# Patient Record
Sex: FEMALE | Race: WHITE | Employment: PART TIME | URBAN - NONMETROPOLITAN AREA
[De-identification: names, ages, dates, MRNs, and addresses within clinical notes are randomized per-mention and may not be internally consistent; named-entity substitution may affect disease eponyms.]

---

## 2017-02-21 ENCOUNTER — OFFICE VISIT (OUTPATIENT)
Dept: FAMILY MEDICINE CLINIC | Age: 67
End: 2017-02-21

## 2017-02-21 VITALS
SYSTOLIC BLOOD PRESSURE: 128 MMHG | DIASTOLIC BLOOD PRESSURE: 76 MMHG | BODY MASS INDEX: 31.59 KG/M2 | OXYGEN SATURATION: 98 % | HEART RATE: 97 BPM | HEIGHT: 65 IN | WEIGHT: 189.6 LBS

## 2017-02-21 DIAGNOSIS — R07.89 CHEST TIGHTNESS: ICD-10-CM

## 2017-02-21 DIAGNOSIS — M50.20 DISPLACEMENT OF CERVICAL INTERVERTEBRAL DISC WITHOUT MYELOPATHY: Primary | ICD-10-CM

## 2017-02-21 DIAGNOSIS — M54.2 NECK PAIN: ICD-10-CM

## 2017-02-21 DIAGNOSIS — F32.3 DEPRESSIVE PSYCHOSIS (HCC): ICD-10-CM

## 2017-02-21 PROCEDURE — 99213 OFFICE O/P EST LOW 20 MIN: CPT | Performed by: FAMILY MEDICINE

## 2017-02-21 RX ORDER — TRAMADOL HYDROCHLORIDE 50 MG/1
TABLET ORAL
Qty: 120 TABLET | Refills: 0 | Status: SHIPPED | OUTPATIENT
Start: 2017-02-21 | End: 2017-05-05 | Stop reason: SDUPTHER

## 2017-02-21 RX ORDER — GABAPENTIN 100 MG/1
CAPSULE ORAL
Qty: 120 CAPSULE | Refills: 11 | Status: SHIPPED | OUTPATIENT
Start: 2017-02-21 | End: 2017-08-21 | Stop reason: DRUGHIGH

## 2017-02-25 LAB
6-ACETYLMORPHINE: NOT DETECTED
7-AMINOCLONAZEPAM: NOT DETECTED
ALPHA-OH-ALPRAZOLAM: NOT DETECTED
ALPRAZOLAM: NOT DETECTED
AMPHETAMINE: NOT DETECTED
BARBITURATES: NOT DETECTED
BENZOYLECGONINE: NOT DETECTED
BUPRENORPHINE: NOT DETECTED
CARISOPRODOL: NOT DETECTED
CLONAZEPAM: NOT DETECTED
CODEINE: NOT DETECTED
CREATININE URINE: 264.1 MG/DL (ref 20–400)
DIAZEPAM: NOT DETECTED
DRUGS EXPECTED: NORMAL
EER PAIN MGT DRUG PANEL, HIGH RES/EMIT U: NORMAL
ETHYL GLUCURONIDE: NOT DETECTED
FENTANYL: NOT DETECTED
HYDROCODONE: NOT DETECTED
HYDROMORPHONE: NOT DETECTED
LORAZEPAM: NOT DETECTED
MARIJUANA METABOLITE: NOT DETECTED
MDA: NOT DETECTED
MDEA: NOT DETECTED
MDMA URINE: NOT DETECTED
MEPERIDINE: NOT DETECTED
METHADONE: NOT DETECTED
METHAMPHETAMINE: NOT DETECTED
METHYLPHENIDATE: NOT DETECTED
MIDAZOLAM: NOT DETECTED
MORPHINE: NOT DETECTED
NORBUPRENORPHINE, FREE: NOT DETECTED
NORDIAZEPAM: NOT DETECTED
NORFENTANYL: NOT DETECTED
NORHYDROCODONE, URINE: NOT DETECTED
NOROXYCODONE: NOT DETECTED
NOROXYMORPHONE, URINE: NOT DETECTED
OXAZEPAM: NOT DETECTED
OXYCODONE: NOT DETECTED
OXYMORPHONE: NOT DETECTED
PAIN MANAGEMENT DRUG PANEL: NORMAL
PAIN MANAGEMENT DRUG PANEL: NORMAL
PCP: NOT DETECTED
PHENTERMINE: NOT DETECTED
PROPOXYPHENE: NOT DETECTED
TAPENTADOL, URINE: NOT DETECTED
TAPENTADOL-O-SULFATE, URINE: NOT DETECTED
TEMAZEPAM: NOT DETECTED
TRAMADOL: PRESENT
ZOLPIDEM: NOT DETECTED

## 2017-03-01 ENCOUNTER — TELEPHONE (OUTPATIENT)
Dept: FAMILY MEDICINE CLINIC | Age: 67
End: 2017-03-01

## 2017-03-07 ENCOUNTER — NURSE ONLY (OUTPATIENT)
Dept: FAMILY MEDICINE CLINIC | Age: 67
End: 2017-03-07

## 2017-03-07 DIAGNOSIS — R07.89 CHEST TIGHTNESS: ICD-10-CM

## 2017-03-07 DIAGNOSIS — R07.9 CHEST PAIN, UNSPECIFIED TYPE: Primary | ICD-10-CM

## 2017-03-07 PROCEDURE — 93000 ELECTROCARDIOGRAM COMPLETE: CPT | Performed by: FAMILY MEDICINE

## 2017-03-20 ENCOUNTER — HOSPITAL ENCOUNTER (OUTPATIENT)
Dept: OTHER | Age: 67
Discharge: HOME | End: 2017-03-20
Admitting: FAMILY MEDICINE

## 2017-03-20 ENCOUNTER — HOSPITAL ENCOUNTER (OUTPATIENT)
Dept: NUCLEAR MEDICINE | Age: 67
Discharge: HOME | End: 2017-03-20
Attending: FAMILY MEDICINE | Admitting: FAMILY MEDICINE

## 2017-03-20 DIAGNOSIS — R07.9 CHEST PAIN: ICD-10-CM

## 2017-03-20 DIAGNOSIS — R94.31 ABNORMAL EKG: Primary | ICD-10-CM

## 2017-03-20 LAB
LV EF: 27 %
LVEF MODALITY: NORMAL

## 2017-03-20 RX ORDER — ASPIRIN 81 MG/1
81 TABLET ORAL DAILY
Qty: 30 TABLET | Refills: 3 | COMMUNITY
Start: 2017-03-20

## 2017-03-20 RX ORDER — ATORVASTATIN CALCIUM 40 MG/1
80 TABLET, FILM COATED ORAL DAILY
Qty: 60 TABLET | Refills: 11 | Status: SHIPPED | OUTPATIENT
Start: 2017-03-20 | End: 2018-03-05 | Stop reason: SDUPTHER

## 2017-03-20 RX ORDER — METOPROLOL SUCCINATE 25 MG/1
25 TABLET, EXTENDED RELEASE ORAL DAILY
Qty: 30 TABLET | Refills: 11 | Status: SHIPPED | OUTPATIENT
Start: 2017-03-20 | End: 2017-03-30 | Stop reason: SDUPTHER

## 2017-03-21 ENCOUNTER — OFFICE VISIT (OUTPATIENT)
Dept: CARDIOLOGY CLINIC | Age: 67
End: 2017-03-21

## 2017-03-21 VITALS
DIASTOLIC BLOOD PRESSURE: 76 MMHG | BODY MASS INDEX: 31.49 KG/M2 | HEIGHT: 65 IN | HEART RATE: 82 BPM | WEIGHT: 189 LBS | SYSTOLIC BLOOD PRESSURE: 126 MMHG

## 2017-03-21 DIAGNOSIS — R07.2 PRECORDIAL PAIN: ICD-10-CM

## 2017-03-21 DIAGNOSIS — R94.39 ABNORMAL STRESS TEST: Primary | ICD-10-CM

## 2017-03-21 DIAGNOSIS — R94.31 ABNORMAL ECG: ICD-10-CM

## 2017-03-21 PROCEDURE — 3014F SCREEN MAMMO DOC REV: CPT | Performed by: INTERNAL MEDICINE

## 2017-03-21 PROCEDURE — 1123F ACP DISCUSS/DSCN MKR DOCD: CPT | Performed by: INTERNAL MEDICINE

## 2017-03-21 PROCEDURE — 99205 OFFICE O/P NEW HI 60 MIN: CPT | Performed by: INTERNAL MEDICINE

## 2017-03-21 PROCEDURE — G8417 CALC BMI ABV UP PARAM F/U: HCPCS | Performed by: INTERNAL MEDICINE

## 2017-03-21 PROCEDURE — 1090F PRES/ABSN URINE INCON ASSESS: CPT | Performed by: INTERNAL MEDICINE

## 2017-03-21 PROCEDURE — G8484 FLU IMMUNIZE NO ADMIN: HCPCS | Performed by: INTERNAL MEDICINE

## 2017-03-21 PROCEDURE — 3017F COLORECTAL CA SCREEN DOC REV: CPT | Performed by: INTERNAL MEDICINE

## 2017-03-21 PROCEDURE — G8400 PT W/DXA NO RESULTS DOC: HCPCS | Performed by: INTERNAL MEDICINE

## 2017-03-21 PROCEDURE — G8427 DOCREV CUR MEDS BY ELIG CLIN: HCPCS | Performed by: INTERNAL MEDICINE

## 2017-03-21 PROCEDURE — 1036F TOBACCO NON-USER: CPT | Performed by: INTERNAL MEDICINE

## 2017-03-21 PROCEDURE — 4040F PNEUMOC VAC/ADMIN/RCVD: CPT | Performed by: INTERNAL MEDICINE

## 2017-03-21 RX ORDER — NITROGLYCERIN 0.4 MG/1
0.4 TABLET SUBLINGUAL EVERY 5 MIN PRN
Qty: 25 TABLET | Refills: 3 | Status: SHIPPED | OUTPATIENT
Start: 2017-03-21 | End: 2019-04-30 | Stop reason: CLARIF

## 2017-03-22 PROBLEM — I10 HTN (HYPERTENSION): Status: ACTIVE | Noted: 2017-03-22

## 2017-03-22 PROBLEM — R07.9 CHEST PAIN: Status: ACTIVE | Noted: 2017-03-21

## 2017-03-23 ENCOUNTER — HOSPITAL ENCOUNTER (OUTPATIENT)
Dept: OTHER | Age: 67
Discharge: HOME | End: 2017-03-23
Attending: INTERNAL MEDICINE | Admitting: INTERNAL MEDICINE

## 2017-03-24 PROBLEM — I50.22 SYSTOLIC CHF, CHRONIC (HCC): Status: ACTIVE | Noted: 2017-03-24

## 2017-03-24 PROBLEM — I42.9 CARDIOMYOPATHY, IDIOPATHIC (HCC): Status: ACTIVE | Noted: 2017-03-24

## 2017-03-27 ENCOUNTER — TELEPHONE (OUTPATIENT)
Dept: CARDIAC REHAB | Age: 67
End: 2017-03-27

## 2017-03-28 ENCOUNTER — TELEPHONE (OUTPATIENT)
Dept: CARDIOLOGY CLINIC | Age: 67
End: 2017-03-28

## 2017-03-30 ENCOUNTER — OFFICE VISIT (OUTPATIENT)
Dept: CARDIOLOGY CLINIC | Age: 67
End: 2017-03-30

## 2017-03-30 ENCOUNTER — HOSPITAL ENCOUNTER (OUTPATIENT)
Dept: GENERAL RADIOLOGY | Age: 67
Discharge: HOME | End: 2017-03-30
Attending: NURSE PRACTITIONER | Admitting: NURSE PRACTITIONER

## 2017-03-30 VITALS
WEIGHT: 184 LBS | BODY MASS INDEX: 30.66 KG/M2 | DIASTOLIC BLOOD PRESSURE: 60 MMHG | OXYGEN SATURATION: 94 % | HEART RATE: 84 BPM | HEIGHT: 65 IN | SYSTOLIC BLOOD PRESSURE: 110 MMHG

## 2017-03-30 DIAGNOSIS — I50.20 SYSTOLIC HEART FAILURE, UNSPECIFIED HEART FAILURE CHRONICITY: Primary | ICD-10-CM

## 2017-03-30 DIAGNOSIS — I50.21 ACUTE SYSTOLIC CONGESTIVE HEART FAILURE (HCC): ICD-10-CM

## 2017-03-30 DIAGNOSIS — I50.20 SYSTOLIC HEART FAILURE, UNSPECIFIED HEART FAILURE CHRONICITY: ICD-10-CM

## 2017-03-30 DIAGNOSIS — I10 ESSENTIAL HYPERTENSION: ICD-10-CM

## 2017-03-30 DIAGNOSIS — I42.9 CARDIOMYOPATHY, IDIOPATHIC (HCC): ICD-10-CM

## 2017-03-30 DIAGNOSIS — R06.02 SHORTNESS OF BREATH: ICD-10-CM

## 2017-03-30 LAB
ANION GAP SERPL CALCULATED.3IONS-SCNC: 13 MMOL/L (ref 3–16)
BUN BLDV-MCNC: 20 MG/DL (ref 7–20)
CALCIUM SERPL-MCNC: 9.6 MG/DL (ref 8.3–10.6)
CHLORIDE BLD-SCNC: 100 MMOL/L (ref 99–110)
CO2: 26 MMOL/L (ref 21–32)
CREAT SERPL-MCNC: 0.8 MG/DL (ref 0.6–1.2)
GFR AFRICAN AMERICAN: >60
GFR NON-AFRICAN AMERICAN: >60
GLUCOSE BLD-MCNC: 95 MG/DL (ref 70–99)
POTASSIUM SERPL-SCNC: 4.3 MMOL/L (ref 3.5–5.1)
PRO-BNP: 867 PG/ML (ref 0–124)
SODIUM BLD-SCNC: 139 MMOL/L (ref 136–145)

## 2017-03-30 PROCEDURE — 1090F PRES/ABSN URINE INCON ASSESS: CPT | Performed by: NURSE PRACTITIONER

## 2017-03-30 PROCEDURE — 99215 OFFICE O/P EST HI 40 MIN: CPT | Performed by: NURSE PRACTITIONER

## 2017-03-30 PROCEDURE — 4040F PNEUMOC VAC/ADMIN/RCVD: CPT | Performed by: NURSE PRACTITIONER

## 2017-03-30 PROCEDURE — G8417 CALC BMI ABV UP PARAM F/U: HCPCS | Performed by: NURSE PRACTITIONER

## 2017-03-30 PROCEDURE — 1123F ACP DISCUSS/DSCN MKR DOCD: CPT | Performed by: NURSE PRACTITIONER

## 2017-03-30 PROCEDURE — G8427 DOCREV CUR MEDS BY ELIG CLIN: HCPCS | Performed by: NURSE PRACTITIONER

## 2017-03-30 PROCEDURE — 3014F SCREEN MAMMO DOC REV: CPT | Performed by: NURSE PRACTITIONER

## 2017-03-30 PROCEDURE — 1111F DSCHRG MED/CURRENT MED MERGE: CPT | Performed by: NURSE PRACTITIONER

## 2017-03-30 PROCEDURE — 1036F TOBACCO NON-USER: CPT | Performed by: NURSE PRACTITIONER

## 2017-03-30 PROCEDURE — 3017F COLORECTAL CA SCREEN DOC REV: CPT | Performed by: NURSE PRACTITIONER

## 2017-03-30 PROCEDURE — G8598 ASA/ANTIPLAT THER USED: HCPCS | Performed by: NURSE PRACTITIONER

## 2017-03-30 PROCEDURE — G8484 FLU IMMUNIZE NO ADMIN: HCPCS | Performed by: NURSE PRACTITIONER

## 2017-03-30 PROCEDURE — G8400 PT W/DXA NO RESULTS DOC: HCPCS | Performed by: NURSE PRACTITIONER

## 2017-03-30 RX ORDER — METOPROLOL SUCCINATE 25 MG/1
50 TABLET, EXTENDED RELEASE ORAL DAILY
Qty: 30 TABLET | Refills: 11 | Status: SHIPPED | OUTPATIENT
Start: 2017-03-30 | End: 2017-04-11 | Stop reason: SDUPTHER

## 2017-03-31 ENCOUNTER — TELEPHONE (OUTPATIENT)
Dept: CARDIOLOGY CLINIC | Age: 67
End: 2017-03-31

## 2017-04-03 ENCOUNTER — TELEPHONE (OUTPATIENT)
Dept: FAMILY MEDICINE CLINIC | Age: 67
End: 2017-04-03

## 2017-04-03 ENCOUNTER — TELEPHONE (OUTPATIENT)
Dept: CARDIOLOGY CLINIC | Age: 67
End: 2017-04-03

## 2017-04-10 ENCOUNTER — TELEPHONE (OUTPATIENT)
Dept: CARDIOLOGY | Age: 67
End: 2017-04-10

## 2017-04-10 ENCOUNTER — OFFICE VISIT (OUTPATIENT)
Dept: FAMILY MEDICINE CLINIC | Age: 67
End: 2017-04-10

## 2017-04-10 VITALS
BODY MASS INDEX: 30.49 KG/M2 | HEIGHT: 65 IN | WEIGHT: 183 LBS | DIASTOLIC BLOOD PRESSURE: 68 MMHG | HEART RATE: 73 BPM | OXYGEN SATURATION: 96 % | SYSTOLIC BLOOD PRESSURE: 122 MMHG

## 2017-04-10 DIAGNOSIS — I50.22 SYSTOLIC CHF, CHRONIC (HCC): Primary | ICD-10-CM

## 2017-04-10 DIAGNOSIS — I10 ESSENTIAL HYPERTENSION: ICD-10-CM

## 2017-04-10 DIAGNOSIS — M54.2 NECK PAIN: ICD-10-CM

## 2017-04-10 DIAGNOSIS — E78.2 MIXED HYPERLIPIDEMIA: ICD-10-CM

## 2017-04-10 DIAGNOSIS — I42.9 CARDIOMYOPATHY, IDIOPATHIC (HCC): ICD-10-CM

## 2017-04-10 PROCEDURE — G8400 PT W/DXA NO RESULTS DOC: HCPCS | Performed by: FAMILY MEDICINE

## 2017-04-10 PROCEDURE — 1123F ACP DISCUSS/DSCN MKR DOCD: CPT | Performed by: FAMILY MEDICINE

## 2017-04-10 PROCEDURE — 3017F COLORECTAL CA SCREEN DOC REV: CPT | Performed by: FAMILY MEDICINE

## 2017-04-10 PROCEDURE — 1111F DSCHRG MED/CURRENT MED MERGE: CPT | Performed by: FAMILY MEDICINE

## 2017-04-10 PROCEDURE — 3014F SCREEN MAMMO DOC REV: CPT | Performed by: FAMILY MEDICINE

## 2017-04-10 PROCEDURE — 1036F TOBACCO NON-USER: CPT | Performed by: FAMILY MEDICINE

## 2017-04-10 PROCEDURE — 4040F PNEUMOC VAC/ADMIN/RCVD: CPT | Performed by: FAMILY MEDICINE

## 2017-04-10 PROCEDURE — G8427 DOCREV CUR MEDS BY ELIG CLIN: HCPCS | Performed by: FAMILY MEDICINE

## 2017-04-10 PROCEDURE — 99214 OFFICE O/P EST MOD 30 MIN: CPT | Performed by: FAMILY MEDICINE

## 2017-04-10 PROCEDURE — G8417 CALC BMI ABV UP PARAM F/U: HCPCS | Performed by: FAMILY MEDICINE

## 2017-04-10 PROCEDURE — 1090F PRES/ABSN URINE INCON ASSESS: CPT | Performed by: FAMILY MEDICINE

## 2017-04-10 PROCEDURE — G8598 ASA/ANTIPLAT THER USED: HCPCS | Performed by: FAMILY MEDICINE

## 2017-04-10 ASSESSMENT — PATIENT HEALTH QUESTIONNAIRE - PHQ9
SUM OF ALL RESPONSES TO PHQ9 QUESTIONS 1 & 2: 0
2. FEELING DOWN, DEPRESSED OR HOPELESS: 0
SUM OF ALL RESPONSES TO PHQ QUESTIONS 1-9: 0
1. LITTLE INTEREST OR PLEASURE IN DOING THINGS: 0

## 2017-04-11 ENCOUNTER — HOSPITAL ENCOUNTER (OUTPATIENT)
Dept: GENERAL RADIOLOGY | Age: 67
Discharge: HOME | End: 2017-04-11
Attending: NURSE PRACTITIONER | Admitting: NURSE PRACTITIONER

## 2017-04-11 ENCOUNTER — OFFICE VISIT (OUTPATIENT)
Dept: CARDIOLOGY CLINIC | Age: 67
End: 2017-04-11

## 2017-04-11 VITALS
HEART RATE: 66 BPM | SYSTOLIC BLOOD PRESSURE: 115 MMHG | DIASTOLIC BLOOD PRESSURE: 66 MMHG | WEIGHT: 179.8 LBS | BODY MASS INDEX: 30.39 KG/M2

## 2017-04-11 DIAGNOSIS — I50.21 ACUTE SYSTOLIC CONGESTIVE HEART FAILURE (HCC): Primary | ICD-10-CM

## 2017-04-11 DIAGNOSIS — I42.9 CARDIOMYOPATHY, IDIOPATHIC (HCC): ICD-10-CM

## 2017-04-11 LAB
ANION GAP SERPL CALCULATED.3IONS-SCNC: 12 MMOL/L (ref 3–16)
BUN BLDV-MCNC: 16 MG/DL (ref 7–20)
CALCIUM SERPL-MCNC: 9.5 MG/DL (ref 8.3–10.6)
CHLORIDE BLD-SCNC: 96 MMOL/L (ref 99–110)
CO2: 27 MMOL/L (ref 21–32)
CREAT SERPL-MCNC: 0.9 MG/DL (ref 0.6–1.2)
GFR AFRICAN AMERICAN: >60
GFR NON-AFRICAN AMERICAN: >60
GLUCOSE BLD-MCNC: 97 MG/DL (ref 70–99)
POTASSIUM SERPL-SCNC: 5 MMOL/L (ref 3.5–5.1)
SODIUM BLD-SCNC: 135 MMOL/L (ref 136–145)

## 2017-04-11 PROCEDURE — 3014F SCREEN MAMMO DOC REV: CPT | Performed by: NURSE PRACTITIONER

## 2017-04-11 PROCEDURE — 1123F ACP DISCUSS/DSCN MKR DOCD: CPT | Performed by: NURSE PRACTITIONER

## 2017-04-11 PROCEDURE — 4040F PNEUMOC VAC/ADMIN/RCVD: CPT | Performed by: NURSE PRACTITIONER

## 2017-04-11 PROCEDURE — 3017F COLORECTAL CA SCREEN DOC REV: CPT | Performed by: NURSE PRACTITIONER

## 2017-04-11 PROCEDURE — G8427 DOCREV CUR MEDS BY ELIG CLIN: HCPCS | Performed by: NURSE PRACTITIONER

## 2017-04-11 PROCEDURE — G8400 PT W/DXA NO RESULTS DOC: HCPCS | Performed by: NURSE PRACTITIONER

## 2017-04-11 PROCEDURE — 1090F PRES/ABSN URINE INCON ASSESS: CPT | Performed by: NURSE PRACTITIONER

## 2017-04-11 PROCEDURE — G8417 CALC BMI ABV UP PARAM F/U: HCPCS | Performed by: NURSE PRACTITIONER

## 2017-04-11 PROCEDURE — G8598 ASA/ANTIPLAT THER USED: HCPCS | Performed by: NURSE PRACTITIONER

## 2017-04-11 PROCEDURE — 1111F DSCHRG MED/CURRENT MED MERGE: CPT | Performed by: NURSE PRACTITIONER

## 2017-04-11 PROCEDURE — 99214 OFFICE O/P EST MOD 30 MIN: CPT | Performed by: NURSE PRACTITIONER

## 2017-04-11 PROCEDURE — 1036F TOBACCO NON-USER: CPT | Performed by: NURSE PRACTITIONER

## 2017-04-11 RX ORDER — METOPROLOL SUCCINATE 50 MG/1
50 TABLET, EXTENDED RELEASE ORAL DAILY
Qty: 30 TABLET | Refills: 3 | Status: SHIPPED | OUTPATIENT
Start: 2017-04-11 | End: 2017-08-08 | Stop reason: SDUPTHER

## 2017-04-11 RX ORDER — LISINOPRIL 5 MG/1
10 TABLET ORAL DAILY
Qty: 30 TABLET | Refills: 3
Start: 2017-04-11 | End: 2017-04-11 | Stop reason: SDUPTHER

## 2017-04-11 RX ORDER — LISINOPRIL 10 MG/1
10 TABLET ORAL DAILY
Qty: 30 TABLET | Refills: 3 | Status: SHIPPED | OUTPATIENT
Start: 2017-04-11 | End: 2017-04-25 | Stop reason: SDUPTHER

## 2017-04-11 RX ORDER — LORAZEPAM 1 MG/1
TABLET ORAL
Qty: 2 TABLET | Refills: 0 | Status: SHIPPED | OUTPATIENT
Start: 2017-04-11 | End: 2017-11-10

## 2017-04-12 ENCOUNTER — TELEPHONE (OUTPATIENT)
Dept: CARDIOLOGY CLINIC | Age: 67
End: 2017-04-12

## 2017-04-12 DIAGNOSIS — Z79.899 MEDICATION MANAGEMENT: Primary | ICD-10-CM

## 2017-04-18 ENCOUNTER — HOSPITAL ENCOUNTER (OUTPATIENT)
Dept: OTHER | Age: 67
Discharge: HOME | End: 2017-04-18
Attending: NURSE PRACTITIONER | Admitting: NURSE PRACTITIONER

## 2017-04-18 ENCOUNTER — TELEPHONE (OUTPATIENT)
Dept: CARDIOLOGY CLINIC | Age: 67
End: 2017-04-18

## 2017-04-18 LAB
ANION GAP SERPL CALCULATED.3IONS-SCNC: 10 MMOL/L (ref 3–16)
BUN BLDV-MCNC: 16 MG/DL (ref 7–20)
CALCIUM SERPL-MCNC: 9.4 MG/DL (ref 8.3–10.6)
CHLORIDE BLD-SCNC: 103 MMOL/L (ref 99–110)
CO2: 29 MMOL/L (ref 21–32)
CREAT SERPL-MCNC: 0.8 MG/DL (ref 0.6–1.2)
GFR AFRICAN AMERICAN: >60
GFR NON-AFRICAN AMERICAN: >60
GLUCOSE BLD-MCNC: 125 MG/DL (ref 70–99)
POTASSIUM SERPL-SCNC: 4 MMOL/L (ref 3.5–5.1)
SODIUM BLD-SCNC: 142 MMOL/L (ref 136–145)

## 2017-04-25 ENCOUNTER — OFFICE VISIT (OUTPATIENT)
Dept: CARDIOLOGY CLINIC | Age: 67
End: 2017-04-25

## 2017-04-25 VITALS
SYSTOLIC BLOOD PRESSURE: 106 MMHG | BODY MASS INDEX: 30.86 KG/M2 | OXYGEN SATURATION: 96 % | HEART RATE: 65 BPM | DIASTOLIC BLOOD PRESSURE: 59 MMHG | WEIGHT: 182.6 LBS

## 2017-04-25 DIAGNOSIS — I10 ESSENTIAL HYPERTENSION: ICD-10-CM

## 2017-04-25 DIAGNOSIS — I42.9 CARDIOMYOPATHY, IDIOPATHIC (HCC): Primary | ICD-10-CM

## 2017-04-25 DIAGNOSIS — I50.22 SYSTOLIC CHF, CHRONIC (HCC): ICD-10-CM

## 2017-04-25 PROCEDURE — 1123F ACP DISCUSS/DSCN MKR DOCD: CPT | Performed by: NURSE PRACTITIONER

## 2017-04-25 PROCEDURE — G8427 DOCREV CUR MEDS BY ELIG CLIN: HCPCS | Performed by: NURSE PRACTITIONER

## 2017-04-25 PROCEDURE — 99214 OFFICE O/P EST MOD 30 MIN: CPT | Performed by: NURSE PRACTITIONER

## 2017-04-25 PROCEDURE — 3014F SCREEN MAMMO DOC REV: CPT | Performed by: NURSE PRACTITIONER

## 2017-04-25 PROCEDURE — G8598 ASA/ANTIPLAT THER USED: HCPCS | Performed by: NURSE PRACTITIONER

## 2017-04-25 PROCEDURE — 3017F COLORECTAL CA SCREEN DOC REV: CPT | Performed by: NURSE PRACTITIONER

## 2017-04-25 PROCEDURE — 1036F TOBACCO NON-USER: CPT | Performed by: NURSE PRACTITIONER

## 2017-04-25 PROCEDURE — G8400 PT W/DXA NO RESULTS DOC: HCPCS | Performed by: NURSE PRACTITIONER

## 2017-04-25 PROCEDURE — 1090F PRES/ABSN URINE INCON ASSESS: CPT | Performed by: NURSE PRACTITIONER

## 2017-04-25 PROCEDURE — 4040F PNEUMOC VAC/ADMIN/RCVD: CPT | Performed by: NURSE PRACTITIONER

## 2017-04-25 PROCEDURE — G8417 CALC BMI ABV UP PARAM F/U: HCPCS | Performed by: NURSE PRACTITIONER

## 2017-04-25 RX ORDER — LISINOPRIL 10 MG/1
10 TABLET ORAL 2 TIMES DAILY
Qty: 60 TABLET | Refills: 3 | Status: SHIPPED | OUTPATIENT
Start: 2017-04-25 | End: 2017-05-09 | Stop reason: SDUPTHER

## 2017-05-04 ENCOUNTER — HOSPITAL ENCOUNTER (OUTPATIENT)
Dept: OTHER | Age: 67
Discharge: HOME | End: 2017-05-04
Attending: NURSE PRACTITIONER | Admitting: NURSE PRACTITIONER

## 2017-05-04 LAB
ANION GAP SERPL CALCULATED.3IONS-SCNC: 11 MMOL/L (ref 3–16)
BUN BLDV-MCNC: 19 MG/DL (ref 7–20)
CALCIUM SERPL-MCNC: 9.8 MG/DL (ref 8.3–10.6)
CHLORIDE BLD-SCNC: 104 MMOL/L (ref 99–110)
CO2: 27 MMOL/L (ref 21–32)
CREAT SERPL-MCNC: 0.9 MG/DL (ref 0.6–1.2)
GFR AFRICAN AMERICAN: >60
GFR NON-AFRICAN AMERICAN: >60
GLUCOSE BLD-MCNC: 103 MG/DL (ref 70–99)
POTASSIUM SERPL-SCNC: 4.2 MMOL/L (ref 3.5–5.1)
PRO-BNP: 455 PG/ML (ref 0–124)
SODIUM BLD-SCNC: 142 MMOL/L (ref 136–145)

## 2017-05-05 DIAGNOSIS — M50.20 DISPLACEMENT OF CERVICAL INTERVERTEBRAL DISC WITHOUT MYELOPATHY: ICD-10-CM

## 2017-05-05 DIAGNOSIS — M54.2 NECK PAIN: ICD-10-CM

## 2017-05-05 RX ORDER — TRAMADOL HYDROCHLORIDE 50 MG/1
TABLET ORAL
Qty: 120 TABLET | Refills: 0 | Status: SHIPPED | OUTPATIENT
Start: 2017-05-05 | End: 2017-08-21 | Stop reason: SDUPTHER

## 2017-05-09 ENCOUNTER — OFFICE VISIT (OUTPATIENT)
Dept: CARDIOLOGY CLINIC | Age: 67
End: 2017-05-09

## 2017-05-09 VITALS
SYSTOLIC BLOOD PRESSURE: 108 MMHG | DIASTOLIC BLOOD PRESSURE: 61 MMHG | HEART RATE: 65 BPM | BODY MASS INDEX: 30.25 KG/M2 | WEIGHT: 179 LBS

## 2017-05-09 DIAGNOSIS — I42.9 CARDIOMYOPATHY, IDIOPATHIC (HCC): Primary | ICD-10-CM

## 2017-05-09 DIAGNOSIS — I50.22 SYSTOLIC CHF, CHRONIC (HCC): ICD-10-CM

## 2017-05-09 DIAGNOSIS — I10 ESSENTIAL HYPERTENSION: ICD-10-CM

## 2017-05-09 PROCEDURE — 1036F TOBACCO NON-USER: CPT | Performed by: NURSE PRACTITIONER

## 2017-05-09 PROCEDURE — G8427 DOCREV CUR MEDS BY ELIG CLIN: HCPCS | Performed by: NURSE PRACTITIONER

## 2017-05-09 PROCEDURE — 3017F COLORECTAL CA SCREEN DOC REV: CPT | Performed by: NURSE PRACTITIONER

## 2017-05-09 PROCEDURE — G8598 ASA/ANTIPLAT THER USED: HCPCS | Performed by: NURSE PRACTITIONER

## 2017-05-09 PROCEDURE — 1090F PRES/ABSN URINE INCON ASSESS: CPT | Performed by: NURSE PRACTITIONER

## 2017-05-09 PROCEDURE — G8400 PT W/DXA NO RESULTS DOC: HCPCS | Performed by: NURSE PRACTITIONER

## 2017-05-09 PROCEDURE — G8417 CALC BMI ABV UP PARAM F/U: HCPCS | Performed by: NURSE PRACTITIONER

## 2017-05-09 PROCEDURE — 3014F SCREEN MAMMO DOC REV: CPT | Performed by: NURSE PRACTITIONER

## 2017-05-09 PROCEDURE — 4040F PNEUMOC VAC/ADMIN/RCVD: CPT | Performed by: NURSE PRACTITIONER

## 2017-05-09 PROCEDURE — 1123F ACP DISCUSS/DSCN MKR DOCD: CPT | Performed by: NURSE PRACTITIONER

## 2017-05-09 PROCEDURE — 99214 OFFICE O/P EST MOD 30 MIN: CPT | Performed by: NURSE PRACTITIONER

## 2017-05-09 RX ORDER — LISINOPRIL 10 MG/1
30 TABLET ORAL DAILY
Qty: 60 TABLET | Refills: 3
Start: 2017-05-09 | End: 2017-05-23 | Stop reason: SDUPTHER

## 2017-05-15 ENCOUNTER — TELEPHONE (OUTPATIENT)
Dept: CARDIOLOGY | Age: 67
End: 2017-05-15

## 2017-05-19 ENCOUNTER — TELEPHONE (OUTPATIENT)
Dept: CARDIOLOGY CLINIC | Age: 67
End: 2017-05-19

## 2017-05-21 ENCOUNTER — HOSPITAL ENCOUNTER (OUTPATIENT)
Dept: OTHER | Age: 67
Discharge: HOME | End: 2017-05-21
Attending: NURSE PRACTITIONER | Admitting: NURSE PRACTITIONER

## 2017-05-21 LAB
ANION GAP SERPL CALCULATED.3IONS-SCNC: 11 MMOL/L (ref 3–16)
BUN BLDV-MCNC: 16 MG/DL (ref 7–20)
CALCIUM SERPL-MCNC: 9.4 MG/DL (ref 8.3–10.6)
CHLORIDE BLD-SCNC: 103 MMOL/L (ref 99–110)
CO2: 27 MMOL/L (ref 21–32)
CREAT SERPL-MCNC: 1 MG/DL (ref 0.6–1.2)
GFR AFRICAN AMERICAN: >60
GFR NON-AFRICAN AMERICAN: 55
GLUCOSE BLD-MCNC: 90 MG/DL (ref 70–99)
POTASSIUM SERPL-SCNC: 4.9 MMOL/L (ref 3.5–5.1)
SODIUM BLD-SCNC: 141 MMOL/L (ref 136–145)

## 2017-05-22 ENCOUNTER — OFFICE VISIT (OUTPATIENT)
Dept: FAMILY MEDICINE CLINIC | Age: 67
End: 2017-05-22

## 2017-05-22 ENCOUNTER — TELEPHONE (OUTPATIENT)
Dept: CARDIOLOGY CLINIC | Age: 67
End: 2017-05-22

## 2017-05-22 VITALS
SYSTOLIC BLOOD PRESSURE: 120 MMHG | TEMPERATURE: 99.3 F | HEART RATE: 77 BPM | OXYGEN SATURATION: 97 % | BODY MASS INDEX: 29.66 KG/M2 | DIASTOLIC BLOOD PRESSURE: 72 MMHG | WEIGHT: 178 LBS | HEIGHT: 65 IN

## 2017-05-22 DIAGNOSIS — I42.9 CARDIOMYOPATHY, IDIOPATHIC (HCC): ICD-10-CM

## 2017-05-22 DIAGNOSIS — M50.20 DISPLACEMENT OF CERVICAL INTERVERTEBRAL DISC WITHOUT MYELOPATHY: Primary | ICD-10-CM

## 2017-05-22 PROCEDURE — 3014F SCREEN MAMMO DOC REV: CPT | Performed by: FAMILY MEDICINE

## 2017-05-22 PROCEDURE — 99213 OFFICE O/P EST LOW 20 MIN: CPT | Performed by: FAMILY MEDICINE

## 2017-05-22 PROCEDURE — 1123F ACP DISCUSS/DSCN MKR DOCD: CPT | Performed by: FAMILY MEDICINE

## 2017-05-22 PROCEDURE — G8400 PT W/DXA NO RESULTS DOC: HCPCS | Performed by: FAMILY MEDICINE

## 2017-05-22 PROCEDURE — G8417 CALC BMI ABV UP PARAM F/U: HCPCS | Performed by: FAMILY MEDICINE

## 2017-05-22 PROCEDURE — 1090F PRES/ABSN URINE INCON ASSESS: CPT | Performed by: FAMILY MEDICINE

## 2017-05-22 PROCEDURE — G8427 DOCREV CUR MEDS BY ELIG CLIN: HCPCS | Performed by: FAMILY MEDICINE

## 2017-05-22 PROCEDURE — 1036F TOBACCO NON-USER: CPT | Performed by: FAMILY MEDICINE

## 2017-05-22 PROCEDURE — G8598 ASA/ANTIPLAT THER USED: HCPCS | Performed by: FAMILY MEDICINE

## 2017-05-22 PROCEDURE — 3017F COLORECTAL CA SCREEN DOC REV: CPT | Performed by: FAMILY MEDICINE

## 2017-05-22 PROCEDURE — 4040F PNEUMOC VAC/ADMIN/RCVD: CPT | Performed by: FAMILY MEDICINE

## 2017-05-23 ENCOUNTER — OFFICE VISIT (OUTPATIENT)
Dept: CARDIOLOGY CLINIC | Age: 67
End: 2017-05-23

## 2017-05-23 VITALS
WEIGHT: 178 LBS | BODY MASS INDEX: 30.39 KG/M2 | SYSTOLIC BLOOD PRESSURE: 137 MMHG | HEIGHT: 64 IN | DIASTOLIC BLOOD PRESSURE: 70 MMHG | HEART RATE: 67 BPM

## 2017-05-23 DIAGNOSIS — I77.1 SUBCLAVIAN ARTERY STENOSIS, RIGHT (HCC): ICD-10-CM

## 2017-05-23 DIAGNOSIS — I36.1 NON-RHEUMATIC TRICUSPID VALVE INSUFFICIENCY: ICD-10-CM

## 2017-05-23 DIAGNOSIS — I50.22 SYSTOLIC CHF, CHRONIC (HCC): Primary | ICD-10-CM

## 2017-05-23 DIAGNOSIS — I34.0 MODERATE MITRAL REGURGITATION: ICD-10-CM

## 2017-05-23 PROCEDURE — 1123F ACP DISCUSS/DSCN MKR DOCD: CPT | Performed by: NURSE PRACTITIONER

## 2017-05-23 PROCEDURE — G8598 ASA/ANTIPLAT THER USED: HCPCS | Performed by: NURSE PRACTITIONER

## 2017-05-23 PROCEDURE — G8417 CALC BMI ABV UP PARAM F/U: HCPCS | Performed by: NURSE PRACTITIONER

## 2017-05-23 PROCEDURE — 3014F SCREEN MAMMO DOC REV: CPT | Performed by: NURSE PRACTITIONER

## 2017-05-23 PROCEDURE — 4040F PNEUMOC VAC/ADMIN/RCVD: CPT | Performed by: NURSE PRACTITIONER

## 2017-05-23 PROCEDURE — G8427 DOCREV CUR MEDS BY ELIG CLIN: HCPCS | Performed by: NURSE PRACTITIONER

## 2017-05-23 PROCEDURE — 3017F COLORECTAL CA SCREEN DOC REV: CPT | Performed by: NURSE PRACTITIONER

## 2017-05-23 PROCEDURE — 99215 OFFICE O/P EST HI 40 MIN: CPT | Performed by: NURSE PRACTITIONER

## 2017-05-23 PROCEDURE — 1036F TOBACCO NON-USER: CPT | Performed by: NURSE PRACTITIONER

## 2017-05-23 PROCEDURE — G8400 PT W/DXA NO RESULTS DOC: HCPCS | Performed by: NURSE PRACTITIONER

## 2017-05-23 PROCEDURE — 1090F PRES/ABSN URINE INCON ASSESS: CPT | Performed by: NURSE PRACTITIONER

## 2017-05-23 RX ORDER — LISINOPRIL 40 MG/1
40 TABLET ORAL NIGHTLY
Qty: 30 TABLET | Refills: 3 | Status: SHIPPED | OUTPATIENT
Start: 2017-05-23 | End: 2017-09-08 | Stop reason: SDUPTHER

## 2017-06-12 ENCOUNTER — CLINICAL DOCUMENTATION (OUTPATIENT)
Dept: SPIRITUAL SERVICES | Age: 67
End: 2017-06-12

## 2017-06-16 ENCOUNTER — OFFICE VISIT (OUTPATIENT)
Dept: VASCULAR SURGERY | Age: 67
End: 2017-06-16

## 2017-06-16 VITALS
HEIGHT: 65 IN | SYSTOLIC BLOOD PRESSURE: 138 MMHG | BODY MASS INDEX: 29.82 KG/M2 | DIASTOLIC BLOOD PRESSURE: 82 MMHG | HEART RATE: 61 BPM | WEIGHT: 179 LBS | OXYGEN SATURATION: 94 %

## 2017-06-16 DIAGNOSIS — M25.511 CHRONIC RIGHT SHOULDER PAIN: Primary | ICD-10-CM

## 2017-06-16 DIAGNOSIS — G89.29 CHRONIC RIGHT SHOULDER PAIN: Primary | ICD-10-CM

## 2017-06-16 DIAGNOSIS — I77.1 SUBCLAVIAN ARTERY STENOSIS, RIGHT (HCC): ICD-10-CM

## 2017-06-16 PROCEDURE — 3017F COLORECTAL CA SCREEN DOC REV: CPT | Performed by: SURGERY

## 2017-06-16 PROCEDURE — 4040F PNEUMOC VAC/ADMIN/RCVD: CPT | Performed by: SURGERY

## 2017-06-16 PROCEDURE — 3014F SCREEN MAMMO DOC REV: CPT | Performed by: SURGERY

## 2017-06-16 PROCEDURE — G8598 ASA/ANTIPLAT THER USED: HCPCS | Performed by: SURGERY

## 2017-06-16 PROCEDURE — 1036F TOBACCO NON-USER: CPT | Performed by: SURGERY

## 2017-06-16 PROCEDURE — 99214 OFFICE O/P EST MOD 30 MIN: CPT | Performed by: SURGERY

## 2017-06-16 PROCEDURE — G8417 CALC BMI ABV UP PARAM F/U: HCPCS | Performed by: SURGERY

## 2017-06-16 PROCEDURE — G8427 DOCREV CUR MEDS BY ELIG CLIN: HCPCS | Performed by: SURGERY

## 2017-06-16 PROCEDURE — 1090F PRES/ABSN URINE INCON ASSESS: CPT | Performed by: SURGERY

## 2017-06-21 ENCOUNTER — TELEPHONE (OUTPATIENT)
Dept: CARDIOLOGY CLINIC | Age: 67
End: 2017-06-21

## 2017-06-22 ENCOUNTER — OFFICE VISIT (OUTPATIENT)
Dept: CARDIOLOGY CLINIC | Age: 67
End: 2017-06-22

## 2017-06-22 ENCOUNTER — CLINICAL DOCUMENTATION (OUTPATIENT)
Dept: SPIRITUAL SERVICES | Age: 67
End: 2017-06-22

## 2017-06-22 VITALS
HEART RATE: 70 BPM | SYSTOLIC BLOOD PRESSURE: 118 MMHG | HEIGHT: 65 IN | BODY MASS INDEX: 29.82 KG/M2 | DIASTOLIC BLOOD PRESSURE: 68 MMHG | WEIGHT: 179 LBS

## 2017-06-22 DIAGNOSIS — F41.9 ANXIETY: ICD-10-CM

## 2017-06-22 DIAGNOSIS — I50.22 SYSTOLIC CHF, CHRONIC (HCC): Primary | ICD-10-CM

## 2017-06-22 DIAGNOSIS — I10 ESSENTIAL HYPERTENSION: ICD-10-CM

## 2017-06-22 DIAGNOSIS — I77.1 SUBCLAVIAN ARTERY STENOSIS, RIGHT (HCC): ICD-10-CM

## 2017-06-22 DIAGNOSIS — I42.9 CARDIOMYOPATHY, IDIOPATHIC (HCC): ICD-10-CM

## 2017-06-22 PROCEDURE — 99214 OFFICE O/P EST MOD 30 MIN: CPT | Performed by: NURSE PRACTITIONER

## 2017-06-22 PROCEDURE — 4040F PNEUMOC VAC/ADMIN/RCVD: CPT | Performed by: NURSE PRACTITIONER

## 2017-06-22 PROCEDURE — 1090F PRES/ABSN URINE INCON ASSESS: CPT | Performed by: NURSE PRACTITIONER

## 2017-06-22 PROCEDURE — G8598 ASA/ANTIPLAT THER USED: HCPCS | Performed by: NURSE PRACTITIONER

## 2017-06-22 PROCEDURE — G8417 CALC BMI ABV UP PARAM F/U: HCPCS | Performed by: NURSE PRACTITIONER

## 2017-06-22 PROCEDURE — G8427 DOCREV CUR MEDS BY ELIG CLIN: HCPCS | Performed by: NURSE PRACTITIONER

## 2017-06-22 PROCEDURE — 3017F COLORECTAL CA SCREEN DOC REV: CPT | Performed by: NURSE PRACTITIONER

## 2017-06-22 PROCEDURE — 1036F TOBACCO NON-USER: CPT | Performed by: NURSE PRACTITIONER

## 2017-06-22 PROCEDURE — 3014F SCREEN MAMMO DOC REV: CPT | Performed by: NURSE PRACTITIONER

## 2017-06-22 PROCEDURE — 1123F ACP DISCUSS/DSCN MKR DOCD: CPT | Performed by: NURSE PRACTITIONER

## 2017-06-22 PROCEDURE — G8400 PT W/DXA NO RESULTS DOC: HCPCS | Performed by: NURSE PRACTITIONER

## 2017-07-11 ENCOUNTER — OFFICE VISIT (OUTPATIENT)
Dept: CARDIOLOGY CLINIC | Age: 67
End: 2017-07-11

## 2017-07-11 VITALS — BODY MASS INDEX: 30.59 KG/M2 | WEIGHT: 181 LBS

## 2017-07-11 DIAGNOSIS — I50.22 SYSTOLIC CHF, CHRONIC (HCC): ICD-10-CM

## 2017-07-11 DIAGNOSIS — I42.9 CARDIOMYOPATHY, IDIOPATHIC (HCC): Primary | ICD-10-CM

## 2017-07-11 PROCEDURE — G8417 CALC BMI ABV UP PARAM F/U: HCPCS | Performed by: NURSE PRACTITIONER

## 2017-07-11 PROCEDURE — G8598 ASA/ANTIPLAT THER USED: HCPCS | Performed by: NURSE PRACTITIONER

## 2017-07-11 PROCEDURE — 1090F PRES/ABSN URINE INCON ASSESS: CPT | Performed by: NURSE PRACTITIONER

## 2017-07-11 PROCEDURE — 1036F TOBACCO NON-USER: CPT | Performed by: NURSE PRACTITIONER

## 2017-07-11 PROCEDURE — G8428 CUR MEDS NOT DOCUMENT: HCPCS | Performed by: NURSE PRACTITIONER

## 2017-07-11 PROCEDURE — 3014F SCREEN MAMMO DOC REV: CPT | Performed by: NURSE PRACTITIONER

## 2017-07-11 PROCEDURE — 4040F PNEUMOC VAC/ADMIN/RCVD: CPT | Performed by: NURSE PRACTITIONER

## 2017-07-11 PROCEDURE — 1123F ACP DISCUSS/DSCN MKR DOCD: CPT | Performed by: NURSE PRACTITIONER

## 2017-07-11 PROCEDURE — 99213 OFFICE O/P EST LOW 20 MIN: CPT | Performed by: NURSE PRACTITIONER

## 2017-07-11 PROCEDURE — 3017F COLORECTAL CA SCREEN DOC REV: CPT | Performed by: NURSE PRACTITIONER

## 2017-07-11 PROCEDURE — G8400 PT W/DXA NO RESULTS DOC: HCPCS | Performed by: NURSE PRACTITIONER

## 2017-07-13 ENCOUNTER — CLINICAL DOCUMENTATION (OUTPATIENT)
Dept: SPIRITUAL SERVICES | Age: 67
End: 2017-07-13

## 2017-07-17 ENCOUNTER — TELEPHONE (OUTPATIENT)
Dept: FAMILY MEDICINE CLINIC | Age: 67
End: 2017-07-17

## 2017-07-17 DIAGNOSIS — B02.29 POST HERPETIC NEURALGIA: ICD-10-CM

## 2017-07-17 RX ORDER — ACYCLOVIR 800 MG/1
TABLET ORAL
Qty: 35 TABLET | Refills: 0 | Status: SHIPPED | OUTPATIENT
Start: 2017-07-17 | End: 2017-08-21 | Stop reason: ALTCHOICE

## 2017-07-20 ENCOUNTER — TELEPHONE (OUTPATIENT)
Dept: CARDIOLOGY CLINIC | Age: 67
End: 2017-07-20

## 2017-07-20 RX ORDER — FENOFIBRATE 54 MG/1
54 TABLET ORAL DAILY
Qty: 90 TABLET | Refills: 3 | OUTPATIENT
Start: 2017-07-20 | End: 2018-07-10 | Stop reason: SDUPTHER

## 2017-08-07 ENCOUNTER — CLINICAL DOCUMENTATION (OUTPATIENT)
Dept: SPIRITUAL SERVICES | Age: 67
End: 2017-08-07

## 2017-08-21 ENCOUNTER — OFFICE VISIT (OUTPATIENT)
Dept: FAMILY MEDICINE CLINIC | Age: 67
End: 2017-08-21

## 2017-08-21 ENCOUNTER — CLINICAL DOCUMENTATION (OUTPATIENT)
Dept: SPIRITUAL SERVICES | Age: 67
End: 2017-08-21

## 2017-08-21 VITALS
HEART RATE: 73 BPM | BODY MASS INDEX: 30.16 KG/M2 | DIASTOLIC BLOOD PRESSURE: 76 MMHG | WEIGHT: 181 LBS | HEIGHT: 65 IN | SYSTOLIC BLOOD PRESSURE: 126 MMHG | OXYGEN SATURATION: 97 %

## 2017-08-21 DIAGNOSIS — M51.37 DEGENERATION OF LUMBAR OR LUMBOSACRAL INTERVERTEBRAL DISC: Primary | ICD-10-CM

## 2017-08-21 DIAGNOSIS — S80.12XS CONTUSION OF LEFT LOWER LEG, SEQUELA: ICD-10-CM

## 2017-08-21 DIAGNOSIS — S20.222S: ICD-10-CM

## 2017-08-21 PROCEDURE — 1036F TOBACCO NON-USER: CPT | Performed by: FAMILY MEDICINE

## 2017-08-21 PROCEDURE — G8417 CALC BMI ABV UP PARAM F/U: HCPCS | Performed by: FAMILY MEDICINE

## 2017-08-21 PROCEDURE — G8400 PT W/DXA NO RESULTS DOC: HCPCS | Performed by: FAMILY MEDICINE

## 2017-08-21 PROCEDURE — G8427 DOCREV CUR MEDS BY ELIG CLIN: HCPCS | Performed by: FAMILY MEDICINE

## 2017-08-21 PROCEDURE — 99214 OFFICE O/P EST MOD 30 MIN: CPT | Performed by: FAMILY MEDICINE

## 2017-08-21 PROCEDURE — 3017F COLORECTAL CA SCREEN DOC REV: CPT | Performed by: FAMILY MEDICINE

## 2017-08-21 PROCEDURE — 4040F PNEUMOC VAC/ADMIN/RCVD: CPT | Performed by: FAMILY MEDICINE

## 2017-08-21 PROCEDURE — 1090F PRES/ABSN URINE INCON ASSESS: CPT | Performed by: FAMILY MEDICINE

## 2017-08-21 PROCEDURE — 3014F SCREEN MAMMO DOC REV: CPT | Performed by: FAMILY MEDICINE

## 2017-08-21 PROCEDURE — G8598 ASA/ANTIPLAT THER USED: HCPCS | Performed by: FAMILY MEDICINE

## 2017-08-21 PROCEDURE — 1123F ACP DISCUSS/DSCN MKR DOCD: CPT | Performed by: FAMILY MEDICINE

## 2017-08-21 RX ORDER — TRAMADOL HYDROCHLORIDE 50 MG/1
TABLET ORAL
Qty: 120 TABLET | Refills: 0 | Status: SHIPPED | OUTPATIENT
Start: 2017-08-21 | End: 2017-10-09 | Stop reason: SDUPTHER

## 2017-08-21 RX ORDER — GABAPENTIN 100 MG/1
CAPSULE ORAL
Qty: 60 CAPSULE | Refills: 11 | Status: SHIPPED
Start: 2017-08-21 | End: 2018-01-16 | Stop reason: DRUGHIGH

## 2017-08-29 ENCOUNTER — OFFICE VISIT (OUTPATIENT)
Dept: ORTHOPEDIC SURGERY | Age: 67
End: 2017-08-29

## 2017-08-29 VITALS — HEIGHT: 65 IN | WEIGHT: 175 LBS | BODY MASS INDEX: 29.16 KG/M2

## 2017-08-29 DIAGNOSIS — M25.511 RIGHT SHOULDER PAIN, UNSPECIFIED CHRONICITY: Primary | ICD-10-CM

## 2017-08-29 DIAGNOSIS — M75.101 TEAR OF RIGHT ROTATOR CUFF, UNSPECIFIED TEAR EXTENT: ICD-10-CM

## 2017-08-29 PROCEDURE — 1123F ACP DISCUSS/DSCN MKR DOCD: CPT | Performed by: PHYSICIAN ASSISTANT

## 2017-08-29 PROCEDURE — 1090F PRES/ABSN URINE INCON ASSESS: CPT | Performed by: PHYSICIAN ASSISTANT

## 2017-08-29 PROCEDURE — 3017F COLORECTAL CA SCREEN DOC REV: CPT | Performed by: PHYSICIAN ASSISTANT

## 2017-08-29 PROCEDURE — 3014F SCREEN MAMMO DOC REV: CPT | Performed by: PHYSICIAN ASSISTANT

## 2017-08-29 PROCEDURE — 99203 OFFICE O/P NEW LOW 30 MIN: CPT | Performed by: PHYSICIAN ASSISTANT

## 2017-08-29 PROCEDURE — G8598 ASA/ANTIPLAT THER USED: HCPCS | Performed by: PHYSICIAN ASSISTANT

## 2017-08-29 PROCEDURE — 1036F TOBACCO NON-USER: CPT | Performed by: PHYSICIAN ASSISTANT

## 2017-08-29 PROCEDURE — 4040F PNEUMOC VAC/ADMIN/RCVD: CPT | Performed by: PHYSICIAN ASSISTANT

## 2017-08-29 PROCEDURE — G8400 PT W/DXA NO RESULTS DOC: HCPCS | Performed by: PHYSICIAN ASSISTANT

## 2017-08-29 PROCEDURE — G8417 CALC BMI ABV UP PARAM F/U: HCPCS | Performed by: PHYSICIAN ASSISTANT

## 2017-08-29 PROCEDURE — 73030 X-RAY EXAM OF SHOULDER: CPT | Performed by: PHYSICIAN ASSISTANT

## 2017-08-29 PROCEDURE — G8427 DOCREV CUR MEDS BY ELIG CLIN: HCPCS | Performed by: PHYSICIAN ASSISTANT

## 2017-09-08 ENCOUNTER — HOSPITAL ENCOUNTER (OUTPATIENT)
Dept: GENERAL RADIOLOGY | Age: 67
Discharge: HOME | End: 2017-09-08

## 2017-09-08 DIAGNOSIS — M75.101 TEAR OF RIGHT ROTATOR CUFF, UNSPECIFIED TEAR EXTENT: ICD-10-CM

## 2017-09-08 DIAGNOSIS — M25.511 RIGHT SHOULDER PAIN, UNSPECIFIED CHRONICITY: ICD-10-CM

## 2017-09-08 DIAGNOSIS — M25.511 ACUTE PAIN OF RIGHT SHOULDER: ICD-10-CM

## 2017-09-22 ENCOUNTER — TELEPHONE (OUTPATIENT)
Dept: FAMILY MEDICINE CLINIC | Age: 67
End: 2017-09-22

## 2017-09-22 RX ORDER — BUSPIRONE HYDROCHLORIDE 10 MG/1
10 TABLET ORAL ONCE
Qty: 1 TABLET | Refills: 0 | Status: SHIPPED | OUTPATIENT
Start: 2017-09-22 | End: 2017-09-22

## 2017-10-09 DIAGNOSIS — M51.37 DEGENERATION OF LUMBAR OR LUMBOSACRAL INTERVERTEBRAL DISC: ICD-10-CM

## 2017-10-09 RX ORDER — TRAMADOL HYDROCHLORIDE 50 MG/1
TABLET ORAL
Qty: 120 TABLET | Refills: 0 | Status: SHIPPED | OUTPATIENT
Start: 2017-10-09 | End: 2017-12-02 | Stop reason: SDUPTHER

## 2017-10-10 ENCOUNTER — OFFICE VISIT (OUTPATIENT)
Dept: ORTHOPEDIC SURGERY | Age: 67
End: 2017-10-10

## 2017-10-10 VITALS — WEIGHT: 175.04 LBS | HEIGHT: 65 IN | BODY MASS INDEX: 29.16 KG/M2

## 2017-10-10 DIAGNOSIS — M75.121 COMPLETE TEAR OF RIGHT ROTATOR CUFF: ICD-10-CM

## 2017-10-10 PROCEDURE — G8427 DOCREV CUR MEDS BY ELIG CLIN: HCPCS | Performed by: ORTHOPAEDIC SURGERY

## 2017-10-10 PROCEDURE — 1123F ACP DISCUSS/DSCN MKR DOCD: CPT | Performed by: ORTHOPAEDIC SURGERY

## 2017-10-10 PROCEDURE — 3014F SCREEN MAMMO DOC REV: CPT | Performed by: ORTHOPAEDIC SURGERY

## 2017-10-10 PROCEDURE — G8598 ASA/ANTIPLAT THER USED: HCPCS | Performed by: ORTHOPAEDIC SURGERY

## 2017-10-10 PROCEDURE — G8400 PT W/DXA NO RESULTS DOC: HCPCS | Performed by: ORTHOPAEDIC SURGERY

## 2017-10-10 PROCEDURE — 99213 OFFICE O/P EST LOW 20 MIN: CPT | Performed by: ORTHOPAEDIC SURGERY

## 2017-10-10 PROCEDURE — L3670 SO ACRO/CLAV CAN WEB PRE OTS: HCPCS | Performed by: ORTHOPAEDIC SURGERY

## 2017-10-10 PROCEDURE — 3017F COLORECTAL CA SCREEN DOC REV: CPT | Performed by: ORTHOPAEDIC SURGERY

## 2017-10-10 PROCEDURE — 4040F PNEUMOC VAC/ADMIN/RCVD: CPT | Performed by: ORTHOPAEDIC SURGERY

## 2017-10-10 PROCEDURE — 1090F PRES/ABSN URINE INCON ASSESS: CPT | Performed by: ORTHOPAEDIC SURGERY

## 2017-10-10 PROCEDURE — 1036F TOBACCO NON-USER: CPT | Performed by: ORTHOPAEDIC SURGERY

## 2017-10-10 PROCEDURE — G8484 FLU IMMUNIZE NO ADMIN: HCPCS | Performed by: ORTHOPAEDIC SURGERY

## 2017-10-10 PROCEDURE — G8417 CALC BMI ABV UP PARAM F/U: HCPCS | Performed by: ORTHOPAEDIC SURGERY

## 2017-10-10 NOTE — PROGRESS NOTES
HISTORY:  The patient returns today for evaluation of right shoulder pain. The symptoms are essentially unchanged from previous visit. She continues to have right shoulder pain. REVIEW OF SYSTEMS: Pertinent items are noted in the HPI. Review of systems was reviewed from Patient History Form dated on August 29, 2017 and available in the patient's chart under the Media tab. PHYSICAL EXAMINATION: Her physical exam is unchanged. Inspection of the right shoulder reveals warm, dry, intact skin. There is no adenopathy. The distal neurovascular exam is grossly intact. She has moderate point tenderness over the lateral aspect of the acromion. Range of motion reveals 90° of active forward elevation, 110° passive forward elevation. She has 30° of abduction rotation with her arm at her side. She has 90° of shoulder abduction. She has 70° of external rotation and 20° of internal rotation with the arm maximally abducted. She has 4 out of 5 strength in forward elevation and external rotation. She has 5 minus out of 5 strength in internal rotation. She has discomfort and weakness we will test.  She has discomfort and weakness with a belly press test.  She has mild weakness with resisted supination. Examination of the contralateral shoulder reveals no atrophy or deformity. The skin is warm and dry. Range of motion is within normal limits. There is no focal tenderness with palpation. Provocative SLAP, biceps tension, apprehension AC joint or rotator cuff tests are negative. Strength is graded 5/5 in all muscle groups. The distal neurovascular exam is grossly intact. Cervical spine: The skin is warm and dry. There is no swelling, warmth, or erythema. Range of motion is within normal limits. There is no paraspinal or spinous process tenderness. Spurling's sign is negative and did not produce shoulder pain. The distal neurovascular exam is grossly intact.     MRI: I personally reviewed the MRI images of the right shoulder. MRI of the right shoulder reveals:  Small full thickness tear of the distal supraspinatus tendon with   approximately 12 mm of retraction.       Infraspinatus tendinosis and mild partial-thickness tearing of the distal   tendon.       High-grade partial-thickness tear of the intra-articular portion of the   biceps tendon is suspected.       Mild subacromial impingement with suspected mild subacromial bursitis. ASSESSMENT:   1. Right shoulder full-thickness supraspinatus tendon tear with approximately 1 cm of retraction  2. High-grade, incomplete biceps tendon tear  3. Outlet impingement with subacromial bursitis    PLAN:  I had a detailed discussion with the patient. We reviewed the diagnosis and the treatment options. She is not pleased with her shoulder but is concerned about missing work. She is leaning towards proceeding with right shoulder arthroscopy, subacromial decompression and rotator cuff repair. I discussed the risks of surgery which include but are not limited to infection, bleeding, nerve injury resulting in motor or sensory loss, DVT, RSD, persistent pain, loss of motion, functional instability, and need for further surgery. At no time were any guarantees implied or stated. The patient acknowledged these risks and electively reviewed and signed the consent form. Procedures    Sling Shot II Breg Brace     Patient was prescribed a Breg Sling Shot II Shoulder Brace. The right shoulder will require stabilization / immobilization from this orthosis. The orthosis will assist in protecting the affected area, provide functional support and facilitate healing. The device was ordered and fit on 10/10/2017. The patient was educated and fit by a healthcare professional with expert knowledge and specialization in brace application while under the direct supervision of the treating physician.   Verbal and written instructions for the use of and application of this item were provided. They were instructed to contact the office immediately should the brace result in increased pain, decreased sensation, increased swelling or worsening of the condition.

## 2017-10-10 NOTE — PATIENT INSTRUCTIONS
I have reviewed the provider's instructions with the patient, answering all questions to her satisfaction.

## 2017-10-17 NOTE — LETTER
[facility]  Surgery Scheduling & Billing Form    SHANNON Charles E Juan Darling      Surgeon: Keegan Ogden. Sandi Anderson M.D. Assistant: Esequiel Madrid PA-C    Surgical Procedure: Right shoulder video arthroscopy,  subacromial decompression, rotator cuff repair   CPT CODE 95533, [id number]      Scheduled Date: 11/15/2017 Scheduled Time: 8:30am    Length of Surgery: 1 hour    Diagnosis: Complete tear of right rotator cuff ICD-10-CM: M75.121            Classification:  Outpatient        Same Day Admission  In-Patient  Day Admit    Cardiac Clearance Needed:  YES          NO     Anesthesia Type:   General MAC    IV Regional Local Other:                 SCD:  Knee High Thigh High    Pre-admission Testing: Labs EKG       X-Ray H&P    Allergies: Allergies   Allergen Reactions    Shellfish-Derived Products Nausea And Vomiting    Celebrex [Celecoxib] Swelling    Codeine     Ibuprofen Swelling    Lipitor [Atorvastatin] Other (See Comments)     Muscle aches, flu like SX    Methadone     Vioxx Swelling     Latex: YES          NO    C-ARM needed  -   Large          Mini    Special Equipment:   Rep Notified:       If no pre-admission testing is needed, specify reason (i.e. Testing at primary; no testing needed; etc)    Special Requests:   Remarks/Comments:    Scheduled By: Christiano Manning 10/17/2017            1              Patient Name:  Tracey Nugent                                          Patient :  1950          Insurance:  Payor: MEDICARE / Plan: MEDICARE PART A AND B / Product Type: *No Product type* /                     Comments:                 Candelario Mabry MD                 10/17/17                                     2                             [facility]    IN ACCORDANCE WITH OUR FORMULARY SYSTEM, A GENERIC EQUIVALENT DRUG MAY BE DISPENSED AND ADMINISTERED UNLESS D. A. W. IS WRITTEN WITH THE MEDICATION ORDER  PRE-SURGICAL PHYSICIAN ORDERS  ORTHOPEDIC SURGERY

## 2017-10-24 ENCOUNTER — CLINICAL DOCUMENTATION (OUTPATIENT)
Dept: SPIRITUAL SERVICES | Age: 67
End: 2017-10-24

## 2017-10-24 ENCOUNTER — OFFICE VISIT (OUTPATIENT)
Dept: CARDIOLOGY CLINIC | Age: 67
End: 2017-10-24

## 2017-10-24 VITALS
OXYGEN SATURATION: 97 % | HEART RATE: 67 BPM | DIASTOLIC BLOOD PRESSURE: 60 MMHG | SYSTOLIC BLOOD PRESSURE: 100 MMHG | WEIGHT: 179 LBS | HEIGHT: 65 IN | BODY MASS INDEX: 29.82 KG/M2

## 2017-10-24 DIAGNOSIS — I42.9 CARDIOMYOPATHY, IDIOPATHIC (HCC): ICD-10-CM

## 2017-10-24 DIAGNOSIS — E78.2 MIXED HYPERLIPIDEMIA: ICD-10-CM

## 2017-10-24 DIAGNOSIS — I10 ESSENTIAL HYPERTENSION: ICD-10-CM

## 2017-10-24 DIAGNOSIS — I50.22 SYSTOLIC CHF, CHRONIC (HCC): Primary | ICD-10-CM

## 2017-10-24 PROCEDURE — 3017F COLORECTAL CA SCREEN DOC REV: CPT | Performed by: NURSE PRACTITIONER

## 2017-10-24 PROCEDURE — G8400 PT W/DXA NO RESULTS DOC: HCPCS | Performed by: NURSE PRACTITIONER

## 2017-10-24 PROCEDURE — G8427 DOCREV CUR MEDS BY ELIG CLIN: HCPCS | Performed by: NURSE PRACTITIONER

## 2017-10-24 PROCEDURE — G8598 ASA/ANTIPLAT THER USED: HCPCS | Performed by: NURSE PRACTITIONER

## 2017-10-24 PROCEDURE — 1036F TOBACCO NON-USER: CPT | Performed by: NURSE PRACTITIONER

## 2017-10-24 PROCEDURE — 3014F SCREEN MAMMO DOC REV: CPT | Performed by: NURSE PRACTITIONER

## 2017-10-24 PROCEDURE — 4040F PNEUMOC VAC/ADMIN/RCVD: CPT | Performed by: NURSE PRACTITIONER

## 2017-10-24 PROCEDURE — 1123F ACP DISCUSS/DSCN MKR DOCD: CPT | Performed by: NURSE PRACTITIONER

## 2017-10-24 PROCEDURE — 99214 OFFICE O/P EST MOD 30 MIN: CPT | Performed by: NURSE PRACTITIONER

## 2017-10-24 PROCEDURE — G8484 FLU IMMUNIZE NO ADMIN: HCPCS | Performed by: NURSE PRACTITIONER

## 2017-10-24 PROCEDURE — G8417 CALC BMI ABV UP PARAM F/U: HCPCS | Performed by: NURSE PRACTITIONER

## 2017-10-24 PROCEDURE — 1090F PRES/ABSN URINE INCON ASSESS: CPT | Performed by: NURSE PRACTITIONER

## 2017-10-24 NOTE — PROGRESS NOTES
Late Entry:  10/23/2017  12:37pm/1237    Patient telephoned Outpatient [facility] to schedule an appointment to update her Advanced Directives documents. Patient updated [facility]patient [facility] team member on current health concerns. An appointment is scheduled for 10/24/2017 at 11:30am with Outpatient [facility].

## 2017-10-24 NOTE — PROGRESS NOTES
Medication Sig Start Date End Date Taking? Authorizing Provider   traMADol (ULTRAM) 50 MG tablet TAKE TWO TABLETS BY MOUTH FOUR TIMES A DAY AS NEEDED 10/9/17  Yes Ankur Jacobs MD   lisinopril (PRINIVIL;ZESTRIL) 40 MG tablet TAKE 1 TABLET BY MOUTH NIGHTLY 9/8/17  Yes Jennifer Paige CNP   gabapentin (NEURONTIN) 100 MG capsule 1 bid 8/21/17  Yes Ankur Jacobs MD   metoprolol succinate (TOPROL XL) 50 MG extended release tablet TAKE 1 TABLET BY MOUTH DAILY 8/8/17  Yes Jennifer Paige CNP   fenofibrate (TRICOR) 54 MG tablet Take 1 tablet by mouth daily 7/20/17  Yes Aramis Gaitan MD   spironolactone (ALDACTONE) 25 MG tablet Take 0.5 tablets by mouth daily 3/24/17  Yes Aramis Gaitan MD   nitroGLYCERIN (NITROSTAT) 0.4 MG SL tablet Place 1 tablet under the tongue every 5 minutes as needed for Chest pain 3/21/17  Yes Florencio Osullivan MD   atorvastatin (LIPITOR) 40 MG tablet Take 2 tablets by mouth daily 3/20/17  Yes Ankur Jacobs MD   aspirin EC 81 MG EC tablet Take 1 tablet by mouth daily 3/20/17  Yes Ankur Jacobs MD   albuterol sulfate HFA (PROVENTIL HFA) 108 (90 BASE) MCG/ACT inhaler Inhale 2 puffs into the lungs 4 times daily  Patient taking differently: Inhale 2 puffs into the lungs every 6 hours as needed  10/4/16  Yes Ankur Jacobs MD   lidocaine (LIDODERM) 5 % Place 1 patch onto the skin daily 12 hours on, 12 hours off prn 10/4/16  Yes Ankur Jacobs MD   cyclobenzaprine (FLEXERIL) 10 MG tablet TAKE ONE TABLET BY MOUTH THREE TIMES A DAY AS NEEDED 10/4/16  Yes Ankur Jacobs MD   Cholecalciferol (VITAMIN D3) 2000 UNITS CAPS Take 4 tablets by mouth daily    Yes Historical Provider, MD   Calcium 9095-8831 MG-UNIT CHEW Take  by mouth.    Yes Historical Provider, MD   LORazepam (ATIVAN) 1 MG tablet Take 1 hour prior to MRI and repeat if needed at MRI appointment 4/11/17   Jennifer Paige CNP        Allergies:  Shellfish-derived 03/24/2017    WBC 10.3 03/23/2017    WBC 6.3 03/22/2017    RBC 4.56 03/24/2017    RBC 4.68 03/23/2017    RBC 4.85 03/22/2017    HGB 13.0 03/24/2017    HGB 13.0 03/23/2017    HGB 13.6 03/22/2017    HCT 38.1 03/24/2017    HCT 39.3 03/23/2017    HCT 40.8 03/22/2017    MCV 83.6 03/24/2017    MCV 83.9 03/23/2017    MCV 84.1 03/22/2017    RDW 13.3 03/24/2017    RDW 14.1 03/23/2017    RDW 13.6 03/22/2017     03/24/2017     03/23/2017     03/22/2017     BMP:   Lab Results   Component Value Date     08/20/2017     06/20/2017     05/21/2017    K 4.7 08/20/2017    K 4.8 06/20/2017    K 4.9 05/21/2017     08/20/2017     06/20/2017     05/21/2017    CO2 28 08/20/2017    CO2 29 06/20/2017    CO2 27 05/21/2017    PHOS 3.7 03/23/2017    BUN 12 08/20/2017    BUN 13 06/20/2017    BUN 16 05/21/2017    CREATININE 1.0 08/20/2017    CREATININE 0.9 06/20/2017    CREATININE 1.0 05/21/2017     BNP:   Lab Results   Component Value Date    PROBNP 455 05/04/2017    PROBNP 867 03/30/2017    PROBNP 1,419 03/22/2017       Recent Testing:    Cardiac MRI 6/2017  IMPRESSION:  These findings are most consistent with a recovering non-ischemic cardiomyopathy. Wall motion abnormality is likely secondary to underlying LBBB. An infiltrative cardiomyopathy cannot be excluded. However, findings are not consistent with cardiac amyloidosis. There is no evidence of a hereditary cardiomyopathy. Moderate MR and mild to moderate TR are present. Echo 0/8266  LV systolic function is severely reduced with an ejection fraction of 20-25%. There is akinesis of the anteroseptal and apical-septal segments. No evidence for apical thrombus with use of Definity contrast.   Grade II diastolic dysfunction with borderline elevated filling pressure. There is mild concentric left ventricular hypertrophy. Left ventricular size is moderately dilated . Severely dilated left atrium.    Mild mitral annular

## 2017-10-24 NOTE — Clinical Note
Does she need to see you prior to getting cardiac clearance? She is going to be having rotator cuff surgery 11/15/17.
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
## 2017-10-26 NOTE — COMMUNICATION BODY
products; Celebrex [celecoxib]; Codeine; Ibuprofen; Lipitor [atorvastatin]; Methadone; and Vioxx     Review of Systems:   · Constitutional: there has been no unanticipated weight loss. · Eyes: No vision changes  · ENT: No Headaches, no nasal congestion. No mouth sores or sore throat. · Cardiovascular: Reviewed in HPI  · Respiratory: No cough or wheezing, no sputum production. · Gastrointestinal: No abdominal pain, no constipation or diarrhea  · Genitourinary: No dysuria, trouble voiding, or hematuria. · Musculoskeletal:  No weakness or joint complaints. · Integumentary: No rash or pruritis. · Neurological: No numbness or tingling. No weakness. No tremor. · Psychiatric: No anxiety, no depression. · Endocrine:  No excessive thirst or urination. · Hematologic/Lymphatic: No abnormal bruising or bleeding, blood clots or swollen lymph nodes. Physical Examination:    Vitals:    10/24/17 1249   BP: 100/60   Pulse: 67   SpO2: 97%   Weight: 179 lb (81.2 kg)   Height: 5' 4.5\" (1.638 m)        Constitutional and General Appearance: no apparent distress  HEENT: non-icteric sclera, oropharynx without exudate, oral mucosa moist  Neck: JVP less than 8 cm H20  Respiratory:  · No use of accessory muscles  · Clear breath sounds throughout, no wheezing, no crackles, no rhonchi  Cardiovascular:  · The apical impulses not displaced  · Heart tones are crisp and normal, no murmur/rub/gallop  · Regular rate and rhythm, S1,S2 normal  · Radial pulses 2+ and equal bilaterally  · No edema  · Pedal Pulses: 2+ and equal   Abdomen:  · No masses or tenderness  · Liver: No Abnormalities Noted  Musculoskeletal/Skin:  · Exhibits normal gait balance and coordination  · There is no clubbing, cyanosis of the extremities  · Skin is warm and dry  · Moves all extremities well  Neurological/Psychiatric:  · Alert and oriented in all spheres  · On the verge of crying at times.      Lab Data:  CBC:   Lab Results   Component Value Date    WBC 7.5 03/24/2017    WBC 10.3 03/23/2017    WBC 6.3 03/22/2017    RBC 4.56 03/24/2017    RBC 4.68 03/23/2017    RBC 4.85 03/22/2017    HGB 13.0 03/24/2017    HGB 13.0 03/23/2017    HGB 13.6 03/22/2017    HCT 38.1 03/24/2017    HCT 39.3 03/23/2017    HCT 40.8 03/22/2017    MCV 83.6 03/24/2017    MCV 83.9 03/23/2017    MCV 84.1 03/22/2017    RDW 13.3 03/24/2017    RDW 14.1 03/23/2017    RDW 13.6 03/22/2017     03/24/2017     03/23/2017     03/22/2017     BMP:   Lab Results   Component Value Date     08/20/2017     06/20/2017     05/21/2017    K 4.7 08/20/2017    K 4.8 06/20/2017    K 4.9 05/21/2017     08/20/2017     06/20/2017     05/21/2017    CO2 28 08/20/2017    CO2 29 06/20/2017    CO2 27 05/21/2017    PHOS 3.7 03/23/2017    BUN 12 08/20/2017    BUN 13 06/20/2017    BUN 16 05/21/2017    CREATININE 1.0 08/20/2017    CREATININE 0.9 06/20/2017    CREATININE 1.0 05/21/2017     BNP:   Lab Results   Component Value Date    PROBNP 455 05/04/2017    PROBNP 867 03/30/2017    PROBNP 1,419 03/22/2017       Recent Testing:    Cardiac MRI 6/2017  IMPRESSION:  These findings are most consistent with a recovering non-ischemic cardiomyopathy. Wall motion abnormality is likely secondary to underlying LBBB. An infiltrative cardiomyopathy cannot be excluded. However, findings are not consistent with cardiac amyloidosis. There is no evidence of a hereditary cardiomyopathy. Moderate MR and mild to moderate TR are present. Echo 7/7311  LV systolic function is severely reduced with an ejection fraction of 20-25%. There is akinesis of the anteroseptal and apical-septal segments. No evidence for apical thrombus with use of Definity contrast.   Grade II diastolic dysfunction with borderline elevated filling pressure. There is mild concentric left ventricular hypertrophy. Left ventricular size is moderately dilated . Severely dilated left atrium.    Mild mitral annular calcification. Mild thickening of leaflets of mitral valve. Moderate mitral valve regurgitation. Mild-moderate tricuspid regurgitation. Systolic pulmonary artery pressure (SPAP) is normal and estimated at 33mmHg   (RA pressure 3mmHg). LEFT HEART CATH 3/22/17  LM: no angiographic CAD  LAD: Luminals. Large D1  LCX: luminals  RCA: dominant, mild diffuse disease with multiple luminals of <15%, mid/distal 20%     LVEDP: 14  LVEF: 25% with severe inferior hypokinesis and mod-sev global hypokinesis     Rt subclavian artery with 80% stenosis, unable to pass and Radial access abandoned and groin acces obtained    Pertinent Problems:  · Chronic systolic heart failure with recovered EF. LVEF 25%---> 55%. NYHA class II.   · Non-ischemic cardiomyopathy  · Hyperlipidemia  · Right subclavian artery stenosis seen by Dr. Amna Michelle  · Right shoulder pain, chronic    Visit Diagnosis:    1. Systolic CHF, chronic ([address]    2. Mixed hyperlipidemia    3. Cardiomyopathy, idiopathic ([address]    4. Essential hypertension      She appears compensated from heart failure perspective and her EF is recovered. Plan:   1. Continues standing orders BMP every 3 months; repeat fasting lipid panel with next blood draw  2. Continue daily weights  3. Continue 2 liter fluid allowance  4. No changes to medications; continues GDMT  5. Stay as active as possible  6. Cardiac clearance to come from MD, discussed this with patient  7. Follow up 3 months Dr. María Brandt  1. Tobacco Cessation Counseling: NA  2. Retake of BP if >140/90:   NA  3. Documentation to PCP/referring for new patient:  Sent to PCP at close of office visit  4. CAD patient on anti-platelet: Yes  5. CAD patient on STATIN therapy:  Yes  6. Patient with CHF and aFib on anticoagulation:  NA     I appreciate the opportunity for caring for this patient.      Guilherme Douglas CNP, 10/24/2017, 1:12 PM

## 2017-11-01 ENCOUNTER — HOSPITAL ENCOUNTER (OUTPATIENT)
Dept: PHYSICAL THERAPY | Age: 67
Discharge: HOME | End: 2017-11-30
Attending: ORTHOPAEDIC SURGERY | Admitting: ORTHOPAEDIC SURGERY

## 2017-11-02 ENCOUNTER — CLINICAL DOCUMENTATION (OUTPATIENT)
Dept: SPIRITUAL SERVICES | Age: 67
End: 2017-11-02

## 2017-11-10 ENCOUNTER — TELEPHONE (OUTPATIENT)
Dept: CARDIOLOGY CLINIC | Age: 67
End: 2017-11-10

## 2017-11-10 ENCOUNTER — CLINICAL DOCUMENTATION (OUTPATIENT)
Dept: SPIRITUAL SERVICES | Age: 67
End: 2017-11-10

## 2017-11-10 ENCOUNTER — OFFICE VISIT (OUTPATIENT)
Dept: FAMILY MEDICINE CLINIC | Age: 67
End: 2017-11-10

## 2017-11-10 VITALS
OXYGEN SATURATION: 96 % | HEIGHT: 65 IN | DIASTOLIC BLOOD PRESSURE: 76 MMHG | SYSTOLIC BLOOD PRESSURE: 122 MMHG | BODY MASS INDEX: 30.16 KG/M2 | HEART RATE: 81 BPM | WEIGHT: 181 LBS

## 2017-11-10 DIAGNOSIS — M51.37 DEGENERATION OF LUMBAR OR LUMBOSACRAL INTERVERTEBRAL DISC: ICD-10-CM

## 2017-11-10 DIAGNOSIS — F32.1 MAJOR DEPRESSIVE DISORDER, SINGLE EPISODE, MODERATE (HCC): ICD-10-CM

## 2017-11-10 DIAGNOSIS — Z01.818 PREOP EXAMINATION: Primary | ICD-10-CM

## 2017-11-10 DIAGNOSIS — M75.121 COMPLETE TEAR OF RIGHT ROTATOR CUFF: ICD-10-CM

## 2017-11-10 DIAGNOSIS — Z01.818 PRE-OP TESTING: ICD-10-CM

## 2017-11-10 DIAGNOSIS — E78.2 MIXED HYPERLIPIDEMIA: ICD-10-CM

## 2017-11-10 DIAGNOSIS — J45.20 MILD INTERMITTENT ASTHMA WITHOUT COMPLICATION: ICD-10-CM

## 2017-11-10 DIAGNOSIS — I10 ESSENTIAL HYPERTENSION: ICD-10-CM

## 2017-11-10 DIAGNOSIS — I77.1 SUBCLAVIAN ARTERY STENOSIS, RIGHT (HCC): ICD-10-CM

## 2017-11-10 DIAGNOSIS — I42.9 CARDIOMYOPATHY, IDIOPATHIC (HCC): ICD-10-CM

## 2017-11-10 LAB
ANION GAP SERPL CALCULATED.3IONS-SCNC: 12 MMOL/L (ref 3–16)
BASOPHILS ABSOLUTE: 0.1 K/UL (ref 0–0.2)
BASOPHILS RELATIVE PERCENT: 1.2 %
BUN BLDV-MCNC: 16 MG/DL (ref 7–20)
CALCIUM SERPL-MCNC: 9.6 MG/DL (ref 8.3–10.6)
CHLORIDE BLD-SCNC: 102 MMOL/L (ref 99–110)
CO2: 27 MMOL/L (ref 21–32)
CREAT SERPL-MCNC: 1 MG/DL (ref 0.6–1.2)
EOSINOPHILS ABSOLUTE: 0.2 K/UL (ref 0–0.6)
EOSINOPHILS RELATIVE PERCENT: 4.1 %
GFR AFRICAN AMERICAN: >60
GFR NON-AFRICAN AMERICAN: 55
GLUCOSE BLD-MCNC: 82 MG/DL (ref 70–99)
HCT VFR BLD CALC: 36.7 % (ref 36–48)
HEMOGLOBIN: 12.4 G/DL (ref 12–16)
LYMPHOCYTES ABSOLUTE: 2.5 K/UL (ref 1–5.1)
LYMPHOCYTES RELATIVE PERCENT: 41.3 %
MCH RBC QN AUTO: 29.4 PG (ref 26–34)
MCHC RBC AUTO-ENTMCNC: 33.7 G/DL (ref 31–36)
MCV RBC AUTO: 87.2 FL (ref 80–100)
MONOCYTES ABSOLUTE: 0.3 K/UL (ref 0–1.3)
MONOCYTES RELATIVE PERCENT: 5.5 %
NEUTROPHILS ABSOLUTE: 2.8 K/UL (ref 1.7–7.7)
NEUTROPHILS RELATIVE PERCENT: 47.9 %
PDW BLD-RTO: 13 % (ref 12.4–15.4)
PLATELET # BLD: 262 K/UL (ref 135–450)
PMV BLD AUTO: 9.8 FL (ref 5–10.5)
POTASSIUM SERPL-SCNC: 4.5 MMOL/L (ref 3.5–5.1)
RBC # BLD: 4.21 M/UL (ref 4–5.2)
SODIUM BLD-SCNC: 141 MMOL/L (ref 136–145)
WBC # BLD: 5.9 K/UL (ref 4–11)

## 2017-11-10 PROCEDURE — 1090F PRES/ABSN URINE INCON ASSESS: CPT | Performed by: FAMILY MEDICINE

## 2017-11-10 PROCEDURE — 1036F TOBACCO NON-USER: CPT | Performed by: FAMILY MEDICINE

## 2017-11-10 PROCEDURE — 36415 COLL VENOUS BLD VENIPUNCTURE: CPT | Performed by: FAMILY MEDICINE

## 2017-11-10 PROCEDURE — G8427 DOCREV CUR MEDS BY ELIG CLIN: HCPCS | Performed by: FAMILY MEDICINE

## 2017-11-10 PROCEDURE — 3017F COLORECTAL CA SCREEN DOC REV: CPT | Performed by: FAMILY MEDICINE

## 2017-11-10 PROCEDURE — G8598 ASA/ANTIPLAT THER USED: HCPCS | Performed by: FAMILY MEDICINE

## 2017-11-10 PROCEDURE — G8400 PT W/DXA NO RESULTS DOC: HCPCS | Performed by: FAMILY MEDICINE

## 2017-11-10 PROCEDURE — 99215 OFFICE O/P EST HI 40 MIN: CPT | Performed by: FAMILY MEDICINE

## 2017-11-10 PROCEDURE — G8484 FLU IMMUNIZE NO ADMIN: HCPCS | Performed by: FAMILY MEDICINE

## 2017-11-10 PROCEDURE — 1123F ACP DISCUSS/DSCN MKR DOCD: CPT | Performed by: FAMILY MEDICINE

## 2017-11-10 PROCEDURE — G8417 CALC BMI ABV UP PARAM F/U: HCPCS | Performed by: FAMILY MEDICINE

## 2017-11-10 PROCEDURE — 3014F SCREEN MAMMO DOC REV: CPT | Performed by: FAMILY MEDICINE

## 2017-11-10 PROCEDURE — 4040F PNEUMOC VAC/ADMIN/RCVD: CPT | Performed by: FAMILY MEDICINE

## 2017-11-10 NOTE — PROGRESS NOTES
11/10/2017  10:28am/1028    Outpatient SCS team member telephoned patient. Team member and patient discussed possible appointment times to meet in order to complete Living Will document. Patient would like to meet with Outpatient SCS at [place] [address] on 11/13/2017 between 8am-12pm.

## 2017-11-10 NOTE — PROGRESS NOTES
Bertrand Mendez M.D., [address]. [place], [address]  Phone: [phone]  Fax: [phone]  Preoperative Consultation      Tracey Nugent  YOB: 1950    Date of Service:  11/10/2017    Vitals:    11/10/17 1521   BP: 122/76   Site: Left Arm   Position: Sitting   Cuff Size: Medium Adult   Pulse: 81   SpO2: 96%   Weight: 181 lb (82.1 kg)   Height: 5' 4.5\" (1.638 m)      Wt Readings from Last 2 Encounters:   11/10/17 181 lb (82.1 kg)   10/24/17 179 lb (81.2 kg)     BP Readings from Last 3 Encounters:   11/10/17 122/76   10/24/17 100/60   08/21/17 126/76        Chief Complaint   Patient presents with   [facility] Exam     Right Rotator Cuff Surgery by Dr. Junior Flank 11/15/17 at [facility].      Allergies   Allergen Reactions    Shellfish-Derived Products Nausea And Vomiting    Celebrex [Celecoxib] Swelling    Codeine     Ibuprofen Swelling    Lipitor [Atorvastatin] Other (See Comments)     Muscle aches, flu like SX    Methadone     Vioxx Swelling     Outpatient Prescriptions Marked as Taking for the 11/10/17 encounter (Office Visit) with Trey Briseno MD   Medication Sig Dispense Refill    traMADol (ULTRAM) 50 MG tablet TAKE TWO TABLETS BY MOUTH FOUR TIMES A DAY AS NEEDED (Patient taking differently: 2 am  1 afternoon 1 hs) 120 tablet 0    lisinopril (PRINIVIL;ZESTRIL) 40 MG tablet TAKE 1 TABLET BY MOUTH NIGHTLY 90 tablet 1    gabapentin (NEURONTIN) 100 MG capsule 1 bid (Patient taking differently: 2 times daily 2 am 1 hs) 60 capsule 11    metoprolol succinate (TOPROL XL) 50 MG extended release tablet TAKE 1 TABLET BY MOUTH DAILY 90 tablet 1    fenofibrate (TRICOR) 54 MG tablet Take 1 tablet by mouth daily 90 tablet 3    spironolactone (ALDACTONE) 25 MG tablet Take 0.5 tablets by mouth daily 30 tablet 3    nitroGLYCERIN (NITROSTAT) 0.4 MG SL tablet Place 1 tablet under the tongue every 5 minutes as needed for Chest ([facility] [address] [facility]    Abnormal ECG    Acute systolic congestive heart failure (HCC)    Cardiomyopathy, idiopathic (HCC)    Systolic CHF, chronic (HCC)    Essential hypertension    Mixed hyperlipidemia    Subclavian artery stenosis, right (HCC)    Complete tear of right rotator cuff       Past Medical History:   Diagnosis Date    Arthritis     Asthma     Back pain     Fibromyalgia     Fibromyalgia     Heart failure with reduced ejection fraction (HCC)     Herpes zoster     Hypercholesteremia     Hyperlipidemia     Hypertriglyceridemia     Neck pain     Osteopenia     Stress-induced cardiomyopathy 03/2017     Past Surgical History:   Procedure Laterality Date    BACK SURGERY      BLADDER REPAIR      CARDIAC CATHETERIZATION      COLONOSCOPY  4/23/12    diverticulosis    HYSTERECTOMY      NECK SURGERY  1/98, 10/05, '07 or '08    C6-[id number] '05     Family History   Problem Relation Age of Onset    High Blood Pressure Mother     Heart Disease Father      Social History     Social History    Marital status: Legally      Spouse name: N/A    Number of children: N/A    Years of education: N/A     Occupational History    Not on file. Social History Main Topics    Smoking status: Former Smoker     Packs/day: 0.50     Quit date: 1/1/2015    Smokeless tobacco: Never Used    Alcohol use No    Drug use: No    Sexual activity: Yes     Partners: Male     Other Topics Concern    Not on file     Social History Narrative    No narrative on file       Review of Systems  A comprehensive review of systems was negative except for what was noted in the HPI. Additional review of systems may be scanned into the media section of this medical record. Any responses requiring further intervention were pursued. Physical Exam   Constitutional: She is oriented to person, place, and time. She appears well-developed and well-nourished. No distress. HENT:   Head: Normocephalic and atraumatic.    Mouth/Throat:

## 2017-11-10 NOTE — TELEPHONE ENCOUNTER
Galo Mejía states that the pt is coming for surgery on Wednesday morning and she is needing cardiac clearance entered in epic. She states that she can see NPRB's note from ov on 10/24 that clearance needs to come from MD. Pt last saw Miller Skinner on 3/21/17.

## 2017-11-13 ENCOUNTER — TELEPHONE (OUTPATIENT)
Dept: CARDIOLOGY CLINIC | Age: 67
End: 2017-11-13

## 2017-11-13 DIAGNOSIS — I50.21 ACUTE SYSTOLIC CONGESTIVE HEART FAILURE (HCC): Primary | ICD-10-CM

## 2017-11-13 NOTE — TELEPHONE ENCOUNTER
Pt is NOT clear for surgery  First- the actual surgery being performed is not listed  Second- none of my recommendations from April were completed  Third- pt needs a limited echo  4- gill did not see her for cardia clearance,     Please schedule echo and make appt

## 2017-11-14 ENCOUNTER — CLINICAL DOCUMENTATION (OUTPATIENT)
Dept: SPIRITUAL SERVICES | Age: 67
End: 2017-11-14

## 2017-11-16 ENCOUNTER — HOSPITAL ENCOUNTER (OUTPATIENT)
Dept: CARDIOLOGY | Facility: CLINIC | Age: 67
Discharge: HOME | End: 2017-11-16
Attending: INTERNAL MEDICINE | Admitting: INTERNAL MEDICINE

## 2017-11-17 ENCOUNTER — TELEPHONE (OUTPATIENT)
Dept: CARDIOLOGY CLINIC | Age: 67
End: 2017-11-17

## 2017-11-17 RX ORDER — SPIRONOLACTONE 25 MG/1
12.5 TABLET ORAL DAILY
Qty: 30 TABLET | Refills: 3 | Status: SHIPPED | OUTPATIENT
Start: 2017-11-17 | End: 2018-06-02 | Stop reason: SDUPTHER

## 2017-11-22 ENCOUNTER — HOSPITAL ENCOUNTER (OUTPATIENT)
Dept: PHYSICAL THERAPY | Age: 67
Discharge: HOME | End: 2017-10-31
Admitting: ORTHOPAEDIC SURGERY

## 2017-11-28 ENCOUNTER — CLINICAL DOCUMENTATION (OUTPATIENT)
Dept: SPIRITUAL SERVICES | Age: 67
End: 2017-11-28

## 2017-11-28 NOTE — PROGRESS NOTES
11/28/2017 4:55pm 1655    Outpatient SCS sent a note card to patient via post office affirming availability of spiritual support. Contact information provided for Outpatient SCS is patient desires or needs spiritual support in the future. Plan: Outpatient SCS to move patient to inactive status in files. Outpatient SCS will again follow patient if patient notifies office and desires continued spiritual support or re-admits to acute setting.

## 2017-11-29 ENCOUNTER — OFFICE VISIT (OUTPATIENT)
Dept: CARDIOLOGY CLINIC | Age: 67
End: 2017-11-29

## 2017-11-29 ENCOUNTER — TELEPHONE (OUTPATIENT)
Dept: CARDIOLOGY CLINIC | Age: 67
End: 2017-11-29

## 2017-11-29 VITALS
DIASTOLIC BLOOD PRESSURE: 70 MMHG | SYSTOLIC BLOOD PRESSURE: 110 MMHG | BODY MASS INDEX: 30.16 KG/M2 | WEIGHT: 181 LBS | HEART RATE: 67 BPM | OXYGEN SATURATION: 96 % | HEIGHT: 65 IN

## 2017-11-29 DIAGNOSIS — I42.9 CARDIOMYOPATHY, IDIOPATHIC (HCC): ICD-10-CM

## 2017-11-29 DIAGNOSIS — I25.10 CORONARY ARTERY DISEASE INVOLVING NATIVE CORONARY ARTERY OF NATIVE HEART WITHOUT ANGINA PECTORIS: ICD-10-CM

## 2017-11-29 DIAGNOSIS — Z01.818 PRE-OP EXAM: Primary | ICD-10-CM

## 2017-11-29 DIAGNOSIS — I77.1 SUBCLAVIAN ARTERY STENOSIS, RIGHT (HCC): ICD-10-CM

## 2017-11-29 PROCEDURE — 3017F COLORECTAL CA SCREEN DOC REV: CPT | Performed by: INTERNAL MEDICINE

## 2017-11-29 PROCEDURE — G8417 CALC BMI ABV UP PARAM F/U: HCPCS | Performed by: INTERNAL MEDICINE

## 2017-11-29 PROCEDURE — G8598 ASA/ANTIPLAT THER USED: HCPCS | Performed by: INTERNAL MEDICINE

## 2017-11-29 PROCEDURE — G8484 FLU IMMUNIZE NO ADMIN: HCPCS | Performed by: INTERNAL MEDICINE

## 2017-11-29 PROCEDURE — G8400 PT W/DXA NO RESULTS DOC: HCPCS | Performed by: INTERNAL MEDICINE

## 2017-11-29 PROCEDURE — 1090F PRES/ABSN URINE INCON ASSESS: CPT | Performed by: INTERNAL MEDICINE

## 2017-11-29 PROCEDURE — G8427 DOCREV CUR MEDS BY ELIG CLIN: HCPCS | Performed by: INTERNAL MEDICINE

## 2017-11-29 PROCEDURE — 1123F ACP DISCUSS/DSCN MKR DOCD: CPT | Performed by: INTERNAL MEDICINE

## 2017-11-29 PROCEDURE — 93000 ELECTROCARDIOGRAM COMPLETE: CPT | Performed by: INTERNAL MEDICINE

## 2017-11-29 PROCEDURE — 4040F PNEUMOC VAC/ADMIN/RCVD: CPT | Performed by: INTERNAL MEDICINE

## 2017-11-29 PROCEDURE — 3014F SCREEN MAMMO DOC REV: CPT | Performed by: INTERNAL MEDICINE

## 2017-11-29 PROCEDURE — 99213 OFFICE O/P EST LOW 20 MIN: CPT | Performed by: INTERNAL MEDICINE

## 2017-11-29 PROCEDURE — 1036F TOBACCO NON-USER: CPT | Performed by: INTERNAL MEDICINE

## 2017-11-29 NOTE — PROGRESS NOTES
[facility]   Cardiovascular Evaluation    PATIENT: Rosalee Cheadle  DATE: 2017  MRN:   CSN: [id number]  : 1950      Primary Care Doctor: Lakisha Salcido MD  Reason for evaluation:   Cardiac Clearance (right shoulder surgery rotator cuff) and Other (a \"pulliing\" in her chest)      Subjective:   History of present illness on initial date of evaluation:   Rosalee Cheadle is a 79 y.o. patient who presents for cardiac evaluation of an abnormal stress test. She underwent a stress test on 3/20/17 that showed moderate sized anteroseptal partial reversibility defect consistent with  ischemia and infarction in the territory of the mid and distal LAD. She was started in Metoprolol 25 mg daily and Lipitor 40 mg. Today she states she has been having chest tightness off and on for the last 3 months. This has been worsening in severity in the last few weeks. She states this pain is occasionally exertional and positional.  She also has exertional shortness of breath. She os unable to climb a flight of stairs. She states that at times she is not able to take a deep breath. She states that on Saturday she experienced neck and facial numbness. She chewed a baby Aspirin and rested. She started new medications yesterday and feels she is tolerating them well. She quit smoking roughly <1 year ago. She complains of extremity swelling in the morning. This resolves on its own. She also reports a family history of CAD in hr father. He underwent CABG x5 age 48. She denies syncope, dizziness, palpitations, nausea or vomiting. Today she presents for cardiology follow up and cardiac clearance for right rotator cuff surgery. She has right subclavian artery stenosis and has seen Dr. Denice Nissen for this. She underwent LHC on 3/23/17 with no intervention needed. Echo 17 showed an EF of 35-40%. She denies chest pain, shortness of breath, edema or syncope.        Patient Active Problem List Diagnosis    Back pain    Neck pain    Fibromyalgia    Arthritis    Herpes zoster    Osteopenia    Obesity    Dysthymia    Scoliosis of thoracic spine    Compression fx, thoracic spine (HCC)    Rotator cuff syndrome of right shoulder    Vitamin D deficiency    Major depressive disorder, single episode, moderate (HCC)    Displacement of cervical intervertebral disc without myelopathy    Tobacco abuse    Diastasis recti    Degeneration of lumbar or lumbosacral intervertebral disc    Contusion of lower leg    Contusion of back    Ocular migraine    Post herpetic neuralgia    Mild intermittent asthma without complication    Depressive psychosis (HCC)    Abnormal stress test    Chest pain    Unstable angina (HCC)    Abnormal ECG    Acute systolic congestive heart failure (HCC)    Cardiomyopathy, idiopathic (HCC)    Systolic CHF, chronic (HCC)    Essential hypertension    Mixed hyperlipidemia    Subclavian artery stenosis, right (HCC)    Complete tear of right rotator cuff         Past Medical History:   has a past medical history of Arthritis; Asthma; Back pain; Fibromyalgia; Fibromyalgia; Heart failure with reduced ejection fraction ([address] Herpes zoster; Hypercholesteremia; Hyperlipidemia; Hypertriglyceridemia; Neck pain; Osteopenia; and Stress-induced cardiomyopathy. Surgical History:   has a past surgical history that includes Neck surgery (1/98, 10/05, '07 or '08); back surgery; Hysterectomy; bladder repair; Colonoscopy (4/23/12); and Cardiac catheterization. Social History:   reports that she quit smoking about 2 years ago. She smoked 0.50 packs per day. She has never used smokeless tobacco. She reports that she does not drink alcohol or use drugs. Family History:  Father CABG x5 age 48. Home Medications:  Reviewed and are listed in nursing record.  and/or listed below  Current Outpatient Prescriptions   Medication Sig Dispense Refill    spironolactone (ALDACTONE) 25 MG tablet Take 0.5 tablets by mouth daily 30 tablet 3    traMADol (ULTRAM) 50 MG tablet TAKE TWO TABLETS BY MOUTH FOUR TIMES A DAY AS NEEDED (Patient taking differently: 2 am  1 afternoon 1 hs) 120 tablet 0    lisinopril (PRINIVIL;ZESTRIL) 40 MG tablet TAKE 1 TABLET BY MOUTH NIGHTLY 90 tablet 1    gabapentin (NEURONTIN) 100 MG capsule 1 bid (Patient taking differently: 2 times daily 2 am 1 hs) 60 capsule 11    metoprolol succinate (TOPROL XL) 50 MG extended release tablet TAKE 1 TABLET BY MOUTH DAILY 90 tablet 1    fenofibrate (TRICOR) 54 MG tablet Take 1 tablet by mouth daily 90 tablet 3    atorvastatin (LIPITOR) 40 MG tablet Take 2 tablets by mouth daily 60 tablet 11    aspirin EC 81 MG EC tablet Take 1 tablet by mouth daily 30 tablet 3    albuterol sulfate HFA (PROVENTIL HFA) 108 (90 BASE) MCG/ACT inhaler Inhale 2 puffs into the lungs 4 times daily (Patient taking differently: Inhale 2 puffs into the lungs every 6 hours as needed ) 1 Inhaler 11    Cholecalciferol (VITAMIN D3) 2000 UNITS CAPS Take 2 tablets by mouth daily       Calcium 5985-9111 MG-UNIT CHEW Take  by mouth.  nitroGLYCERIN (NITROSTAT) 0.4 MG SL tablet Place 1 tablet under the tongue every 5 minutes as needed for Chest pain 25 tablet 3    lidocaine (LIDODERM) 5 % Place 1 patch onto the skin daily 12 hours on, 12 hours off prn 30 patch 11     No current facility-administered medications for this visit. Allergies:  Shellfish-derived products; Celebrex [celecoxib]; Codeine; Ibuprofen; Lipitor [atorvastatin]; Methadone; and Vioxx     Review of Systems:   All 12 point review of symptoms completed. Pertinent positives identified in the HPI, all other review of symptoms negative as below.     Objective:   PHYSICAL EXAM:    Vitals:    11/29/17 0930   BP: 110/70   Pulse:    SpO2:     Weight: 181 lb (82.1 kg)     Wt Readings from Last 3 Encounters:   11/29/17 181 lb (82.1 kg)   11/10/17 181 lb (82.1 kg)   10/24/17 179 lb (81.2 kg) Value Date    LDLCALC 217 (H) 03/23/2017    LDLCALC 266 (H) 10/28/2016            Lab Results   Component Value Date    LABVLDL 58 03/23/2017    LABVLDL 59 10/28/2016         CARDIAC DATA     ECHO 11/16/17  Limited echo for LV function with limited doppler/no color. LV systolic function is moderately reduced with an LVEF of 35-40%. There is moderate global hypokinesis. Left ventricular size is mildly increased. There is abnormal (paradoxical) septal motion c/w LBBB. AV appears sclerotic but opens adequately. Mild calcification of aortic  annulus noted. Grade I diastolic dysfunction with normal filling pressure. Systolic pulmonary artery pressure (SPAP) is normal and estimated at 26mmHg  (RA pressure 3mmHg). Compared to last echo on 3/23/2017 (EF 20-25%), left ventricle systolic  function has improved. [facility] 3/23/17  ANGIOGRAPHIC FINDINGS:  1. The left main coronary artery bifurcates off the left coronary cusp. The  left main has no angiographic disease. 2.  The LAD is a moderate sized vessel that runs in the interventricular  groove. It has luminal irregularities throughout its course with no focal  stenosis. There is a large branching diagonal 1 vessel that is small, about 2  mm in size, with no significant disease. The LAD does wrap the apex. 3.  The left circumflex has 2 small obtuse marginals with a high takeoff;  these have luminal irregularities. There is an OM3 and a large PLOM. The  left circumflex has luminal irregularities only with no significant disease. 4.  The right coronary artery is dominant for posterior circulation, comes off  the right coronary cusp, travels in the AV groove and bifurcates to PLV and  PDA. This is a large vessel, greater than about 4 mm in size. There is mild  diffuse disease all throughout the proximal, mid and distal portions with  lesions no greater than 15% all throughout. There is a large PLV and a PDA  system.   5.   LVEDP is 14.    6.  No significant aortic stenosis was detected. No significant mitral  regurgitation, but opacification may not be optimal to detect this. 7.  LV EF was severely reduced at about 25%. There was fairly severe  hypokinesis of the inferior wall, and there is moderate to severe hypokinesis  of the anterior wall in the apex and the apical inferior walls as well. 8.  The right subclavian artery did show an 80% stenosis. We were unable to  do a pullback gradient across this to assess its hemodynamic significance, so  we will check bilateral arm pressures as well as future ultrasound. PLAN:  1. No significant coronary disease was seen today. 2.  Evidence of severe nonischemic cardiomyopathy with ejection fraction  approximately 25%. Given the patient has abnormal stress test for ischemia,  abnormal wall motions, as well as a left bundle, I am very concerned for  possible infiltrative disease, so we will need to evaluate for sarcoid,  possible amyloid or other infiltrative processes. She will also need an  extensive secondary workup for her abnormal cardiomyopathy at this point. 3.  We will at this point intensify CHF therapy. Continue aspirin, Lipitor  and Toprol. I will add an ACE inhibitor and Aldactone as tolerated. 4.  The patient will require a cardiac MRI as an outpatient on a more urgent  basis; this need to be done with gadolinium to evaluate myocardium. If  lesions are found, she will need an endocardial biopsy at that time. ECG   3/7/2017 NSR with LBBB    STRESS TEST: 3/20/17  Moderate sized anteroseptal partial reversibility defect consistent with  ischemia and infarction in the territory of the mid and distal LAD. There is  severe global LV systolic dysfunction with ejection fraction of 27%. More  prominent HK of the anterior wall with asynchronous septal motion and severe  HK of the remaining segments. High risk abnormal study.      Resting ECG  Normal sinus rhythm; complete LBBB      Assessment and Plan Kimberly Sagastume is a 79 y.o. female who presents today for the following problems:      1. CAD: Nonobstructive   - false + stress test  2. Chronic nonischemic cardiomyopathy: 25%-->35-40%  3. Abnormal ECG: LBBB  4. PAD: Right subclavian stenosis   - 80% on cath, asymptomatic  5. Preop: rt rotator cuff        MD PLAN  1. Pt ok to proceed with surgery as low risk, no cardiac symptoms with good CHF control  2. Pt informed NO bp to be taken in right arm   - arterial US next visit  3. Cont current meds      Plan:  1. Do not have blood pressure taken in right arm  2. Ok for rotator cuff surgery  3. Follow up with Dr. Durand Duverney in April with US of upper extremities      QUALITY MEASURES  1. Tobacco Cessation Counseling: NA  2. Retake of BP if >140/90:   NA  3. Documentation to PCP/referring for new patient:  Sent to PCP at close of office visit  4. CAD patient on anti-platelet: Yes  5. CAD patient on STATIN therapy:  Yes  6. Patient with CHF and aFib on anticoagulation:  NA       It is a pleasure to assist in the care of Kimberly Sagastume. Please call with any questions. All questions and concerns were addressed to the patient/family. Alternatives to my treatment were discussed. The note was completed using EMR.          Beverly Oh MD, [address] [facility] Cardiologist  [facility]  [phone] [facility]  [phone] [address] [place] [address]  11/29/2017  9:45 AM

## 2017-11-29 NOTE — LETTER
[address] Cardiology - [address] [place] [address]  Phone: [phone]  Fax: [phone]    Jose Craig MD        November 29, 2017     Charlotte Giraldo, [address] [place] [address]    Patient: Veda Tomas  MR Number:   YOB: 1950  Date of Visit: 11/29/2017    Dear Dr. Charlotte Giraldo:    Thank you for allowing me to participate in the care of Pedro Garcia. Below are the relevant portions of my assessment and plan of care. Assessment and Plan   Veda Tomas is a 79 y.o. female who presents today for the following problems:       1. CAD: Nonobstructive              - false + stress test  2. Chronic nonischemic cardiomyopathy: 25%-->35-40%  3. Abnormal ECG: LBBB  4. PAD: Right subclavian stenosis              - 80% on cath, asymptomatic  5. Preop: rt rotator cuff           MD PLAN  1. Pt ok to proceed with surgery as low risk, no cardiac symptoms with good CHF control  2. Pt informed NO bp to be taken in right arm              - arterial US next visit  3. Cont current meds        Plan:  1. Do not have blood pressure taken in right arm  2. Ok for rotator cuff surgery  3. Follow up with Dr. Sahara Camilo in April with US of upper extremities      If you have questions, please do not hesitate to call me. I look forward to following Hakan Schmidt along with you.     Sincerely,        Jose Craig MD

## 2017-11-29 NOTE — PATIENT INSTRUCTIONS
Plan:  1. Do not have blood pressure taken in right arm  2. Ok for rotator cuff surgery  3.  Follow up with Dr. Mike Wynn in April with US of upper extremities

## 2017-12-02 DIAGNOSIS — M51.37 DEGENERATION OF LUMBAR OR LUMBOSACRAL INTERVERTEBRAL DISC: ICD-10-CM

## 2017-12-05 RX ORDER — TRAMADOL HYDROCHLORIDE 50 MG/1
TABLET ORAL
Qty: 120 TABLET | Refills: 2 | Status: SHIPPED | OUTPATIENT
Start: 2017-12-05 | End: 2018-05-16 | Stop reason: SDUPTHER

## 2018-01-08 ENCOUNTER — TELEPHONE (OUTPATIENT)
Dept: FAMILY MEDICINE CLINIC | Age: 68
End: 2018-01-08

## 2018-01-08 DIAGNOSIS — E78.00 HYPERCHOLESTEROLEMIA: ICD-10-CM

## 2018-01-08 DIAGNOSIS — Z12.39 SCREENING FOR BREAST CANCER: ICD-10-CM

## 2018-01-15 RX ORDER — METOPROLOL SUCCINATE 50 MG/1
TABLET, EXTENDED RELEASE ORAL
Qty: 90 TABLET | Refills: 3 | Status: SHIPPED | OUTPATIENT
Start: 2018-01-15 | End: 2018-10-03 | Stop reason: SDUPTHER

## 2018-01-16 ENCOUNTER — OFFICE VISIT (OUTPATIENT)
Dept: FAMILY MEDICINE CLINIC | Age: 68
End: 2018-01-16

## 2018-01-16 VITALS
WEIGHT: 184 LBS | BODY MASS INDEX: 30.66 KG/M2 | HEART RATE: 74 BPM | SYSTOLIC BLOOD PRESSURE: 114 MMHG | DIASTOLIC BLOOD PRESSURE: 68 MMHG | OXYGEN SATURATION: 96 % | HEIGHT: 65 IN

## 2018-01-16 DIAGNOSIS — M51.37 DEGENERATION OF LUMBAR OR LUMBOSACRAL INTERVERTEBRAL DISC: ICD-10-CM

## 2018-01-16 DIAGNOSIS — E78.2 MIXED HYPERLIPIDEMIA: Primary | ICD-10-CM

## 2018-01-16 DIAGNOSIS — F34.1 DYSTHYMIA: ICD-10-CM

## 2018-01-16 DIAGNOSIS — J45.20 MILD INTERMITTENT ASTHMA WITHOUT COMPLICATION: ICD-10-CM

## 2018-01-16 DIAGNOSIS — I51.81 STRESS-INDUCED CARDIOMYOPATHY: ICD-10-CM

## 2018-01-16 DIAGNOSIS — M79.7 FIBROMYALGIA: ICD-10-CM

## 2018-01-16 DIAGNOSIS — I42.9 CARDIOMYOPATHY, IDIOPATHIC (HCC): ICD-10-CM

## 2018-01-16 DIAGNOSIS — E55.9 VITAMIN D DEFICIENCY: ICD-10-CM

## 2018-01-16 DIAGNOSIS — I50.22 SYSTOLIC CHF, CHRONIC (HCC): ICD-10-CM

## 2018-01-16 DIAGNOSIS — I10 ESSENTIAL HYPERTENSION: ICD-10-CM

## 2018-01-16 DIAGNOSIS — E66.09 CLASS 1 OBESITY DUE TO EXCESS CALORIES WITHOUT SERIOUS COMORBIDITY WITH BODY MASS INDEX (BMI) OF 31.0 TO 31.9 IN ADULT: ICD-10-CM

## 2018-01-16 DIAGNOSIS — F33.41 MAJOR DEPRESSIVE DISORDER, RECURRENT, IN PARTIAL REMISSION (HCC): ICD-10-CM

## 2018-01-16 PROCEDURE — G8417 CALC BMI ABV UP PARAM F/U: HCPCS | Performed by: FAMILY MEDICINE

## 2018-01-16 PROCEDURE — 3017F COLORECTAL CA SCREEN DOC REV: CPT | Performed by: FAMILY MEDICINE

## 2018-01-16 PROCEDURE — 1090F PRES/ABSN URINE INCON ASSESS: CPT | Performed by: FAMILY MEDICINE

## 2018-01-16 PROCEDURE — 99214 OFFICE O/P EST MOD 30 MIN: CPT | Performed by: FAMILY MEDICINE

## 2018-01-16 PROCEDURE — 3014F SCREEN MAMMO DOC REV: CPT | Performed by: FAMILY MEDICINE

## 2018-01-16 PROCEDURE — 1123F ACP DISCUSS/DSCN MKR DOCD: CPT | Performed by: FAMILY MEDICINE

## 2018-01-16 PROCEDURE — G8400 PT W/DXA NO RESULTS DOC: HCPCS | Performed by: FAMILY MEDICINE

## 2018-01-16 PROCEDURE — G8599 NO ASA/ANTIPLAT THER USE RNG: HCPCS | Performed by: FAMILY MEDICINE

## 2018-01-16 PROCEDURE — G8484 FLU IMMUNIZE NO ADMIN: HCPCS | Performed by: FAMILY MEDICINE

## 2018-01-16 PROCEDURE — G8427 DOCREV CUR MEDS BY ELIG CLIN: HCPCS | Performed by: FAMILY MEDICINE

## 2018-01-16 PROCEDURE — 4040F PNEUMOC VAC/ADMIN/RCVD: CPT | Performed by: FAMILY MEDICINE

## 2018-01-16 PROCEDURE — 1036F TOBACCO NON-USER: CPT | Performed by: FAMILY MEDICINE

## 2018-01-16 RX ORDER — GABAPENTIN 100 MG/1
CAPSULE ORAL
Qty: 60 CAPSULE | Refills: 11 | Status: SHIPPED
Start: 2018-01-16 | End: 2018-04-17 | Stop reason: SDUPTHER

## 2018-01-16 NOTE — PROGRESS NOTES
Value   03/23/2017 217 (H)     Past Medical History:   Diagnosis Date    Arthritis     Asthma     Back pain     Fibromyalgia     Fibromyalgia     Heart failure with reduced ejection fraction (HCC)     Herpes zoster     Hypercholesteremia     Hyperlipidemia     Hypertriglyceridemia     Neck pain     Osteopenia     Stress-induced cardiomyopathy 03/2017     Family History   Problem Relation Age of Onset    High Blood Pressure Mother     Heart Disease Father      Social History     Social History    Marital status: Legally      Spouse name: N/A    Number of children: N/A    Years of education: N/A     Occupational History    Not on file. Social History Main Topics    Smoking status: Former Smoker     Packs/day: 0.50     Quit date: 1/1/2015    Smokeless tobacco: Never Used    Alcohol use No    Drug use: No    Sexual activity: Yes     Partners: Male     Other Topics Concern    Not on file     Social History Narrative    No narrative on file       Prior to Visit Medications    Medication Sig Taking?  Authorizing Provider   metoprolol succinate (TOPROL XL) 50 MG extended release tablet TAKE 1 TABLET BY MOUTH EVERY DAY Yes Whitney Arroyo MD   Docusate Calcium (STOOL SOFTENER PO) Take by mouth as needed Yes Historical Provider, MD   traMADol (ULTRAM) 50 MG tablet TAKE TWO TABLETS BY MOUTH FOUR TIMES A DAY AS NEEDED Yes Valerio Browne MD   VENTOLIN  (00 Base) MCG/ACT inhaler INHALE 2 PUFFS INTO THE LUNGS FOUR TIMES DAILY Yes Valerio Browne MD   spironolactone (ALDACTONE) 25 MG tablet Take 0.5 tablets by mouth daily Yes Whitney Arroyo MD   lisinopril (PRINIVIL;ZESTRIL) 40 MG tablet TAKE 1 TABLET BY MOUTH NIGHTLY Yes Sandhya Frank CNP   gabapentin (NEURONTIN) 100 MG capsule 1 bid  Patient taking differently: 2 times daily 2 am 1 hs Yes Valerio Browne MD   fenofibrate (TRICOR) 54 MG tablet Take 1 tablet by mouth daily Yes Jorge Saha MD nitroGLYCERIN (NITROSTAT) 0.4 MG SL tablet Place 1 tablet under the tongue every 5 minutes as needed for Chest pain Yes Levi Anne MD   atorvastatin (LIPITOR) 40 MG tablet Take 2 tablets by mouth daily Yes Lavonna Bloch, MD   aspirin EC 81 MG EC tablet Take 1 tablet by mouth daily Yes Lavonna Bloch, MD   lidocaine (LIDODERM) 5 % Place 1 patch onto the skin daily 12 hours on, 12 hours off prn Yes Lavonna Bloch, MD   Cholecalciferol (VITAMIN D3) 2000 UNITS CAPS Take 2 tablets by mouth daily  Yes Historical Provider, MD   Calcium 0209-9509 MG-UNIT CHEW Take  by mouth. Yes Historical Provider, MD     Allergies   Allergen Reactions    Shellfish-Derived Products Nausea And Vomiting    Celebrex [Celecoxib] Swelling    Codeine     Ibuprofen Swelling    Lipitor [Atorvastatin] Other (See Comments)     Muscle aches, flu like SX    Methadone     Vioxx Swelling       OBJECTIVE:  /68 (Site: Left Arm, Position: Sitting, Cuff Size: Large Adult)   Pulse 74   Ht 5' 4.5\" (1.638 m)   Wt 184 lb (83.5 kg)   LMP  (LMP Unknown)   SpO2 96%   BMI 31.10 kg/m²   BP Readings from Last 2 Encounters:   01/16/18 114/68   11/29/17 110/70     Wt Readings from Last 3 Encounters:   01/16/18 184 lb (83.5 kg)   11/29/17 181 lb (82.1 kg)   11/10/17 181 lb (82.1 kg)         Physical Exam   Constitutional: She appears well-developed and well-nourished. No distress. HENT:   Head: Normocephalic and atraumatic. Right Ear: External ear normal.   Left Ear: External ear normal.   Nose: Nose normal.   Eyes: Conjunctivae and EOM are normal. Pupils are equal, round, and reactive to light. Right eye exhibits no discharge. Left eye exhibits no discharge. No scleral icterus. Cardiovascular: Normal rate, regular rhythm and normal heart sounds. Exam reveals no gallop. Pulmonary/Chest: Effort normal and breath sounds normal. No respiratory distress. She has no rales. Skin: Skin is warm and dry.  No

## 2018-01-30 ENCOUNTER — OFFICE VISIT (OUTPATIENT)
Dept: FAMILY MEDICINE CLINIC | Age: 68
End: 2018-01-30

## 2018-01-30 VITALS
DIASTOLIC BLOOD PRESSURE: 66 MMHG | HEART RATE: 82 BPM | SYSTOLIC BLOOD PRESSURE: 124 MMHG | WEIGHT: 180.6 LBS | OXYGEN SATURATION: 98 % | BODY MASS INDEX: 30.52 KG/M2

## 2018-01-30 DIAGNOSIS — M50.20 DISPLACEMENT OF CERVICAL INTERVERTEBRAL DISC WITHOUT MYELOPATHY: Primary | ICD-10-CM

## 2018-01-30 DIAGNOSIS — F32.3 DEPRESSIVE PSYCHOSIS (HCC): ICD-10-CM

## 2018-01-30 PROCEDURE — G8599 NO ASA/ANTIPLAT THER USE RNG: HCPCS | Performed by: FAMILY MEDICINE

## 2018-01-30 PROCEDURE — 3017F COLORECTAL CA SCREEN DOC REV: CPT | Performed by: FAMILY MEDICINE

## 2018-01-30 PROCEDURE — G8417 CALC BMI ABV UP PARAM F/U: HCPCS | Performed by: FAMILY MEDICINE

## 2018-01-30 PROCEDURE — 99213 OFFICE O/P EST LOW 20 MIN: CPT | Performed by: FAMILY MEDICINE

## 2018-01-30 PROCEDURE — 1123F ACP DISCUSS/DSCN MKR DOCD: CPT | Performed by: FAMILY MEDICINE

## 2018-01-30 PROCEDURE — G8427 DOCREV CUR MEDS BY ELIG CLIN: HCPCS | Performed by: FAMILY MEDICINE

## 2018-01-30 PROCEDURE — 1036F TOBACCO NON-USER: CPT | Performed by: FAMILY MEDICINE

## 2018-01-30 PROCEDURE — 1090F PRES/ABSN URINE INCON ASSESS: CPT | Performed by: FAMILY MEDICINE

## 2018-01-30 PROCEDURE — 3014F SCREEN MAMMO DOC REV: CPT | Performed by: FAMILY MEDICINE

## 2018-01-30 PROCEDURE — G8400 PT W/DXA NO RESULTS DOC: HCPCS | Performed by: FAMILY MEDICINE

## 2018-01-30 PROCEDURE — G8484 FLU IMMUNIZE NO ADMIN: HCPCS | Performed by: FAMILY MEDICINE

## 2018-01-30 PROCEDURE — 4040F PNEUMOC VAC/ADMIN/RCVD: CPT | Performed by: FAMILY MEDICINE

## 2018-01-30 NOTE — LETTER
medical emergencies, dial 911. For questions regarding your [employer] account call [phone]. If you have a clinical question, please call your doctor's office.     Sincerely,  Citlali Gabriel MD

## 2018-01-30 NOTE — PROGRESS NOTES
I, Dr. Olinda Huertas, personally performed the services described in this documentation, as scribed by the above signed scribe in my presence, and it is both accurate and complete. I agree with the Chief Complaint, ROS, and Past Histories independently gathered by the clinical support staff and the remaining scribed note accurately describes my personal service to the patient.       1/30/2018    3:48 PM

## 2018-01-31 ENCOUNTER — TELEPHONE (OUTPATIENT)
Dept: CARDIOLOGY CLINIC | Age: 68
End: 2018-01-31

## 2018-01-31 NOTE — TELEPHONE ENCOUNTER
1. Avoid over the counter cold medications that contain pseudo-ephredrines, phenylephrines, neosynephrines = decongestants  2. Okay to take antihistamines without the decongestant (Claritin, Allegra, Zyrtec, Benadryl without the \"D\")  3. Okay to take guaifenesin (Mucinex or Robitussion) to help clear phlegm, okay to take dextromethorphan (Delsym) as cough suppressant.

## 2018-02-02 ENCOUNTER — TELEPHONE (OUTPATIENT)
Dept: FAMILY MEDICINE CLINIC | Age: 68
End: 2018-02-02

## 2018-02-02 RX ORDER — AZITHROMYCIN 250 MG/1
TABLET, FILM COATED ORAL
Qty: 1 PACKET | Refills: 0 | Status: SHIPPED | OUTPATIENT
Start: 2018-02-02 | End: 2018-02-12

## 2018-03-05 RX ORDER — ATORVASTATIN CALCIUM 40 MG/1
80 TABLET, FILM COATED ORAL DAILY
Qty: 60 TABLET | Refills: 11 | Status: SHIPPED | OUTPATIENT
Start: 2018-03-05 | End: 2019-03-12 | Stop reason: SDUPTHER

## 2018-03-05 RX ORDER — LISINOPRIL 40 MG/1
TABLET ORAL
Qty: 90 TABLET | Refills: 1 | Status: SHIPPED | OUTPATIENT
Start: 2018-03-05 | End: 2018-09-14 | Stop reason: SDUPTHER

## 2018-03-06 NOTE — TELEPHONE ENCOUNTER
Please inform her she make take 2 tablets by mouth at night or I can call her in the increase dose at 80 mg. Which would she prefer?

## 2018-03-06 NOTE — TELEPHONE ENCOUNTER
Pt said that she wants to take the 2 tablets at night. Pt said that she has a hard time swallowing bigger pills so she is fine with just sticking to taking the two tablets.

## 2018-04-16 ENCOUNTER — TELEPHONE (OUTPATIENT)
Dept: FAMILY MEDICINE CLINIC | Age: 68
End: 2018-04-16

## 2018-04-16 DIAGNOSIS — Z12.39 SCREENING FOR BREAST CANCER: Primary | ICD-10-CM

## 2018-04-17 ENCOUNTER — OFFICE VISIT (OUTPATIENT)
Dept: FAMILY MEDICINE CLINIC | Age: 68
End: 2018-04-17

## 2018-04-17 VITALS
SYSTOLIC BLOOD PRESSURE: 122 MMHG | OXYGEN SATURATION: 94 % | HEIGHT: 65 IN | BODY MASS INDEX: 31.49 KG/M2 | WEIGHT: 189 LBS | HEART RATE: 88 BPM | DIASTOLIC BLOOD PRESSURE: 68 MMHG

## 2018-04-17 DIAGNOSIS — I77.1 SUBCLAVIAN ARTERY STENOSIS, RIGHT (HCC): ICD-10-CM

## 2018-04-17 DIAGNOSIS — S20.222D CONTUSION OF LEFT SIDE OF BACK, SUBSEQUENT ENCOUNTER: ICD-10-CM

## 2018-04-17 DIAGNOSIS — E78.2 MIXED HYPERLIPIDEMIA: Primary | ICD-10-CM

## 2018-04-17 DIAGNOSIS — M51.37 DEGENERATION OF LUMBAR OR LUMBOSACRAL INTERVERTEBRAL DISC: Primary | ICD-10-CM

## 2018-04-17 DIAGNOSIS — S80.12XD CONTUSION OF LEFT LOWER LEG, SUBSEQUENT ENCOUNTER: ICD-10-CM

## 2018-04-17 PROCEDURE — 3017F COLORECTAL CA SCREEN DOC REV: CPT | Performed by: FAMILY MEDICINE

## 2018-04-17 PROCEDURE — 4040F PNEUMOC VAC/ADMIN/RCVD: CPT | Performed by: FAMILY MEDICINE

## 2018-04-17 PROCEDURE — 1123F ACP DISCUSS/DSCN MKR DOCD: CPT | Performed by: FAMILY MEDICINE

## 2018-04-17 PROCEDURE — 1090F PRES/ABSN URINE INCON ASSESS: CPT | Performed by: FAMILY MEDICINE

## 2018-04-17 PROCEDURE — 99213 OFFICE O/P EST LOW 20 MIN: CPT | Performed by: FAMILY MEDICINE

## 2018-04-17 PROCEDURE — 1036F TOBACCO NON-USER: CPT | Performed by: FAMILY MEDICINE

## 2018-04-17 PROCEDURE — G8427 DOCREV CUR MEDS BY ELIG CLIN: HCPCS | Performed by: FAMILY MEDICINE

## 2018-04-17 PROCEDURE — G8417 CALC BMI ABV UP PARAM F/U: HCPCS | Performed by: FAMILY MEDICINE

## 2018-04-17 PROCEDURE — G8400 PT W/DXA NO RESULTS DOC: HCPCS | Performed by: FAMILY MEDICINE

## 2018-04-17 PROCEDURE — 3014F SCREEN MAMMO DOC REV: CPT | Performed by: FAMILY MEDICINE

## 2018-04-17 PROCEDURE — G8599 NO ASA/ANTIPLAT THER USE RNG: HCPCS | Performed by: FAMILY MEDICINE

## 2018-04-17 RX ORDER — GABAPENTIN 100 MG/1
CAPSULE ORAL
Qty: 180 CAPSULE | Refills: 11 | Status: SHIPPED | OUTPATIENT
Start: 2018-04-17 | End: 2019-04-22 | Stop reason: SDUPTHER

## 2018-04-17 RX ORDER — TRAMADOL HYDROCHLORIDE 50 MG/1
TABLET ORAL
Qty: 120 TABLET | Refills: 2 | Status: CANCELLED | OUTPATIENT
Start: 2018-04-17 | End: 2018-05-17

## 2018-04-17 ASSESSMENT — PATIENT HEALTH QUESTIONNAIRE - PHQ9
1. LITTLE INTEREST OR PLEASURE IN DOING THINGS: 0
2. FEELING DOWN, DEPRESSED OR HOPELESS: 0
SUM OF ALL RESPONSES TO PHQ QUESTIONS 1-9: 0
SUM OF ALL RESPONSES TO PHQ9 QUESTIONS 1 & 2: 0

## 2018-05-02 ENCOUNTER — HOSPITAL ENCOUNTER (OUTPATIENT)
Dept: VASCULAR LAB | Age: 68
Discharge: HOME | End: 2018-05-02
Attending: INTERNAL MEDICINE | Admitting: INTERNAL MEDICINE

## 2018-05-02 DIAGNOSIS — I77.1 STRICTURE OF ARTERY (HCC): ICD-10-CM

## 2018-05-03 ENCOUNTER — TELEPHONE (OUTPATIENT)
Dept: CARDIOLOGY CLINIC | Age: 68
End: 2018-05-03

## 2018-05-08 ENCOUNTER — OFFICE VISIT (OUTPATIENT)
Dept: CARDIOLOGY CLINIC | Age: 68
End: 2018-05-08

## 2018-05-08 VITALS
DIASTOLIC BLOOD PRESSURE: 80 MMHG | WEIGHT: 187 LBS | OXYGEN SATURATION: 97 % | SYSTOLIC BLOOD PRESSURE: 136 MMHG | BODY MASS INDEX: 31.16 KG/M2 | HEART RATE: 63 BPM | HEIGHT: 65 IN

## 2018-05-08 DIAGNOSIS — I50.22 SYSTOLIC CHF, CHRONIC (HCC): Primary | ICD-10-CM

## 2018-05-08 DIAGNOSIS — E78.2 MIXED HYPERLIPIDEMIA: ICD-10-CM

## 2018-05-08 DIAGNOSIS — I10 ESSENTIAL HYPERTENSION: ICD-10-CM

## 2018-05-08 DIAGNOSIS — R06.02 SOB (SHORTNESS OF BREATH): ICD-10-CM

## 2018-05-08 PROCEDURE — G8417 CALC BMI ABV UP PARAM F/U: HCPCS | Performed by: INTERNAL MEDICINE

## 2018-05-08 PROCEDURE — 1090F PRES/ABSN URINE INCON ASSESS: CPT | Performed by: INTERNAL MEDICINE

## 2018-05-08 PROCEDURE — G8427 DOCREV CUR MEDS BY ELIG CLIN: HCPCS | Performed by: INTERNAL MEDICINE

## 2018-05-08 PROCEDURE — 3017F COLORECTAL CA SCREEN DOC REV: CPT | Performed by: INTERNAL MEDICINE

## 2018-05-08 PROCEDURE — 99215 OFFICE O/P EST HI 40 MIN: CPT | Performed by: INTERNAL MEDICINE

## 2018-05-11 ENCOUNTER — TELEPHONE (OUTPATIENT)
Dept: CARDIOLOGY CLINIC | Age: 68
End: 2018-05-11

## 2018-05-16 ENCOUNTER — TELEPHONE (OUTPATIENT)
Dept: FAMILY MEDICINE CLINIC | Age: 68
End: 2018-05-16

## 2018-05-16 DIAGNOSIS — M54.50 CHRONIC LOW BACK PAIN WITHOUT SCIATICA, UNSPECIFIED BACK PAIN LATERALITY: ICD-10-CM

## 2018-05-16 DIAGNOSIS — M51.37 DEGENERATION OF LUMBAR OR LUMBOSACRAL INTERVERTEBRAL DISC: ICD-10-CM

## 2018-05-16 DIAGNOSIS — G89.29 CHRONIC LOW BACK PAIN WITHOUT SCIATICA, UNSPECIFIED BACK PAIN LATERALITY: ICD-10-CM

## 2018-05-16 DIAGNOSIS — M54.2 NECK PAIN: Primary | ICD-10-CM

## 2018-05-16 RX ORDER — TRAMADOL HYDROCHLORIDE 50 MG/1
TABLET ORAL
Qty: 120 TABLET | Refills: 0 | Status: SHIPPED | OUTPATIENT
Start: 2018-05-16 | End: 2018-06-16

## 2018-06-04 RX ORDER — SPIRONOLACTONE 25 MG/1
TABLET ORAL
Qty: 45 TABLET | Refills: 1 | Status: SHIPPED | OUTPATIENT
Start: 2018-06-04 | End: 2018-10-03 | Stop reason: SDUPTHER

## 2018-06-07 ENCOUNTER — HOSPITAL ENCOUNTER (OUTPATIENT)
Dept: CARDIOLOGY | Facility: CLINIC | Age: 68
Discharge: HOME | End: 2018-06-07
Attending: INTERNAL MEDICINE | Admitting: INTERNAL MEDICINE

## 2018-06-07 DIAGNOSIS — R06.02 SHORTNESS OF BREATH: ICD-10-CM

## 2018-06-07 LAB
LV EF: 45 %
LVEF MODALITY: NORMAL

## 2018-06-08 ENCOUNTER — TELEPHONE (OUTPATIENT)
Dept: CARDIOLOGY CLINIC | Age: 68
End: 2018-06-08

## 2018-06-19 RX ORDER — ALBUTEROL SULFATE 90 UG/1
2 AEROSOL, METERED RESPIRATORY (INHALATION) 4 TIMES DAILY
Qty: 18 INHALER | Refills: 5 | Status: SHIPPED | OUTPATIENT
Start: 2018-06-19 | End: 2019-04-30 | Stop reason: CLARIF

## 2018-06-20 ENCOUNTER — TELEPHONE (OUTPATIENT)
Dept: CARDIOLOGY CLINIC | Age: 68
End: 2018-06-20

## 2018-06-22 ENCOUNTER — TELEPHONE (OUTPATIENT)
Dept: FAMILY MEDICINE CLINIC | Age: 68
End: 2018-06-22

## 2018-07-08 DIAGNOSIS — M51.37 DEGENERATION OF LUMBAR OR LUMBOSACRAL INTERVERTEBRAL DISC: ICD-10-CM

## 2018-07-09 ENCOUNTER — TELEPHONE (OUTPATIENT)
Dept: FAMILY MEDICINE CLINIC | Age: 68
End: 2018-07-09

## 2018-07-09 RX ORDER — TRAMADOL HYDROCHLORIDE 50 MG/1
TABLET ORAL
Qty: 120 TABLET | Refills: 1 | OUTPATIENT
Start: 2018-07-09

## 2018-07-09 NOTE — TELEPHONE ENCOUNTER
Patient is calling in, she states Comp management has sent her approved C-9 to our office twice for Dr Adria Beauchamp but his office still states they have not received it. She cannot get into see him until they get a copy. Can you please call patient and confirm we have it and have sent it to them.     [phone]

## 2018-07-09 NOTE — TELEPHONE ENCOUNTER
Printed the approval off and Nuha the  said Comp Management called her to verify and that they were going to call Dr Masoud Lino office and clear this up.

## 2018-07-10 ENCOUNTER — TELEPHONE (OUTPATIENT)
Dept: CARDIOLOGY CLINIC | Age: 68
End: 2018-07-10

## 2018-07-10 RX ORDER — FENOFIBRATE 54 MG/1
54 TABLET ORAL DAILY
Qty: 90 TABLET | Refills: 1 | Status: SHIPPED | OUTPATIENT
Start: 2018-07-10 | End: 2018-10-03 | Stop reason: SDUPTHER

## 2018-07-10 NOTE — TELEPHONE ENCOUNTER
Medication Refill    When was your last appointment with cardiology?  (if 1year or longer, please schedule an appointment)    Medication needing refilled:fenofibrate    Doseage of the medication:54mg    How are you taking this medication (QD, BID, TID, QID, PRN):    Patient want a 30 or 90 day supply called in:90    Which Pharmacy are we sending the medication to:[facility]     Pharmacy Phone number:    Pharmacy Fax number:

## 2018-07-11 DIAGNOSIS — M51.37 DEGENERATION OF LUMBAR OR LUMBOSACRAL INTERVERTEBRAL DISC: ICD-10-CM

## 2018-07-12 RX ORDER — TRAMADOL HYDROCHLORIDE 50 MG/1
TABLET ORAL
Qty: 120 TABLET | Refills: 1 | OUTPATIENT
Start: 2018-07-12

## 2018-07-18 ENCOUNTER — OFFICE VISIT (OUTPATIENT)
Dept: FAMILY MEDICINE CLINIC | Age: 68
End: 2018-07-18

## 2018-07-18 VITALS
BODY MASS INDEX: 31.32 KG/M2 | WEIGHT: 188 LBS | OXYGEN SATURATION: 96 % | HEART RATE: 82 BPM | DIASTOLIC BLOOD PRESSURE: 68 MMHG | HEIGHT: 65 IN | SYSTOLIC BLOOD PRESSURE: 120 MMHG

## 2018-07-18 DIAGNOSIS — E78.2 MIXED HYPERLIPIDEMIA: ICD-10-CM

## 2018-07-18 DIAGNOSIS — M51.37 DEGENERATION OF LUMBAR OR LUMBOSACRAL INTERVERTEBRAL DISC: ICD-10-CM

## 2018-07-18 DIAGNOSIS — M54.2 NECK PAIN: ICD-10-CM

## 2018-07-18 DIAGNOSIS — I10 ESSENTIAL HYPERTENSION: ICD-10-CM

## 2018-07-18 DIAGNOSIS — I50.22 SYSTOLIC CHF, CHRONIC (HCC): ICD-10-CM

## 2018-07-18 DIAGNOSIS — M50.20 DISPLACEMENT OF CERVICAL INTERVERTEBRAL DISC WITHOUT MYELOPATHY: Primary | ICD-10-CM

## 2018-07-18 LAB
A/G RATIO: 1.7 (ref 1.1–2.2)
ALBUMIN SERPL-MCNC: 4.3 G/DL (ref 3.4–5)
ALP BLD-CCNC: 62 U/L (ref 40–129)
ALT SERPL-CCNC: 18 U/L (ref 10–40)
ANION GAP SERPL CALCULATED.3IONS-SCNC: 10 MMOL/L (ref 3–16)
AST SERPL-CCNC: 20 U/L (ref 15–37)
BASOPHILS ABSOLUTE: 0.1 K/UL (ref 0–0.2)
BASOPHILS RELATIVE PERCENT: 0.9 %
BILIRUB SERPL-MCNC: 0.4 MG/DL (ref 0–1)
BUN BLDV-MCNC: 15 MG/DL (ref 7–20)
CALCIUM SERPL-MCNC: 9.7 MG/DL (ref 8.3–10.6)
CHLORIDE BLD-SCNC: 103 MMOL/L (ref 99–110)
CHOLESTEROL, TOTAL: 260 MG/DL (ref 0–199)
CO2: 26 MMOL/L (ref 21–32)
CREAT SERPL-MCNC: 1 MG/DL (ref 0.6–1.2)
EOSINOPHILS ABSOLUTE: 0.2 K/UL (ref 0–0.6)
EOSINOPHILS RELATIVE PERCENT: 3.7 %
GFR AFRICAN AMERICAN: >60
GFR NON-AFRICAN AMERICAN: 55
GLOBULIN: 2.5 G/DL
GLUCOSE BLD-MCNC: 119 MG/DL (ref 70–99)
HCT VFR BLD CALC: 36.4 % (ref 36–48)
HDLC SERPL-MCNC: 58 MG/DL (ref 40–60)
HEMOGLOBIN: 12.3 G/DL (ref 12–16)
LDL CHOLESTEROL CALCULATED: 172 MG/DL
LYMPHOCYTES ABSOLUTE: 1.3 K/UL (ref 1–5.1)
LYMPHOCYTES RELATIVE PERCENT: 23.2 %
MCH RBC QN AUTO: 30 PG (ref 26–34)
MCHC RBC AUTO-ENTMCNC: 33.9 G/DL (ref 31–36)
MCV RBC AUTO: 88.5 FL (ref 80–100)
MONOCYTES ABSOLUTE: 0.4 K/UL (ref 0–1.3)
MONOCYTES RELATIVE PERCENT: 6.3 %
NEUTROPHILS ABSOLUTE: 3.8 K/UL (ref 1.7–7.7)
NEUTROPHILS RELATIVE PERCENT: 65.9 %
PDW BLD-RTO: 13.5 % (ref 12.4–15.4)
PLATELET # BLD: 275 K/UL (ref 135–450)
PMV BLD AUTO: 9.4 FL (ref 5–10.5)
POTASSIUM SERPL-SCNC: 5.1 MMOL/L (ref 3.5–5.1)
PRO-BNP: 95 PG/ML (ref 0–124)
RBC # BLD: 4.12 M/UL (ref 4–5.2)
SODIUM BLD-SCNC: 139 MMOL/L (ref 136–145)
TOTAL PROTEIN: 6.8 G/DL (ref 6.4–8.2)
TRIGL SERPL-MCNC: 148 MG/DL (ref 0–150)
VLDLC SERPL CALC-MCNC: 30 MG/DL
WBC # BLD: 5.8 K/UL (ref 4–11)

## 2018-07-18 PROCEDURE — 3017F COLORECTAL CA SCREEN DOC REV: CPT | Performed by: FAMILY MEDICINE

## 2018-07-18 PROCEDURE — 1036F TOBACCO NON-USER: CPT | Performed by: FAMILY MEDICINE

## 2018-07-18 PROCEDURE — 1090F PRES/ABSN URINE INCON ASSESS: CPT | Performed by: FAMILY MEDICINE

## 2018-07-18 PROCEDURE — 1101F PT FALLS ASSESS-DOCD LE1/YR: CPT | Performed by: FAMILY MEDICINE

## 2018-07-18 PROCEDURE — 99214 OFFICE O/P EST MOD 30 MIN: CPT | Performed by: FAMILY MEDICINE

## 2018-07-18 PROCEDURE — G8417 CALC BMI ABV UP PARAM F/U: HCPCS | Performed by: FAMILY MEDICINE

## 2018-07-18 PROCEDURE — G8427 DOCREV CUR MEDS BY ELIG CLIN: HCPCS | Performed by: FAMILY MEDICINE

## 2018-07-18 PROCEDURE — 36415 COLL VENOUS BLD VENIPUNCTURE: CPT | Performed by: FAMILY MEDICINE

## 2018-07-18 PROCEDURE — G8400 PT W/DXA NO RESULTS DOC: HCPCS | Performed by: FAMILY MEDICINE

## 2018-07-18 PROCEDURE — G8599 NO ASA/ANTIPLAT THER USE RNG: HCPCS | Performed by: FAMILY MEDICINE

## 2018-07-18 PROCEDURE — 4040F PNEUMOC VAC/ADMIN/RCVD: CPT | Performed by: FAMILY MEDICINE

## 2018-07-18 PROCEDURE — 1123F ACP DISCUSS/DSCN MKR DOCD: CPT | Performed by: FAMILY MEDICINE

## 2018-07-18 NOTE — PROGRESS NOTES
Chief Complaint   Patient presents with    Personal Problem       HPI:  Wale Underwood is a 79 y.o. (: 1950) here today for private issue. Patient wishes to discuss ongoing pain management. Per previous discussion, she has been aware that I was discontinuing chronic pain management. We had written enough medication for her through . Is taken her some time to get established back with Dr. Aayush Overton. She has seen him in the past and he has done procedures. She has 2 tramadol left. She had spoken to Dr. Aayush Overton staff prior to the upcoming appointment this [address] she had perceived from the conversation that he would not be writing any pain medicine for her. She continues to work. She has found that to tramadol in the morning and one in the evening is the minimum that she can take which allows her to continue to function and work. If she cannot get tramadol from him, she is concerned as to what her other options are. She is okay with receiving procedures as indicated. Patient's medications, allergies, past medical, surgical, social and family histories were reviewed and updated as appropriate on 2018 at 10:18 AM.    ROS:  Review of Systems    All other systems reviewed and are negative except as noted above on 2018 at 10:18 AM. Additional review of systems may be scanned into the media section of this medical record. Any responses requiring further intervention were pursued.     LDL Calculated (mg/dL)   Date Value   2017 217 (H)     Past Medical History:   Diagnosis Date    Arthritis     Asthma     Back pain     Fibromyalgia     Fibromyalgia     Heart failure with reduced ejection fraction (HCC)     Herpes zoster     Hypercholesteremia     Hyperlipidemia     Hypertriglyceridemia     Neck pain     Osteopenia     Stress-induced cardiomyopathy 2017     Family History   Problem Relation Age of Onset    High Blood Pressure Mother     Heart Disease Father Social History     Social History    Marital status:      Spouse name: N/A    Number of children: N/A    Years of education: N/A     Occupational History    Not on file. Social History Main Topics    Smoking status: Former Smoker     Packs/day: 0.50     Quit date: 1/1/2015    Smokeless tobacco: Never Used    Alcohol use No    Drug use: No    Sexual activity: Yes     Partners: Male     Other Topics Concern    Not on file     Social History Narrative    No narrative on file       Prior to Visit Medications    Medication Sig Taking? Authorizing Provider   fenofibrate (TRICOR) 54 MG tablet Take 1 tablet by mouth daily Yes BRENDA Kyle CNP   albuterol sulfate HFA (VENTOLIN HFA) 108 (90 Base) MCG/ACT inhaler Inhale 2 puffs into the lungs 4 times daily Yes BRENDA Travis CNP   spironolactone (ALDACTONE) 25 MG tablet TAKE 1/2 TABLET BY MOUTH DAILY Yes Delma Celaya MD   atorvastatin (LIPITOR) 40 MG tablet TAKE 2 TABLETS BY MOUTH DAILY Yes BRENDA Travis CNP   lisinopril (PRINIVIL;ZESTRIL) 40 MG tablet TAKE 1 TABLET BY MOUTH EVERY DAY IN THE EVENING Yes Delma Celaya MD   metoprolol succinate (TOPROL XL) 50 MG extended release tablet TAKE 1 TABLET BY MOUTH EVERY DAY Yes Angeles Ram MD   Docusate Calcium (STOOL SOFTENER PO) Take by mouth as needed Yes Historical Provider, MD   nitroGLYCERIN (NITROSTAT) 0.4 MG SL tablet Place 1 tablet under the tongue every 5 minutes as needed for Chest pain Yes Angeles Ram MD   aspirin EC 81 MG EC tablet Take 1 tablet by mouth daily Yes Chencho Greenberg MD   lidocaine (LIDODERM) 5 % Place 1 patch onto the skin daily 12 hours on, 12 hours off prn Yes Chencho Greenberg MD   Cholecalciferol (VITAMIN D3) 2000 UNITS CAPS Take 2 tablets by mouth daily  Yes Historical Provider, MD   Calcium 4870-0919 MG-UNIT CHEW Take  by mouth.  Yes Historical Provider, MD   gabapentin (NEURONTIN) 100 MG capsule 2 capsule 3 x day. Chencho Greenberg MD     Allergies   Allergen Reactions    Shellfish-Derived Products Nausea And Vomiting    Celebrex [Celecoxib] Swelling    Codeine     Ibuprofen Swelling    Lipitor [Atorvastatin] Other (See Comments)     Muscle aches, flu like SX    Methadone     Vioxx Swelling       OBJECTIVE:  Estimated body mass index is 31.77 kg/m² as calculated from the following:    Height as of this encounter: 5' 4.5\" (1.638 m). Weight as of this encounter: 188 lb (85.3 kg). Vitals:    07/18/18 1012   BP: 120/68   Site: Left Arm   Position: Sitting   Cuff Size: Large Adult   Pulse: 82   SpO2: 96%   Weight: 188 lb (85.3 kg)   Height: 5' 4.5\" (1.638 m)     BP Readings from Last 2 Encounters:   07/18/18 120/68   05/08/18 136/80     Wt Readings from Last 3 Encounters:   07/18/18 188 lb (85.3 kg)   05/08/18 187 lb (84.8 kg)   04/17/18 189 lb (85.7 kg)       Physical Exam   Constitutional: She is oriented to person, place, and time. She appears well-developed and well-nourished. No distress. HENT:   Head: Normocephalic and atraumatic. Cardiovascular: Normal rate. Pulmonary/Chest: Effort normal. No respiratory distress. Neurological: She is alert and oriented to person, place, and time. Skin: She is not diaphoretic. Psychiatric: Her speech is normal and behavior is normal. Judgment and thought content normal. Her mood appears anxious. Patient quite tearful   Nursing note and vitals reviewed. ASSESSMENT PLAN      Diagnosis Orders   1. Displacement of cervical intervertebral disc without myelopathy     2. Mixed hyperlipidemia  LIPID PANEL   3. Neck pain  Drug Panel-PM-HI Res-UR Interp-A   4. Systolic CHF, chronic (HCC)  Comprehensive Metabolic Panel    CBC WITH AUTO DIFFERENTIAL    BRAIN NATRIURETIC PEPTIDE (BNP)   5. Essential hypertension  Comprehensive Metabolic Panel    CBC WITH AUTO DIFFERENTIAL    BRAIN NATRIURETIC PEPTIDE (BNP)   6.  Degeneration of

## 2018-07-20 ENCOUNTER — CLINICAL DOCUMENTATION (OUTPATIENT)
Dept: FAMILY MEDICINE CLINIC | Age: 68
End: 2018-07-20

## 2018-07-22 LAB
6-ACETYLMORPHINE: NOT DETECTED
7-AMINOCLONAZEPAM: NOT DETECTED
ALPHA-OH-ALPRAZOLAM: NOT DETECTED
ALPRAZOLAM: NOT DETECTED
AMPHETAMINE: NOT DETECTED
BARBITURATES: NOT DETECTED
BENZOYLECGONINE: NOT DETECTED
BUPRENORPHINE: NOT DETECTED
CARISOPRODOL: NOT DETECTED
CLONAZEPAM: NOT DETECTED
CODEINE: NOT DETECTED
CREATININE URINE: 219 MG/DL (ref 20–400)
DIAZEPAM: NOT DETECTED
DRUGS EXPECTED: NORMAL
EER PAIN MGT DRUG PANEL, HIGH RES/EMIT U: NORMAL
ETHYL GLUCURONIDE: NOT DETECTED
FENTANYL: NOT DETECTED
HYDROCODONE: NOT DETECTED
HYDROMORPHONE: NOT DETECTED
LORAZEPAM: NOT DETECTED
MARIJUANA METABOLITE: NOT DETECTED
MDA: NOT DETECTED
MDEA: NOT DETECTED
MDMA URINE: NOT DETECTED
MEPERIDINE: NOT DETECTED
METHADONE: NOT DETECTED
METHAMPHETAMINE: NOT DETECTED
METHYLPHENIDATE: NOT DETECTED
MIDAZOLAM: NOT DETECTED
MORPHINE: NOT DETECTED
NORBUPRENORPHINE, FREE: NOT DETECTED
NORDIAZEPAM: NOT DETECTED
NORFENTANYL: NOT DETECTED
NORHYDROCODONE, URINE: NOT DETECTED
NOROXYCODONE: NOT DETECTED
NOROXYMORPHONE, URINE: NOT DETECTED
OXAZEPAM: NOT DETECTED
OXYCODONE: NOT DETECTED
OXYMORPHONE: NOT DETECTED
PAIN MANAGEMENT DRUG PANEL: NORMAL
PAIN MANAGEMENT DRUG PANEL: NORMAL
PCP: NOT DETECTED
PHENTERMINE: NOT DETECTED
PROPOXYPHENE: NOT DETECTED
TAPENTADOL, URINE: NOT DETECTED
TAPENTADOL-O-SULFATE, URINE: NOT DETECTED
TEMAZEPAM: NOT DETECTED
TRAMADOL: PRESENT
ZOLPIDEM: NOT DETECTED

## 2018-07-23 ENCOUNTER — TELEPHONE (OUTPATIENT)
Dept: CARDIOLOGY CLINIC | Age: 68
End: 2018-07-23

## 2018-07-31 ENCOUNTER — OFFICE VISIT (OUTPATIENT)
Dept: FAMILY MEDICINE CLINIC | Age: 68
End: 2018-07-31

## 2018-07-31 VITALS
OXYGEN SATURATION: 97 % | DIASTOLIC BLOOD PRESSURE: 72 MMHG | WEIGHT: 193 LBS | BODY MASS INDEX: 32.62 KG/M2 | HEART RATE: 78 BPM | SYSTOLIC BLOOD PRESSURE: 108 MMHG

## 2018-07-31 DIAGNOSIS — M50.20 DISPLACEMENT OF CERVICAL INTERVERTEBRAL DISC WITHOUT MYELOPATHY: Primary | ICD-10-CM

## 2018-07-31 DIAGNOSIS — F32.3 DEPRESSIVE PSYCHOSIS (HCC): ICD-10-CM

## 2018-07-31 PROCEDURE — 99213 OFFICE O/P EST LOW 20 MIN: CPT | Performed by: FAMILY MEDICINE

## 2018-07-31 NOTE — PROGRESS NOTES
Judgment and thought content normal.   Nursing note and vitals reviewed. ASSESSMENT PLAN      Diagnosis Orders   1. Displacement of cervical intervertebral disc without myelopathy     2. Depressive psychosis ([address]     I believe her cervical injury is at baseline. I do not get the sense there is worsening radiculopathy. At this time we will continue to monitor no need for updated imaging, no need for return to neurosurgery at this point. She is now receiving up to 4 tramadol per day from Dr. Mariel Coe. She also is taking 2 gabapentin twice a day. Follow-up for the cervical spine in 6 months, follow-up for her lumbar injury on a separate Worker's Comp. claim in 3 months. Patient should call the office immediately with new or ongoing signs or symptoms or worsening, or proceed to the emergency room. No changes in past medical history, past surgical history, social history, or family history were noted during the patient encounter unless specifically listed above. All updates of past medical history, past surgical history, social history, or family history were reviewed personally by me during the office visit. All problems listed in the assessment are stable unless noted otherwise. Medication profile reviewed personally by me during the office visit. Medication side effects and possible impairments from medications were discussed as applicable. This document was prepared by a combination of typing and transcription through a voice recognition software. Scribe attestation: Alec Bañuelos RN, am scribing for and in the presence of Romayne Patient, MD. Electronically signed by Marah Saba RN on 7/31/2018 at 4:22 PM      Provider attestation:     I, Dr. Jarrett Zhao, personally performed the services described in this documentation, as scribed by the above signed scribe in my presence, and it is both accurate and complete.  I agree with the ROS and Past Histories independently

## 2018-09-14 RX ORDER — LISINOPRIL 40 MG/1
TABLET ORAL
Qty: 90 TABLET | Refills: 1 | Status: SHIPPED | OUTPATIENT
Start: 2018-09-14 | End: 2018-10-03 | Stop reason: SDUPTHER

## 2018-09-17 ENCOUNTER — TELEPHONE (OUTPATIENT)
Dept: FAMILY MEDICINE CLINIC | Age: 68
End: 2018-09-17

## 2018-09-17 NOTE — TELEPHONE ENCOUNTER
Pt said that she has the shingles back and was wanting to see if she could get something called in to [facility] or come in and be seen

## 2018-09-17 NOTE — TELEPHONE ENCOUNTER
[facility] detailed with appt date/time - requested pt to call back to confirm.  appt made in EPIC

## 2018-09-18 ENCOUNTER — TELEPHONE (OUTPATIENT)
Dept: FAMILY MEDICINE CLINIC | Age: 68
End: 2018-09-18

## 2018-09-18 NOTE — TELEPHONE ENCOUNTER
As long as absences are not habitual it will be a problem, for this patient it has not been a problem

## 2018-10-03 ENCOUNTER — TELEPHONE (OUTPATIENT)
Dept: CARDIOLOGY CLINIC | Age: 68
End: 2018-10-03

## 2018-10-04 RX ORDER — METOPROLOL SUCCINATE 50 MG/1
TABLET, EXTENDED RELEASE ORAL
Qty: 90 TABLET | Refills: 3 | Status: SHIPPED | OUTPATIENT
Start: 2018-10-04 | End: 2019-11-05 | Stop reason: SDUPTHER

## 2018-10-04 RX ORDER — SPIRONOLACTONE 25 MG/1
TABLET ORAL
Qty: 45 TABLET | Refills: 3 | Status: SHIPPED | OUTPATIENT
Start: 2018-10-04 | End: 2019-11-05 | Stop reason: SDUPTHER

## 2018-10-04 RX ORDER — LISINOPRIL 40 MG/1
TABLET ORAL
Qty: 90 TABLET | Refills: 2 | Status: SHIPPED | OUTPATIENT
Start: 2018-10-04 | End: 2019-09-17 | Stop reason: SDUPTHER

## 2018-10-04 RX ORDER — FENOFIBRATE 54 MG/1
54 TABLET ORAL DAILY
Qty: 90 TABLET | Refills: 1 | Status: SHIPPED | OUTPATIENT
Start: 2018-10-04 | End: 2019-07-15 | Stop reason: SDUPTHER

## 2018-10-16 ENCOUNTER — OFFICE VISIT (OUTPATIENT)
Dept: FAMILY MEDICINE CLINIC | Age: 68
End: 2018-10-16
Payer: MEDICARE

## 2018-10-16 VITALS
HEIGHT: 64 IN | SYSTOLIC BLOOD PRESSURE: 124 MMHG | WEIGHT: 193 LBS | BODY MASS INDEX: 32.95 KG/M2 | DIASTOLIC BLOOD PRESSURE: 78 MMHG | OXYGEN SATURATION: 96 % | HEART RATE: 74 BPM

## 2018-10-16 DIAGNOSIS — M51.36 DEGENERATION OF LUMBAR INTERVERTEBRAL DISC: Primary | ICD-10-CM

## 2018-10-16 DIAGNOSIS — S20.229D CONTUSION OF BACK, UNSPECIFIED LATERALITY, SUBSEQUENT ENCOUNTER: Chronic | ICD-10-CM

## 2018-10-16 DIAGNOSIS — S80.10XD CONTUSION OF LOWER LEG, UNSPECIFIED LATERALITY, SUBSEQUENT ENCOUNTER: Chronic | ICD-10-CM

## 2018-10-16 PROCEDURE — 1090F PRES/ABSN URINE INCON ASSESS: CPT | Performed by: FAMILY MEDICINE

## 2018-10-16 PROCEDURE — 1123F ACP DISCUSS/DSCN MKR DOCD: CPT | Performed by: FAMILY MEDICINE

## 2018-10-16 PROCEDURE — G8417 CALC BMI ABV UP PARAM F/U: HCPCS | Performed by: FAMILY MEDICINE

## 2018-10-16 PROCEDURE — 99214 OFFICE O/P EST MOD 30 MIN: CPT | Performed by: FAMILY MEDICINE

## 2018-10-16 PROCEDURE — 3017F COLORECTAL CA SCREEN DOC REV: CPT | Performed by: FAMILY MEDICINE

## 2018-10-16 PROCEDURE — G8484 FLU IMMUNIZE NO ADMIN: HCPCS | Performed by: FAMILY MEDICINE

## 2018-10-16 PROCEDURE — 1036F TOBACCO NON-USER: CPT | Performed by: FAMILY MEDICINE

## 2018-10-16 PROCEDURE — 4040F PNEUMOC VAC/ADMIN/RCVD: CPT | Performed by: FAMILY MEDICINE

## 2018-10-16 PROCEDURE — G8400 PT W/DXA NO RESULTS DOC: HCPCS | Performed by: FAMILY MEDICINE

## 2018-10-16 PROCEDURE — G8427 DOCREV CUR MEDS BY ELIG CLIN: HCPCS | Performed by: FAMILY MEDICINE

## 2018-10-16 PROCEDURE — 1101F PT FALLS ASSESS-DOCD LE1/YR: CPT | Performed by: FAMILY MEDICINE

## 2018-10-16 PROCEDURE — G8599 NO ASA/ANTIPLAT THER USE RNG: HCPCS | Performed by: FAMILY MEDICINE

## 2018-10-16 NOTE — PROGRESS NOTES
Chief Complaint   Patient presents with    Back Pain     Worker's comp   Pain is about the same. She thinks rain and cold weather has kicked it up a notch. HPI:  Nini Allen is a 79 y.o. (: 1950) here today for  Worker's Compensation Visit. Worker's Compensation Visit  Low back pain due to injury at work. Pain is getting a little worse. Describes pain as constant, deep aching pain. At worst pain it is 8/10. Pain sometimes radiates down both legs. She takes Tramadol and Gabapentin. Patient's medications, allergies, past medical, surgical, social and family histories were reviewed and updated asappropriate on 10/16/2018 at 4:35 PM.    ROS:  Review of Systems    All other systems reviewed and are negative except as noted above on 10/16/2018 at 4:35 PM. Additional review of systems may be scanned into the media section ofthis medical record. Any responses requiring further intervention were pursued. LDL Calculated (mg/dL)   Date Value   2018 172 (H)     Past Medical History:   Diagnosis Date    Arthritis     Asthma     Back pain     Fibromyalgia     Fibromyalgia     Heart failure with reduced ejection fraction (HCC)     Herpes zoster     Hypercholesteremia     Hyperlipidemia     Hypertriglyceridemia     Neck pain     Osteopenia     Stress-induced cardiomyopathy 2017     Family History   Problem Relation Age of Onset    High Blood Pressure Mother     Heart Disease Father      Social History     Social History    Marital status:      Spouse name: N/A    Number of children: N/A    Years of education: N/A     Occupational History    Not on file.      Social History Main Topics    Smoking status: Former Smoker     Packs/day: 0.50     Years: 3.00     Quit date: 2015    Smokeless tobacco: Never Used    Alcohol use No    Drug use: No    Sexual activity: Yes     Partners: Male     Other Topics Concern    Not on file     Social History Narrative    No kg).  Vitals:    10/16/18 1611   BP: 124/78   Site: Left Upper Arm   Position: Sitting   Cuff Size: Large Adult   Pulse: 74   SpO2: 96%   Weight: 193 lb (87.5 kg)   Height: 5' 4\" (1.626 m)     BP Readings from Last 2 Encounters:   10/16/18 124/78   07/31/18 108/72     Wt Readings from Last 3 Encounters:   10/16/18 193 lb (87.5 kg)   07/31/18 193 lb (87.5 kg)   07/18/18 188 lb (85.3 kg)       Physical Exam   Constitutional: She appears well-developed and well-nourished. No distress. HENT:   Head: Normocephalic and atraumatic. Right Ear: External ear normal.   Left Ear: External ear normal.   Nose: Nose normal.   Lips symmetric   Eyes: Pupils are equal, round, and reactive to light. Lids are normal. Right eye exhibits no discharge. Left eye exhibits no discharge. No scleral icterus. Neck: No JVD present. No thyromegaly present. Cardiovascular: Normal rate, regular rhythm and normal heart sounds. Pulmonary/Chest: Effort normal and breath sounds normal. No respiratory distress. Abdominal: Soft. There is no hepatosplenomegaly. There is no tenderness. Musculoskeletal: She exhibits tenderness (over sacral spine area). She exhibits no edema. Lumbar back: She exhibits bony tenderness. Right thigh 1 cm in diameter larger than left   Skin: Skin is warm and dry. No rash noted. She is not diaphoretic. No erythema. No pallor. Turgor normal   Psychiatric: She has a normal mood and affect. Her behavior is normal. Judgment and thought content normal.   Nursing note and vitals reviewed. ASSESSMENT PLAN      Diagnosis Orders   1. Degeneration of lumbar intervertebral disc     2. Contusion of lower leg, unspecified laterality, subsequent encounter     3. Contusion of back, unspecified laterality, subsequent encounter      Dr. Jacquelin Cheung is prescribing her controlled substances. She does have some thigh wasting on the left that would be consistent with her radiculopathy.   She is managing to push through

## 2018-10-18 PROBLEM — S80.12XA CONTUSION OF LEFT LEG: Status: ACTIVE | Noted: 2018-10-18

## 2018-10-18 PROBLEM — M51.37 DEGENERATIVE DISC DISEASE AT L5-S1 LEVEL: Chronic | Status: ACTIVE | Noted: 2018-10-18

## 2018-10-18 PROBLEM — S30.0XXA CONTUSION OF BUTTOCK: Chronic | Status: ACTIVE | Noted: 2018-10-18

## 2018-10-18 PROBLEM — M51.369 DEGENERATION OF L4-L5 INTERVERTEBRAL DISC: Status: ACTIVE | Noted: 2018-10-18

## 2018-10-18 PROBLEM — S30.0XXA CONTUSION OF BUTTOCK: Status: ACTIVE | Noted: 2018-10-18

## 2018-10-18 PROBLEM — M51.37 DEGENERATIVE DISC DISEASE AT L5-S1 LEVEL: Status: ACTIVE | Noted: 2018-10-18

## 2018-10-18 PROBLEM — M51.36 DEGENERATION OF L4-L5 INTERVERTEBRAL DISC: Status: ACTIVE | Noted: 2018-10-18

## 2018-10-18 PROBLEM — S80.12XA CONTUSION OF LEFT LEG: Chronic | Status: ACTIVE | Noted: 2018-10-18

## 2018-10-18 PROBLEM — M51.379 DEGENERATIVE DISC DISEASE AT L5-S1 LEVEL: Status: ACTIVE | Noted: 2018-10-18

## 2018-10-18 PROBLEM — M51.369 DEGENERATION OF L4-L5 INTERVERTEBRAL DISC: Chronic | Status: ACTIVE | Noted: 2018-10-18

## 2018-10-18 PROBLEM — M51.36 DEGENERATION OF L4-L5 INTERVERTEBRAL DISC: Chronic | Status: ACTIVE | Noted: 2018-10-18

## 2018-10-18 PROBLEM — M51.379 DEGENERATIVE DISC DISEASE AT L5-S1 LEVEL: Chronic | Status: ACTIVE | Noted: 2018-10-18

## 2018-11-13 ENCOUNTER — OFFICE VISIT (OUTPATIENT)
Dept: CARDIOLOGY CLINIC | Age: 68
End: 2018-11-13
Payer: MEDICARE

## 2018-11-13 VITALS
OXYGEN SATURATION: 94 % | HEART RATE: 76 BPM | BODY MASS INDEX: 32.68 KG/M2 | WEIGHT: 191.4 LBS | SYSTOLIC BLOOD PRESSURE: 110 MMHG | HEIGHT: 64 IN | DIASTOLIC BLOOD PRESSURE: 80 MMHG

## 2018-11-13 DIAGNOSIS — I42.9 CARDIOMYOPATHY, IDIOPATHIC (HCC): ICD-10-CM

## 2018-11-13 DIAGNOSIS — I10 ESSENTIAL HYPERTENSION: ICD-10-CM

## 2018-11-13 DIAGNOSIS — I50.22 SYSTOLIC CHF, CHRONIC (HCC): Primary | ICD-10-CM

## 2018-11-13 DIAGNOSIS — E78.2 MIXED HYPERLIPIDEMIA: ICD-10-CM

## 2018-11-13 PROCEDURE — G8427 DOCREV CUR MEDS BY ELIG CLIN: HCPCS | Performed by: INTERNAL MEDICINE

## 2018-11-13 PROCEDURE — 1036F TOBACCO NON-USER: CPT | Performed by: INTERNAL MEDICINE

## 2018-11-13 PROCEDURE — G8417 CALC BMI ABV UP PARAM F/U: HCPCS | Performed by: INTERNAL MEDICINE

## 2018-11-13 PROCEDURE — 1090F PRES/ABSN URINE INCON ASSESS: CPT | Performed by: INTERNAL MEDICINE

## 2018-11-13 PROCEDURE — 3017F COLORECTAL CA SCREEN DOC REV: CPT | Performed by: INTERNAL MEDICINE

## 2018-11-13 PROCEDURE — G8484 FLU IMMUNIZE NO ADMIN: HCPCS | Performed by: INTERNAL MEDICINE

## 2018-11-13 PROCEDURE — 99214 OFFICE O/P EST MOD 30 MIN: CPT | Performed by: INTERNAL MEDICINE

## 2018-11-13 PROCEDURE — G8400 PT W/DXA NO RESULTS DOC: HCPCS | Performed by: INTERNAL MEDICINE

## 2018-11-13 PROCEDURE — 4040F PNEUMOC VAC/ADMIN/RCVD: CPT | Performed by: INTERNAL MEDICINE

## 2018-11-13 PROCEDURE — 1101F PT FALLS ASSESS-DOCD LE1/YR: CPT | Performed by: INTERNAL MEDICINE

## 2018-11-13 PROCEDURE — G8599 NO ASA/ANTIPLAT THER USE RNG: HCPCS | Performed by: INTERNAL MEDICINE

## 2018-11-13 PROCEDURE — 1123F ACP DISCUSS/DSCN MKR DOCD: CPT | Performed by: INTERNAL MEDICINE

## 2018-11-13 NOTE — PROGRESS NOTES
to 30 days. . 120 tablet 0    lisinopril (PRINIVIL;ZESTRIL) 40 MG tablet TAKE 1 TABLET BY MOUTH EVERY DAY IN THE EVENING 90 tablet 2    spironolactone (ALDACTONE) 25 MG tablet TAKE 1/2 TABLET BY MOUTH DAILY 45 tablet 3    metoprolol succinate (TOPROL XL) 50 MG extended release tablet TAKE 1 TABLET BY MOUTH EVERY DAY 90 tablet 3    fenofibrate (TRICOR) 54 MG tablet Take 1 tablet by mouth daily 90 tablet 1    gabapentin (NEURONTIN) 100 MG capsule 2 capsule 3 x day. 180 capsule 11    atorvastatin (LIPITOR) 40 MG tablet TAKE 2 TABLETS BY MOUTH DAILY 60 tablet 11    Docusate Calcium (STOOL SOFTENER PO) Take by mouth as needed      nitroGLYCERIN (NITROSTAT) 0.4 MG SL tablet Place 1 tablet under the tongue every 5 minutes as needed for Chest pain 25 tablet 3    aspirin EC 81 MG EC tablet Take 1 tablet by mouth daily 30 tablet 3    Cholecalciferol (VITAMIN D3) 2000 UNITS CAPS Take 2 tablets by mouth daily       Calcium 4085-6928 MG-UNIT CHEW Take  by mouth.  albuterol sulfate HFA (VENTOLIN HFA) 108 (90 Base) MCG/ACT inhaler Inhale 2 puffs into the lungs 4 times daily 18 Inhaler 5    lidocaine (LIDODERM) 5 % Place 1 patch onto the skin daily 12 hours on, 12 hours off prn 30 patch 11     No current facility-administered medications for this visit.         Past Medical History:   Diagnosis Date    Arthritis     Asthma     Back pain     Fibromyalgia     Fibromyalgia     Heart failure with reduced ejection fraction (HCC)     Herpes zoster     Hypercholesteremia     Hyperlipidemia     Hypertriglyceridemia     Neck pain     Osteopenia     Stress-induced cardiomyopathy 03/2017     Past Surgical History:   Procedure Laterality Date    BACK SURGERY      BLADDER REPAIR      CARDIAC CATHETERIZATION      COLONOSCOPY  4/23/12    diverticulosis    HYSTERECTOMY      NECK SURGERY  1/98, 10/05, '07 or '08    C6-C7spur '05     Family History   Problem Relation Age of Onset    High Blood Pressure Mother     Systolic pulmonary artery pressure (SPAP) is normal and estimated at 26 mmHg   (right atrial pressure 3 mmHg). Last echo showed EF of 35-40%       [facility] 3/23/17  ANGIOGRAPHIC FINDINGS:  1.  The left main coronary artery bifurcates off the left coronary cusp.  The  left main has no angiographic disease. 2.  The LAD is a moderate sized vessel that runs in the interventricular  groove.  It has luminal irregularities throughout its course with no focal  stenosis. Martin Davis is a large branching diagonal 1 vessel that is small, about 2  mm in size, with no significant disease.  The LAD does wrap the apex. 3.  The left circumflex has 2 small obtuse marginals with a high takeoff;  these have luminal irregularities. Martin Davis is an OM3 and a large PLOM.  The  left circumflex has luminal irregularities only with no significant disease. 4.  The right coronary artery is dominant for posterior circulation, comes off  the right coronary cusp, travels in the AV groove and bifurcates to PLV and  PDA.  This is a large vessel, greater than about 4 mm in size.  There is mild  diffuse disease all throughout the proximal, mid and distal portions with  lesions no greater than 15% all throughout. Martin Davis is a large PLV and a PDA  system. 5.   LVEDP is 14.    6.  No significant aortic stenosis was detected.  No significant mitral  regurgitation, but opacification may not be optimal to detect this. 7.  LV EF was severely reduced at about 25%.  There was fairly severe  hypokinesis of the inferior wall, and there is moderate to severe hypokinesis  of the anterior wall in the apex and the apical inferior walls as well.   8.  The right subclavian artery did show an 80% stenosis.  We were unable to  do a pullback gradient across this to assess its hemodynamic significance, so  we will check bilateral arm pressures as well as future ultrasound.       Physical Exam:  Vitals:    11/13/18 1257   BP: 110/80   Pulse: 76   SpO2: 94%   Weight: 191 lb 6.4 oz (86.8 kg)   Height: 5' 4\" (1.626 m)     Body mass index is 32.85 kg/m². Wt Readings from Last 3 Encounters:   11/13/18 191 lb 6.4 oz (86.8 kg)   10/16/18 193 lb (87.5 kg)   07/31/18 193 lb (87.5 kg)     BP Readings from Last 3 Encounters:   11/13/18 110/80   10/16/18 124/78   07/31/18 108/72        Constitutional and General Appearance:   WD/WN in NAD  HEENT:  NC/AT  KATHRYN  No problems with hearing  Skin:  Warm, dry  Respiratory:  · Normal excursion and expansion without use of accessory muscles  · Resp Auscultation: Normal breath sounds without dullness  Cardiovascular:  · The apical impulses not displaced  · Heart tones are crisp and normal  · Cervical veins are not engorged  · The carotid upstroke is normal in amplitude and contour without delay or bruit  · JVP less than 8 cm H2O  RRR with nl S1 and S2 without m,r,g  · Peripheral pulses are symmetrical and full  · There is no clubbing, cyanosis of the extremities. · No edema  · Femoral Arteries: 2+ and equal  · Pedal Pulses: 2+ and equal   Neck:  · No thyromegaly  Abdomen:  · No masses or tenderness  · Liver/Spleen: No Abnormalities Noted  Neurological/Psychiatric:  · Alert and oriented in all spheres  · Moves all extremities well  · Exhibits normal gait balance and coordination  · No abnormalities of mood, affect, memory, mentation, or behavior are noted      Assessment:    1. Systolic CHF, chronic (HCC)    2. Cardiomyopathy, idiopathic ([address]    3. Essential hypertension    4. Mixed hyperlipidemia         Plan:  1. Avoid second hand smoke  2. Discussed possible need to start Zetia if LDL still elevated  3. Lipids, CBC, CMP and BNP  4. Follow up with me in 6 months      QUALITY MEASURES  1. Tobacco Cessation Counseling: NA  2. Retake of BP if >140/90:   NA  3. Documentation to PCP/referring for new patient:  Sent to PCP at close of office visit  4. CAD patient on anti-platelet: Yes  5. CAD patient on STATIN therapy:  Yes  6.  Patient with CHF and aFib on

## 2018-12-04 ENCOUNTER — TELEPHONE (OUTPATIENT)
Dept: FAMILY MEDICINE CLINIC | Age: 68
End: 2018-12-04

## 2019-01-15 ENCOUNTER — TELEPHONE (OUTPATIENT)
Dept: FAMILY MEDICINE CLINIC | Age: 69
End: 2019-01-15

## 2019-01-29 ENCOUNTER — OFFICE VISIT (OUTPATIENT)
Dept: FAMILY MEDICINE CLINIC | Age: 69
End: 2019-01-29
Payer: MEDICARE

## 2019-01-29 VITALS
BODY MASS INDEX: 32.85 KG/M2 | DIASTOLIC BLOOD PRESSURE: 78 MMHG | HEIGHT: 64 IN | SYSTOLIC BLOOD PRESSURE: 128 MMHG | WEIGHT: 192.4 LBS | HEART RATE: 74 BPM | OXYGEN SATURATION: 96 %

## 2019-01-29 DIAGNOSIS — F32.3 DEPRESSIVE PSYCHOSIS (HCC): Primary | ICD-10-CM

## 2019-01-29 DIAGNOSIS — M50.20 HERNIATED DISC, CERVICAL: ICD-10-CM

## 2019-01-29 DIAGNOSIS — M50.321 DEGENERATION OF INTERVERTEBRAL DISC AT C4-C5 LEVEL: ICD-10-CM

## 2019-01-29 DIAGNOSIS — M50.322 DEGENERATION OF C5-C6 INTERVERTEBRAL DISC: ICD-10-CM

## 2019-01-29 PROCEDURE — 99213 OFFICE O/P EST LOW 20 MIN: CPT | Performed by: FAMILY MEDICINE

## 2019-01-29 RX ORDER — GABAPENTIN 100 MG/1
CAPSULE ORAL
COMMUNITY
Start: 2019-01-28 | End: 2019-01-29 | Stop reason: SDUPTHER

## 2019-02-14 ENCOUNTER — TELEPHONE (OUTPATIENT)
Dept: FAMILY MEDICINE CLINIC | Age: 69
End: 2019-02-14

## 2019-03-12 RX ORDER — ATORVASTATIN CALCIUM 80 MG/1
80 TABLET, FILM COATED ORAL DAILY
Qty: 90 TABLET | Refills: 3 | Status: SHIPPED | OUTPATIENT
Start: 2019-03-12 | End: 2019-03-29 | Stop reason: SDUPTHER

## 2019-03-29 RX ORDER — ATORVASTATIN CALCIUM 40 MG/1
TABLET, FILM COATED ORAL
Qty: 60 TABLET | Refills: 11 | Status: SHIPPED | OUTPATIENT
Start: 2019-03-29 | End: 2019-10-01

## 2019-04-02 ENCOUNTER — TELEPHONE (OUTPATIENT)
Dept: FAMILY MEDICINE CLINIC | Age: 69
End: 2019-04-02

## 2019-04-03 NOTE — TELEPHONE ENCOUNTER
Attempted to contact patient on 4/3/2019. Result: left message on the patient's voicemail asking patient to return my call. Pre-Visit planning not completed.

## 2019-04-16 ENCOUNTER — OFFICE VISIT (OUTPATIENT)
Dept: FAMILY MEDICINE CLINIC | Age: 69
End: 2019-04-16
Payer: COMMERCIAL

## 2019-04-16 VITALS
RESPIRATION RATE: 16 BRPM | DIASTOLIC BLOOD PRESSURE: 74 MMHG | WEIGHT: 195.2 LBS | HEIGHT: 64 IN | HEART RATE: 76 BPM | BODY MASS INDEX: 33.32 KG/M2 | OXYGEN SATURATION: 96 % | SYSTOLIC BLOOD PRESSURE: 122 MMHG

## 2019-04-16 DIAGNOSIS — S80.12XS CONTUSION OF LEFT LOWER EXTREMITY, SEQUELA: Chronic | ICD-10-CM

## 2019-04-16 DIAGNOSIS — S30.0XXS CONTUSION OF BUTTOCK, SEQUELA: Chronic | ICD-10-CM

## 2019-04-16 DIAGNOSIS — M51.36 DEGENERATION OF LUMBAR INTERVERTEBRAL DISC: ICD-10-CM

## 2019-04-16 DIAGNOSIS — M51.36 DEGENERATION OF L4-L5 INTERVERTEBRAL DISC: Primary | Chronic | ICD-10-CM

## 2019-04-16 PROCEDURE — 99213 OFFICE O/P EST LOW 20 MIN: CPT | Performed by: FAMILY MEDICINE

## 2019-04-16 NOTE — PROGRESS NOTES
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 Pt states \"it's hurting\". Pt states it comes and goes, better with medication and takes warm baths and does not over do it. Pt states it has been getting worse over the last few years   Here re  low back WC claim. Sees Dr Deo Levine for pain control. She doesn't feel like the pain has changed a lot over 6 months but she is sure of location of the pain. The pain is not real bad all the time,  she went to the laundromat and her pain was worse after. Really any extra activity will make it hurt more. Discussed she could try taking the gabapentin 2 in AM and 1 in afternoon and 2 at night. HPI:  Eduard Augustine is a 76 y.o. (: 1950) here today for    Low back pain r/t [facility] 2001 injury    Patient's medications, allergies, past medical, surgical, social and family histories were reviewed and updated asappropriate on 2019 at 3:58 PM.    ROS:  Review of Systems    All other systems reviewed and are negative except as noted above on 2019 at 3:58 PM. Additional review of systems may [facility]. Any responses requiring further intervention were pursued.     LDL Calculated (mg/dL)   Date Value   2018 172 (H)     Past Medical History:   Diagnosis Date    Arthritis     Asthma     Back pain     Fibromyalgia     Fibromyalgia     Heart failure with reduced ejection fraction (HCC)     Herpes zoster     Hypercholesteremia     Hyperlipidemia     Hypertriglyceridemia     Neck pain     Osteopenia     Stress-induced cardiomyopathy 2017        Family History   Problem Relation Age of Onset    High Blood Pressure Mother     Heart Disease Father      Social History     Socioeconomic History    Marital status:      Spouse name: Not on file    Number of children: Not on file    Years of education: Not on file    Highest education level: Not on file   Occupational History    Not on file   Social Needs    Financial resource strain: Not on file    Food insecurity:     Worry: Not on file     Inability: Not on file    Transportation needs:     Medical: Not on file     Non-medical: Not on file   Tobacco Use    Smoking status: Former Smoker     Packs/day: 0.50     Years: 3.00     Pack years: 1.50     Last attempt to quit: 2015     Years since quittin.2    Smokeless tobacco: Never Used   Substance and Sexual Activity    Alcohol use: No     Alcohol/week: 0.0 oz    Drug use: No    Sexual activity: Yes     Partners: Male   Lifestyle    Physical activity:     Days per week: Not on file     Minutes per session: Not on file    Stress: Not on file   Relationships    Social connections:     Talks on phone: Not on file     Gets together: Not on file     Attends Yarsanism service: Not on file     Active member of club or organization: Not on file     Attends meetings of clubs or organizations: Not on file     Relationship status: Not on file    Intimate partner violence:     Fear of current or ex partner: Not on file     Emotionally abused: Not on file     Physically abused: Not on file     Forced sexual activity: Not on file   Other Topics Concern    Not on file   Social History Narrative    Not on file       Prior to Visit Medications    Medication Sig Taking? Authorizing Provider   atorvastatin (LIPITOR) 40 MG tablet TAKE TWO TABLETS BY MOUTH DAILY Yes BRENDA John CNP   traMADol (ULTRAM) 50 MG tablet Take 1 tablet by mouth every 6 hours for 30 days.  Yes BRENDA Coleman CNP   naloxone 4 MG/0.1ML LIQD nasal spray 1 spray by Nasal route as needed for Opioid Reversal Yes Rl Max MD   lisinopril (PRINIVIL;ZESTRIL) 40 MG tablet TAKE 1 TABLET BY MOUTH EVERY DAY IN THE EVENING Yes Dai Wade MD   spironolactone (ALDACTONE) 25 MG tablet TAKE 1/2 TABLET BY MOUTH DAILY Yes Bean Guzman MD   metoprolol succinate (TOPROL XL) 50 MG extended release tablet TAKE 1 TABLET BY MOUTH EVERY DAY Yes Bean Guzman MD   fenofibrate (TRICOR) 54 MG tablet Take 1 tablet by mouth daily Yes Bean Guzman MD   albuterol sulfate HFA (VENTOLIN HFA) 108 (90 Base) MCG/ACT inhaler Inhale 2 puffs into the lungs 4 times daily Yes Obinna Sakina, APRN - CNP   gabapentin (NEURONTIN) 100 MG capsule 2 capsule 3 x day. Yes Buzz العراقي MD   Docusate Calcium (STOOL SOFTENER PO) Take by mouth as needed Yes Historical Provider, MD   aspirin EC 81 MG EC tablet Take 1 tablet by mouth daily Yes Buzz العراقي MD   Cholecalciferol (VITAMIN D3) 2000 UNITS CAPS Take 2 tablets by mouth daily  Yes Historical Provider, MD   Calcium 9127-5907 MG-UNIT CHEW Take  by mouth. Yes Historical Provider, MD   nitroGLYCERIN (NITROSTAT) 0.4 MG SL tablet Place 1 tablet under the tongue every 5 minutes as needed for Chest pain  Marshal MD Gurmeet     Allergies   Allergen Reactions    Shellfish-Derived Products Nausea And Vomiting    Celebrex [Celecoxib] Swelling    Codeine     Ibuprofen Swelling    Lipitor [Atorvastatin] Other (See Comments)     Muscle aches, flu like SX    Methadone     Vioxx Swelling       OBJECTIVE:  Estimated body mass index is 33.49 kg/m² as calculated from the following:    Height as of this encounter: 5' 4.02\" (1.626 m). Weight as of this encounter: 195 lb 3.2 oz (88.5 kg).   Vitals:    04/16/19 1548   BP: 122/74   Site: Left Upper Arm   Position: Sitting   Cuff Size: Large Adult   Pulse: 76   Resp: 16   SpO2: 96%   Weight: 195 lb 3.2 oz (88.5 kg)   Height: 5' 4.02\" (1.626 m)     BP Readings from Last 2 Encounters:   04/16/19 122/74   01/29/19 128/78     Wt Readings from Last 3 Encounters:   04/16/19 195 lb 3.2 oz (88.5 kg)   01/29/19 192 lb 6.4 oz (87.3 kg)   11/13/18 191 lb 6.4 oz (86.8 kg)       Physical Exam stable unless noted otherwise. Medication profile reviewed personally by me during the office visit. Medication side effects and possible impairments from medications were discussed as applicable. This document was prepared by a combination of typing and transcription through a voice recognition software. Scribe attestation: Ibeth Ramirez RN, am scribing for and in the presence of Jessica Yao MD. Electronically signed by Sandra Alberto RN on 4/16/2019 at 3:58 PM      Provider attestation:     I, Dr. Carly Tirado, personally performed the services described in this documentation, as scribed by the above signed scribe in my presence, and it is both accurate and complete. I agree with the ROS and Past Histories independently gathered by the clinical support staff and the remaining scribed note accurately describes my personal service to the patient.       4/24/2019    9:48 AM

## 2019-04-16 NOTE — PATIENT INSTRUCTIONS
Try taking gabapentin 100 mg 2 in AM and 1 in afternoon and 2 at night, if you can tolerate it OK you could increase to 2 in afternoon if desired. Patient should call the office immediately with new or ongoing signs or symptoms or worsening, or proceed to the emergency room. If you are on medications which could impair your senses, you are at risk of weakness, falls, dizziness, or drowsiness. You should be careful during activities which could place you at risk of harm, such as climbing, using stairs, operating machinery, or driving vehicles. If you feel you cannot safely do these activities, you should request others to help you, or avoid the activities altogether. If you are drowsy for any other reason, you should use the same precautions as listed above.

## 2019-04-29 NOTE — PROGRESS NOTES
[address]  Advanced CHF/Pulmonary Hypertension   Cardiac Evaluation      Yahaira Che  YOB: 1950    Date of Visit:  4/30/19    Chief Complaint   Patient presents with    Follow-up       History of Present Illness:  Yahaira Che is a 76 y.o. female who presents from referral from Dr. Malina Padilla for consultation and management of CHF, non ischemic CMP. She has a with PMH of HTN, HLD, obesity, abnormal stress test, and fibromyalgia, who presented to [facility] with chest pain in March 2017. History obtained from patient and review of EMR. She underwent a stress test on 3/20/17 that showed moderate sized anteroseptal partial reversibility defect consistent with  ischemia and infarction in the territory of the mid and distal LAD  She has right subclavian artery stenosis and has seen Dr. Monisha Webster for this. She underwent LHC on 3/23/17 with no intervention needed. She had a recent VL Duplex on 5/2/18 (report below). Her echo from 06/07/18 showed her EF was 45%. Today she reports she feels well overall. She is working 4 days a week. She is rests when she gets tired. She has occasional SOBOE but is at baseline. She denies CP, palpitations, dizziness or syncope. Allergies   Allergen Reactions    Shellfish-Derived Products Nausea And Vomiting    Celebrex [Celecoxib] Swelling    Codeine     Ibuprofen Swelling    Lipitor [Atorvastatin] Other (See Comments)     Muscle aches, flu like SX    Methadone     Vioxx Swelling     Current Outpatient Medications   Medication Sig Dispense Refill    traMADol (ULTRAM) 50 MG tablet Take 100 mg by mouth 2 times daily as needed for Pain.       gabapentin (NEURONTIN) 100 MG capsule TAKE TWO CAPSULES BY MOUTH THREE TIMES A  capsule 0    atorvastatin (LIPITOR) 40 MG tablet TAKE TWO TABLETS BY MOUTH DAILY 60 tablet 11    lisinopril (PRINIVIL;ZESTRIL) 40 MG tablet TAKE 1 TABLET BY MOUTH EVERY DAY IN THE EVENING 90 tablet 2    spironolactone (ALDACTONE) 25 MG tablet TAKE 1/2 TABLET BY MOUTH DAILY 45 tablet 3    metoprolol succinate (TOPROL XL) 50 MG extended release tablet TAKE 1 TABLET BY MOUTH EVERY DAY 90 tablet 3    fenofibrate (TRICOR) 54 MG tablet Take 1 tablet by mouth daily 90 tablet 1    aspirin EC 81 MG EC tablet Take 1 tablet by mouth daily 30 tablet 3    Cholecalciferol (VITAMIN D3) 2000 UNITS CAPS Take 2 tablets by mouth daily       Calcium 8771-3024 MG-UNIT CHEW Take  by mouth. No current facility-administered medications for this visit.         Past Medical History:   Diagnosis Date    Arthritis     Asthma     Back pain     Fibromyalgia     Fibromyalgia     Heart failure with reduced ejection fraction (HCC)     Herpes zoster     Hypercholesteremia     Hyperlipidemia     Hypertriglyceridemia     Neck pain     Osteopenia     Stress-induced cardiomyopathy 2017     Past Surgical History:   Procedure Laterality Date    BACK SURGERY      BLADDER REPAIR      CARDIAC CATHETERIZATION      COLONOSCOPY  12    diverticulosis    HYSTERECTOMY      NECK SURGERY  , 10/05, '07 or 08    C6-C7spur '05     Family History   Problem Relation Age of Onset    High Blood Pressure Mother     Heart Disease Father      Social History     Socioeconomic History    Marital status:      Spouse name: Not on file    Number of children: Not on file    Years of education: Not on file    Highest education level: Not on file   Occupational History    Not on file   Social Needs    Financial resource strain: Not on file    Food insecurity:     Worry: Not on file     Inability: Not on file    Transportation needs:     Medical: Not on file     Non-medical: Not on file   Tobacco Use    Smoking status: Former Smoker     Packs/day: 0.50     Years: 3.00     Pack years: 1.50     Last attempt to quit: 2015     Years since quittin.3    Smokeless tobacco: Never Used   Substance and Sexual Activity    nodes.  · Allergic/Immunologic: No nasal congestion or hives. VL Duplex 5/2/18  Impressions Right Impression 1. The right wrist/brachial index is 0.93. 2. There are elevated velocities at the proximal subclavian artery with turbulence. There are no other focal elevated velocities in the upper extremity. 3. Continuous wave Doppler of the subclavian, axillary, brachial, radial, and ulnar arteries demonstrate multiphasic flow. The distal radial artery demonstrated monophasic flow. The waveforms demonstrated systolic broadening. 4. There is no gross plaques seen involving the right upper extremity. 5. There is no brachial pressure gradient at this time. Left Impression The left wrist/brachial index is 0.95. ECHO 11/16/17  Limited echo for LV function with limited doppler/no color. LV systolic function is moderately reduced with an LVEF of 35-40%. There is moderate global hypokinesis. Left ventricular size is mildly increased. There is abnormal (paradoxical) septal motion c/w LBBB. AV appears sclerotic but opens adequately. Mild calcification of aortic  annulus noted. Grade I diastolic dysfunction with normal filling pressure. Systolic pulmonary artery pressure (SPAP) is normal and estimated at 26mmHg  (RA pressure 3mmHg). Compared to last echo on 3/23/2017 (EF 20-25%), left ventricle systolic  function has improved. Echo: 06/07/18   The left ventricular systolic function is mildly reduced with an ejection   fraction of 45 %. There is hypokinesis of the inferior walls. Abnormal (paradoxical) septal motion is present likely due to left bundle   branch block. No evidence of left ventricular mass or thrombus noted by definity contrast.   Grade I diastolic dysfunction with normal filing pressure. The left atrium is mildly dilated. Mild tricuspid regurgitation. Systolic pulmonary artery pressure (SPAP) is normal and estimated at 26 mmHg   (right atrial pressure 3 mmHg).    Last echo showed EF of 35-40%       [facility] 3/23/17  ANGIOGRAPHIC FINDINGS:  1.  The left main coronary artery bifurcates off the left coronary cusp.  The  left main has no angiographic disease. 2.  The LAD is a moderate sized vessel that runs in the interventricular  groove.  It has luminal irregularities throughout its course with no focal  stenosis. Ross Skeens is a large branching diagonal 1 vessel that is small, about 2  mm in size, with no significant disease.  The LAD does wrap the apex. 3.  The left circumflex has 2 small obtuse marginals with a high takeoff;  these have luminal irregularities. Ross Skeens is an OM3 and a large PLOM.  The  left circumflex has luminal irregularities only with no significant disease. 4.  The right coronary artery is dominant for posterior circulation, comes off  the right coronary cusp, travels in the AV groove and bifurcates to PLV and  PDA.  This is a large vessel, greater than about 4 mm in size.  There is mild  diffuse disease all throughout the proximal, mid and distal portions with  lesions no greater than 15% all throughout. Ross Skedgarods is a large PLV and a PDA  system. 5.   LVEDP is 14.    6.  No significant aortic stenosis was detected.  No significant mitral  regurgitation, but opacification may not be optimal to detect this. 7.  LV EF was severely reduced at about 25%.  There was fairly severe  hypokinesis of the inferior wall, and there is moderate to severe hypokinesis  of the anterior wall in the apex and the apical inferior walls as well. 8.  The right subclavian artery did show an 80% stenosis.  We were unable to  do a pullback gradient across this to assess its hemodynamic significance, so  we will check bilateral arm pressures as well as future ultrasound.       Physical Exam:  Vitals:    04/30/19 1431   BP: 116/62   Pulse: 74   SpO2: 96%   Weight: 195 lb (88.5 kg)   Height: 5' 4\" (1.626 m)     Body mass index is 33.47 kg/m².      Wt Readings from Last 3 Encounters:   04/30/19 195 lb (88.5 kg)   04/16/19 195 lb 3.2 oz (88.5 kg)   01/29/19 192 lb 6.4 oz (87.3 kg)     BP Readings from Last 3 Encounters:   04/30/19 116/62   04/16/19 122/74   01/29/19 128/78           Constitutional and General Appearance:   WD/WN in NAD  HEENT:  NC/AT  KATHRYN  No problems with hearing  Skin:  Warm, dry  Respiratory:  · Normal excursion and expansion without use of accessory muscles  · Resp Auscultation: Normal breath sounds without dullness  Cardiovascular:  · The apical impulses not displaced  · Heart tones are crisp and normal  · Cervical veins are not engorged  · The carotid upstroke is normal in amplitude and contour without delay or bruit  · JVP normal  RRR with nl S1 and S2 without m,r,g  · Peripheral pulses are symmetrical and full  · There is no clubbing, cyanosis of the extremities. · No edema  · Femoral Arteries: 2+ and equal  · Pedal Pulses: 2+ and equal   Neck:  · No thyromegaly  Abdomen:  · No masses or tenderness  · Liver/Spleen: No Abnormalities Noted  Neurological/Psychiatric:  · Alert and oriented in all spheres  · Moves all extremities well  · Exhibits normal gait balance and coordination  · No abnormalities of mood, affect, memory, mentation, or behavior are noted      Assessment:    1. Systolic CHF, chronic ([address]    2. Chronic congestive heart failure, unspecified heart failure type (HCC)    3. Cardiomyopathy, idiopathic ([address]    4. Essential hypertension    5. Mixed hyperlipidemia         Plan:  1. Labs as ordered  2. Echo in 6 months  3. Follow up in 6 months        QUALITY MEASURES  1. Tobacco Cessation Counseling: NA  2. Retake of BP if >140/90:   NA  3. Documentation to PCP/referring for new patient:  Sent to PCP at close of office visit  4. CAD patient on anti-platelet: Yes  5. CAD patient on STATIN therapy:  Yes  6. Patient with CHF and aFib on anticoagulation:  NA          I appreciate the opportunity of cooperating in the care of this patient. Scribe's attestation:   This note was scribed in the presence of Lesia Phelps M.D. By Kezia Jason RN     The scribe's documentation has been prepared under my direction and personally reviewed by me in its entirety. I confirm that the note above accurately reflects all work, treatment, procedures, and medical decision making performed by me.         Lesia Phelps M.D., [facility]

## 2019-04-30 ENCOUNTER — OFFICE VISIT (OUTPATIENT)
Dept: CARDIOLOGY CLINIC | Age: 69
End: 2019-04-30
Payer: MEDICARE

## 2019-04-30 VITALS
WEIGHT: 195 LBS | OXYGEN SATURATION: 96 % | HEIGHT: 64 IN | SYSTOLIC BLOOD PRESSURE: 116 MMHG | BODY MASS INDEX: 33.29 KG/M2 | HEART RATE: 74 BPM | DIASTOLIC BLOOD PRESSURE: 62 MMHG

## 2019-04-30 DIAGNOSIS — I50.9 CHRONIC CONGESTIVE HEART FAILURE, UNSPECIFIED HEART FAILURE TYPE (HCC): ICD-10-CM

## 2019-04-30 DIAGNOSIS — I42.9 CARDIOMYOPATHY, IDIOPATHIC (HCC): ICD-10-CM

## 2019-04-30 DIAGNOSIS — I10 ESSENTIAL HYPERTENSION: ICD-10-CM

## 2019-04-30 DIAGNOSIS — E78.2 MIXED HYPERLIPIDEMIA: ICD-10-CM

## 2019-04-30 DIAGNOSIS — I50.22 SYSTOLIC CHF, CHRONIC (HCC): Primary | ICD-10-CM

## 2019-04-30 PROCEDURE — 1036F TOBACCO NON-USER: CPT | Performed by: INTERNAL MEDICINE

## 2019-04-30 PROCEDURE — G8599 NO ASA/ANTIPLAT THER USE RNG: HCPCS | Performed by: INTERNAL MEDICINE

## 2019-04-30 PROCEDURE — 1090F PRES/ABSN URINE INCON ASSESS: CPT | Performed by: INTERNAL MEDICINE

## 2019-04-30 PROCEDURE — G8417 CALC BMI ABV UP PARAM F/U: HCPCS | Performed by: INTERNAL MEDICINE

## 2019-04-30 PROCEDURE — G8427 DOCREV CUR MEDS BY ELIG CLIN: HCPCS | Performed by: INTERNAL MEDICINE

## 2019-04-30 PROCEDURE — 99214 OFFICE O/P EST MOD 30 MIN: CPT | Performed by: INTERNAL MEDICINE

## 2019-04-30 PROCEDURE — 4040F PNEUMOC VAC/ADMIN/RCVD: CPT | Performed by: INTERNAL MEDICINE

## 2019-04-30 PROCEDURE — G8400 PT W/DXA NO RESULTS DOC: HCPCS | Performed by: INTERNAL MEDICINE

## 2019-04-30 PROCEDURE — 1123F ACP DISCUSS/DSCN MKR DOCD: CPT | Performed by: INTERNAL MEDICINE

## 2019-04-30 PROCEDURE — 3017F COLORECTAL CA SCREEN DOC REV: CPT | Performed by: INTERNAL MEDICINE

## 2019-04-30 PROCEDURE — 93000 ELECTROCARDIOGRAM COMPLETE: CPT | Performed by: INTERNAL MEDICINE

## 2019-04-30 RX ORDER — TRAMADOL HYDROCHLORIDE 50 MG/1
100 TABLET ORAL 2 TIMES DAILY PRN
COMMUNITY
End: 2019-05-15 | Stop reason: SDUPTHER

## 2019-04-30 NOTE — LETTER
[employer]  Advanced CHF/Pulmonary Hypertension   Cardiac Evaluation      Xavier Willoughby  YOB: 1950    Date of Visit:  4/30/19    Chief Complaint   Patient presents with    Follow-up       History of Present Illness:  Xavier Willoughby is a 76 y.o. female who presents from referral from Dr. Iraida Starr for consultation and management of CHF, non ischemic CMP. She has a with PMH of HTN, HLD, obesity, abnormal stress test, and fibromyalgia, who presented to [facility] with chest pain in March 2017. History obtained from patient and review of EMR. She underwent a stress test on 3/20/17 that showed moderate sized anteroseptal partial reversibility defect consistent with  ischemia and infarction in the territory of the mid and distal LAD  She has right subclavian artery stenosis and has seen Dr. Marlin Milian for this. She underwent LHC on 3/23/17 with no intervention needed. She had a recent VL Duplex on 5/2/18 (report below). Her echo from 06/07/18 showed her EF was 45%. Today she reports she feels well overall. She is working 4 days a week. She is rests when she gets tired. She has occasional SOBOE but is at baseline. She denies CP, palpitations, dizziness or syncope. Allergies   Allergen Reactions    Shellfish-Derived Products Nausea And Vomiting    Celebrex [Celecoxib] Swelling    Codeine     Ibuprofen Swelling    Lipitor [Atorvastatin] Other (See Comments)     Muscle aches, flu like SX    Methadone     Vioxx Swelling     Current Outpatient Medications   Medication Sig Dispense Refill    traMADol (ULTRAM) 50 MG tablet Take 100 mg by mouth 2 times daily as needed for Pain.       gabapentin (NEURONTIN) 100 MG capsule TAKE TWO CAPSULES BY MOUTH THREE TIMES A  capsule 0    atorvastatin (LIPITOR) 40 MG tablet TAKE TWO TABLETS BY MOUTH DAILY 60 tablet 11    lisinopril (PRINIVIL;ZESTRIL) 40 MG tablet TAKE 1 TABLET BY MOUTH EVERY DAY IN THE EVENING 90 tablet 2  spironolactone (ALDACTONE) 25 MG tablet TAKE 1/2 TABLET BY MOUTH DAILY 45 tablet 3    metoprolol succinate (TOPROL XL) 50 MG extended release tablet TAKE 1 TABLET BY MOUTH EVERY DAY 90 tablet 3    fenofibrate (TRICOR) 54 MG tablet Take 1 tablet by mouth daily 90 tablet 1    aspirin EC 81 MG EC tablet Take 1 tablet by mouth daily 30 tablet 3    Cholecalciferol (VITAMIN D3) 2000 UNITS CAPS Take 2 tablets by mouth daily       Calcium 7993-7377 MG-UNIT CHEW Take  by mouth. No current facility-administered medications for this visit.         Past Medical History:   Diagnosis Date    Arthritis     Asthma     Back pain     Fibromyalgia     Fibromyalgia     Heart failure with reduced ejection fraction (HCC)     Herpes zoster     Hypercholesteremia     Hyperlipidemia     Hypertriglyceridemia     Neck pain     Osteopenia     Stress-induced cardiomyopathy 2017     Past Surgical History:   Procedure Laterality Date    BACK SURGERY      BLADDER REPAIR      CARDIAC CATHETERIZATION      COLONOSCOPY  12    diverticulosis    HYSTERECTOMY      NECK SURGERY  , 10/05, '07 or 08    C6-C7spur '05     Family History   Problem Relation Age of Onset    High Blood Pressure Mother     Heart Disease Father      Social History     Socioeconomic History    Marital status:      Spouse name: Not on file    Number of children: Not on file    Years of education: Not on file    Highest education level: Not on file   Occupational History    Not on file   Social Needs    Financial resource strain: Not on file    Food insecurity:     Worry: Not on file     Inability: Not on file    Transportation needs:     Medical: Not on file     Non-medical: Not on file   Tobacco Use    Smoking status: Former Smoker     Packs/day: 0.50     Years: 3.00     Pack years: 1.50     Last attempt to quit: 2015     Years since quittin.3    Smokeless tobacco: Never Used   Substance and Sexual Activity  Alcohol use: No     Alcohol/week: 0.0 oz    Drug use: No    Sexual activity: Yes     Partners: Male   Lifestyle    Physical activity:     Days per week: Not on file     Minutes per session: Not on file    Stress: Not on file   Relationships    Social connections:     Talks on phone: Not on file     Gets together: Not on file     Attends Yarsanism service: Not on file     Active member of club or organization: Not on file     Attends meetings of clubs or organizations: Not on file     Relationship status: Not on file    Intimate partner violence:     Fear of current or ex partner: Not on file     Emotionally abused: Not on file     Physically abused: Not on file     Forced sexual activity: Not on file   Other Topics Concern    Not on file   Social History Narrative    Not on file       Review of Systems:   · Constitutional: there has been no unanticipated weight loss. There's been no change in energy level, sleep pattern, or activity level. · Eyes: No visual changes or diplopia. No scleral icterus. · ENT: No Headaches, hearing loss or vertigo. No mouth sores or sore throat. · Cardiovascular: Reviewed in HPI  · Respiratory: No cough or wheezing, no sputum production. No hematemesis. · Gastrointestinal: No abdominal pain, appetite loss, blood in stools. No change in bowel or bladder habits. · Genitourinary: No dysuria, trouble voiding, or hematuria. · Musculoskeletal:  No gait disturbance, weakness or joint complaints. · Integumentary: No rash or pruritis. · Neurological: No headache, diplopia, change in muscle strength, numbness or tingling. No change in gait, balance, coordination, mood, affect, memory, mentation, behavior. · Psychiatric: No anxiety, no depression. · Endocrine: No malaise, fatigue or temperature intolerance. No excessive thirst, fluid intake, or urination. No tremor. · Hematologic/Lymphatic: No abnormal bruising or bleeding, blood clots or swollen lymph nodes. · Allergic/Immunologic: No nasal congestion or hives. VL Duplex 5/2/18  Impressions Right Impression 1. The right wrist/brachial index is 0.93. 2. There are elevated velocities at the proximal subclavian artery with turbulence. There are no other focal elevated velocities in the upper extremity. 3. Continuous wave Doppler of the subclavian, axillary, brachial, radial, and ulnar arteries demonstrate multiphasic flow. The distal radial artery demonstrated monophasic flow. The waveforms demonstrated systolic broadening. 4. There is no gross plaques seen involving the right upper extremity. 5. There is no brachial pressure gradient at this time. Left Impression The left wrist/brachial index is 0.95. ECHO 11/16/17  Limited echo for LV function with limited doppler/no color. LV systolic function is moderately reduced with an LVEF of 35-40%. There is moderate global hypokinesis. Left ventricular size is mildly increased. There is abnormal (paradoxical) septal motion c/w LBBB. AV appears sclerotic but opens adequately. Mild calcification of aortic  annulus noted. Grade I diastolic dysfunction with normal filling pressure. Systolic pulmonary artery pressure (SPAP) is normal and estimated at 26mmHg  (RA pressure 3mmHg). Compared to last echo on 3/23/2017 (EF 20-25%), left ventricle systolic  function has improved. Echo: 06/07/18   The left ventricular systolic function is mildly reduced with an ejection   fraction of 45 %. There is hypokinesis of the inferior walls. Abnormal (paradoxical) septal motion is present likely due to left bundle   branch block. No evidence of left ventricular mass or thrombus noted by definity contrast.   Grade I diastolic dysfunction with normal filing pressure. The left atrium is mildly dilated. Mild tricuspid regurgitation. Systolic pulmonary artery pressure (SPAP) is normal and estimated at 26 mmHg   (right atrial pressure 3 mmHg). Last echo showed EF of 35-40%       [facility] 3/23/17  ANGIOGRAPHIC FINDINGS:  1.  The left main coronary artery bifurcates off the left coronary cusp.  The  left main has no angiographic disease. 2.  The LAD is a moderate sized vessel that runs in the interventricular  groove.  It has luminal irregularities throughout its course with no focal  stenosis. Boris Plump is a large branching diagonal 1 vessel that is small, about 2  mm in size, with no significant disease.  The LAD does wrap the apex. 3.  The left circumflex has 2 small obtuse marginals with a high takeoff;  these have luminal irregularities. [place] Plump is an OM3 and a large PLOM.  The  left circumflex has luminal irregularities only with no significant disease. 4.  The right coronary artery is dominant for posterior circulation, comes off  the right coronary cusp, travels in the AV groove and bifurcates to PLV and  PDA.  This is a large vessel, greater than about 4 mm in size.  There is mild  diffuse disease all throughout the proximal, mid and distal portions with  lesions no greater than 15% all throughout. [place] Plump is a large PLV and a PDA  system. 5.   LVEDP is 14.    6.  No significant aortic stenosis was detected.  No significant mitral  regurgitation, but opacification may not be optimal to detect this. 7.  LV EF was severely reduced at about 25%.  There was fairly severe  hypokinesis of the inferior wall, and there is moderate to severe hypokinesis  of the anterior wall in the apex and the apical inferior walls as well. 8.  The right subclavian artery did show an 80% stenosis.  We were unable to  do a pullback gradient across this to assess its hemodynamic significance, so  we will check bilateral arm pressures as well as future ultrasound.       Physical Exam:  Vitals:    04/30/19 1431   BP: 116/62   Pulse: 74   SpO2: 96%   Weight: 195 lb (88.5 kg)   Height: 5' 4\" (1.626 m)     Body mass index is 33.47 kg/m².      Wt Readings from Last 3 Encounters: 04/30/19 195 lb (88.5 kg)   04/16/19 195 lb 3.2 oz (88.5 kg)   01/29/19 192 lb 6.4 oz (87.3 kg)     BP Readings from Last 3 Encounters:   04/30/19 116/62   04/16/19 122/74   01/29/19 128/78           Constitutional and General Appearance:   WD/WN in NAD  HEENT:  NC/AT  KATHRYN  No problems with hearing  Skin:  Warm, dry  Respiratory:  · Normal excursion and expansion without use of accessory muscles  · Resp Auscultation: Normal breath sounds without dullness  Cardiovascular:  · The apical impulses not displaced  · Heart tones are crisp and normal  · Cervical veins are not engorged  · The carotid upstroke is normal in amplitude and contour without delay or bruit  · JVP normal  RRR with nl S1 and S2 without m,r,g  · Peripheral pulses are symmetrical and full  · There is no clubbing, cyanosis of the extremities. · No edema  · Femoral Arteries: 2+ and equal  · Pedal Pulses: 2+ and equal   Neck:  · No thyromegaly  Abdomen:  · No masses or tenderness  · Liver/Spleen: No Abnormalities Noted  Neurological/Psychiatric:  · Alert and oriented in all spheres  · Moves all extremities well  · Exhibits normal gait balance and coordination  · No abnormalities of mood, affect, memory, mentation, or behavior are noted      Assessment:    1. Systolic CHF, chronic ([address]    2. Chronic congestive heart failure, unspecified heart failure type (HCC)    3. Cardiomyopathy, idiopathic ([address]    4. Essential hypertension    5. Mixed hyperlipidemia         Plan:  1. Labs as ordered  2. Echo in 6 months  3. Follow up in 6 months        QUALITY MEASURES  1. Tobacco Cessation Counseling: NA  2. Retake of BP if >140/90:   NA  3. Documentation to PCP/referring for new patient:  Sent to PCP at close of office visit  4. CAD patient on anti-platelet: Yes  5. CAD patient on STATIN therapy:  Yes  6. Patient with CHF and aFib on anticoagulation:  NA          I appreciate the opportunity of cooperating in the care of this patient. Scribe's attestation: This note was scribed in the presence of Odalys Slater M.D. By July Long RN     The scribe's documentation has been prepared under my direction and personally reviewed by me in its entirety. I confirm that the note above accurately reflects all work, treatment, procedures, and medical decision making performed by me.         Odalys Slater M.D., [facility]

## 2019-06-01 DIAGNOSIS — M51.37 DEGENERATION OF LUMBAR OR LUMBOSACRAL INTERVERTEBRAL DISC: ICD-10-CM

## 2019-06-03 RX ORDER — GABAPENTIN 100 MG/1
CAPSULE ORAL
Qty: 180 CAPSULE | Refills: 0 | Status: SHIPPED | OUTPATIENT
Start: 2019-06-03 | End: 2019-07-15 | Stop reason: SDUPTHER

## 2019-07-15 DIAGNOSIS — M51.37 DEGENERATION OF LUMBAR OR LUMBOSACRAL INTERVERTEBRAL DISC: ICD-10-CM

## 2019-07-16 RX ORDER — FENOFIBRATE 54 MG/1
TABLET ORAL
Qty: 90 TABLET | Refills: 1 | Status: SHIPPED | OUTPATIENT
Start: 2019-07-16 | End: 2019-11-05 | Stop reason: SDUPTHER

## 2019-07-16 RX ORDER — GABAPENTIN 100 MG/1
CAPSULE ORAL
Qty: 180 CAPSULE | Refills: 0 | Status: SHIPPED | OUTPATIENT
Start: 2019-07-16 | End: 2019-09-03 | Stop reason: SDUPTHER

## 2019-07-18 ENCOUNTER — TELEPHONE (OUTPATIENT)
Dept: FAMILY MEDICINE CLINIC | Age: 69
End: 2019-07-18

## 2019-07-18 NOTE — TELEPHONE ENCOUNTER
Attempted to contact patient on 7/18/2019. Result: left message on the patient's voicemail asking patient to return my call. Pre-Visit planning not completed.

## 2019-07-26 RX ORDER — TRAMADOL HYDROCHLORIDE 50 MG/1
50 TABLET ORAL EVERY 6 HOURS PRN
COMMUNITY
End: 2019-08-07 | Stop reason: SDUPTHER

## 2019-07-30 ENCOUNTER — OFFICE VISIT (OUTPATIENT)
Dept: FAMILY MEDICINE CLINIC | Age: 69
End: 2019-07-30
Payer: COMMERCIAL

## 2019-07-30 VITALS
DIASTOLIC BLOOD PRESSURE: 64 MMHG | HEART RATE: 60 BPM | WEIGHT: 191.6 LBS | SYSTOLIC BLOOD PRESSURE: 118 MMHG | OXYGEN SATURATION: 96 % | BODY MASS INDEX: 32.71 KG/M2 | HEIGHT: 64 IN

## 2019-07-30 DIAGNOSIS — M50.223 HERNIATION OF INTERVERTEBRAL DISC AT C6-C7 LEVEL: ICD-10-CM

## 2019-07-30 DIAGNOSIS — F32.3 DEPRESSIVE PSYCHOSIS (HCC): ICD-10-CM

## 2019-07-30 DIAGNOSIS — M50.322 DEGENERATION OF C5-C6 INTERVERTEBRAL DISC: Primary | ICD-10-CM

## 2019-07-30 DIAGNOSIS — M50.221 HERNIATION OF INTERVERTEBRAL DISC AT C4-C5 LEVEL: ICD-10-CM

## 2019-07-30 PROCEDURE — 99213 OFFICE O/P EST LOW 20 MIN: CPT | Performed by: FAMILY MEDICINE

## 2019-07-30 NOTE — PROGRESS NOTES
Alcohol use: No     Alcohol/week: 0.0 standard drinks    Drug use: No    Sexual activity: Yes     Partners: Male   Lifestyle    Physical activity:     Days per week: Not on file     Minutes per session: Not on file    Stress: Not on file   Relationships    Social connections:     Talks on phone: Not on file     Gets together: Not on file     Attends Druze service: Not on file     Active member of club or organization: Not on file     Attends meetings of clubs or organizations: Not on file     Relationship status: Not on file    Intimate partner violence:     Fear of current or ex partner: Not on file     Emotionally abused: Not on file     Physically abused: Not on file     Forced sexual activity: Not on file   Other Topics Concern    Not on file   Social History Narrative    Not on file       Prior to Visit Medications    Medication Sig Taking? Authorizing Provider   traMADol (ULTRAM) 50 MG tablet Take 50 mg by mouth every 6 hours as needed for Pain. Yes Historical Provider, MD   fenofibrate (TRICOR) 54 MG tablet TAKE ONE TABLET BY MOUTH DAILY Yes BRENDA Urias CNP   gabapentin (NEURONTIN) 100 MG capsule TAKE TWO CAPSULES BY MOUTH THREE TIMES A DAY Yes BRENDA Jackson CNP   atorvastatin (LIPITOR) 40 MG tablet TAKE TWO TABLETS BY MOUTH DAILY Yes BRENDA Jackson CNP   lisinopril (PRINIVIL;ZESTRIL) 40 MG tablet TAKE 1 TABLET BY MOUTH EVERY DAY IN THE EVENING Yes Aidee Bellamy MD   spironolactone (ALDACTONE) 25 MG tablet TAKE 1/2 TABLET BY MOUTH DAILY Yes Aidee Bellamy MD   metoprolol succinate (TOPROL XL) 50 MG extended release tablet TAKE 1 TABLET BY MOUTH EVERY DAY Yes Aidee Bellamy MD   aspirin EC 81 MG EC tablet Take 1 tablet by mouth daily Yes Sudhir Vargas MD   Cholecalciferol (VITAMIN D3) 2000 UNITS CAPS Take 2 tablets by mouth daily  Yes Historical Provider, MD   Calcium 8509-5842 MG-UNIT CHEW Take  by mouth.  Yes Historical Provider, MD recognition software. Scribe attestation: Barbara Rodas RN, am scribing for and in the presence of Cuong Dunaway MD. Electronically signed by Francisco Pisano RN on 7/30/2019 at 4:01 PM      Provider attestation:     I, Dr. Aníbal Sullivan, personally performed the services described in this documentation, as scribed by the above signed scribe in my presence, and it is both accurate and complete. I agree with the ROS and Past Histories independently gathered by the clinical support staff and the remaining scribed note accurately describes my personal service to the patient.       7/30/2019    7:12 PM

## 2019-08-29 ENCOUNTER — TELEPHONE (OUTPATIENT)
Dept: FAMILY MEDICINE CLINIC | Age: 69
End: 2019-08-29

## 2019-09-03 DIAGNOSIS — M51.37 DEGENERATION OF LUMBAR OR LUMBOSACRAL INTERVERTEBRAL DISC: ICD-10-CM

## 2019-09-03 RX ORDER — GABAPENTIN 100 MG/1
CAPSULE ORAL
Qty: 180 CAPSULE | Refills: 5 | Status: SHIPPED | OUTPATIENT
Start: 2019-09-03 | End: 2020-04-28 | Stop reason: SDUPTHER

## 2019-09-09 NOTE — TELEPHONE ENCOUNTER
Attempted to contact patient on 9/9/2019. Result: left message on the patient's voicemail asking patient to return my call. Pre-Visit planning not completed.

## 2019-09-17 ENCOUNTER — TELEPHONE (OUTPATIENT)
Dept: FAMILY MEDICINE CLINIC | Age: 69
End: 2019-09-17

## 2019-10-01 ENCOUNTER — OFFICE VISIT (OUTPATIENT)
Dept: FAMILY MEDICINE CLINIC | Age: 69
End: 2019-10-01
Payer: MEDICARE

## 2019-10-01 VITALS
SYSTOLIC BLOOD PRESSURE: 115 MMHG | WEIGHT: 187 LBS | DIASTOLIC BLOOD PRESSURE: 75 MMHG | OXYGEN SATURATION: 96 % | HEART RATE: 76 BPM | BODY MASS INDEX: 32.1 KG/M2

## 2019-10-01 DIAGNOSIS — Z12.39 BREAST CANCER SCREENING: ICD-10-CM

## 2019-10-01 DIAGNOSIS — J45.20 MILD INTERMITTENT ASTHMA WITHOUT COMPLICATION: ICD-10-CM

## 2019-10-01 DIAGNOSIS — E78.2 MIXED HYPERLIPIDEMIA: ICD-10-CM

## 2019-10-01 DIAGNOSIS — Z12.31 BREAST CANCER SCREENING BY MAMMOGRAM: ICD-10-CM

## 2019-10-01 DIAGNOSIS — I50.22 SYSTOLIC CHF, CHRONIC (HCC): ICD-10-CM

## 2019-10-01 DIAGNOSIS — F34.1 DYSTHYMIA: ICD-10-CM

## 2019-10-01 DIAGNOSIS — I42.9 CARDIOMYOPATHY, IDIOPATHIC (HCC): ICD-10-CM

## 2019-10-01 DIAGNOSIS — M72.2 BILATERAL PLANTAR FASCIITIS: ICD-10-CM

## 2019-10-01 DIAGNOSIS — I77.1 SUBCLAVIAN ARTERY STENOSIS, RIGHT (HCC): ICD-10-CM

## 2019-10-01 DIAGNOSIS — I10 ESSENTIAL HYPERTENSION: Primary | ICD-10-CM

## 2019-10-01 DIAGNOSIS — E66.09 CLASS 1 OBESITY DUE TO EXCESS CALORIES WITHOUT SERIOUS COMORBIDITY WITH BODY MASS INDEX (BMI) OF 31.0 TO 31.9 IN ADULT: ICD-10-CM

## 2019-10-01 DIAGNOSIS — E55.9 VITAMIN D DEFICIENCY: ICD-10-CM

## 2019-10-01 PROCEDURE — 1090F PRES/ABSN URINE INCON ASSESS: CPT | Performed by: FAMILY MEDICINE

## 2019-10-01 PROCEDURE — G8427 DOCREV CUR MEDS BY ELIG CLIN: HCPCS | Performed by: FAMILY MEDICINE

## 2019-10-01 PROCEDURE — 99214 OFFICE O/P EST MOD 30 MIN: CPT | Performed by: FAMILY MEDICINE

## 2019-10-01 PROCEDURE — G8417 CALC BMI ABV UP PARAM F/U: HCPCS | Performed by: FAMILY MEDICINE

## 2019-10-01 PROCEDURE — G8400 PT W/DXA NO RESULTS DOC: HCPCS | Performed by: FAMILY MEDICINE

## 2019-10-01 PROCEDURE — 1123F ACP DISCUSS/DSCN MKR DOCD: CPT | Performed by: FAMILY MEDICINE

## 2019-10-01 PROCEDURE — 4040F PNEUMOC VAC/ADMIN/RCVD: CPT | Performed by: FAMILY MEDICINE

## 2019-10-01 PROCEDURE — G8599 NO ASA/ANTIPLAT THER USE RNG: HCPCS | Performed by: FAMILY MEDICINE

## 2019-10-01 PROCEDURE — 1036F TOBACCO NON-USER: CPT | Performed by: FAMILY MEDICINE

## 2019-10-01 PROCEDURE — 3017F COLORECTAL CA SCREEN DOC REV: CPT | Performed by: FAMILY MEDICINE

## 2019-10-01 PROCEDURE — G8484 FLU IMMUNIZE NO ADMIN: HCPCS | Performed by: FAMILY MEDICINE

## 2019-10-01 RX ORDER — ATORVASTATIN CALCIUM 20 MG/1
TABLET, FILM COATED ORAL
Qty: 30 TABLET | Refills: 3
Start: 2019-10-01 | End: 2019-11-05 | Stop reason: SDUPTHER

## 2019-10-21 RX ORDER — LISINOPRIL 40 MG/1
TABLET ORAL
Qty: 30 TABLET | Refills: 0 | Status: SHIPPED | OUTPATIENT
Start: 2019-10-21 | End: 2019-11-05 | Stop reason: SDUPTHER

## 2019-11-05 ENCOUNTER — OFFICE VISIT (OUTPATIENT)
Dept: CARDIOLOGY CLINIC | Age: 69
End: 2019-11-05
Payer: MEDICARE

## 2019-11-05 ENCOUNTER — HOSPITAL ENCOUNTER (OUTPATIENT)
Dept: CARDIOLOGY | Age: 69
Discharge: HOME | End: 2019-11-05
Payer: MEDICARE

## 2019-11-05 VITALS
WEIGHT: 186.8 LBS | DIASTOLIC BLOOD PRESSURE: 62 MMHG | HEIGHT: 64 IN | SYSTOLIC BLOOD PRESSURE: 120 MMHG | OXYGEN SATURATION: 96 % | HEART RATE: 67 BPM | BODY MASS INDEX: 31.89 KG/M2

## 2019-11-05 DIAGNOSIS — I50.22 SYSTOLIC CHF, CHRONIC (HCC): Primary | ICD-10-CM

## 2019-11-05 DIAGNOSIS — I10 ESSENTIAL HYPERTENSION: ICD-10-CM

## 2019-11-05 DIAGNOSIS — I50.22 SYSTOLIC CHF, CHRONIC (HCC): ICD-10-CM

## 2019-11-05 DIAGNOSIS — I42.9 CARDIOMYOPATHY, IDIOPATHIC (HCC): ICD-10-CM

## 2019-11-05 DIAGNOSIS — E78.2 MIXED HYPERLIPIDEMIA: ICD-10-CM

## 2019-11-05 LAB
LV EF: 45 %
LVEF MODALITY: NORMAL

## 2019-11-05 PROCEDURE — 93306 TTE W/DOPPLER COMPLETE: CPT

## 2019-11-05 PROCEDURE — 99214 OFFICE O/P EST MOD 30 MIN: CPT | Performed by: INTERNAL MEDICINE

## 2019-11-05 RX ORDER — ATORVASTATIN CALCIUM 20 MG/1
TABLET, FILM COATED ORAL
Qty: 30 TABLET | Refills: 11 | Status: SHIPPED | OUTPATIENT
Start: 2019-11-05 | End: 2020-03-17 | Stop reason: SINTOL

## 2019-11-05 RX ORDER — LISINOPRIL 40 MG/1
TABLET ORAL
Qty: 90 TABLET | Refills: 3 | Status: SHIPPED | OUTPATIENT
Start: 2019-11-05 | End: 2020-12-16

## 2019-11-05 RX ORDER — METOPROLOL SUCCINATE 50 MG/1
TABLET, EXTENDED RELEASE ORAL
Qty: 90 TABLET | Refills: 3 | Status: SHIPPED | OUTPATIENT
Start: 2019-11-05 | End: 2020-11-30

## 2019-11-05 RX ORDER — FENOFIBRATE 54 MG/1
54 TABLET ORAL DAILY
Qty: 90 TABLET | Refills: 3 | Status: SHIPPED | OUTPATIENT
Start: 2019-11-05 | End: 2021-01-19

## 2019-11-05 RX ORDER — SPIRONOLACTONE 25 MG/1
TABLET ORAL
Qty: 45 TABLET | Refills: 3 | Status: SHIPPED | OUTPATIENT
Start: 2019-11-05 | End: 2020-12-16

## 2019-11-06 ENCOUNTER — NURSE ONLY (OUTPATIENT)
Dept: FAMILY MEDICINE CLINIC | Age: 69
End: 2019-11-06

## 2019-11-06 DIAGNOSIS — I50.22 SYSTOLIC CHF, CHRONIC (HCC): ICD-10-CM

## 2019-11-06 DIAGNOSIS — E78.2 MIXED HYPERLIPIDEMIA: ICD-10-CM

## 2019-11-06 LAB
A/G RATIO: 1.6 (ref 1.1–2.2)
ALBUMIN SERPL-MCNC: 4.4 G/DL (ref 3.4–5)
ALP BLD-CCNC: 85 U/L (ref 40–129)
ALT SERPL-CCNC: 12 U/L (ref 10–40)
ANION GAP SERPL CALCULATED.3IONS-SCNC: 15 MMOL/L (ref 3–16)
AST SERPL-CCNC: 21 U/L (ref 15–37)
BASOPHILS ABSOLUTE: 0.1 K/UL (ref 0–0.2)
BASOPHILS RELATIVE PERCENT: 0.8 %
BILIRUB SERPL-MCNC: 0.5 MG/DL (ref 0–1)
BUN BLDV-MCNC: 13 MG/DL (ref 7–20)
CALCIUM SERPL-MCNC: 10 MG/DL (ref 8.3–10.6)
CHLORIDE BLD-SCNC: 101 MMOL/L (ref 99–110)
CHOLESTEROL, TOTAL: 354 MG/DL (ref 0–199)
CO2: 25 MMOL/L (ref 21–32)
CREAT SERPL-MCNC: 0.9 MG/DL (ref 0.6–1.2)
EOSINOPHILS ABSOLUTE: 0.2 K/UL (ref 0–0.6)
EOSINOPHILS RELATIVE PERCENT: 2.8 %
GFR AFRICAN AMERICAN: >60
GFR NON-AFRICAN AMERICAN: >60
GLOBULIN: 2.7 G/DL
GLUCOSE BLD-MCNC: 124 MG/DL (ref 70–99)
HCT VFR BLD CALC: 39.1 % (ref 36–48)
HDLC SERPL-MCNC: 46 MG/DL (ref 40–60)
HEMOGLOBIN: 13.1 G/DL (ref 12–16)
LDL CHOLESTEROL CALCULATED: 266 MG/DL
LYMPHOCYTES ABSOLUTE: 1.6 K/UL (ref 1–5.1)
LYMPHOCYTES RELATIVE PERCENT: 26.6 %
MCH RBC QN AUTO: 29 PG (ref 26–34)
MCHC RBC AUTO-ENTMCNC: 33.5 G/DL (ref 31–36)
MCV RBC AUTO: 86.7 FL (ref 80–100)
MONOCYTES ABSOLUTE: 0.3 K/UL (ref 0–1.3)
MONOCYTES RELATIVE PERCENT: 5.1 %
NEUTROPHILS ABSOLUTE: 3.9 K/UL (ref 1.7–7.7)
NEUTROPHILS RELATIVE PERCENT: 64.7 %
PDW BLD-RTO: 13.4 % (ref 12.4–15.4)
PLATELET # BLD: 298 K/UL (ref 135–450)
PMV BLD AUTO: 9.9 FL (ref 5–10.5)
POTASSIUM SERPL-SCNC: 4.6 MMOL/L (ref 3.5–5.1)
PRO-BNP: 101 PG/ML (ref 0–124)
RBC # BLD: 4.51 M/UL (ref 4–5.2)
SODIUM BLD-SCNC: 141 MMOL/L (ref 136–145)
TOTAL PROTEIN: 7.1 G/DL (ref 6.4–8.2)
TRIGL SERPL-MCNC: 208 MG/DL (ref 0–150)
VLDLC SERPL CALC-MCNC: 42 MG/DL
WBC # BLD: 6.1 K/UL (ref 4–11)

## 2019-11-08 ENCOUNTER — TELEPHONE (OUTPATIENT)
Dept: CARDIOLOGY CLINIC | Age: 69
End: 2019-11-08

## 2019-11-19 ENCOUNTER — OFFICE VISIT (OUTPATIENT)
Dept: FAMILY MEDICINE CLINIC | Age: 69
End: 2019-11-19
Payer: COMMERCIAL

## 2019-11-19 VITALS
SYSTOLIC BLOOD PRESSURE: 128 MMHG | DIASTOLIC BLOOD PRESSURE: 62 MMHG | OXYGEN SATURATION: 97 % | BODY MASS INDEX: 32.27 KG/M2 | HEART RATE: 75 BPM | WEIGHT: 189 LBS | HEIGHT: 64 IN

## 2019-11-19 DIAGNOSIS — M51.37 DEGENERATIVE DISC DISEASE AT L5-S1 LEVEL: Chronic | ICD-10-CM

## 2019-11-19 DIAGNOSIS — M51.36 DEGENERATION OF L4-L5 INTERVERTEBRAL DISC: Primary | Chronic | ICD-10-CM

## 2019-11-19 PROCEDURE — G8599 NO ASA/ANTIPLAT THER USE RNG: HCPCS | Performed by: FAMILY MEDICINE

## 2019-11-19 PROCEDURE — 99213 OFFICE O/P EST LOW 20 MIN: CPT | Performed by: FAMILY MEDICINE

## 2019-11-19 PROCEDURE — G8484 FLU IMMUNIZE NO ADMIN: HCPCS | Performed by: FAMILY MEDICINE

## 2019-11-19 PROCEDURE — 1036F TOBACCO NON-USER: CPT | Performed by: FAMILY MEDICINE

## 2019-11-19 PROCEDURE — G8427 DOCREV CUR MEDS BY ELIG CLIN: HCPCS | Performed by: FAMILY MEDICINE

## 2019-11-19 PROCEDURE — 3017F COLORECTAL CA SCREEN DOC REV: CPT | Performed by: FAMILY MEDICINE

## 2019-11-19 PROCEDURE — 1090F PRES/ABSN URINE INCON ASSESS: CPT | Performed by: FAMILY MEDICINE

## 2019-11-19 PROCEDURE — 4040F PNEUMOC VAC/ADMIN/RCVD: CPT | Performed by: FAMILY MEDICINE

## 2019-11-19 PROCEDURE — 1123F ACP DISCUSS/DSCN MKR DOCD: CPT | Performed by: FAMILY MEDICINE

## 2019-11-19 PROCEDURE — G8400 PT W/DXA NO RESULTS DOC: HCPCS | Performed by: FAMILY MEDICINE

## 2019-11-19 PROCEDURE — G8417 CALC BMI ABV UP PARAM F/U: HCPCS | Performed by: FAMILY MEDICINE

## 2019-11-21 ENCOUNTER — HOSPITAL ENCOUNTER (OUTPATIENT)
Dept: MAMMOGRAPHY | Age: 69
Discharge: HOME | End: 2019-11-26
Payer: MEDICARE

## 2019-11-21 DIAGNOSIS — Z12.31 BREAST CANCER SCREENING BY MAMMOGRAM: ICD-10-CM

## 2019-11-21 PROCEDURE — 77063 BREAST TOMOSYNTHESIS BI: CPT

## 2019-12-05 ENCOUNTER — NURSE TRIAGE (OUTPATIENT)
Dept: OTHER | Facility: CLINIC | Age: 69
End: 2019-12-05

## 2019-12-06 ENCOUNTER — OFFICE VISIT (OUTPATIENT)
Dept: FAMILY MEDICINE CLINIC | Age: 69
End: 2019-12-06
Payer: MEDICARE

## 2019-12-06 ENCOUNTER — HOSPITAL ENCOUNTER (OUTPATIENT)
Age: 69
Discharge: HOME | End: 2019-12-06
Payer: MEDICARE

## 2019-12-06 VITALS
TEMPERATURE: 98 F | HEART RATE: 73 BPM | BODY MASS INDEX: 31.58 KG/M2 | DIASTOLIC BLOOD PRESSURE: 70 MMHG | WEIGHT: 185 LBS | SYSTOLIC BLOOD PRESSURE: 116 MMHG | OXYGEN SATURATION: 97 % | HEIGHT: 64 IN

## 2019-12-06 DIAGNOSIS — R10.9 ABDOMINAL CRAMPING: ICD-10-CM

## 2019-12-06 DIAGNOSIS — R11.0 NAUSEA: ICD-10-CM

## 2019-12-06 DIAGNOSIS — R10.10 UPPER ABDOMINAL PAIN: ICD-10-CM

## 2019-12-06 DIAGNOSIS — R10.9 LEFT SIDED ABDOMINAL PAIN: ICD-10-CM

## 2019-12-06 DIAGNOSIS — R10.9 LEFT SIDED ABDOMINAL PAIN: Primary | ICD-10-CM

## 2019-12-06 LAB
A/G RATIO: 1.1 (ref 1.1–2.2)
ALBUMIN SERPL-MCNC: 4 G/DL (ref 3.4–5)
ALP BLD-CCNC: 83 U/L (ref 40–129)
ALT SERPL-CCNC: 12 U/L (ref 10–40)
AMYLASE: 53 U/L (ref 25–115)
ANION GAP SERPL CALCULATED.3IONS-SCNC: 13 MMOL/L (ref 3–16)
AST SERPL-CCNC: 17 U/L (ref 15–37)
BASOPHILS ABSOLUTE: 0 K/UL (ref 0–0.2)
BASOPHILS RELATIVE PERCENT: 0.4 %
BILIRUB SERPL-MCNC: 0.3 MG/DL (ref 0–1)
BILIRUBIN, POC: NORMAL
BLOOD URINE, POC: NORMAL
BUN BLDV-MCNC: 21 MG/DL (ref 7–20)
CALCIUM SERPL-MCNC: 9.5 MG/DL (ref 8.3–10.6)
CHLORIDE BLD-SCNC: 102 MMOL/L (ref 99–110)
CLARITY, POC: NORMAL
CO2: 26 MMOL/L (ref 21–32)
COLOR, POC: NORMAL
CREAT SERPL-MCNC: 1 MG/DL (ref 0.6–1.2)
EOSINOPHILS ABSOLUTE: 0.2 K/UL (ref 0–0.6)
EOSINOPHILS RELATIVE PERCENT: 3.1 %
GFR AFRICAN AMERICAN: >60
GFR NON-AFRICAN AMERICAN: 55
GLOBULIN: 3.8 G/DL
GLUCOSE BLD-MCNC: 108 MG/DL (ref 70–99)
GLUCOSE URINE, POC: NORMAL
HCT VFR BLD CALC: 37.8 % (ref 36–48)
HEMOGLOBIN: 12.4 G/DL (ref 12–16)
KETONES, POC: NORMAL
LEUKOCYTE EST, POC: NORMAL
LIPASE: 23 U/L (ref 13–60)
LYMPHOCYTES ABSOLUTE: 2.1 K/UL (ref 1–5.1)
LYMPHOCYTES RELATIVE PERCENT: 29 %
MCH RBC QN AUTO: 28.6 PG (ref 26–34)
MCHC RBC AUTO-ENTMCNC: 32.8 G/DL (ref 31–36)
MCV RBC AUTO: 87.2 FL (ref 80–100)
MONOCYTES ABSOLUTE: 0.3 K/UL (ref 0–1.3)
MONOCYTES RELATIVE PERCENT: 4.6 %
NEUTROPHILS ABSOLUTE: 4.6 K/UL (ref 1.7–7.7)
NEUTROPHILS RELATIVE PERCENT: 62.9 %
NITRITE, POC: NORMAL
PDW BLD-RTO: 13.5 % (ref 12.4–15.4)
PH, POC: 5
PLATELET # BLD: 259 K/UL (ref 135–450)
PMV BLD AUTO: 9.4 FL (ref 5–10.5)
POTASSIUM SERPL-SCNC: 4.2 MMOL/L (ref 3.5–5.1)
PROTEIN, POC: NORMAL
RBC # BLD: 4.34 M/UL (ref 4–5.2)
SODIUM BLD-SCNC: 141 MMOL/L (ref 136–145)
SPECIFIC GRAVITY, POC: >=1.03
TOTAL PROTEIN: 7.8 G/DL (ref 6.4–8.2)
UROBILINOGEN, POC: 0.2
WBC # BLD: 7.3 K/UL (ref 4–11)

## 2019-12-06 PROCEDURE — 1123F ACP DISCUSS/DSCN MKR DOCD: CPT | Performed by: NURSE PRACTITIONER

## 2019-12-06 PROCEDURE — 81002 URINALYSIS NONAUTO W/O SCOPE: CPT | Performed by: NURSE PRACTITIONER

## 2019-12-06 PROCEDURE — G8417 CALC BMI ABV UP PARAM F/U: HCPCS | Performed by: NURSE PRACTITIONER

## 2019-12-06 PROCEDURE — G8599 NO ASA/ANTIPLAT THER USE RNG: HCPCS | Performed by: NURSE PRACTITIONER

## 2019-12-06 PROCEDURE — 80053 COMPREHEN METABOLIC PANEL: CPT

## 2019-12-06 PROCEDURE — 83690 ASSAY OF LIPASE: CPT

## 2019-12-06 PROCEDURE — 99215 OFFICE O/P EST HI 40 MIN: CPT | Performed by: NURSE PRACTITIONER

## 2019-12-06 PROCEDURE — G8484 FLU IMMUNIZE NO ADMIN: HCPCS | Performed by: NURSE PRACTITIONER

## 2019-12-06 PROCEDURE — 1036F TOBACCO NON-USER: CPT | Performed by: NURSE PRACTITIONER

## 2019-12-06 PROCEDURE — G8427 DOCREV CUR MEDS BY ELIG CLIN: HCPCS | Performed by: NURSE PRACTITIONER

## 2019-12-06 PROCEDURE — 4040F PNEUMOC VAC/ADMIN/RCVD: CPT | Performed by: NURSE PRACTITIONER

## 2019-12-06 PROCEDURE — 3017F COLORECTAL CA SCREEN DOC REV: CPT | Performed by: NURSE PRACTITIONER

## 2019-12-06 PROCEDURE — 82150 ASSAY OF AMYLASE: CPT

## 2019-12-06 PROCEDURE — 36415 COLL VENOUS BLD VENIPUNCTURE: CPT

## 2019-12-06 PROCEDURE — 85025 COMPLETE CBC W/AUTO DIFF WBC: CPT

## 2019-12-06 PROCEDURE — G8400 PT W/DXA NO RESULTS DOC: HCPCS | Performed by: NURSE PRACTITIONER

## 2019-12-06 PROCEDURE — 1090F PRES/ABSN URINE INCON ASSESS: CPT | Performed by: NURSE PRACTITIONER

## 2019-12-06 ASSESSMENT — ENCOUNTER SYMPTOMS
CHOKING: 0
BELCHING: 0
RESPIRATORY NEGATIVE: 1
BLOOD IN STOOL: 0
STRIDOR: 0
EYE PAIN: 0
ABDOMINAL DISTENTION: 0
SORE THROAT: 0
NAUSEA: 1
COLOR CHANGE: 0
SINUS PRESSURE: 1
BACK PAIN: 0
ABDOMINAL PAIN: 1
SHORTNESS OF BREATH: 0
HEMATOCHEZIA: 0
TROUBLE SWALLOWING: 0
CHEST TIGHTNESS: 0
RHINORRHEA: 0
RECTAL PAIN: 0
ANAL BLEEDING: 0
CONSTIPATION: 0
FLATUS: 0
DIARRHEA: 0
EYE DISCHARGE: 0
EYE REDNESS: 0
SINUS PAIN: 1
COUGH: 0
EYE ITCHING: 0
ALLERGIC/IMMUNOLOGIC NEGATIVE: 1
WHEEZING: 0
APNEA: 0
PHOTOPHOBIA: 0
VOMITING: 0

## 2019-12-06 ASSESSMENT — CROHNS DISEASE ACTIVITY INDEX (CDAI): CDAI SCORE: 0

## 2020-01-22 PROBLEM — S20.221A CONTUSION OF RIGHT SIDE OF BACK: Status: ACTIVE | Noted: 2020-01-22

## 2020-01-28 ENCOUNTER — OFFICE VISIT (OUTPATIENT)
Dept: FAMILY MEDICINE CLINIC | Age: 70
End: 2020-01-28
Payer: COMMERCIAL

## 2020-01-28 VITALS
BODY MASS INDEX: 31.58 KG/M2 | HEART RATE: 78 BPM | SYSTOLIC BLOOD PRESSURE: 118 MMHG | HEIGHT: 64 IN | WEIGHT: 185 LBS | DIASTOLIC BLOOD PRESSURE: 66 MMHG | OXYGEN SATURATION: 95 %

## 2020-01-28 PROCEDURE — 3017F COLORECTAL CA SCREEN DOC REV: CPT | Performed by: FAMILY MEDICINE

## 2020-01-28 PROCEDURE — 1090F PRES/ABSN URINE INCON ASSESS: CPT | Performed by: FAMILY MEDICINE

## 2020-01-28 PROCEDURE — 1123F ACP DISCUSS/DSCN MKR DOCD: CPT | Performed by: FAMILY MEDICINE

## 2020-01-28 PROCEDURE — 1036F TOBACCO NON-USER: CPT | Performed by: FAMILY MEDICINE

## 2020-01-28 PROCEDURE — G8417 CALC BMI ABV UP PARAM F/U: HCPCS | Performed by: FAMILY MEDICINE

## 2020-01-28 PROCEDURE — G8400 PT W/DXA NO RESULTS DOC: HCPCS | Performed by: FAMILY MEDICINE

## 2020-01-28 PROCEDURE — 4040F PNEUMOC VAC/ADMIN/RCVD: CPT | Performed by: FAMILY MEDICINE

## 2020-01-28 PROCEDURE — G8427 DOCREV CUR MEDS BY ELIG CLIN: HCPCS | Performed by: FAMILY MEDICINE

## 2020-01-28 PROCEDURE — 99214 OFFICE O/P EST MOD 30 MIN: CPT | Performed by: FAMILY MEDICINE

## 2020-01-28 PROCEDURE — G8484 FLU IMMUNIZE NO ADMIN: HCPCS | Performed by: FAMILY MEDICINE

## 2020-01-28 NOTE — PROGRESS NOTES
Not on file     Non-medical: Not on file   Tobacco Use    Smoking status: Former Smoker     Packs/day: 0.50     Years: 3.00     Pack years: 1.50     Last attempt to quit: 2015     Years since quittin.0    Smokeless tobacco: Never Used   Substance and Sexual Activity    Alcohol use: No     Alcohol/week: 0.0 standard drinks    Drug use: No    Sexual activity: Yes     Partners: Male   Lifestyle    Physical activity:     Days per week: Not on file     Minutes per session: Not on file    Stress: Not on file   Relationships    Social connections:     Talks on phone: Not on file     Gets together: Not on file     Attends Church service: Not on file     Active member of club or organization: Not on file     Attends meetings of clubs or organizations: Not on file     Relationship status: Not on file    Intimate partner violence:     Fear of current or ex partner: Not on file     Emotionally abused: Not on file     Physically abused: Not on file     Forced sexual activity: Not on file   Other Topics Concern    Not on file   Social History Narrative    Not on file       Prior to Visit Medications    Medication Sig Taking? Authorizing Provider   traMADol (ULTRAM) 50 MG tablet Take 1 tablet by mouth every 6 hours as needed for Pain for up to 30 days.   Costa Portillo, APRN - CNP   atorvastatin (LIPITOR) 20 MG tablet TAKE 1 TAB  BY MOUTH DAILY  Cleo Dawson MD   fenofibrate (TRICOR) 54 MG tablet Take 1 tablet by mouth daily [facility]: Please dispense generic fenofibrate unless prescriber denote  Cleo Dawson MD   lisinopril (PRINIVIL;ZESTRIL) 40 MG tablet One tablet every evening  Cleo Dawson MD   metoprolol succinate (TOPROL XL) 50 MG extended release tablet TAKE 1 TABLET BY MOUTH EVERY DAY  Cleo Dawson MD   spironolactone (ALDACTONE) 25 MG tablet TAKE 1/2 TABLET BY MOUTH DAILY  Cleo Dawson MD   gabapentin (NEURONTIN) 100 MG capsule TAKE TWO CAPSULES BY MOUTH THREE TIMES A DAY  Silvio Gallegos Marquis Gianni MD   aspirin EC 81 MG EC tablet Take 1 tablet by mouth daily  Evan Maldonado MD   Cholecalciferol (VITAMIN D3) 2000 UNITS CAPS Take 2 tablets by mouth daily   Historical Provider, MD   Calcium 4179-6004 MG-UNIT CHEW Take  by mouth. Historical Provider, MD     Allergies   Allergen Reactions    Shellfish-Derived Products Nausea And Vomiting    Celebrex [Celecoxib] Swelling    Codeine     Ibuprofen Swelling    Lipitor [Atorvastatin] Other (See Comments)     Muscle aches, flu like SX    Methadone     Vioxx Swelling       OBJECTIVE:  Estimated body mass index is 31.76 kg/m² as calculated from the following:    Height as of 12/6/19: 5' 4\" (1.626 m). Weight as of 12/6/19: 185 lb (83.9 kg). There were no vitals filed for this visit. BP Readings from Last 2 Encounters:   12/06/19 116/70   11/19/19 128/62     Wt Readings from Last 3 Encounters:   12/06/19 185 lb (83.9 kg)   11/19/19 189 lb (85.7 kg)   11/05/19 186 lb 12.8 oz (84.7 kg)       Physical Exam  Vitals signs and nursing note reviewed. Constitutional:       General: She is not in acute distress. Appearance: She is well-developed. She is not diaphoretic. HENT:      Head: Normocephalic and atraumatic. Right Ear: External ear normal.      Left Ear: External ear normal.      Nose: Nose normal.   Eyes:      General: Lids are normal. No scleral icterus. Right eye: No discharge. Left eye: No discharge. Pupils: Pupils are equal, round, and reactive to light. Neck:      Thyroid: No thyromegaly. Vascular: No JVD. Cardiovascular:      Rate and Rhythm: Normal rate and regular rhythm. Heart sounds: Normal heart sounds. Pulmonary:      Effort: Pulmonary effort is normal. No respiratory distress. Breath sounds: Normal breath sounds. Abdominal:      Palpations: Abdomen is soft. Tenderness: There is no abdominal tenderness.    Musculoskeletal:         General: Tenderness (right proximal trapezius spasm and tenderness moderate on the left) present. Cervical back: She exhibits decreased range of motion, tenderness (Especially right paracervical and trapezius to palpation), bony tenderness (Over C7) and spasm. Comments: C spine flexion 20 degrees  Extension 10 degrees  Left rotation 40 degrees  Right rotation 20 degrees  Left flexion 10 degrees  Right flexion is 10 degrees with increased pain   Skin:     General: Skin is warm and dry. Coloration: Skin is not pale. Findings: No erythema or rash. Comments: Turgor normal   Neurological:      Mental Status: She is alert and oriented to person, place, and time. Cranial Nerves: No cranial nerve deficit. Sensory: Sensory deficit (Mildly diminished left radial first webspace) present. Deep Tendon Reflexes:      Reflex Scores:       Bicep reflexes are 2+ on the right side and 2+ on the left side. Comments:  strength UE equal bilateral   Psychiatric:         Behavior: Behavior normal.         Thought Content: Thought content normal.         Judgment: Judgment normal.              ASSESSMENT PLAN      Diagnosis Orders   1. Degeneration of intervertebral disc at C4-C5 level     2. Degeneration of C5-C6 intervertebral disc     3. Displacement of cervical intervertebral disc without myelopathy     4. Herniation of intervertebral disc at C4-C5 level     5. Herniation of intervertebral disc at C6-C7 level     No change in treatment necessary at this time. I do not believe the sensory deficit is related to the cervical spine. We can talk about this more when she has a regular checkup. Follow-up on this claim in 6 months    Patient should call the office immediately with new or ongoing signs or symptoms or worsening, or proceed to the emergency room.   No changes in past medical history, past surgical history, social history, or family history were noted during the patient encounter unless specifically listed above.  All updates of past medical history, past surgical history, social history, or family history were reviewed personally by me during the office visit. All problems listed in the assessment are stable unless noted otherwise. Medication profile reviewed personally by me during the visit. Medication side effects and possible impairments from medications were discussed as applicable. This document was prepared by a combination of typing and transcription through a voice recognition software. Scribe attestation: I, Monique Ascencio MA, am scribing for and in the presence of Beti Lakhani MD. Electronically signed by Monique Ascencio MA on 1/28/2020 at 4:05 PM      Provider attestation:     I, Dr. Cornelius Mayen, personally performed the services described in this documentation, as scribed by the above signed scribe in my presence, and it is both accurate and complete. I agree with the ROS and Past Histories independently gathered by the clinical support staff and the remaining scribed note accurately describes my personal service to the patient.       1/28/2020    5:13 PM

## 2020-02-05 ENCOUNTER — TELEPHONE (OUTPATIENT)
Dept: FAMILY MEDICINE CLINIC | Age: 70
End: 2020-02-05

## 2020-02-05 RX ORDER — AZITHROMYCIN 250 MG/1
250 TABLET, FILM COATED ORAL SEE ADMIN INSTRUCTIONS
Qty: 6 TABLET | Refills: 0 | Status: SHIPPED | OUTPATIENT
Start: 2020-02-05 | End: 2020-02-11 | Stop reason: ALTCHOICE

## 2020-02-05 NOTE — TELEPHONE ENCOUNTER
Pt informed - advised to call the office back if she's not improving with the zpak. She verbalized understanding.

## 2020-02-10 ENCOUNTER — TELEPHONE (OUTPATIENT)
Dept: FAMILY MEDICINE CLINIC | Age: 70
End: 2020-02-10

## 2020-02-11 ENCOUNTER — OFFICE VISIT (OUTPATIENT)
Dept: FAMILY MEDICINE CLINIC | Age: 70
End: 2020-02-11
Payer: MEDICARE

## 2020-02-11 VITALS
TEMPERATURE: 98 F | BODY MASS INDEX: 32.61 KG/M2 | HEART RATE: 68 BPM | DIASTOLIC BLOOD PRESSURE: 82 MMHG | WEIGHT: 190 LBS | OXYGEN SATURATION: 99 % | SYSTOLIC BLOOD PRESSURE: 134 MMHG

## 2020-02-11 LAB
INFLUENZA A ANTIGEN, POC: NORMAL
INFLUENZA B ANTIGEN, POC: NORMAL

## 2020-02-11 PROCEDURE — 1090F PRES/ABSN URINE INCON ASSESS: CPT | Performed by: FAMILY MEDICINE

## 2020-02-11 PROCEDURE — 99213 OFFICE O/P EST LOW 20 MIN: CPT | Performed by: FAMILY MEDICINE

## 2020-02-11 PROCEDURE — G8400 PT W/DXA NO RESULTS DOC: HCPCS | Performed by: FAMILY MEDICINE

## 2020-02-11 PROCEDURE — 1036F TOBACCO NON-USER: CPT | Performed by: FAMILY MEDICINE

## 2020-02-11 PROCEDURE — 87804 INFLUENZA ASSAY W/OPTIC: CPT | Performed by: FAMILY MEDICINE

## 2020-02-11 PROCEDURE — G8484 FLU IMMUNIZE NO ADMIN: HCPCS | Performed by: FAMILY MEDICINE

## 2020-02-11 PROCEDURE — 1123F ACP DISCUSS/DSCN MKR DOCD: CPT | Performed by: FAMILY MEDICINE

## 2020-02-11 PROCEDURE — G8417 CALC BMI ABV UP PARAM F/U: HCPCS | Performed by: FAMILY MEDICINE

## 2020-02-11 PROCEDURE — 4040F PNEUMOC VAC/ADMIN/RCVD: CPT | Performed by: FAMILY MEDICINE

## 2020-02-11 PROCEDURE — 3017F COLORECTAL CA SCREEN DOC REV: CPT | Performed by: FAMILY MEDICINE

## 2020-02-11 PROCEDURE — G8427 DOCREV CUR MEDS BY ELIG CLIN: HCPCS | Performed by: FAMILY MEDICINE

## 2020-02-11 NOTE — PATIENT INSTRUCTIONS
may need a spacer if you are using corticosteroid medicines. · If you are using a corticosteroid inhaler, gargle and rinse out your mouth with water after use. Do not swallow the water. Swallowing the water will increase the chance that the medicine will get into your bloodstream. This may make it more likely that you will have side effects. To use a spacer with an inhaler  1. Shake the inhaler. Remove the inhaler cap, and place the mouthpiece of the inhaler into the spacer. Check the inhaler instructions to see if you need to prime your inhaler before you use it. If it needs priming, follow the instructions on how to prime your inhaler. 2. Remove the cap from the spacer. 3. Hold the inhaler upright with the mouthpiece at the bottom. 4. Tilt your head back a little, and breathe out slowly and completely. 5. Place the spacer's mouthpiece in your mouth. 6. Press down on the inhaler to spray one puff of medicine into the spacer, and then start breathing in slowly. Wait to inhale until after you have pressed down on the inhaler. Some spacers have a whistle. If you hear it, you should breathe in more slowly. 7. Hold your breath for 10 seconds. This will let the medicine settle in your lungs. 8. If you need to take a second dose, wait 30 to 60 seconds to allow the inhaler valve to refill. To use an inhaler without a spacer  1. Shake the inhaler as directed. Remove the cap. Check the instructions to see if you need to prime your inhaler before you use it. If it needs priming, follow the instructions on how to prime your inhaler. 2. Hold the inhaler upright with the mouthpiece at the bottom. 3. Tilt your head back a little, and breathe out slowly and completely. 4. Position the inhaler in one of two ways:  ? You can place the inhaler in your mouth. This is easier for most people. And it lowers the risk that any of the medicine will get into your eyes.   ? Or you can place the inhaler 1 to 2 inches in front of

## 2020-02-11 NOTE — PROGRESS NOTES
Chief Complaint   Patient presents with    Wheezing    Head Congestion    Fever    Cough       HPI:   Jarrell Jeffery is a 71 y.o. (: 1950) here today for wheezing, cough URI. Patient has been ill for about a week. Started in her chest now mostly in her head although she does get short of breath. She had missed work at the beginning to end of last week went back yesterday and just part day today. Tactile temperature. Coughing up some sputum. He has used an inhaler in the past.    Patient's medications, allergies, past medical, surgical, social and family histories were reviewed and updated as appropriateon 2020 at 1:32 PM.    LDL Calculated (mg/dL)   Date Value   2019 266 (H)     Review of Systems  Additional review of systems may be scanned into the mediasection of this medical record. Any responses requiring further intervention were pursued. OBJECTIVE:  Estimated body mass index is 32.61 kg/m² as calculated from the following:    Height as of 20: 5' 4\" (1.626 m). Weight as of this encounter: 190 lb (86.2 kg). Vitals:    20 1327   BP: 134/82   Site: Left Upper Arm   Position: Sitting   Cuff Size: Medium Adult   Pulse: 68   Temp: 98 °F (36.7 °C)   SpO2: 99%   Weight: 190 lb (86.2 kg)     Physical Exam  Vitals signs and nursing note reviewed. Constitutional:       General: She is not in acute distress. Appearance: She is well-developed. She is not diaphoretic. HENT:      Head: Normocephalic and atraumatic. Right Ear: External ear normal.      Left Ear: External ear normal.      Nose: Nose normal.   Eyes:      General: Lids are normal. No scleral icterus. Right eye: No discharge. Left eye: No discharge. Pupils: Pupils are equal, round, and reactive to light. Neck:      Thyroid: No thyromegaly. Vascular: No JVD. Cardiovascular:      Rate and Rhythm: Normal rate and regular rhythm. Heart sounds: Normal heart sounds.

## 2020-02-12 RX ORDER — ALBUTEROL SULFATE 90 UG/1
2 AEROSOL, METERED RESPIRATORY (INHALATION) EVERY 6 HOURS PRN
Qty: 1 INHALER | Refills: 3 | Status: SHIPPED | OUTPATIENT
Start: 2020-02-12

## 2020-02-12 RX ORDER — PREDNISONE 10 MG/1
TABLET ORAL
Qty: 20 TABLET | Refills: 0 | Status: SHIPPED | OUTPATIENT
Start: 2020-02-12 | End: 2020-03-17 | Stop reason: ALTCHOICE

## 2020-03-17 ENCOUNTER — OFFICE VISIT (OUTPATIENT)
Dept: FAMILY MEDICINE CLINIC | Age: 70
End: 2020-03-17
Payer: MEDICARE

## 2020-03-17 VITALS
DIASTOLIC BLOOD PRESSURE: 68 MMHG | WEIGHT: 188.2 LBS | HEART RATE: 82 BPM | SYSTOLIC BLOOD PRESSURE: 138 MMHG | OXYGEN SATURATION: 97 % | BODY MASS INDEX: 32.3 KG/M2

## 2020-03-17 PROBLEM — F32.3 DEPRESSIVE PSYCHOSIS (HCC): Status: RESOLVED | Noted: 2017-02-21 | Resolved: 2020-03-17

## 2020-03-17 PROBLEM — R73.03 PRE-DIABETES: Status: ACTIVE | Noted: 2020-03-17

## 2020-03-17 LAB — HBA1C MFR BLD: 5.9 %

## 2020-03-17 PROCEDURE — G8427 DOCREV CUR MEDS BY ELIG CLIN: HCPCS | Performed by: FAMILY MEDICINE

## 2020-03-17 PROCEDURE — 1090F PRES/ABSN URINE INCON ASSESS: CPT | Performed by: FAMILY MEDICINE

## 2020-03-17 PROCEDURE — 99214 OFFICE O/P EST MOD 30 MIN: CPT | Performed by: FAMILY MEDICINE

## 2020-03-17 PROCEDURE — 1123F ACP DISCUSS/DSCN MKR DOCD: CPT | Performed by: FAMILY MEDICINE

## 2020-03-17 PROCEDURE — 4040F PNEUMOC VAC/ADMIN/RCVD: CPT | Performed by: FAMILY MEDICINE

## 2020-03-17 PROCEDURE — 1036F TOBACCO NON-USER: CPT | Performed by: FAMILY MEDICINE

## 2020-03-17 PROCEDURE — 83036 HEMOGLOBIN GLYCOSYLATED A1C: CPT | Performed by: FAMILY MEDICINE

## 2020-03-17 PROCEDURE — G8417 CALC BMI ABV UP PARAM F/U: HCPCS | Performed by: FAMILY MEDICINE

## 2020-03-17 PROCEDURE — G8400 PT W/DXA NO RESULTS DOC: HCPCS | Performed by: FAMILY MEDICINE

## 2020-03-17 PROCEDURE — G8484 FLU IMMUNIZE NO ADMIN: HCPCS | Performed by: FAMILY MEDICINE

## 2020-03-17 PROCEDURE — 3017F COLORECTAL CA SCREEN DOC REV: CPT | Performed by: FAMILY MEDICINE

## 2020-03-17 RX ORDER — ATORVASTATIN CALCIUM 20 MG/1
TABLET, FILM COATED ORAL
Qty: 30 TABLET | Refills: 11
Start: 2020-03-17 | End: 2020-11-30

## 2020-03-17 NOTE — PATIENT INSTRUCTIONS
Co enzyme Q10 200 mg daily and Vit D and 64 ounces of water to help you tolerate the statin medication. Call if you cannot tolerate the atorvastatin and we can change to rosuvastatin. Watch your carbohydrates and sweets in your diet. Patient should call the office immediately with new or ongoing signs or symptoms or worsening, or proceed to the emergency room. If you are on medications which could impair your senses, you are at risk of weakness, falls, dizziness, or drowsiness. You should be careful during activities which could place you at risk of harm, such as climbing, using stairs, operating machinery, or driving vehicles. If you feel you cannot safely do these activities, you should request others to help you, or avoid the activities altogether. If you are drowsy for any other reason, you should use the same precautions as listed above. You may receive a survey regarding the care you received during your visit. Your input is valuable to us. We encourage you to complete and return your survey.

## 2020-03-17 NOTE — PROGRESS NOTES
family histories were reviewed and updated asappropriate on 3/17/2020 at 2:35 PM.    ROS:  Review of Systems    All other systems reviewed and are negative except as noted above on 3/17/2020 at 2:35 PM. Additional review of systems may be scanned into the media section ofthis medical record. Any responses requiring further intervention were pursued.     LDL Calculated (mg/dL)   Date Value   2019 266 (H)     Past Medical History:   Diagnosis Date    Arthritis     Asthma     Back pain     Fibromyalgia     Fibromyalgia     Heart failure with reduced ejection fraction (HCC)     Herpes zoster     Hypercholesteremia     Hyperlipidemia     Hypertriglyceridemia     Neck pain     Osteopenia     Stress-induced cardiomyopathy 2017        Family History   Problem Relation Age of Onset    High Blood Pressure Mother     Heart Disease Father      Social History     Socioeconomic History    Marital status:      Spouse name: Not on file    Number of children: Not on file    Years of education: Not on file    Highest education level: Not on file   Occupational History    Not on file   Social Needs    Financial resource strain: Not on file    Food insecurity     Worry: Not on file     Inability: Not on file    Transportation needs     Medical: Not on file     Non-medical: Not on file   Tobacco Use    Smoking status: Former Smoker     Packs/day: 0.50     Years: 3.00     Pack years: 1.50     Types: Cigarettes     Last attempt to quit: 2015     Years since quittin.2    Smokeless tobacco: Never Used    Tobacco comment: pt smoked off and on   Substance and Sexual Activity    Alcohol use: No     Alcohol/week: 0.0 standard drinks    Drug use: No    Sexual activity: Yes     Partners: Male   Lifestyle    Physical activity     Days per week: Not on file     Minutes per session: Not on file    Stress: Not on file   Relationships    Social connections     Talks on phone: Not on file Reactions    Shellfish-Derived Products Nausea And Vomiting    Celebrex [Celecoxib] Swelling    Codeine     Ibuprofen Swelling    Lipitor [Atorvastatin] Other (See Comments)     Muscle aches, flu like SX    Methadone     Vioxx Swelling       OBJECTIVE:  Estimated body mass index is 32.3 kg/m² as calculated from the following:    Height as of 1/28/20: 5' 4\" (1.626 m). Weight as of this encounter: 188 lb 3.2 oz (85.4 kg). Vitals:    03/17/20 1434   BP: 138/68   Site: Left Upper Arm   Position: Sitting   Cuff Size: Medium Adult   Pulse: 82   SpO2: 97%   Weight: 188 lb 3.2 oz (85.4 kg)     BP Readings from Last 2 Encounters:   03/17/20 138/68   02/11/20 134/82     Wt Readings from Last 3 Encounters:   03/17/20 188 lb 3.2 oz (85.4 kg)   02/11/20 190 lb (86.2 kg)   01/28/20 185 lb (83.9 kg)       Physical Exam  Vitals signs and nursing note reviewed. Constitutional:       General: She is not in acute distress. Appearance: She is well-developed. She is not diaphoretic. HENT:      Head: Normocephalic and atraumatic. Right Ear: External ear normal.      Left Ear: External ear normal.      Nose: Nose normal.   Eyes:      General: Lids are normal. No scleral icterus. Right eye: No discharge. Left eye: No discharge. Pupils: Pupils are equal, round, and reactive to light. Neck:      Thyroid: No thyromegaly. Vascular: No JVD. Cardiovascular:      Rate and Rhythm: Normal rate and regular rhythm. Heart sounds: Normal heart sounds. Pulmonary:      Effort: Pulmonary effort is normal. No respiratory distress. Breath sounds: Normal breath sounds. Abdominal:      Palpations: Abdomen is soft. There is no hepatomegaly or splenomegaly. Tenderness: There is no abdominal tenderness. Skin:     General: Skin is warm and dry. Coloration: Skin is not pale. Findings: No erythema or rash.       Comments: Turgor normal   Psychiatric:         Behavior: Behavior Electronically signed by Germania Culver RN on 3/17/2020 at 2:35 PM      Provider attestation:     I, Dr. Zahira Youngblood, personally performed the services described in this documentation, as scribed by the above signed scribe in my presence, and it is both accurate and complete. I agree with the ROS and Past Histories independently gathered by the clinical support staff and the remaining scribed note accurately describes my personal service to the patient.       3/17/2020    3:05 PM

## 2020-04-28 ENCOUNTER — VIRTUAL VISIT (OUTPATIENT)
Dept: FAMILY MEDICINE CLINIC | Age: 70
End: 2020-04-28
Payer: COMMERCIAL

## 2020-04-28 VITALS — TEMPERATURE: 96.7 F

## 2020-04-28 PROCEDURE — 99212 OFFICE O/P EST SF 10 MIN: CPT | Performed by: FAMILY MEDICINE

## 2020-04-28 RX ORDER — GABAPENTIN 100 MG/1
CAPSULE ORAL
Qty: 180 CAPSULE | Refills: 5 | Status: SHIPPED | OUTPATIENT
Start: 2020-04-28 | End: 2021-04-23

## 2020-04-30 ENCOUNTER — TELEPHONE (OUTPATIENT)
Dept: CARDIOLOGY CLINIC | Age: 70
End: 2020-04-30

## 2020-05-05 ENCOUNTER — OFFICE VISIT (OUTPATIENT)
Dept: CARDIOLOGY CLINIC | Age: 70
End: 2020-05-05
Payer: MEDICARE

## 2020-05-05 VITALS
HEIGHT: 64 IN | HEART RATE: 72 BPM | OXYGEN SATURATION: 97 % | BODY MASS INDEX: 32.52 KG/M2 | DIASTOLIC BLOOD PRESSURE: 92 MMHG | WEIGHT: 190.5 LBS | SYSTOLIC BLOOD PRESSURE: 151 MMHG

## 2020-05-05 PROCEDURE — 99214 OFFICE O/P EST MOD 30 MIN: CPT | Performed by: INTERNAL MEDICINE

## 2020-05-05 PROCEDURE — 1036F TOBACCO NON-USER: CPT | Performed by: INTERNAL MEDICINE

## 2020-05-05 PROCEDURE — 1090F PRES/ABSN URINE INCON ASSESS: CPT | Performed by: INTERNAL MEDICINE

## 2020-05-05 PROCEDURE — 3017F COLORECTAL CA SCREEN DOC REV: CPT | Performed by: INTERNAL MEDICINE

## 2020-05-05 PROCEDURE — G8400 PT W/DXA NO RESULTS DOC: HCPCS | Performed by: INTERNAL MEDICINE

## 2020-05-05 PROCEDURE — 1123F ACP DISCUSS/DSCN MKR DOCD: CPT | Performed by: INTERNAL MEDICINE

## 2020-05-05 PROCEDURE — 4040F PNEUMOC VAC/ADMIN/RCVD: CPT | Performed by: INTERNAL MEDICINE

## 2020-05-05 PROCEDURE — G8417 CALC BMI ABV UP PARAM F/U: HCPCS | Performed by: INTERNAL MEDICINE

## 2020-05-05 PROCEDURE — G8427 DOCREV CUR MEDS BY ELIG CLIN: HCPCS | Performed by: INTERNAL MEDICINE

## 2020-05-05 NOTE — LETTER
Abdomen:  · No masses or tenderness  · Liver/Spleen: No Abnormalities Noted  Neurological/Psychiatric:  · Alert and oriented in all spheres  · Moves all extremities well  · Exhibits normal gait balance and coordination  · No abnormalities of mood, affect, memory, mentation, or behavior are noted    Echo: 11/05/19  Summary   Left ventricular systolic function is low with a visually estimated ejection   fraction of 45%. The left ventricle is normal in size with mild septal hypertrophy. Abnormal (paradoxical) septal wall motion consistent with left bundle branch   block. Grade II diastolic dysfunction with elevated LV pressure. Right ventricular systolic function is indeterminate. Systolic pulmonary artery pressure (SPAP) is normal and estimated at 25 mmHg   (right atrial pressure 3 mmHg). Labs were reviewed including labs from other hospital systems through [facility]. Cardiac testing was reviewed including echos, nuclear scans, cardiac catheterization, including from other hospital systems through [facility]. Assessment:    1. Systolic CHF, chronic (HCC)    2. Cardiomyopathy, idiopathic ([address]    3. Essential hypertension    4. Mixed hyperlipidemia          Plan:  1. Lipids, CMP, CBC, BNP, Vit D  2. Consider starting zetia if LDL still high after low dose Lipitor 20 mg  3. Follow up in 6 months      QUALITY MEASURES  1. Tobacco Cessation Counseling: NA  2. Retake of BP if >140/90:   NA  3. Documentation to PCP/referring for new patient:  Sent to PCP at close of office visit  4. CAD patient on anti-platelet: Yes  5. CAD patient on STATIN therapy:  Yes  6. Patient with CHF and aFib on anticoagulation:  NA          I appreciate the opportunity of cooperating in the care of this patient. Scribe's attestation:   This note was scribed in the presence of Nandini Werner M.D. By Lisa Schroeder RN     The scribe's documentation has been prepared under my direction and

## 2020-05-06 NOTE — PROGRESS NOTES
children: Not on file    Years of education: Not on file    Highest education level: Not on file   Occupational History    Not on file   Social Needs    Financial resource strain: Not on file    Food insecurity     Worry: Not on file     Inability: Not on file    Transportation needs     Medical: Not on file     Non-medical: Not on file   Tobacco Use    Smoking status: Former Smoker     Packs/day: 0.50     Years: 3.00     Pack years: 1.50     Types: Cigarettes     Last attempt to quit: 2015     Years since quittin.3    Smokeless tobacco: Never Used    Tobacco comment: pt smoked off and on   Substance and Sexual Activity    Alcohol use: No     Alcohol/week: 0.0 standard drinks    Drug use: No    Sexual activity: Yes     Partners: Male   Lifestyle    Physical activity     Days per week: Not on file     Minutes per session: Not on file    Stress: Not on file   Relationships    Social connections     Talks on phone: Not on file     Gets together: Not on file     Attends Anabaptism service: Not on file     Active member of club or organization: Not on file     Attends meetings of clubs or organizations: Not on file     Relationship status: Not on file    Intimate partner violence     Fear of current or ex partner: Not on file     Emotionally abused: Not on file     Physically abused: Not on file     Forced sexual activity: Not on file   Other Topics Concern    Not on file   Social History Narrative    Not on file       Review of Systems:   · Constitutional: there has been no unanticipated weight loss. There's been no change in energy level, sleep pattern, or activity level. · Eyes: No visual changes or diplopia. No scleral icterus. · ENT: No Headaches, hearing loss or vertigo. No mouth sores or sore throat. · Cardiovascular: Reviewed in HPI  · Respiratory: No cough or wheezing, no sputum production. No hematemesis. · Gastrointestinal: No abdominal pain, appetite loss, blood in stools.  No Alert and oriented to person, place and time

## 2020-06-16 ENCOUNTER — TELEPHONE (OUTPATIENT)
Dept: ADMINISTRATIVE | Age: 70
End: 2020-06-16

## 2020-06-19 ENCOUNTER — TELEPHONE (OUTPATIENT)
Dept: FAMILY MEDICINE CLINIC | Age: 70
End: 2020-06-19

## 2020-07-14 ENCOUNTER — TELEPHONE (OUTPATIENT)
Dept: FAMILY MEDICINE CLINIC | Age: 70
End: 2020-07-14

## 2020-08-18 ENCOUNTER — OFFICE VISIT (OUTPATIENT)
Dept: FAMILY MEDICINE CLINIC | Age: 70
End: 2020-08-18
Payer: MEDICARE

## 2020-08-18 VITALS
HEART RATE: 84 BPM | BODY MASS INDEX: 31.76 KG/M2 | RESPIRATION RATE: 16 BRPM | DIASTOLIC BLOOD PRESSURE: 64 MMHG | TEMPERATURE: 97.2 F | WEIGHT: 186 LBS | HEIGHT: 64 IN | OXYGEN SATURATION: 95 % | SYSTOLIC BLOOD PRESSURE: 110 MMHG

## 2020-08-18 PROCEDURE — G8400 PT W/DXA NO RESULTS DOC: HCPCS | Performed by: FAMILY MEDICINE

## 2020-08-18 PROCEDURE — G8417 CALC BMI ABV UP PARAM F/U: HCPCS | Performed by: FAMILY MEDICINE

## 2020-08-18 PROCEDURE — 4040F PNEUMOC VAC/ADMIN/RCVD: CPT | Performed by: FAMILY MEDICINE

## 2020-08-18 PROCEDURE — 1036F TOBACCO NON-USER: CPT | Performed by: FAMILY MEDICINE

## 2020-08-18 PROCEDURE — 99214 OFFICE O/P EST MOD 30 MIN: CPT | Performed by: FAMILY MEDICINE

## 2020-08-18 PROCEDURE — 1090F PRES/ABSN URINE INCON ASSESS: CPT | Performed by: FAMILY MEDICINE

## 2020-08-18 PROCEDURE — 1123F ACP DISCUSS/DSCN MKR DOCD: CPT | Performed by: FAMILY MEDICINE

## 2020-08-18 PROCEDURE — 3017F COLORECTAL CA SCREEN DOC REV: CPT | Performed by: FAMILY MEDICINE

## 2020-08-18 PROCEDURE — G8427 DOCREV CUR MEDS BY ELIG CLIN: HCPCS | Performed by: FAMILY MEDICINE

## 2020-08-18 NOTE — PROGRESS NOTES
Chief Complaint   Patient presents with    Hypertension     pt is taking her medications regularly without missing any doses. Pt does not monitors her BP at home. Pt is trying to following a low sodium diet. Pt denies any swelling or headaches.  Hyperlipidemia     Pt is not fasting. Pt is trying to follow a low fat low cholesterol diet. Pt is taking medication fine with no new concerns     Blood Sugar Problem     pt does not chesk bs at home. Pt states she has cut back on her sugar intake, however drinks Mt Dew 3x daily     Other     pt has multiple medical problems     Other     onset x 1 week, upper-mid abdomen, just under right breast, discomfort, tender to touch or with certain movements. no otc med used unknown origin. Pt has concerns of bursting intestines or liver issues. She is due for fasting lab work, she has been very busy at work. She states she has had episodes of complete exhaustion. She states she took an extra baby aspirin and she felt better after a while. About a week ago she had a feeling of drawing in of her chest but that lasted about 1/2 hour. She states her hand will draw up at times at night,     HPI:  Riik Face is a 71 y.o. (: 1950) here today for    Is here for high blood pressure. Watching salt intake. Numbers are good. Denies chest pain. Denies shortness of breath. Taking medications as prescribed. Is here for hyperglycemia. Numbers are good. No increased thirst or increased urination. No dizziness, shakiness, or cold sweats. Is here for cholesterol. Tolerating medication. Trying to watch low-fat diet. Weight stable. No change in exercise. Here for hypothyroidism. No tremor. No palpitations. No hair loss. No change in skin texture.     Patient's medications, allergies, past medical, surgical, social and family histories were reviewed and updated asappropriate on 2020 at 4:28 PM.    ROS:  Review of Systems    All other systems member of club or organization: Not on file     Attends meetings of clubs or organizations: Not on file     Relationship status: Not on file    Intimate partner violence     Fear of current or ex partner: Not on file     Emotionally abused: Not on file     Physically abused: Not on file     Forced sexual activity: Not on file   Other Topics Concern    Not on file   Social History Narrative    Not on file       Prior to Visit Medications    Medication Sig Taking? Authorizing Provider   traMADol (ULTRAM) 50 MG tablet Take 1 tablet by mouth every 6 hours for 30 days. Yes Mic Quinones MD   gabapentin (NEURONTIN) 100 MG capsule 2 tab tid as directed Yes Iram Roth MD   atorvastatin (LIPITOR) 20 MG tablet TAKE 1 TAB  BY MOUTH DAILY Yes Iram Roth MD   albuterol sulfate HFA (VENTOLIN HFA) 108 (90 Base) MCG/ACT inhaler Inhale 2 puffs into the lungs every 6 hours as needed for Wheezing Yes Iram Roth MD   fenofibrate (TRICOR) 54 MG tablet Take 1 tablet by mouth daily s [facility] pharmacy: Please dispense generic fenofibrate unless prescriber denote Yes Jovita Peralta MD   lisinopril (PRINIVIL;ZESTRIL) 40 MG tablet One tablet every evening Yes Jovita Peralta MD   metoprolol succinate (TOPROL XL) 50 MG extended release tablet TAKE 1 TABLET BY MOUTH EVERY DAY Yes Jovita Peralta MD   spironolactone (ALDACTONE) 25 MG tablet TAKE 1/2 TABLET BY MOUTH DAILY Yes Jovita Peralta MD   aspirin EC 81 MG EC tablet Take 1 tablet by mouth daily Yes Iram Roth MD   Cholecalciferol (VITAMIN D3) 2000 UNITS CAPS Take 2 tablets by mouth daily  Yes Historical Provider, MD   Calcium 0356-9841 MG-UNIT CHEW Take  by mouth.  Yes Historical Provider, MD     Allergies   Allergen Reactions    Shellfish-Derived Products Nausea And Vomiting    Celebrex [Celecoxib] Swelling    Codeine     Ibuprofen Swelling    Methadone     Vioxx Swelling       OBJECTIVE:  Estimated body mass index is 31.93 Pre-diabetes  Hemoglobin A1C   2. Muscle spasm  MAGNESIUM   3. Acute costochondritis     4. Class 1 obesity due to excess calories without serious comorbidity with body mass index (BMI) of 31.0 to 31.9 in adult     5. Vitamin D deficiency     6. Mild intermittent asthma without complication     7. Cardiomyopathy, idiopathic ([address]     8. Essential hypertension     9. Mixed hyperlipidemia     No symptoms of elevated sugar. Labs to be updated. I reassured her that the discomfort in the right lower chest was related to musculoskeletal etiology no organ pathology. I told her that movement such as she described would not bring about this discomfort. Vitamin D levels will be monitored as needed asthma doing well. No evidence of CHF. Blood pressure acceptable. Continue lipid monitoring as needed. Follow-up 6 months regular visit    Patient should call the office immediately with new or ongoing signs or symptoms or worsening, or proceed to the emergency room. No changes in past medical history, past surgical history, social history, or family history were noted during the patient encounter unless specifically listed above. All updates of past medical history, past surgical history, social history, or family history were reviewed personally by me during the office visit. All problems listed in the assessment are stable unless noted otherwise. Medication profile reviewed personally by me during the visit. Medication side effects and possible impairments from medications were discussed as applicable. This document was prepared by a combination of typing and transcription through a voice recognition software.        Scribe attestation: Chuy Garcia RN, am scribing for and in the presence of Eleuterio Montero MD. Electronically signed by Fidelia Slater RN on 8/18/2020 at 4:28 PM      Provider attestation:     I, Dr. Christine Lainez, personally performed the services described in this documentation, as scribed by the above signed scribe in my presence, and it is both accurate and complete. I agree with the ROS and Past Histories independently gathered by the clinical support staff and the remaining scribed note accurately describes my personal service to the patient.       8/18/2020    4:45 PM

## 2020-09-02 ENCOUNTER — NURSE ONLY (OUTPATIENT)
Dept: FAMILY MEDICINE CLINIC | Age: 70
End: 2020-09-02
Payer: MEDICARE

## 2020-09-02 LAB
A/G RATIO: 1.8 (ref 1.1–2.2)
ALBUMIN SERPL-MCNC: 4.1 G/DL (ref 3.4–5)
ALP BLD-CCNC: 72 U/L (ref 40–129)
ALT SERPL-CCNC: 11 U/L (ref 10–40)
ANION GAP SERPL CALCULATED.3IONS-SCNC: 10 MMOL/L (ref 3–16)
AST SERPL-CCNC: 20 U/L (ref 15–37)
BASOPHILS ABSOLUTE: 0.1 K/UL (ref 0–0.2)
BASOPHILS RELATIVE PERCENT: 0.8 %
BILIRUB SERPL-MCNC: 0.4 MG/DL (ref 0–1)
BUN BLDV-MCNC: 13 MG/DL (ref 7–20)
CALCIUM SERPL-MCNC: 9.4 MG/DL (ref 8.3–10.6)
CHLORIDE BLD-SCNC: 105 MMOL/L (ref 99–110)
CHOLESTEROL, TOTAL: 265 MG/DL (ref 0–199)
CO2: 27 MMOL/L (ref 21–32)
CREAT SERPL-MCNC: 1 MG/DL (ref 0.6–1.2)
EOSINOPHILS ABSOLUTE: 0.2 K/UL (ref 0–0.6)
EOSINOPHILS RELATIVE PERCENT: 3 %
GFR AFRICAN AMERICAN: >60
GFR NON-AFRICAN AMERICAN: 55
GLOBULIN: 2.3 G/DL
GLUCOSE BLD-MCNC: 110 MG/DL (ref 70–99)
HCT VFR BLD CALC: 37.3 % (ref 36–48)
HDLC SERPL-MCNC: 42 MG/DL (ref 40–60)
HEMOGLOBIN: 12.6 G/DL (ref 12–16)
LDL CHOLESTEROL CALCULATED: 188 MG/DL
LYMPHOCYTES ABSOLUTE: 2.9 K/UL (ref 1–5.1)
LYMPHOCYTES RELATIVE PERCENT: 43 %
MAGNESIUM: 2 MG/DL (ref 1.8–2.4)
MCH RBC QN AUTO: 29.5 PG (ref 26–34)
MCHC RBC AUTO-ENTMCNC: 33.7 G/DL (ref 31–36)
MCV RBC AUTO: 87.6 FL (ref 80–100)
MONOCYTES ABSOLUTE: 0.4 K/UL (ref 0–1.3)
MONOCYTES RELATIVE PERCENT: 5.5 %
NEUTROPHILS ABSOLUTE: 3.2 K/UL (ref 1.7–7.7)
NEUTROPHILS RELATIVE PERCENT: 47.7 %
PDW BLD-RTO: 13.4 % (ref 12.4–15.4)
PLATELET # BLD: 266 K/UL (ref 135–450)
PMV BLD AUTO: 9.9 FL (ref 5–10.5)
POTASSIUM SERPL-SCNC: 4.5 MMOL/L (ref 3.5–5.1)
PRO-BNP: 144 PG/ML (ref 0–124)
RBC # BLD: 4.26 M/UL (ref 4–5.2)
SODIUM BLD-SCNC: 142 MMOL/L (ref 136–145)
TOTAL PROTEIN: 6.4 G/DL (ref 6.4–8.2)
TRIGL SERPL-MCNC: 174 MG/DL (ref 0–150)
VITAMIN D 25-HYDROXY: 67.3 NG/ML
VLDLC SERPL CALC-MCNC: 35 MG/DL
WBC # BLD: 6.7 K/UL (ref 4–11)

## 2020-09-02 PROCEDURE — 36415 COLL VENOUS BLD VENIPUNCTURE: CPT | Performed by: FAMILY MEDICINE

## 2020-09-03 LAB
ESTIMATED AVERAGE GLUCOSE: 134.1 MG/DL
HBA1C MFR BLD: 6.3 %

## 2020-09-08 ENCOUNTER — NURSE TRIAGE (OUTPATIENT)
Dept: OTHER | Facility: CLINIC | Age: 70
End: 2020-09-08

## 2020-09-08 NOTE — TELEPHONE ENCOUNTER
Reason for Disposition   Sore throat    Protocols used: SORE THROAT-ADULT-[place]    Received call from Nando Broderick. Caller is reporting a mild sore throat for the past 2 days. Today caller states it is improving. Provided care advice to help with symptoms. Please do not reply to the triage nurse through this encounter. Any subsequent communication should be directly with the patient.

## 2020-09-18 ENCOUNTER — TELEPHONE (OUTPATIENT)
Dept: FAMILY MEDICINE CLINIC | Age: 70
End: 2020-09-18

## 2020-09-18 RX ORDER — VALACYCLOVIR HYDROCHLORIDE 1 G/1
1000 TABLET, FILM COATED ORAL 3 TIMES DAILY
Qty: 21 TABLET | Refills: 0 | Status: SHIPPED | OUTPATIENT
Start: 2020-09-18 | End: 2020-10-27

## 2020-09-18 NOTE — TELEPHONE ENCOUNTER
Pt states that she is breaking out with the shingles and was wanting to see if she could get something called in to [address].

## 2020-09-18 NOTE — TELEPHONE ENCOUNTER
LMOR that Rx was sent to pharmacy and to [facility] with any questions.

## 2020-10-27 ENCOUNTER — OFFICE VISIT (OUTPATIENT)
Dept: FAMILY MEDICINE CLINIC | Age: 70
End: 2020-10-27
Payer: COMMERCIAL

## 2020-10-27 VITALS
TEMPERATURE: 98.1 F | SYSTOLIC BLOOD PRESSURE: 124 MMHG | DIASTOLIC BLOOD PRESSURE: 84 MMHG | WEIGHT: 190 LBS | BODY MASS INDEX: 32.44 KG/M2 | HEIGHT: 64 IN | OXYGEN SATURATION: 96 % | HEART RATE: 79 BPM

## 2020-10-27 PROCEDURE — G8417 CALC BMI ABV UP PARAM F/U: HCPCS | Performed by: FAMILY MEDICINE

## 2020-10-27 PROCEDURE — 1090F PRES/ABSN URINE INCON ASSESS: CPT | Performed by: FAMILY MEDICINE

## 2020-10-27 PROCEDURE — 4040F PNEUMOC VAC/ADMIN/RCVD: CPT | Performed by: FAMILY MEDICINE

## 2020-10-27 PROCEDURE — 99213 OFFICE O/P EST LOW 20 MIN: CPT | Performed by: FAMILY MEDICINE

## 2020-10-27 PROCEDURE — 1036F TOBACCO NON-USER: CPT | Performed by: FAMILY MEDICINE

## 2020-10-27 PROCEDURE — G8484 FLU IMMUNIZE NO ADMIN: HCPCS | Performed by: FAMILY MEDICINE

## 2020-10-27 PROCEDURE — G8400 PT W/DXA NO RESULTS DOC: HCPCS | Performed by: FAMILY MEDICINE

## 2020-10-27 PROCEDURE — G8427 DOCREV CUR MEDS BY ELIG CLIN: HCPCS | Performed by: FAMILY MEDICINE

## 2020-10-27 PROCEDURE — 3017F COLORECTAL CA SCREEN DOC REV: CPT | Performed by: FAMILY MEDICINE

## 2020-10-27 PROCEDURE — 1123F ACP DISCUSS/DSCN MKR DOCD: CPT | Performed by: FAMILY MEDICINE

## 2020-10-27 NOTE — PROGRESS NOTES
Chief Complaint   Patient presents with    Hypertension    Hyperlipidemia    Back Pain    Other     patient has multiple medical complaints       HPI:  Leonardo Chin is a 79 y.o. (: 1950) here today for    Treatment Adherence:   Medication compliance:  {Desc; compliance:5303::\"compliant most of the time\"}  Diet compliance:  {Desc; compliance:5303::\"compliant most of the time\"}  Weight trend: {INCREASING/DECREASING/STABLE:80959}  Current exercise: {EXERCISE CXRT:515364546}  Barriers: {Barriers to success:10963}    Hypertension:  Home blood pressure monitoring: {NO/YES:9870446427::\"No\"}. Patient {denies/complains:15581} {Symptoms of Hypertension, Denies:89440}. Antihypertensive medication side effects: {Hypertension med side effects:5728::\"no medication side effects noted\"}. Use of agents associated with hypertension: {bp agents assoc with hypertension:511::\"none\"}. Back pain:                                      Sodium (mmol/L)   Date Value   2020 142    BUN (mg/dL)   Date Value   2020 13    Glucose (mg/dL)   Date Value   2020 110 (H)      Potassium (mmol/L)   Date Value   2020 4.5    CREATININE (mg/dL)   Date Value   2020 1.0         Hyperlipidemia:  No new myalgias or GI upset on {RP HYPERLIPIDEMIA MEDS:25145}. Lab Results   Component Value Date    CHOL 265 (H) 2020    TRIG 174 (H) 2020    HDL 42 2020    LDLCALC 188 (H) 2020     Lab Results   Component Value Date    ALT 11 2020    AST 20 2020          Patient's medications, allergies, past medical, surgical, social and family histories were reviewed and updated asappropriate on 10/27/2020 at 3:14 PM.    ROS:  Review of Systems    All other systems reviewed and are negative except as noted above on 10/27/2020 at 3:14 PM. Additional review of systems may be scanned into the media section ofthis medical record.   Any responses requiring further intervention were file     Emotionally abused: Not on file     Physically abused: Not on file     Forced sexual activity: Not on file   Other Topics Concern    Not on file   Social History Narrative    Not on file       Prior to Visit Medications    Medication Sig Taking? Authorizing Provider   traMADol (ULTRAM) 50 MG tablet Take 1 tablet by mouth every 6 hours for 9 days. Brian Kerns MD   valACYclovir (VALTREX) 1 g tablet Take 1 tablet by mouth 3 times daily  Lynn Poplar, APRN - CNP   gabapentin (NEURONTIN) 100 MG capsule 2 tab tid as directed  Natalee Valerio MD   atorvastatin (LIPITOR) 20 MG tablet TAKE 1 TAB  BY MOUTH DAILY  Natalee Valerio MD   albuterol sulfate HFA (VENTOLIN HFA) 108 (90 Base) MCG/ACT inhaler Inhale 2 puffs into the lungs every 6 hours as needed for Wheezing  Natalee Valerio MD   fenofibrate (TRICOR) 54 MG tablet Take 1 tablet by mouth daily s [facility] pharmacy: Please dispense generic fenofibrate unless prescriber denote  Jane Arellano MD   lisinopril (PRINIVIL;ZESTRIL) 40 MG tablet One tablet every evening  Jane Arellano MD   metoprolol succinate (TOPROL XL) 50 MG extended release tablet TAKE 1 TABLET BY MOUTH EVERY DAY  Jane Arellano MD   spironolactone (ALDACTONE) 25 MG tablet TAKE 1/2 TABLET BY MOUTH DAILY  Jane Arellano MD   aspirin EC 81 MG EC tablet Take 1 tablet by mouth daily  Natalee Valerio MD   Cholecalciferol (VITAMIN D3) 2000 UNITS CAPS Take 2 tablets by mouth daily   Historical Provider, MD   Calcium 9047-3373 MG-UNIT CHEW Take  by mouth. Historical Provider, MD     Allergies   Allergen Reactions    Shellfish-Derived Products Nausea And Vomiting    Celebrex [Celecoxib] Swelling    Codeine     Ibuprofen Swelling    Methadone     Vioxx Swelling       OBJECTIVE:  Estimated body mass index is 31.93 kg/m² as calculated from the following:    Height as of this encounter: 5' 4\" (1.626 m).     Weight as of 8/18/20: 186 lb (84.4

## 2020-10-27 NOTE — PROGRESS NOTES
Chief Complaint   Patient presents with    Lower Back Pain       HPI:  Rosalia Scott is a 79 y.o. (: 1950) here today for    Chronic Back Pain: Pain is worse. On average, pain is perceived as moderate (4-6 pain scale). Change in quality of symptoms: no.  Associated symptoms: lower back pain. She denies any other symptoms. Current treatment: injections. Medication side effects: none. Recent diagnostic testing: none. She has an injection today with Dr Tania Glover which is her second one. She will let us know after the third injection if she would like a referral.      Patient's medications, allergies, past medical, surgical, social and family histories were reviewed and updated asappropriate on 10/27/2020 at 3:33 PM.    ROS:  Review of Systems    All other systems reviewed and are negative except as noted above on 10/27/2020 at 3:33 PM. Additional review of systems may be scanned into the media section ofthis medical record. Any responses requiring further intervention were pursued.     Hemoglobin A1C (%)   Date Value   2020 6.3     LDL Calculated (mg/dL)   Date Value   2020 188 (H)     Past Medical History:   Diagnosis Date    Arthritis     Asthma     Back pain     Fibromyalgia     Fibromyalgia     Heart failure with reduced ejection fraction (HCC)     Herpes zoster     Hypercholesteremia     Hyperlipidemia     Hypertriglyceridemia     Neck pain     Osteopenia     Stress-induced cardiomyopathy 2017     Family History   Problem Relation Age of Onset    High Blood Pressure Mother     Heart Disease Father      Social History     Socioeconomic History    Marital status:      Spouse name: Not on file    Number of children: Not on file    Years of education: Not on file    Highest education level: Not on file   Occupational History    Not on file   Social Needs    Financial resource strain: Not on file    Food insecurity     Worry: Not on file     Inability: Not on file    Transportation needs     Medical: Not on file     Non-medical: Not on file   Tobacco Use    Smoking status: Former Smoker     Packs/day: 0.50     Years: 3.00     Pack years: 1.50     Types: Cigarettes     Last attempt to quit: 2015     Years since quittin.8    Smokeless tobacco: Never Used    Tobacco comment: pt smoked off and on   Substance and Sexual Activity    Alcohol use: No     Alcohol/week: 0.0 standard drinks    Drug use: No    Sexual activity: Yes     Partners: Male   Lifestyle    Physical activity     Days per week: Not on file     Minutes per session: Not on file    Stress: Not on file   Relationships    Social connections     Talks on phone: Not on file     Gets together: Not on file     Attends [employer] service: Not on file     Active member of club or organization: Not on file     Attends meetings of clubs or organizations: Not on file     Relationship status: Not on file    Intimate partner violence     Fear of current or ex partner: Not on file     Emotionally abused: Not on file     Physically abused: Not on file     Forced sexual activity: Not on file   Other Topics Concern    Not on file   Social History Narrative    Not on file       Prior to Visit Medications    Medication Sig Taking? Authorizing Provider   traMADol (ULTRAM) 50 MG tablet Take 1 tablet by mouth every 6 hours for 9 days.  Yes Patricia Quintero MD   gabapentin (NEURONTIN) 100 MG capsule 2 tab tid as directed Yes Manuel Dos Santos MD   atorvastatin (LIPITOR) 20 MG tablet TAKE 1 TAB  BY MOUTH DAILY Yes Manuel Dos Santos MD   albuterol sulfate HFA (VENTOLIN HFA) 108 (90 Base) MCG/ACT inhaler Inhale 2 puffs into the lungs every 6 hours as needed for Wheezing Yes Manuel Dos Santos MD   fenofibrate (TRICOR) 54 MG tablet Take 1 tablet by mouth daily s [facility] pharmacy: Please dispense generic fenofibrate unless prescriber denote Yes Bear Blackmon MD   lisinopril (PRINIVIL;ZESTRIL) 40 MG tablet One tablet every evening Yes Emi Waite MD   metoprolol succinate (TOPROL XL) 50 MG extended release tablet TAKE 1 TABLET BY MOUTH EVERY DAY Yes Emi Waite MD   spironolactone (ALDACTONE) 25 MG tablet TAKE 1/2 TABLET BY MOUTH DAILY Yes Emi Waite MD   aspirin EC 81 MG EC tablet Take 1 tablet by mouth daily Yes Sherryle Fort, MD   Cholecalciferol (VITAMIN D3) 2000 UNITS CAPS Take 2 tablets by mouth daily  Yes Historical Provider, MD   Calcium 2746-6632 MG-UNIT CHEW Take  by mouth. Yes Historical Provider, MD     Allergies   Allergen Reactions    Shellfish-Derived Products Nausea And Vomiting    Celebrex [Celecoxib] Swelling    Codeine     Ibuprofen Swelling    Methadone     Vioxx Swelling       OBJECTIVE:  Estimated body mass index is 32.61 kg/m² as calculated from the following:    Height as of this encounter: 5' 4\" (1.626 m). Weight as of this encounter: 190 lb (86.2 kg). Vitals:    10/27/20 1513   BP: 124/84   Site: Right Upper Arm   Position: Sitting   Cuff Size: Medium Adult   Pulse: 79   Temp: 98.1 °F (36.7 °C)   TempSrc: Temporal   SpO2: 96%   Weight: 190 lb (86.2 kg)   Height: 5' 4\" (1.626 m)     BP Readings from Last 2 Encounters:   10/27/20 124/84   10/27/20 121/75     Wt Readings from Last 3 Encounters:   10/27/20 190 lb (86.2 kg)   08/18/20 186 lb (84.4 kg)   07/14/20 184 lb (83.5 kg)       Physical Exam  Vitals signs and nursing note reviewed. Constitutional:       General: She is not in acute distress. Appearance: She is well-developed. She is not diaphoretic. HENT:      Head: Normocephalic and atraumatic. Right Ear: External ear normal.      Left Ear: External ear normal.      Nose: Nose normal.   Eyes:      General: Lids are normal. No scleral icterus. Right eye: No discharge. Left eye: No discharge. Pupils: Pupils are equal, round, and reactive to light. Neck:      Thyroid: No thyromegaly. Vascular: No JVD. Cardiovascular:      Rate and Rhythm: Normal rate and regular rhythm. Pulmonary:      Effort: Pulmonary effort is normal. No respiratory distress. Abdominal:      Palpations: Abdomen is soft. There is no hepatomegaly or splenomegaly. Tenderness: There is no abdominal tenderness. Musculoskeletal:        Back:       Comments: No sciatic notch pain  Bilateral waistline discomfort   Skin:     General: Skin is warm and dry. Coloration: Skin is not pale. Findings: No erythema or rash. Comments: Turgor normal   Psychiatric:         Behavior: Behavior normal.         Thought Content: Thought content normal.         Judgment: Judgment normal.              ASSESSMENT PLAN      Diagnosis Orders   1. Degeneration of L4-L5 intervertebral disc     2. Degenerative disc disease at L5-S1 level     3. Degeneration of lumbar intervertebral disc     Patient just had an injection in her low back. The discomfort of the low back and down into the left buttock times though is still extensive. She may try 1 more injection after today, but if no better at that point then would refer back to Dr. Suhas Boss who had been her surgeon. Otherwise follow-up on this claim in 6 months    Patient should call the office immediately with new or ongoing signs or symptoms or worsening, or proceed to the emergency room. No changes in past medical history, past surgical history, social history, or family history were noted during the patient encounter unless specifically listed above. All updates of past medical history, past surgical history, social history, or family history were reviewed personally by me during the office visit. All problems listed in the assessment are stable unless noted otherwise. Medication profile reviewed personally by me during the visit. Medication side effects and possible impairments from medications were discussed as applicable.     This document was prepared by a combination of typing and

## 2020-11-10 ENCOUNTER — OFFICE VISIT (OUTPATIENT)
Dept: CARDIOLOGY CLINIC | Age: 70
End: 2020-11-10
Payer: MEDICARE

## 2020-11-10 VITALS
BODY MASS INDEX: 31.76 KG/M2 | OXYGEN SATURATION: 97 % | HEART RATE: 81 BPM | SYSTOLIC BLOOD PRESSURE: 120 MMHG | WEIGHT: 186 LBS | HEIGHT: 64 IN | DIASTOLIC BLOOD PRESSURE: 74 MMHG

## 2020-11-10 PROCEDURE — 99214 OFFICE O/P EST MOD 30 MIN: CPT | Performed by: INTERNAL MEDICINE

## 2020-11-10 PROCEDURE — G8427 DOCREV CUR MEDS BY ELIG CLIN: HCPCS | Performed by: INTERNAL MEDICINE

## 2020-11-10 PROCEDURE — 3017F COLORECTAL CA SCREEN DOC REV: CPT | Performed by: INTERNAL MEDICINE

## 2020-11-10 PROCEDURE — 1090F PRES/ABSN URINE INCON ASSESS: CPT | Performed by: INTERNAL MEDICINE

## 2020-11-10 PROCEDURE — 1036F TOBACCO NON-USER: CPT | Performed by: INTERNAL MEDICINE

## 2020-11-10 PROCEDURE — G8484 FLU IMMUNIZE NO ADMIN: HCPCS | Performed by: INTERNAL MEDICINE

## 2020-11-10 PROCEDURE — G8400 PT W/DXA NO RESULTS DOC: HCPCS | Performed by: INTERNAL MEDICINE

## 2020-11-10 PROCEDURE — 1123F ACP DISCUSS/DSCN MKR DOCD: CPT | Performed by: INTERNAL MEDICINE

## 2020-11-10 PROCEDURE — 4040F PNEUMOC VAC/ADMIN/RCVD: CPT | Performed by: INTERNAL MEDICINE

## 2020-11-10 PROCEDURE — G8417 CALC BMI ABV UP PARAM F/U: HCPCS | Performed by: INTERNAL MEDICINE

## 2020-11-10 RX ORDER — EZETIMIBE 10 MG/1
10 TABLET ORAL DAILY
Qty: 30 TABLET | Refills: 11 | Status: SHIPPED | OUTPATIENT
Start: 2020-11-10 | End: 2021-09-24 | Stop reason: SDUPTHER

## 2020-11-10 NOTE — PROGRESS NOTES
[facility]  Advanced CHF/Pulmonary Hypertension   Cardiac Evaluation      Senia Mccann  YOB: 1950    Date of Visit:  11/10/20    Chief Complaint   Patient presents with    Follow-up    Fatigue    Shortness of Breath         History of Present Illness:  Senia Mccann is a 79 y.o. female who presents from referral from Dr. Soraya Mendoza for consultation and management of CHF, non ischemic CMP. She has a with PMH of HTN, HLD, obesity, abnormal stress test, and fibromyalgia, who presented to [facility] with chest pain in March 2017. History obtained from patient and review of EMR. She underwent a stress test on 3/20/17 that showed moderate sized anteroseptal partial reversibility defect consistent with  ischemia and infarction in the territory of the mid and distal LAD  She has right subclavian artery stenosis and has seen Dr. Lexx Gomez for this. She underwent LHC on 3/23/17 with no intervention needed. She had a recent VL Duplex on 5/2/18 (report below). Her echo from 06/07/18 showed her EF was 45%. Her echo from 11/05/19 showed her EF was 45% with grade II DD. Today she reports she has never had a flu vaccine. She reports she has ongoing SOB and fatigue. She is getting shots in her back. She reports she is on lipitor 20 mg daily. She has tried to take Lipitor 40 mg daily but had severe leg cramps with the higher dose. She denies CP, palpitations, dizziness or syncope. Allergies   Allergen Reactions    Shellfish-Derived Products Nausea And Vomiting    Celebrex [Celecoxib] Swelling    Codeine     Ibuprofen Swelling    Methadone     Vioxx Swelling     Current Outpatient Medications   Medication Sig Dispense Refill    traMADol (ULTRAM) 50 MG tablet Take 1 tablet by mouth every 6 hours for 30 days.  120 tablet 1    gabapentin (NEURONTIN) 100 MG capsule 2 tab tid as directed 180 capsule 5    atorvastatin (LIPITOR) 20 MG tablet TAKE 1 TAB  BY MOUTH DAILY 30 tablet 11  albuterol sulfate HFA (VENTOLIN HFA) 108 (90 Base) MCG/ACT inhaler Inhale 2 puffs into the lungs every 6 hours as needed for Wheezing 1 Inhaler 3    fenofibrate (TRICOR) 54 MG tablet Take 1 tablet by mouth daily [facility]: Please dispense generic fenofibrate unless prescriber denote 90 tablet 3    lisinopril (PRINIVIL;ZESTRIL) 40 MG tablet One tablet every evening 90 tablet 3    metoprolol succinate (TOPROL XL) 50 MG extended release tablet TAKE 1 TABLET BY MOUTH EVERY DAY 90 tablet 3    spironolactone (ALDACTONE) 25 MG tablet TAKE 1/2 TABLET BY MOUTH DAILY 45 tablet 3    aspirin EC 81 MG EC tablet Take 1 tablet by mouth daily 30 tablet 3    Cholecalciferol (VITAMIN D3) 2000 UNITS CAPS Take 2 tablets by mouth daily       Calcium 8446-7766 MG-UNIT CHEW Take  by mouth.  naloxone 4 MG/0.1ML LIQD nasal spray 1 spray by Nasal route as needed for Opioid Reversal (Patient not taking: Reported on 11/10/2020) 1 each 0     No current facility-administered medications for this visit.         Past Medical History:   Diagnosis Date    Arthritis     Asthma     Back pain     Fibromyalgia     Fibromyalgia     Heart failure with reduced ejection fraction (HCC)     Herpes zoster     Hypercholesteremia     Hyperlipidemia     Hypertriglyceridemia     Neck pain     Osteopenia     Stress-induced cardiomyopathy 03/2017     Past Surgical History:   Procedure Laterality Date    BACK SURGERY      BLADDER REPAIR      CARDIAC CATHETERIZATION      COLONOSCOPY  4/23/12    diverticulosis    HYSTERECTOMY      NECK SURGERY  1/98, 10/05, '07 or '08    [id number] '05     Family History   Problem Relation Age of Onset    High Blood Pressure Mother     Heart Disease Father      Social History     Socioeconomic History    Marital status:      Spouse name: Not on file    Number of children: Not on file    Years of education: Not on file    Highest education level: Not on file   Occupational History  Not on file   Social Needs    Financial resource strain: Not on file    Food insecurity     Worry: Not on file     Inability: Not on file    Transportation needs     Medical: Not on file     Non-medical: Not on file   Tobacco Use    Smoking status: Former Smoker     Packs/day: 0.50     Years: 3.00     Pack years: 1.50     Types: Cigarettes     Last attempt to quit: 2015     Years since quittin.8    Smokeless tobacco: Never Used    Tobacco comment: pt smoked off and on   Substance and Sexual Activity    Alcohol use: No     Alcohol/week: 0.0 standard drinks    Drug use: No    Sexual activity: Yes     Partners: Male   Lifestyle    Physical activity     Days per week: Not on file     Minutes per session: Not on file    Stress: Not on file   Relationships    Social connections     Talks on phone: Not on file     Gets together: Not on file     Attends Buddhist service: Not on file     Active member of club or organization: Not on file     Attends meetings of clubs or organizations: Not on file     Relationship status: Not on file    Intimate partner violence     Fear of current or ex partner: Not on file     Emotionally abused: Not on file     Physically abused: Not on file     Forced sexual activity: Not on file   Other Topics Concern    Not on file   Social History Narrative    Not on file       Review of Systems:   · Constitutional: there has been no unanticipated weight loss. There's been no change in energy level, sleep pattern, or activity level. · Eyes: No visual changes or diplopia. No scleral icterus. · ENT: No Headaches, hearing loss or vertigo. No mouth sores or sore throat. · Cardiovascular: Reviewed in HPI  · Respiratory: No cough or wheezing, no sputum production. No hematemesis. · Gastrointestinal: No abdominal pain, appetite loss, blood in stools. No change in bowel or bladder habits. · Genitourinary: No dysuria, trouble voiding, or hematuria.   · Musculoskeletal:  No gait disturbance, weakness or joint complaints. · Integumentary: No rash or pruritis. · Neurological: No headache, diplopia, change in muscle strength, numbness or tingling. No change in gait, balance, coordination, mood, affect, memory, mentation, behavior. · Psychiatric: No anxiety, no depression. · Endocrine: No malaise, fatigue or temperature intolerance. No excessive thirst, fluid intake, or urination. No tremor. · Hematologic/Lymphatic: No abnormal bruising or bleeding, blood clots or swollen lymph nodes. · Allergic/Immunologic: No nasal congestion or hives.       Physical Exam:  Vitals:    11/10/20 1302   BP: 120/74   Pulse: 81   SpO2: 97%   Weight: 186 lb (84.4 kg)   Height: 5' 4\" (1.626 m)     Body mass index is 31.93 kg/m². Wt Readings from Last 3 Encounters:   11/10/20 186 lb (84.4 kg)   10/27/20 190 lb (86.2 kg)   08/18/20 186 lb (84.4 kg)     BP Readings from Last 3 Encounters:   11/10/20 120/74   10/27/20 124/84   10/27/20 121/75            Constitutional and General Appearance:   WD/WN in NAD  HEENT:  NC/AT  KATHRYN  No problems with hearing  Skin:  Warm, dry  Respiratory:  · Normal excursion and expansion without use of accessory muscles  · Resp Auscultation: Normal breath sounds without dullness  Cardiovascular:  · The apical impulses not displaced  · Heart tones are crisp and normal  · Cervical veins are not engorged  · The carotid upstroke is normal in amplitude and contour without delay or bruit  · JVP normal  RRR with nl S1 and S2 without m,r,g  · Peripheral pulses are symmetrical and full  · There is no clubbing, cyanosis of the extremities.   · No edema  · Femoral Arteries: 2+ and equal  · Pedal Pulses: 2+ and equal   Neck:  · No thyromegaly  Abdomen:  · No masses or tenderness  · Liver/Spleen: No Abnormalities Noted  Neurological/Psychiatric:  · Alert and oriented in all spheres  · Moves all extremities well  · Exhibits normal gait balance and coordination  · No abnormalities of mood, affect, memory, mentation, or behavior are noted    Echo: 11/05/19  Summary   Left ventricular systolic function is low with a visually estimated ejection   fraction of 45%. The left ventricle is normal in size with mild septal hypertrophy. Abnormal (paradoxical) septal wall motion consistent with left bundle branch   block. Grade II diastolic dysfunction with elevated LV pressure. Right ventricular systolic function is indeterminate. Systolic pulmonary artery pressure (SPAP) is normal and estimated at 25 mmHg   (right atrial pressure 3 mmHg). Labs were reviewed including labs from other hospital systems through [facility]. Cardiac testing was reviewed including echos, nuclear scans, cardiac catheterization, including from other hospital systems through [facility]. Assessment:    1. Cardiomyopathy, idiopathic ([facility] [address]    2. Systolic CHF, chronic ([facility] [address]    3. Essential hypertension    4. Mixed hyperlipidemia    5. Other fatigue      Lipitor side effects of leg cramps. Crestor intolerance due to muscle cramps    Plan:  1. Will check for Repatha approval, she has severely elevated LDL (188) after starting lipitor 20 mg a day. She cannot increase the lipitor to 40 mg a day due to muscle cramps. 2. Start Zetia 10 mg daily  3. TSH, T4 free, Lipids, CBC, CMP and BNP in December   4. Discussed pulmonary referral for sleep study   5. Follow up in 6 months       Scribe's attestation: This note was scribed in the presence of Benny Cardoso M.D. By Mark Haskins RN     The scribe's documentation has been prepared under my direction and personally reviewed by me in its entirety. I confirm that the note above accurately reflects all work, treatment, procedures, and medical decision making performed by me.         Benny Cardoso M.D., Reshma Cruz

## 2020-11-10 NOTE — LETTER
[facility]  Advanced CHF/Pulmonary Hypertension   Cardiac Evaluation      Wilmer Hartley  YOB: 1950    Date of Visit:  11/10/20    Chief Complaint   Patient presents with    Follow-up    Fatigue    Shortness of Breath         History of Present Illness:  Wilmer Hartley is a 79 y.o. female who presents from referral from Dr. Gavi Martinez for consultation and management of CHF, non ischemic CMP. She has a with PMH of HTN, HLD, obesity, abnormal stress test, and fibromyalgia, who presented to [facility] with chest pain in March 2017. History obtained from patient and review of EMR. She underwent a stress test on 3/20/17 that showed moderate sized anteroseptal partial reversibility defect consistent with  ischemia and infarction in the territory of the mid and distal LAD  She has right subclavian artery stenosis and has seen Dr. Leopoldo Hankins for this. She underwent LHC on 3/23/17 with no intervention needed. She had a recent VL Duplex on 5/2/18 (report below). Her echo from 06/07/18 showed her EF was 45%. Her echo from 11/05/19 showed her EF was 45% with grade II DD. Today she reports she has never had a flu vaccine. She reports she has ongoing SOB and fatigue. She is getting shots in her back. She reports she is on lipitor 20 mg daily. She has tried to take Lipitor 40 mg daily but had severe leg cramps with the higher dose. She denies CP, palpitations, dizziness or syncope. Allergies   Allergen Reactions    Shellfish-Derived Products Nausea And Vomiting    Celebrex [Celecoxib] Swelling    Codeine     Ibuprofen Swelling    Methadone     Vioxx Swelling     Current Outpatient Medications   Medication Sig Dispense Refill    traMADol (ULTRAM) 50 MG tablet Take 1 tablet by mouth every 6 hours for 30 days.  120 tablet 1    gabapentin (NEURONTIN) 100 MG capsule 2 tab tid as directed 180 capsule 5    atorvastatin (LIPITOR) 20 MG tablet TAKE 1 TAB  BY MOUTH DAILY 30 tablet Occupational History    Not on file   Social Needs    Financial resource strain: Not on file    Food insecurity     Worry: Not on file     Inability: Not on file    Transportation needs     Medical: Not on file     Non-medical: Not on file   Tobacco Use    Smoking status: Former Smoker     Packs/day: 0.50     Years: 3.00     Pack years: 1.50     Types: Cigarettes     Last attempt to quit: 2015     Years since quittin.8    Smokeless tobacco: Never Used    Tobacco comment: pt smoked off and on   Substance and Sexual Activity    Alcohol use: No     Alcohol/week: 0.0 standard drinks    Drug use: No    Sexual activity: Yes     Partners: Male   Lifestyle    Physical activity     Days per week: Not on file     Minutes per session: Not on file    Stress: Not on file   Relationships    Social connections     Talks on phone: Not on file     Gets together: Not on file     Attends [employer] service: Not on file     Active member of club or organization: Not on file     Attends meetings of clubs or organizations: Not on file     Relationship status: Not on file    Intimate partner violence     Fear of current or ex partner: Not on file     Emotionally abused: Not on file     Physically abused: Not on file     Forced sexual activity: Not on file   Other Topics Concern    Not on file   Social History Narrative    Not on file       Review of Systems:   · Constitutional: there has been no unanticipated weight loss. There's been no change in energy level, sleep pattern, or activity level. · Eyes: No visual changes or diplopia. No scleral icterus. · ENT: No Headaches, hearing loss or vertigo. No mouth sores or sore throat. · Cardiovascular: Reviewed in HPI  · Respiratory: No cough or wheezing, no sputum production. No hematemesis. · Gastrointestinal: No abdominal pain, appetite loss, blood in stools. No change in bowel or bladder habits. · Genitourinary: No dysuria, trouble voiding, or hematuria. · Musculoskeletal:  No gait disturbance, weakness or joint complaints. · Integumentary: No rash or pruritis. · Neurological: No headache, diplopia, change in muscle strength, numbness or tingling. No change in gait, balance, coordination, mood, affect, memory, mentation, behavior. · Psychiatric: No anxiety, no depression. · Endocrine: No malaise, fatigue or temperature intolerance. No excessive thirst, fluid intake, or urination. No tremor. · Hematologic/Lymphatic: No abnormal bruising or bleeding, blood clots or swollen lymph nodes. · Allergic/Immunologic: No nasal congestion or hives.       Physical Exam:  Vitals:    11/10/20 1302   BP: 120/74   Pulse: 81   SpO2: 97%   Weight: 186 lb (84.4 kg)   Height: 5' 4\" (1.626 m)     Body mass index is 31.93 kg/m². Wt Readings from Last 3 Encounters:   11/10/20 186 lb (84.4 kg)   10/27/20 190 lb (86.2 kg)   08/18/20 186 lb (84.4 kg)     BP Readings from Last 3 Encounters:   11/10/20 120/74   10/27/20 124/84   10/27/20 121/75            Constitutional and General Appearance:   WD/WN in NAD  HEENT:  NC/AT  KATHRYN  No problems with hearing  Skin:  Warm, dry  Respiratory:  · Normal excursion and expansion without use of accessory muscles  · Resp Auscultation: Normal breath sounds without dullness  Cardiovascular:  · The apical impulses not displaced  · Heart tones are crisp and normal  · Cervical veins are not engorged  · The carotid upstroke is normal in amplitude and contour without delay or bruit  · JVP normal  RRR with nl S1 and S2 without m,r,g  · Peripheral pulses are symmetrical and full  · There is no clubbing, cyanosis of the extremities.   · No edema  · Femoral Arteries: 2+ and equal  · Pedal Pulses: 2+ and equal   Neck:  · No thyromegaly  Abdomen:  · No masses or tenderness  · Liver/Spleen: No Abnormalities Noted  Neurological/Psychiatric:  · Alert and oriented in all spheres  · Moves all extremities well  · Exhibits normal gait balance and coordination · No abnormalities of mood, affect, memory, mentation, or behavior are noted    Echo: 11/05/19  Summary   Left ventricular systolic function is low with a visually estimated ejection   fraction of 45%. The left ventricle is normal in size with mild septal hypertrophy. Abnormal (paradoxical) septal wall motion consistent with left bundle branch   block. Grade II diastolic dysfunction with elevated LV pressure. Right ventricular systolic function is indeterminate. Systolic pulmonary artery pressure (SPAP) is normal and estimated at 25 mmHg   (right atrial pressure 3 mmHg). Labs were reviewed including labs from other hospital systems through [facility]. Cardiac testing was reviewed including echos, nuclear scans, cardiac catheterization, including from other hospital systems through [facility]. Assessment:    1. Cardiomyopathy, idiopathic ([facility] [address]    2. Systolic CHF, chronic ([facility] [address]    3. Essential hypertension    4. Mixed hyperlipidemia    5. Other fatigue      Lipitor side effects of leg cramps. Crestor intolerance due to muscle cramps    Plan:  1. Will check for Repatha approval, she has severely elevated LDL (188) after starting lipitor 20 mg a day. She cannot increase the lipitor to 40 mg a day due to muscle cramps. 2. Start Zetia 10 mg daily  3. TSH, T4 free, Lipids, CBC, CMP and BNP in December   4. Discussed pulmonary referral for sleep study   5. Follow up in 6 months       Scribe's attestation: This note was scribed in the presence of Dominic Rosenberg M.D. By Tapan St RN     The scribe's documentation has been prepared under my direction and personally reviewed by me in its entirety. I confirm that the note above accurately reflects all work, treatment, procedures, and medical decision making performed by me.         Dominic Rosenberg M.D., [facility]

## 2020-11-30 RX ORDER — ATORVASTATIN CALCIUM 20 MG/1
TABLET, FILM COATED ORAL
Qty: 30 TABLET | Refills: 7 | Status: SHIPPED | OUTPATIENT
Start: 2020-11-30 | End: 2021-09-24 | Stop reason: SDUPTHER

## 2020-11-30 RX ORDER — METOPROLOL SUCCINATE 50 MG/1
TABLET, EXTENDED RELEASE ORAL
Qty: 90 TABLET | Refills: 2 | Status: SHIPPED | OUTPATIENT
Start: 2020-11-30 | End: 2021-09-24 | Stop reason: SDUPTHER

## 2020-11-30 NOTE — TELEPHONE ENCOUNTER
Pt/pharmacy requesting atorvastatin 20 mg tab and metoprolol succinate 50 mg tab. Pending scripts sent to [address] [place] [address].       Last OV 5/5/2020  Last Labs 9/2/2020  Next OV 5/4/2021

## 2020-12-16 RX ORDER — SPIRONOLACTONE 25 MG/1
TABLET ORAL
Qty: 45 TABLET | Refills: 2 | Status: SHIPPED | OUTPATIENT
Start: 2020-12-16 | End: 2021-11-01 | Stop reason: SDUPTHER

## 2020-12-16 RX ORDER — LISINOPRIL 40 MG/1
TABLET ORAL
Qty: 90 TABLET | Refills: 2 | Status: SHIPPED | OUTPATIENT
Start: 2020-12-16 | End: 2021-10-11 | Stop reason: SDUPTHER

## 2021-01-13 ENCOUNTER — OFFICE VISIT (OUTPATIENT)
Dept: PRIMARY CARE CLINIC | Age: 71
End: 2021-01-13
Payer: COMMERCIAL

## 2021-01-13 DIAGNOSIS — Z01.818 PREOP TESTING: Primary | ICD-10-CM

## 2021-01-13 LAB — SARS-COV-2: NOT DETECTED

## 2021-01-13 PROCEDURE — G8428 CUR MEDS NOT DOCUMENT: HCPCS | Performed by: NURSE PRACTITIONER

## 2021-01-13 PROCEDURE — G8417 CALC BMI ABV UP PARAM F/U: HCPCS | Performed by: NURSE PRACTITIONER

## 2021-01-13 PROCEDURE — 99211 OFF/OP EST MAY X REQ PHY/QHP: CPT | Performed by: NURSE PRACTITIONER

## 2021-01-13 NOTE — PROGRESS NOTES
Sanjeev Alanis received a viral test for COVID-19. They were educated on isolation and quarantine as appropriate. For any symptoms, they were directed to seek care from their PCP, given contact information to establish with a doctor, directed to an urgent care or the emergency room.

## 2021-01-18 DIAGNOSIS — E78.2 MIXED HYPERLIPIDEMIA: ICD-10-CM

## 2021-01-19 RX ORDER — FENOFIBRATE 54 MG/1
TABLET ORAL
Qty: 90 TABLET | Refills: 3 | Status: SHIPPED | OUTPATIENT
Start: 2021-01-19 | End: 2022-03-02 | Stop reason: SDUPTHER

## 2021-01-19 NOTE — TELEPHONE ENCOUNTER
LOV 11/10/20 alysa/ MATTEO    Plan:  1. Will check for Repatha approval, she has severely elevated LDL (188) after starting lipitor 20 mg a day. She cannot increase the lipitor to 40 mg a day due to muscle cramps. 2. Start Zetia 10 mg daily  3. TSH, T4 free, Lipids, CBC, CMP and BNP in December   4. Discussed pulmonary referral for sleep study   5.  Follow up in 6 months

## 2021-01-26 ENCOUNTER — OFFICE VISIT (OUTPATIENT)
Dept: FAMILY MEDICINE CLINIC | Age: 71
End: 2021-01-26
Payer: COMMERCIAL

## 2021-01-26 VITALS
TEMPERATURE: 96.2 F | DIASTOLIC BLOOD PRESSURE: 78 MMHG | HEIGHT: 64 IN | WEIGHT: 193 LBS | OXYGEN SATURATION: 96 % | SYSTOLIC BLOOD PRESSURE: 128 MMHG | BODY MASS INDEX: 32.95 KG/M2 | HEART RATE: 76 BPM

## 2021-01-26 DIAGNOSIS — M50.221 HERNIATION OF INTERVERTEBRAL DISC AT C4-C5 LEVEL: Primary | ICD-10-CM

## 2021-01-26 DIAGNOSIS — M50.321 DEGENERATION OF INTERVERTEBRAL DISC AT C4-C5 LEVEL: ICD-10-CM

## 2021-01-26 DIAGNOSIS — M50.223 HERNIATION OF INTERVERTEBRAL DISC AT C6-C7 LEVEL: ICD-10-CM

## 2021-01-26 PROCEDURE — 99214 OFFICE O/P EST MOD 30 MIN: CPT | Performed by: FAMILY MEDICINE

## 2021-01-26 NOTE — PROGRESS NOTES
Chief Complaint   Patient presents with    Neck Pain       HPI:  Alexys Correa is a 79 y.o. (: 1950) here today for  Chronic Neck Pain:  Pain is worse. On average, pain is perceived as severe (6-8 pain scale). Change in quality of symptoms: Yes, described as bee stings about the incision. Associated symptoms: headache- Intermittent. She denies: any other symptoms. Current treatment: analgesic-tramadol. Medication side effects: none. Recent diagnostic testing: none. here today for 6 month follow up regarding her workman's comp neck claim from . She has been having what feels like pen pricks or bee stings around her surgery site for about a year. If the pain does not go away or gets any worse she wants to go back to Dr Nora Mccormack for a follow up. She is still taking 2 Tramadol in the morning, 1 in the afternoon and 1 at night for pain. Sometimes she does not take the afternoon dosage if she doesn't need it. Patient's medications, allergies, past medical, surgical, social and family histories were reviewed and updated asappropriate on 2021 at 4:31 PM.    ROS:  Review of Systems    All other systems reviewed and are negative except as noted above on 2021 at 4:31 PM. Additional review of systems may be scanned into the media section ofthis medical record. Any responses requiring further intervention were pursued.     Hemoglobin A1C (%)   Date Value   2020 6.3     LDL Calculated (mg/dL)   Date Value   2020 188 (H)     Past Medical History:   Diagnosis Date    Arthritis     Asthma     Back pain     Fibromyalgia     Fibromyalgia     Heart failure with reduced ejection fraction (HCC)     Herpes zoster     Hypercholesteremia     Hyperlipidemia     Hypertriglyceridemia     Neck pain     Osteopenia     Stress-induced cardiomyopathy 2017     Family History   Problem Relation Age of Onset    High Blood Pressure Mother     Heart Disease Father      Social History     Socioeconomic History    Marital status:      Spouse name: Not on file    Number of children: Not on file    Years of education: Not on file    Highest education level: Not on file   Occupational History    Not on file   Social Needs    Financial resource strain: Not on file    Food insecurity     Worry: Not on file     Inability: Not on file    Transportation needs     Medical: Not on file     Non-medical: Not on file   Tobacco Use    Smoking status: Former Smoker     Packs/day: 0.50     Years: 3.00     Pack years: 1.50     Types: Cigarettes     Quit date: 2015     Years since quittin.0    Smokeless tobacco: Never Used    Tobacco comment: pt smoked off and on   Substance and Sexual Activity    Alcohol use: No     Alcohol/week: 0.0 standard drinks    Drug use: No    Sexual activity: Yes     Partners: Male   Lifestyle    Physical activity     Days per week: Not on file     Minutes per session: Not on file    Stress: Not on file   Relationships    Social connections     Talks on phone: Not on file     Gets together: Not on file     Attends [employer] service: Not on file     Active member of club or organization: Not on file     Attends meetings of clubs or organizations: Not on file     Relationship status: Not on file    Intimate partner violence     Fear of current or ex partner: Not on file     Emotionally abused: Not on file     Physically abused: Not on file     Forced sexual activity: Not on file   Other Topics Concern    Not on file   Social History Narrative    Not on file       Prior to Visit Medications    Medication Sig Taking?  Authorizing Provider   fenofibrate (TRICOR) 54 MG tablet TAKE ONE TABLET BY MOUTH DAILY Yes Isaiah Tam MD   lisinopril (PRINIVIL;ZESTRIL) 40 MG tablet TAKE ONE TABLET BY MOUTH EVERY EVENING Yes Isaiah Tam MD   spironolactone (ALDACTONE) 25 MG tablet TAKE ONE-HALF TABLET BY MOUTH DAILY Yes Isaiah Tam MD   metoprolol succinate (TOPROL XL) 50 MG extended release tablet TAKE ONE TABLET BY MOUTH DAILY Yes Ángel Covarrubias MD   atorvastatin (LIPITOR) 20 MG tablet TAKE ONE TABLET BY MOUTH DAILY Yes Ángel Covarrubias MD   ezetimibe (ZETIA) 10 MG tablet Take 1 tablet by mouth daily Yes Ángel Covarrubias MD   albuterol sulfate HFA (VENTOLIN HFA) 108 (90 Base) MCG/ACT inhaler Inhale 2 puffs into the lungs every 6 hours as needed for Wheezing Yes Iris Bhandari MD   aspirin EC 81 MG EC tablet Take 1 tablet by mouth daily Yes Iris Bhandari MD   Cholecalciferol (VITAMIN D3) 2000 UNITS CAPS Take 2 tablets by mouth daily  Yes Historical Provider, MD   Calcium 9138-1616 MG-UNIT CHEW Take  by mouth. Yes Historical Provider, MD   naloxone 4 MG/0.1ML LIQD nasal spray 1 spray by Nasal route as needed for Opioid Reversal  Patient not taking: Reported on 11/10/2020  Pily Sierra MD   gabapentin (NEURONTIN) 100 MG capsule 2 tab tid as directed  Iris Bhandari MD     Allergies   Allergen Reactions    Shellfish-Derived Products Nausea And Vomiting    Celebrex [Celecoxib] Swelling    Codeine     Ibuprofen Swelling    Methadone     Vioxx Swelling       OBJECTIVE:  Estimated body mass index is 33.13 kg/m² as calculated from the following:    Height as of this encounter: 5' 4\" (1.626 m). Weight as of this encounter: 193 lb (87.5 kg). Vitals:    01/26/21 1542   BP: 128/78   Site: Left Upper Arm   Position: Sitting   Cuff Size: Medium Adult   Pulse: 76   Temp: 96.2 °F (35.7 °C)   TempSrc: Temporal   SpO2: 96%   Weight: 193 lb (87.5 kg)   Height: 5' 4\" (1.626 m)     BP Readings from Last 2 Encounters:   01/26/21 128/78   11/10/20 120/74     Wt Readings from Last 3 Encounters:   01/26/21 193 lb (87.5 kg)   11/10/20 186 lb (84.4 kg)   10/27/20 190 lb (86.2 kg)       Physical Exam  Vitals signs and nursing note reviewed. Constitutional:       General: She is not in acute distress. Appearance: She is well-developed.  She is not diaphoretic. HENT:      Head: Normocephalic and atraumatic. Right Ear: External ear normal.      Left Ear: External ear normal.      Nose: Nose normal.   Eyes:      General: Lids are normal. No scleral icterus. Right eye: No discharge. Left eye: No discharge. Pupils: Pupils are equal, round, and reactive to light. Neck:      Thyroid: No thyromegaly. Vascular: No JVD. Cardiovascular:      Rate and Rhythm: Normal rate and regular rhythm. Heart sounds: Normal heart sounds. Pulmonary:      Effort: Pulmonary effort is normal. No respiratory distress. Breath sounds: Normal breath sounds. Abdominal:      Palpations: Abdomen is soft. There is no hepatomegaly or splenomegaly. Tenderness: There is no abdominal tenderness. Musculoskeletal:         General: Tenderness (right proximal trapezius spasm and tenderness moderate on the left) present. Cervical back: She exhibits decreased range of motion, tenderness (Especially right paracervical and trapezius to palpation), bony tenderness (Over C7) and spasm. Comments: C spine flexion 20 degrees  Extension 10 degrees  Left rotation 50 degrees  Right rotation 20 degrees  Left flexion 10 degrees  Right flexion is 20 degrees with increased pain   Skin:     General: Skin is warm and dry. Coloration: Skin is not pale. Findings: No erythema or rash. Comments: Turgor normal   Neurological:      Mental Status: She is alert and oriented to person, place, and time. Cranial Nerves: No cranial nerve deficit. Sensory: Sensory deficit (Mildly diminished left radial first webspace) present. Deep Tendon Reflexes:      Reflex Scores:       Bicep reflexes are 2+ on the right side and 2+ on the left side. Comments:  strength UE equal bilateral   Psychiatric:         Behavior: Behavior normal.         Thought Content:  Thought content normal.         Judgment: Judgment normal. ASSESSMENT PLAN      Diagnosis Orders   1. Herniation of intervertebral disc at C4-C5 level     2. Herniation of intervertebral disc at C6-C7 level     3. Degeneration of intervertebral disc at C4-C5 level     722.0 - Herniated disc C4-C5, C5-C6, C6-C7  296.22 - Depressive psychosis  M50.321 DDD C4-C5  M50.322 DDD C5-C6  at this point we will not be pursuing a referral back to Dr. Kiel Gibbons but if the bee sting discomfort, which is new, continues we will do so. Follow-up in 6 months. Patient received tramadol from Dr. Hollis Saini, current dosing sounds appropriate. She also is getting 5 gabapentin per day which she feels is appropriate, if she takes a 61 in the middle of the day she becomes drowsy. Patient should call the office immediately with new or ongoing signs or symptoms or worsening, or proceed to the emergency room. No changes in past medical history, past surgical history, social history, or family history were noted during the patient encounter unless specifically listed above. All updates of past medical history, past surgical history, social history, or family history were reviewed personally by me during the office visit. All problems listed in the assessment are stable unless noted otherwise. Medication profile reviewed personally by me during the visit. Medication side effects and possible impairments from medications were discussed as applicable. This document was prepared by a combination of typing and transcription through a voice recognition software. Scribe attestation: Cristian Pollock MD, am scribing for and in the presence of Cristian Pollock MD. Electronically signed by Cristian Pollock MD on 1/26/2021 at 4:31 PM      Provider attestation:     I, Dr. Hilda Moore, personally performed the services described in this documentation, as scribed by the above signed scribe in my presence, and it is both accurate and complete.  I agree with the ROS and Past Histories independently gathered by the clinical support staff and the remaining scribed note accurately describes my personal service to the patient.       1/26/2021    4:37 PM

## 2021-01-26 NOTE — PROGRESS NOTES
Chief Complaint   Patient presents with    Neck Pain       HPI:  Sanjeev Alanis is a 79 y.o. (: 1950) here today for 6 month follow up regarding her workman's comp neck claim from . She has been having what feels like pen pricks or bee stings around her surgery site for about a year. If the pain does not go away or gets any worse she wants to go back to Dr Scout Woody for a follow up. She is still taking 2 Tramadol in the morning, 1 in the afternoon and 1 at night for pain. Sometimes she does not take the afternoon dosage if she doesn't need it. Patient's medications, allergies, past medical, surgical, social and family histories were reviewed and updated asappropriate on 2021 at 4:28 PM.    ROS:    All other systems reviewed and are negative except as noted above on 2021 at 4:28 PM. Additional review of systems may be scanned into the media section ofthis medical record. Any responses requiring further intervention were pursued.     Hemoglobin A1C (%)   Date Value   2020 6.3     LDL Calculated (mg/dL)   Date Value   2020 188 (H)     Past Medical History:   Diagnosis Date    Arthritis     Asthma     Back pain     Fibromyalgia     Fibromyalgia     Heart failure with reduced ejection fraction (HCC)     Herpes zoster     Hypercholesteremia     Hyperlipidemia     Hypertriglyceridemia     Neck pain     Osteopenia     Stress-induced cardiomyopathy 2017     Family History   Problem Relation Age of Onset    High Blood Pressure Mother     Heart Disease Father      Social History     Socioeconomic History    Marital status:      Spouse name: Not on file    Number of children: Not on file    Years of education: Not on file    Highest education level: Not on file   Occupational History    Not on file   Social Needs    Financial resource strain: Not on file    Food insecurity     Worry: Not on file     Inability: Not on file   [employer] needs Medical: Not on file     Non-medical: Not on file   Tobacco Use    Smoking status: Former Smoker     Packs/day: 0.50     Years: 3.00     Pack years: 1.50     Types: Cigarettes     Quit date: 2015     Years since quittin.0    Smokeless tobacco: Never Used    Tobacco comment: pt smoked off and on   Substance and Sexual Activity    Alcohol use: No     Alcohol/week: 0.0 standard drinks    Drug use: No    Sexual activity: Yes     Partners: Male   Lifestyle    Physical activity     Days per week: Not on file     Minutes per session: Not on file    Stress: Not on file   Relationships    Social connections     Talks on phone: Not on file     Gets together: Not on file     Attends Restorationist service: Not on file     Active member of club or organization: Not on file     Attends meetings of clubs or organizations: Not on file     Relationship status: Not on file    Intimate partner violence     Fear of current or ex partner: Not on file     Emotionally abused: Not on file     Physically abused: Not on file     Forced sexual activity: Not on file   Other Topics Concern    Not on file   Social History Narrative    Not on file       Prior to Visit Medications    Medication Sig Taking?  Authorizing Provider   fenofibrate (TRICOR) 54 MG tablet TAKE ONE TABLET BY MOUTH DAILY Yes Clarisa Yuan MD   lisinopril (PRINIVIL;ZESTRIL) 40 MG tablet TAKE ONE TABLET BY MOUTH EVERY EVENING Yes Clarisa Yuan MD   spironolactone (ALDACTONE) 25 MG tablet Jonny Jason Yes Clarisa Yuan MD   metoprolol succinate (TOPROL XL) 50 MG extended release tablet TAKE ONE TABLET BY MOUTH DAILY Yes Clarisa Yuan MD   atorvastatin (LIPITOR) 20 MG tablet TAKE ONE TABLET BY MOUTH DAILY Yes Clarisa Yuan MD   ezetimibe (ZETIA) 10 MG tablet Take 1 tablet by mouth daily Yes Clarisa Yuan MD albuterol sulfate HFA (VENTOLIN HFA) 108 (90 Base) MCG/ACT inhaler Inhale 2 puffs into the lungs every 6 hours as needed for Wheezing Yes Odalys Da Silva MD   aspirin EC 81 MG EC tablet Take 1 tablet by mouth daily Yes Odalys Da Silva MD   Cholecalciferol (VITAMIN D3) 2000 UNITS CAPS Take 2 tablets by mouth daily  Yes Historical Provider, MD   Calcium 7678-8427 MG-UNIT CHEW Take  by mouth. Yes Historical Provider, MD   naloxone 4 MG/0.1ML LIQD nasal spray 1 spray by Nasal route as needed for Opioid Reversal  Patient not taking: Reported on 11/10/2020  Rocky Vaqsuez MD   gabapentin (NEURONTIN) 100 MG capsule 2 tab tid as directed  Odalys Da Silva MD     Allergies   Allergen Reactions    Shellfish-Derived Products Nausea And Vomiting    Celebrex [Celecoxib] Swelling    Codeine     Ibuprofen Swelling    Methadone     Vioxx Swelling       OBJECTIVE:  Estimated body mass index is 33.13 kg/m² as calculated from the following:    Height as of this encounter: 5' 4\" (1.626 m). Weight as of this encounter: 193 lb (87.5 kg). Vitals:    01/26/21 1542   BP: 128/78   Site: Left Upper Arm   Position: Sitting   Cuff Size: Medium Adult   Pulse: 76   Temp: 96.2 °F (35.7 °C)   TempSrc: Temporal   SpO2: 96%   Weight: 193 lb (87.5 kg)   Height: 5' 4\" (1.626 m)     BP Readings from Last 2 Encounters:   01/26/21 128/78   11/10/20 120/74     Wt Readings from Last 3 Encounters:   01/26/21 193 lb (87.5 kg)   11/10/20 186 lb (84.4 kg)   10/27/20 190 lb (86.2 kg)              ASSESSMENT PLAN     {Diagnosis Smartlinks:[id number]}    [] Patient has completed an advance directive  [] Patient has NOT completed an advanced directive  [] Patient has a documented healthcare surrogate  [] Patient does NOT have a documented healthcare surrogate  [] Discussed the importance of establishing and updating an advanced directive. Patient has questions at this time and those were answered. [] Discussed the importance of establishing and updating an advanced directive. Patient does NOT have questions at this time.     Discussed with: [] Patient            [] Family             [] Other caregiver          Scribe attestation: Lawerance Moritz, MA, am scribing for and in the presence of Cristian Pollock MD. Electronically signed by Radha Espinoza MA on 1/26/2021 at 4:28 PM      Provider attestation: ***

## 2021-01-26 NOTE — PATIENT INSTRUCTIONS

## 2021-01-29 ENCOUNTER — TELEPHONE (OUTPATIENT)
Dept: FAMILY MEDICINE CLINIC | Age: 71
End: 2021-01-29

## 2021-01-29 DIAGNOSIS — B02.29 POSTHERPETIC NEURALGIA: Primary | ICD-10-CM

## 2021-01-29 RX ORDER — LIDOCAINE 50 MG/G
PATCH TOPICAL
Qty: 30 PATCH | Refills: 5 | Status: SHIPPED | OUTPATIENT
Start: 2021-01-29 | End: 2022-10-26 | Stop reason: SDUPTHER

## 2021-01-29 NOTE — TELEPHONE ENCOUNTER
Pt called requesting a lidocaine patch for \"postherpetic shingle pain\". States the pain is worse than normal pain. Pt uses Marisol Gregorio.  Call back [phone]

## 2021-01-29 NOTE — TELEPHONE ENCOUNTER
Lidoderm 5% quantity number 30, apply 12 hours on, 12 hours off for postherpetic neuralgia #30 with 5 refills

## 2021-02-01 ENCOUNTER — TELEPHONE (OUTPATIENT)
Dept: ADMINISTRATIVE | Age: 71
End: 2021-02-01

## 2021-02-23 ENCOUNTER — OFFICE VISIT (OUTPATIENT)
Dept: FAMILY MEDICINE CLINIC | Age: 71
End: 2021-02-23
Payer: MEDICARE

## 2021-02-23 VITALS
HEIGHT: 64 IN | SYSTOLIC BLOOD PRESSURE: 130 MMHG | DIASTOLIC BLOOD PRESSURE: 78 MMHG | OXYGEN SATURATION: 96 % | TEMPERATURE: 97.2 F | HEART RATE: 72 BPM | BODY MASS INDEX: 32.61 KG/M2 | WEIGHT: 191 LBS

## 2021-02-23 DIAGNOSIS — M94.0 COSTOCHONDRITIS, ACUTE: ICD-10-CM

## 2021-02-23 DIAGNOSIS — E78.2 MIXED HYPERLIPIDEMIA: ICD-10-CM

## 2021-02-23 DIAGNOSIS — I10 ESSENTIAL HYPERTENSION: Primary | ICD-10-CM

## 2021-02-23 DIAGNOSIS — I50.22 SYSTOLIC CHF, CHRONIC (HCC): ICD-10-CM

## 2021-02-23 DIAGNOSIS — R73.03 PRE-DIABETES: ICD-10-CM

## 2021-02-23 DIAGNOSIS — I42.9 CARDIOMYOPATHY, IDIOPATHIC (HCC): ICD-10-CM

## 2021-02-23 DIAGNOSIS — I77.1 SUBCLAVIAN ARTERY STENOSIS, RIGHT (HCC): ICD-10-CM

## 2021-02-23 PROCEDURE — 3017F COLORECTAL CA SCREEN DOC REV: CPT | Performed by: FAMILY MEDICINE

## 2021-02-23 PROCEDURE — 1123F ACP DISCUSS/DSCN MKR DOCD: CPT | Performed by: FAMILY MEDICINE

## 2021-02-23 PROCEDURE — G8400 PT W/DXA NO RESULTS DOC: HCPCS | Performed by: FAMILY MEDICINE

## 2021-02-23 PROCEDURE — 4040F PNEUMOC VAC/ADMIN/RCVD: CPT | Performed by: FAMILY MEDICINE

## 2021-02-23 PROCEDURE — 99214 OFFICE O/P EST MOD 30 MIN: CPT | Performed by: FAMILY MEDICINE

## 2021-02-23 PROCEDURE — G8417 CALC BMI ABV UP PARAM F/U: HCPCS | Performed by: FAMILY MEDICINE

## 2021-02-23 PROCEDURE — G8427 DOCREV CUR MEDS BY ELIG CLIN: HCPCS | Performed by: FAMILY MEDICINE

## 2021-02-23 PROCEDURE — 1090F PRES/ABSN URINE INCON ASSESS: CPT | Performed by: FAMILY MEDICINE

## 2021-02-23 PROCEDURE — G8484 FLU IMMUNIZE NO ADMIN: HCPCS | Performed by: FAMILY MEDICINE

## 2021-02-23 PROCEDURE — 1036F TOBACCO NON-USER: CPT | Performed by: FAMILY MEDICINE

## 2021-02-23 NOTE — PROGRESS NOTES
Chief Complaint   Patient presents with    Hypertension    Other     vit d def    Other     patient has multiple medical complaints    Hyperlipidemia     She complains of a \"twinge\" sometimes in the upper left chest area that hurts at times. Breathing is at baseline she sometimes finds herself holding her breath. HPI:  Racquel Son is a 79 y.o. (: 1950) here today for    Treatment Adherence:   Medication compliance:  compliant all of the time  Diet compliance:  she is trying to make herself drink more  Weight trend: stable  Current exercise: no regular exercise  Barriers: lack of motivation    Hypertension:  Home blood pressure monitoring: No. Patient denies chest pain and shortness of breath. Antihypertensive medication side effects: no medication side effects noted. Use of agents associated with hypertension: none. Sodium (mmol/L)   Date Value   2020 142    BUN (mg/dL)   Date Value   2020 13    Glucose (mg/dL)   Date Value   2020 110 (H)      Potassium (mmol/L)   Date Value   2020 4.5    CREATININE (mg/dL)   Date Value   2020 1.0         Hyperlipidemia:  No new myalgias or GI upset on eztemibe (Zetia) and Atorvastatin 20 mg. Lab Results   Component Value Date    CHOL 265 (H) 2020    TRIG 174 (H) 2020    HDL 42 2020    LDLCALC 188 (H) 2020     Lab Results   Component Value Date    ALT 11 2020    AST 20 2020          Patient's medications, allergies, past medical, surgical, social and family histories were reviewed and updated asappropriate on 2021 at 3:55 PM.    ROS:  Review of Systems    All other systems reviewed and are negative except as noted above on 2021 at 3:55 PM. Additional review of systems may be scanned into the media section ofthis medical record. Any responses requiring further intervention were pursued.     Hemoglobin A1C (%)   Date Value   2020 6.3     LDL Calculated (mg/dL)   Date Value   2020 188 (H)     Past Medical History:   Diagnosis Date    Arthritis     Asthma     Back pain     Fibromyalgia     Fibromyalgia     Heart failure with reduced ejection fraction (HCC)     Herpes zoster     Hypercholesteremia     Hyperlipidemia     Hypertriglyceridemia     Neck pain     Osteopenia     Stress-induced cardiomyopathy 2017     Family History   Problem Relation Age of Onset    High Blood Pressure Mother     Heart Disease Father      Social History     Socioeconomic History    Marital status:      Spouse name: Not on file    Number of children: Not on file    Years of education: Not on file    Highest education level: Not on file   Occupational History    Not on file   Social Needs    Financial resource strain: Not on file    Food insecurity     Worry: Not on file     Inability: Not on file    Transportation needs     Medical: Not on file     Non-medical: Not on file   Tobacco Use    Smoking status: Former Smoker     Packs/day: 0.50     Years: 3.00     Pack years: 1.50     Types: Cigarettes     Quit date: 2015     Years since quittin.1    Smokeless tobacco: Never Used    Tobacco comment: pt smoked off and on   Substance and Sexual Activity    Alcohol use: No     Alcohol/week: 0.0 standard drinks    Drug use: No    Sexual activity: Yes     Partners: Male   Lifestyle    Physical activity     Days per week: Not on file     Minutes per session: Not on file    Stress: Not on file   Relationships    Social connections     Talks on phone: Not on file     Gets together: Not on file     Attends Synagogue service: Not on file     Active member of club or organization: Not on file     Attends meetings of clubs or organizations: Not on file     Relationship status: Not on file    Intimate partner violence     Fear of current or ex partner: Not on file     Emotionally abused: Not on file     Physically abused: Not on file Forced sexual activity: Not on file   Other Topics Concern    Not on file   Social History Narrative    Not on file       Prior to Visit Medications    Medication Sig Taking? Authorizing Provider   traMADol (ULTRAM) 50 MG tablet Take 1 tablet by mouth every 6 hours for 30 days. Vonnie Vogel MD   lidocaine (LIDODERM) 5 % Place patch on skin 12 hours on, 12 hours off. Philbert Meigs, MD   traMADol (ULTRAM) 50 MG tablet Take 1 tablet by mouth every 6 hours for 27 days. Vonnie Vogle MD   fenofibrate (TRICOR) 54 MG tablet TAKE ONE TABLET BY MOUTH DAILY  Juancho Wahl MD   lisinopril (PRINIVIL;ZESTRIL) 40 MG tablet TAKE ONE TABLET BY MOUTH EVERY EVENING  Juancho Wahl MD   spironolactone (ALDACTONE) 25 MG tablet Hessie Cookey MOUTH DAILY  Juancho Wahl MD   metoprolol succinate (TOPROL XL) 50 MG extended release tablet TAKE ONE TABLET BY MOUTH DAILY  Juancho Wahl MD   atorvastatin (LIPITOR) 20 MG tablet TAKE ONE TABLET BY MOUTH DAILY  Juancho Wahl MD   ezetimibe (ZETIA) 10 MG tablet Take 1 tablet by mouth daily  Juancho Wahl MD   naloxone 4 MG/0.1ML LIQD nasal spray 1 spray by Nasal route as needed for Opioid Reversal  Patient not taking: Reported on 11/10/2020  Vonnie Vogel MD   gabapentin (NEURONTIN) 100 MG capsule 2 tab tid as directed  Philbert Meigs, MD   albuterol sulfate HFA (VENTOLIN HFA) 108 (90 Base) MCG/ACT inhaler Inhale 2 puffs into the lungs every 6 hours as needed for Wheezing  Philbert Meigs, MD   aspirin EC 81 MG EC tablet Take 1 tablet by mouth daily  Philbert Meigs, MD   Cholecalciferol (VITAMIN D3) 2000 UNITS CAPS Take 2 tablets by mouth daily   Historical Provider, MD   Calcium 0856-5308 MG-UNIT CHEW Take  by mouth.   Historical Provider, MD     Allergies   Allergen Reactions    Shellfish-Derived Products Nausea And Vomiting    Celebrex [Celecoxib] Swelling    Codeine     Ibuprofen Swelling    Methadone     Vioxx Swelling OBJECTIVE:  Estimated body mass index is 32.61 kg/m² as calculated from the following:    Height as of this encounter: 5' 4\" (1.626 m). Weight as of 2/9/21: 190 lb (86.2 kg). Vitals:    02/23/21 1550   Height: 5' 4\" (1.626 m)     BP Readings from Last 2 Encounters:   02/09/21 (!) 157/87   01/26/21 128/78     Wt Readings from Last 3 Encounters:   02/09/21 190 lb (86.2 kg)   01/26/21 193 lb (87.5 kg)   11/10/20 186 lb (84.4 kg)       Physical Exam  Vitals signs and nursing note reviewed. Constitutional:       General: She is not in acute distress. Appearance: She is well-developed. She is not diaphoretic. HENT:      Head: Normocephalic and atraumatic. Right Ear: External ear normal.      Left Ear: External ear normal.      Nose: Nose normal.   Eyes:      General: Lids are normal. No scleral icterus. Right eye: No discharge. Left eye: No discharge. Pupils: Pupils are equal, round, and reactive to light. Neck:      Thyroid: No thyromegaly. Vascular: No JVD. Cardiovascular:      Rate and Rhythm: Normal rate and regular rhythm. Heart sounds: Normal heart sounds. Pulmonary:      Effort: Pulmonary effort is normal. No respiratory distress. Breath sounds: Normal breath sounds. Chest:       Abdominal:      Palpations: Abdomen is soft. There is no hepatomegaly or splenomegaly. Tenderness: There is no abdominal tenderness. Skin:     General: Skin is warm and dry. Coloration: Skin is not pale. Findings: No erythema or rash. Comments: Turgor normal   Psychiatric:         Behavior: Behavior normal.         Thought Content: Thought content normal.         Judgment: Judgment normal.              ASSESSMENT PLAN      Diagnosis Orders   1. Essential hypertension     2. Mixed hyperlipidemia  LIPID PANEL    HEPATIC FUNCTION PANEL   3. Cardiomyopathy, idiopathic ([address]     4. Subclavian artery stenosis, right ([address]     5.  Systolic CHF, chronic ([address] 6. Pre-diabetes     7. Costochondritis, acute     Blood pressure doing well. She has not been on Zetia for 3 months yet we will recheck lipids at that time. No evidence of heart failure at this time. No symptoms of elevated sugar. Reassured her that the left-sided costochondritis had nothing to do with her heart or lungs. Regular follow-up 6 months        Patient should call the office immediately with new or ongoing signs or symptoms or worsening, or proceed to the emergency room. No changes in past medical history, past surgical history, social history, or family history were noted during the patient encounter unless specifically listed above. All updates of past medical history, past surgical history, social history, or family history were reviewed personally by me during the office visit. All problems listed in the assessment are stable unless noted otherwise. Medication profile reviewed personally by me during the visit. Medication side effects and possible impairments from medications were discussed as applicable. This document was prepared by a combination of typing and transcription through a voice recognition software. Scribe attestation: María Elena Shah MA, am scribing for and in the presence of Ana Lea MD. Electronically signed by More Welch MA on 2/23/2021 at 3:55 PM      Provider attestation:     I, Dr. Faustino Duran, personally performed the services described in this documentation, as scribed by the above signed scribe in my presence, and it is both accurate and complete. I agree with the ROS and Past Histories independently gathered by the clinical support staff and the remaining scribed note accurately describes my personal service to the patient.       2/23/2021    4:33 PM

## 2021-02-23 NOTE — PROGRESS NOTES
Chief Complaint   Patient presents with    Hypertension    Chronic Pain    Other     vit d def    Other     patient has multiple medical complaints     Patient sees Dr Antwan Rivas for her chronic pain. HPI:  Mariama Daniels is a 79 y.o. (: 1950) here today for    Hypertension:  Home blood pressure monitoring: {NO/YES:0247730506::\"No\"}. She {is/is not:9024} adherent to a low sodium diet. Patient {denies/complains:97542} {Symptoms of Hypertension, Denies:19130}. Antihypertensive medication side effects: {Hypertension med side effects:5728::\"no medication side effects noted\"}. Use of agents associated with hypertension: {bp agents assoc with hypertension:511::\"none\"}. Sodium (mmol/L)   Date Value   2020 142    BUN (mg/dL)   Date Value   2020 13    Glucose (mg/dL)   Date Value   2020 110 (H)      Potassium (mmol/L)   Date Value   2020 4.5    CREATININE (mg/dL)   Date Value   2020 1.0           Patient's medications, allergies, past medical, surgical, social and family histories were reviewed and updated asappropriate on 2021 at 3:50 PM.    ROS:  Review of Systems    All other systems reviewed and are negative except as noted above on 2021 at 3:50 PM. Additional review of systems may be scanned into the media section ofthis medical record. Any responses requiring further intervention were pursued.     Hemoglobin A1C (%)   Date Value   2020 6.3     LDL Calculated (mg/dL)   Date Value   2020 188 (H)     Past Medical History:   Diagnosis Date    Arthritis     Asthma     Back pain     Fibromyalgia     Fibromyalgia     Heart failure with reduced ejection fraction (HCC)     Herpes zoster     Hypercholesteremia     Hyperlipidemia     Hypertriglyceridemia     Neck pain     Osteopenia     Stress-induced cardiomyopathy 2017     Family History   Problem Relation Age of Onset    High Blood Pressure Mother traMADol (ULTRAM) 50 MG tablet Take 1 tablet by mouth every 6 hours for 27 days. Bernarda Norris MD   fenofibrate (TRICOR) 54 MG tablet TAKE ONE TABLET BY MOUTH DAILY  Gaby Serrano MD   lisinopril (PRINIVIL;ZESTRIL) 40 MG tablet TAKE ONE TABLET BY MOUTH EVERY EVENING  Gaby Serrano MD   spironolactone (ALDACTONE) 25 MG tablet [place] MOUTH DAILY  Gaby Serrano MD   metoprolol succinate (TOPROL XL) 50 MG extended release tablet TAKE ONE TABLET BY MOUTH DAILY  Gaby Serrano MD   atorvastatin (LIPITOR) 20 MG tablet TAKE ONE TABLET BY MOUTH DAILY  Gaby Serrano MD   ezetimibe (ZETIA) 10 MG tablet Take 1 tablet by mouth daily  Gaby Serrano MD   naloxone 4 MG/0.1ML LIQD nasal spray 1 spray by Nasal route as needed for Opioid Reversal  Patient not taking: Reported on 11/10/2020  Bernarda Norris MD   gabapentin (NEURONTIN) 100 MG capsule 2 tab tid as directed  Josh Gabriel MD   albuterol sulfate HFA (VENTOLIN HFA) 108 (90 Base) MCG/ACT inhaler Inhale 2 puffs into the lungs every 6 hours as needed for Wheezing  Josh Gabriel MD   aspirin EC 81 MG EC tablet Take 1 tablet by mouth daily  Josh Gabriel MD   Cholecalciferol (VITAMIN D3) 2000 UNITS CAPS Take 2 tablets by mouth daily   Historical Provider, MD   Calcium 5882-5648 MG-UNIT CHEW Take  by mouth. Historical Provider, MD     Allergies   Allergen Reactions    Shellfish-Derived Products Nausea And Vomiting    Celebrex [Celecoxib] Swelling    Codeine     Ibuprofen Swelling    Methadone     Vioxx Swelling       OBJECTIVE:  Estimated body mass index is 32.61 kg/m² as calculated from the following:    Height as of this encounter: 5' 4\" (1.626 m). Weight as of 2/9/21: 190 lb (86.2 kg).   Vitals:    02/23/21 1550   Height: 5' 4\" (1.626 m)     BP Readings from Last 2 Encounters:   02/09/21 (!) 157/87   01/26/21 128/78     Wt Readings from Last 3 Encounters:   02/09/21 190 lb (86.2 kg) 01/26/21 193 lb (87.5 kg)   11/10/20 186 lb (84.4 kg)       Physical Exam         ASSESSMENT PLAN     {Diagnosis Smartlinks:[id number]}    [] Patient has completed an advance directive  [] Patient has NOT completed an advanced directive  [] Patient has a documented healthcare surrogate  [] Patient does NOT have a documented healthcare surrogate  [] Discussed the importance of establishing and updating an advanced directive. Patient has questions at this time and those were answered. [] Discussed the importance of establishing and updating an advanced directive. Patient does NOT have questions at this time.     Discussed with: [] Patient            [] Family             [] Other caregiver          Scribe attestation: Clif Lucio MA, am scribing for and in the presence of Tereza Gutierrez MD. Electronically signed by Jason Larose MA on 2/23/2021 at 3:50 PM      Provider attestation: ***

## 2021-02-23 NOTE — PATIENT INSTRUCTIONS
Call [phone] to schedule your mammogram.    Patient should call the office immediately with new or ongoing signs or symptoms or worsening, or proceed to the emergency room. If you are on medications which could impair your senses, you are at risk of weakness, falls, dizziness, or drowsiness. You should be careful during activities which could place you at risk of harm, such as climbing, using stairs, operating machinery, or driving vehicles. If you feel you cannot safely do these activities, you should request others to help you, or avoid the activities altogether. If you are drowsy for any other reason, you should use the same precautions as listed above.      Web Address for Advance Directive:    [email or web address]

## 2021-03-24 ENCOUNTER — NURSE ONLY (OUTPATIENT)
Dept: FAMILY MEDICINE CLINIC | Age: 71
End: 2021-03-24
Payer: MEDICARE

## 2021-03-24 DIAGNOSIS — I50.22 SYSTOLIC CHF, CHRONIC (HCC): ICD-10-CM

## 2021-03-24 DIAGNOSIS — R53.83 OTHER FATIGUE: ICD-10-CM

## 2021-03-24 DIAGNOSIS — E78.2 MIXED HYPERLIPIDEMIA: ICD-10-CM

## 2021-03-24 PROCEDURE — 36415 COLL VENOUS BLD VENIPUNCTURE: CPT | Performed by: FAMILY MEDICINE

## 2021-03-25 LAB
ALBUMIN SERPL-MCNC: 4 G/DL (ref 3.4–5)
ALP BLD-CCNC: 76 U/L (ref 40–129)
ALT SERPL-CCNC: 9 U/L (ref 10–40)
AST SERPL-CCNC: 16 U/L (ref 15–37)
BILIRUB SERPL-MCNC: 0.5 MG/DL (ref 0–1)
BILIRUBIN DIRECT: <0.2 MG/DL (ref 0–0.3)
BILIRUBIN, INDIRECT: ABNORMAL MG/DL (ref 0–1)
TOTAL PROTEIN: 6.6 G/DL (ref 6.4–8.2)

## 2021-04-22 DIAGNOSIS — M51.37 DEGENERATION OF LUMBAR OR LUMBOSACRAL INTERVERTEBRAL DISC: ICD-10-CM

## 2021-04-23 RX ORDER — GABAPENTIN 100 MG/1
CAPSULE ORAL
Qty: 180 CAPSULE | Refills: 5 | Status: SHIPPED | OUTPATIENT
Start: 2021-04-23 | End: 2021-10-25 | Stop reason: SDUPTHER

## 2021-04-23 NOTE — TELEPHONE ENCOUNTER
Last office visit was 2/23/21  Future Appointments   Date Time Provider Norma Elizondoisti   4/27/2021  3:40 PM Caryle Sicks, MD [facility]   5/4/2021  1:15 PM MD Jeffery Bedolla [facility]   5/18/2021  2:20 PM Luisana Ogden MD [facility]   7/27/2021  3:40 PM Caryle Sicks, MD [facility]   8/24/2021  3:40 PM Caryle Sicks, MD [facility]

## 2021-04-27 ENCOUNTER — OFFICE VISIT (OUTPATIENT)
Dept: FAMILY MEDICINE CLINIC | Age: 71
End: 2021-04-27
Payer: COMMERCIAL

## 2021-04-27 VITALS
OXYGEN SATURATION: 95 % | DIASTOLIC BLOOD PRESSURE: 64 MMHG | BODY MASS INDEX: 32.95 KG/M2 | WEIGHT: 193 LBS | HEIGHT: 64 IN | HEART RATE: 77 BPM | SYSTOLIC BLOOD PRESSURE: 118 MMHG

## 2021-04-27 DIAGNOSIS — M51.37 DEGENERATIVE DISC DISEASE AT L5-S1 LEVEL: Chronic | ICD-10-CM

## 2021-04-27 DIAGNOSIS — S30.0XXD CONTUSION OF BUTTOCK, SUBSEQUENT ENCOUNTER: Chronic | ICD-10-CM

## 2021-04-27 DIAGNOSIS — S80.12XD CONTUSION OF LEFT LOWER EXTREMITY, SUBSEQUENT ENCOUNTER: Chronic | ICD-10-CM

## 2021-04-27 DIAGNOSIS — M51.36 DEGENERATION OF L4-L5 INTERVERTEBRAL DISC: Primary | Chronic | ICD-10-CM

## 2021-04-27 PROCEDURE — 99214 OFFICE O/P EST MOD 30 MIN: CPT | Performed by: FAMILY MEDICINE

## 2021-04-27 NOTE — PROGRESS NOTES
Chief Complaint   Patient presents with    Back Pain       Wt Readings from Last 3 Encounters:   21 193 lb (87.5 kg)   21 191 lb (86.6 kg)   21 190 lb (86.2 kg)     BP Readings from Last 3 Encounters:   21 118/64   21 130/78   21 (!) 157/87      Lab Results   Component Value Date    LABA1C 6.3 2020    LABA1C 5.9 2020       HPI:  Maryam Gtz is a 79 y.o. (: 1950) here today for her  workers comp claim for lower back pain. The pain is about the same. On average, pain is perceived as moderate (4 pain scale). No change in quality of symptoms. Current treatments: injections. Medication side effects: none. Recent diagnostic testing: none. Dr Julio César Gautam has put an order in for another injection. This will be her third. [x] Patient has completed an advance directive  [] Patient has NOT completed an advanced directive  [x] Patient has a documented healthcare surrogate  [] Patient does NOT have a documented healthcare surrogate  [] Discussed the importance of establishing and updating an advanced directive. Patient has questions at this time and those were answered. [x] Discussed the importance of establishing and updating an advanced directive. Patient does NOT have questions at this time. Discussed with: [x] Patient            [] Family             [] Other caregiver    Patient's medications, allergies, past medical, surgical, social and family histories were reviewed and updated asappropriate on 2021 at 4:30 PM.    ROS:  Review of Systems    All other systems reviewed and are negative except as noted above on 2021 at 4:30 PM. Additional review of systems may be scanned into the media section ofthis medical record. Any responses requiring further intervention were pursued.     Past Medical History:   Diagnosis Date    Arthritis     Asthma     Back pain     Fibromyalgia     Fibromyalgia     Heart failure with reduced ejection fraction ([facility] [address]     Herpes zoster     Hypercholesteremia     Hyperlipidemia     Hypertriglyceridemia     Neck pain     Osteopenia     Stress-induced cardiomyopathy 2017     Family History   Problem Relation Age of Onset    High Blood Pressure Mother     Heart Disease Father      Social History     Socioeconomic History    Marital status:      Spouse name: Not on file    Number of children: Not on file    Years of education: Not on file    Highest education level: Not on file   Occupational History    Not on file   Social Needs    Financial resource strain: Not on file    Food insecurity     Worry: Not on file     Inability: Not on file    Transportation needs     Medical: Not on file     Non-medical: Not on file   Tobacco Use    Smoking status: Former Smoker     Packs/day: 0.50     Years: 3.00     Pack years: 1.50     Types: Cigarettes     Quit date: 2015     Years since quittin.3    Smokeless tobacco: Never Used    Tobacco comment: pt smoked off and on   Substance and Sexual Activity    Alcohol use: No     Alcohol/week: 0.0 standard drinks    Drug use: No    Sexual activity: Yes     Partners: Male   Lifestyle    Physical activity     Days per week: Not on file     Minutes per session: Not on file    Stress: Not on file   Relationships    Social connections     Talks on phone: Not on file     Gets together: Not on file     Attends Anabaptist service: Not on file     Active member of club or organization: Not on file     Attends meetings of clubs or organizations: Not on file     Relationship status: Not on file    Intimate partner violence     Fear of current or ex partner: Not on file     Emotionally abused: Not on file     Physically abused: Not on file     Forced sexual activity: Not on file   Other Topics Concern    Not on file   Social History Narrative    Not on file     Prior to Visit Medications    Medication Sig Taking?  Authorizing Provider   gabapentin (NEURONTIN) 100 MG capsule TAKE TWO CAPSULES BY MOUTH THREE TIMES A DAY AS DIRECTED Yes Domenica Shah MD   traMADol (ULTRAM) 50 MG tablet Take 1 tablet by mouth every 6 hours for 30 days. Yes Laura Banuelos MD   lidocaine (LIDODERM) 5 % Place patch on skin 12 hours on, 12 hours off. Yes Domenica Shah MD   fenofibrate (TRICOR) 54 MG tablet TAKE ONE TABLET BY MOUTH DAILY Yes Justice Peralta MD   lisinopril (PRINIVIL;ZESTRIL) 40 MG tablet TAKE ONE TABLET BY MOUTH EVERY EVENING Yes Justice Peralta MD   spironolactone (ALDACTONE) 25 MG tablet Amy Banter DAILY Yes Justice Peralta MD   metoprolol succinate (TOPROL XL) 50 MG extended release tablet TAKE ONE TABLET BY MOUTH DAILY Yes Justice Peralta MD   atorvastatin (LIPITOR) 20 MG tablet TAKE ONE TABLET BY MOUTH DAILY Yes Justice Peralta MD   ezetimibe (ZETIA) 10 MG tablet Take 1 tablet by mouth daily Yes Justice Peralta MD   naloxone 4 MG/0.1ML LIQD nasal spray 1 spray by Nasal route as needed for Opioid Reversal Yes Laura Banuelos MD   albuterol sulfate HFA (VENTOLIN HFA) 108 (90 Base) MCG/ACT inhaler Inhale 2 puffs into the lungs every 6 hours as needed for Wheezing Yes Domenica Shah MD   aspirin EC 81 MG EC tablet Take 1 tablet by mouth daily Yes Domenica Shah MD   Cholecalciferol (VITAMIN D3) 2000 UNITS CAPS Take 2 tablets by mouth daily  Yes Historical Provider, MD   Calcium 5561-7184 MG-UNIT CHEW Take  by mouth. Yes Historical Provider, MD     Allergies   Allergen Reactions    Shellfish-Derived Products Nausea And Vomiting    Celebrex [Celecoxib] Swelling    Codeine     Ibuprofen Swelling    Methadone     Vioxx Swelling       OBJECTIVE:  Estimated body mass index is 33.13 kg/m² as calculated from the following:    Height as of this encounter: 5' 4\" (1.626 m). Weight as of this encounter: 193 lb (87.5 kg).   Vitals:    04/27/21 1605   BP: 118/64   Site: Left Upper Arm   Position: Sitting   Cuff Size: Medium worsening, or proceed to the emergency room. No changes in past medical history, past surgical history, social history, or family history were noted during the patient encounter unless specifically listed above. All updates of past medical history, past surgical history, social history, or family history were reviewed personally by me during the office visit. All problems listed in the assessment are stable unless noted otherwise. Medication profile reviewed personally by me during the visit. Medication side effects and possible impairments from medications were discussed as applicable. This document was prepared by a combination of typing and transcription through a voice recognition software. Scribe attestation: Shakira Pastrana MA, am scribing for and in the presence of Kassandra Beckwith MD. Electronically signed by Rocio Max MA on 4/27/2021 at 4:30 PM      Provider attestation:     I, Dr. Bartolo Wang, personally performed the services described in this documentation, as scribed by the above signed scribe in my presence, and it is both accurate and complete. I agree with the ROS and Past Histories independently gathered by the clinical support staff and the remaining scribed note accurately describes my personal service to the patient.       4/27/2021    5:05 PM

## 2021-04-27 NOTE — PATIENT INSTRUCTIONS
Patient should call the office immediately with new or ongoing signs or symptoms or worsening, or proceed to the emergency room. If you are on medications which could impair your senses, you are at risk of weakness, falls, dizziness, or drowsiness. You should be careful during activities which could place you at risk of harm, such as climbing, using stairs, operating machinery, or driving vehicles. If you feel you cannot safely do these activities, you should request others to help you, or avoid the activities altogether. If you are drowsy for any other reason, you should use the same precautions as listed above. Web Address for Advance Directive:    [email or web address]     Scheduling a Covid Vaccine:    Website: [email or web address].gov. [place]    Call: [phone] ([phone])    The medication list included in this document is our record of what you are currently taking, including any changes that were made at today's visit. If you find any differences when compared to your medications at home, or have any questions that were not answered at your visit, please contact the office.

## 2021-04-27 NOTE — PROGRESS NOTES
tablet TAKE ONE TABLET BY MOUTH DAILY Yes Selvin Haynes MD   lisinopril (PRINIVIL;ZESTRIL) 40 MG tablet TAKE ONE TABLET BY MOUTH EVERY EVENING Yes Selvin Haynes MD   spironolactone (ALDACTONE) 25 MG tablet Jammie Hi DAILY Yes Selvin Haynes MD   metoprolol succinate (TOPROL XL) 50 MG extended release tablet TAKE ONE TABLET BY MOUTH DAILY Yes Selvin Haynes MD   atorvastatin (LIPITOR) 20 MG tablet TAKE ONE TABLET BY MOUTH DAILY Yes Selvin Haynes MD   ezetimibe (ZETIA) 10 MG tablet Take 1 tablet by mouth daily Yes Selvin Haynes MD   naloxone 4 MG/0.1ML LIQD nasal spray 1 spray by Nasal route as needed for Opioid Reversal Yes Sheryl Gordon MD   albuterol sulfate HFA (VENTOLIN HFA) 108 (90 Base) MCG/ACT inhaler Inhale 2 puffs into the lungs every 6 hours as needed for Wheezing Yes Dahlia Herndon MD   aspirin EC 81 MG EC tablet Take 1 tablet by mouth daily Yes Dahlia Herndon MD   Cholecalciferol (VITAMIN D3) 2000 UNITS CAPS Take 2 tablets by mouth daily  Yes Historical Provider, MD   Calcium 5013-8441 MG-UNIT CHEW Take  by mouth. Yes Historical Provider, MD     Allergies   Allergen Reactions    Shellfish-Derived Products Nausea And Vomiting    Celebrex [Celecoxib] Swelling    Codeine     Ibuprofen Swelling    Methadone     Vioxx Swelling       OBJECTIVE:  Estimated body mass index is 32.79 kg/m² as calculated from the following:    Height as of this encounter: 5' 4\" (1.626 m). Weight as of 2/23/21: 191 lb (86.6 kg).   Vitals:    04/27/21 1605   Height: 5' 4\" (1.626 m)       Physical Exam         ASSESSMENT PLAN     {Diagnosis Smartlinks:[id number]}              Scribe attestation: Juan Gay MA, am scribing for and in the presence of Kar Dunne MD. Electronically signed by Lidya Miller MA on 4/27/2021 at 4:05 PM      Provider attestation: ***

## 2021-05-04 ENCOUNTER — OFFICE VISIT (OUTPATIENT)
Dept: CARDIOLOGY CLINIC | Age: 71
End: 2021-05-04
Payer: MEDICARE

## 2021-05-04 VITALS
BODY MASS INDEX: 32.78 KG/M2 | SYSTOLIC BLOOD PRESSURE: 124 MMHG | OXYGEN SATURATION: 96 % | DIASTOLIC BLOOD PRESSURE: 68 MMHG | HEART RATE: 83 BPM | HEIGHT: 64 IN | WEIGHT: 192 LBS

## 2021-05-04 DIAGNOSIS — I42.9 CARDIOMYOPATHY, IDIOPATHIC (HCC): ICD-10-CM

## 2021-05-04 DIAGNOSIS — R07.9 CHEST PAIN, UNSPECIFIED TYPE: Primary | ICD-10-CM

## 2021-05-04 DIAGNOSIS — I50.22 SYSTOLIC CHF, CHRONIC (HCC): ICD-10-CM

## 2021-05-04 DIAGNOSIS — I10 ESSENTIAL HYPERTENSION: ICD-10-CM

## 2021-05-04 DIAGNOSIS — E78.2 MIXED HYPERLIPIDEMIA: ICD-10-CM

## 2021-05-04 DIAGNOSIS — R79.89 LOW SERUM VITAMIN D: ICD-10-CM

## 2021-05-04 PROCEDURE — 1090F PRES/ABSN URINE INCON ASSESS: CPT | Performed by: INTERNAL MEDICINE

## 2021-05-04 PROCEDURE — G8427 DOCREV CUR MEDS BY ELIG CLIN: HCPCS | Performed by: INTERNAL MEDICINE

## 2021-05-04 PROCEDURE — 1036F TOBACCO NON-USER: CPT | Performed by: INTERNAL MEDICINE

## 2021-05-04 PROCEDURE — G8417 CALC BMI ABV UP PARAM F/U: HCPCS | Performed by: INTERNAL MEDICINE

## 2021-05-04 PROCEDURE — 3017F COLORECTAL CA SCREEN DOC REV: CPT | Performed by: INTERNAL MEDICINE

## 2021-05-04 PROCEDURE — 4040F PNEUMOC VAC/ADMIN/RCVD: CPT | Performed by: INTERNAL MEDICINE

## 2021-05-04 PROCEDURE — 1123F ACP DISCUSS/DSCN MKR DOCD: CPT | Performed by: INTERNAL MEDICINE

## 2021-05-04 PROCEDURE — 99215 OFFICE O/P EST HI 40 MIN: CPT | Performed by: INTERNAL MEDICINE

## 2021-05-04 PROCEDURE — G8400 PT W/DXA NO RESULTS DOC: HCPCS | Performed by: INTERNAL MEDICINE

## 2021-05-04 NOTE — PATIENT INSTRUCTIONS
Plan:  1. Discussed COVID vaccine but she would like to hold off for now. Call if you would like to get scheduled   2. Continue current medications   3. Call if chest pain does not decrease over time or if it starts to come on with activity. 4. Labs- Vitamin D, CBC, BMP, BNP and fasting lipids   5. Follow up with me in 6 months with an echocardiogram     Your provider has ordered testing for further evaluation. An order/prescription has been included in your paper work.  To schedule outpatient testing, contact Central Scheduling by calling [address] [place] [address] ([phone]).

## 2021-05-04 NOTE — PROGRESS NOTES
[address]  Advanced CHF/Pulmonary Hypertension   Cardiac Evaluation      Yehuda Sanchez  YOB: 1950    Date of Visit:  5/4/21    Chief Complaint   Patient presents with    Follow-up    Cardiomyopathy    Chest Pain     upper chest         History of Present Illness:  Yehuda Sanchez is a 79 y.o. female who presents from referral from Dr. Iris Vogel for consultation and management of CHF, non ischemic CMP. She has a with PMH of HTN, HLD, obesity, abnormal stress test, and fibromyalgia, who presented to [facility] with chest pain in March 2017. History obtained from patient and review of EMR. She underwent a stress test on 3/20/17 that showed moderate sized anteroseptal partial reversibility defect consistent with  ischemia and infarction in the territory of the mid and distal LAD  She has right subclavian artery stenosis and has seen Dr. Petra Kennedy for this. She underwent LHC on 3/23/17 with no intervention needed. She had a recent VL Duplex on 5/2/18 (report below). Her echo from 06/07/18 showed her EF was 45%. Her echo from 11/05/19 showed her EF was 45% with grade II DD. Today, 5/4/2021, she is here for follow up for a history of CHF and non ischemic CMP. At her last visit she reported having leg cramps on an increase in Lipitor. Repatha therapy was discussed. She states she fell on 4/21/2021 and thinks she pulled a muscle in her chest. She states her chest pain is worse when she picks something up or moves her left arm. She states the pain is not related to activity like walking and seems brought on by certain movements. Pain is non radiating and slightly tender to the touch. She also bruised her buttocks during her fall and had a follow up xray which did not show any fractures. She is using a cane for support now. Her breathing has been stable overall. She is tolerating her medications and taking them as prescribed.  She has not had her COVID vaccines due to concerns of side effects and ingredients like potassium chloride. She thinks she may have had COVID in 2/2020. Patient currently denies any weight gain, edema, palpitations, shortness of breath, dizziness, and syncope. Allergies   Allergen Reactions    Shellfish-Derived Products Nausea And Vomiting    Celebrex [Celecoxib] Swelling    Codeine     Ibuprofen Swelling    Methadone     Vioxx Swelling     Current Outpatient Medications   Medication Sig Dispense Refill    gabapentin (NEURONTIN) 100 MG capsule TAKE TWO CAPSULES BY MOUTH THREE TIMES A DAY AS DIRECTED 180 capsule 5    traMADol (ULTRAM) 50 MG tablet Take 1 tablet by mouth every 6 hours for 30 days. 120 tablet 0    lidocaine (LIDODERM) 5 % Place patch on skin 12 hours on, 12 hours off. 30 patch 5    fenofibrate (TRICOR) 54 MG tablet TAKE ONE TABLET BY MOUTH DAILY 90 tablet 3    lisinopril (PRINIVIL;ZESTRIL) 40 MG tablet TAKE ONE TABLET BY MOUTH EVERY EVENING 90 tablet 2    spironolactone (ALDACTONE) 25 MG tablet TAKE ONE-HALF TABLET BY MOUTH DAILY 45 tablet 2    metoprolol succinate (TOPROL XL) 50 MG extended release tablet TAKE ONE TABLET BY MOUTH DAILY 90 tablet 2    atorvastatin (LIPITOR) 20 MG tablet TAKE ONE TABLET BY MOUTH DAILY 30 tablet 7    ezetimibe (ZETIA) 10 MG tablet Take 1 tablet by mouth daily 30 tablet 11    aspirin EC 81 MG EC tablet Take 1 tablet by mouth daily 30 tablet 3    Cholecalciferol (VITAMIN D3) 2000 UNITS CAPS Take 2 tablets by mouth daily       Calcium 1761-4290 MG-UNIT CHEW Take  by mouth.  naloxone 4 MG/0.1ML LIQD nasal spray 1 spray by Nasal route as needed for Opioid Reversal (Patient not taking: Reported on 5/4/2021) 1 each 0    albuterol sulfate HFA (VENTOLIN HFA) 108 (90 Base) MCG/ACT inhaler Inhale 2 puffs into the lungs every 6 hours as needed for Wheezing (Patient not taking: Reported on 5/4/2021) 1 Inhaler 3     No current facility-administered medications for this visit.         Past Medical History: Not on file    Intimate partner violence     Fear of current or ex partner: Not on file     Emotionally abused: Not on file     Physically abused: Not on file     Forced sexual activity: Not on file   Other Topics Concern    Not on file   Social History Narrative    Not on file       Review of Systems:   · Constitutional: there has been no unanticipated weight loss. There's been no change in energy level, sleep pattern, or activity level. · Eyes: No visual changes or diplopia. No scleral icterus. · ENT: No Headaches, hearing loss or vertigo. No mouth sores or sore throat. · Cardiovascular: Reviewed in HPI  · Respiratory: No cough or wheezing, no sputum production. No hematemesis. · Gastrointestinal: No abdominal pain, appetite loss, blood in stools. No change in bowel or bladder habits. · Genitourinary: No dysuria, trouble voiding, or hematuria. · Musculoskeletal:  No gait disturbance, weakness or joint complaints. · Integumentary: No rash or pruritis. · Neurological: No headache, diplopia, change in muscle strength, numbness or tingling. No change in gait, balance, coordination, mood, affect, memory, mentation, behavior. · Psychiatric: No anxiety, no depression. · Endocrine: No malaise, fatigue or temperature intolerance. No excessive thirst, fluid intake, or urination. No tremor. · Hematologic/Lymphatic: No abnormal bruising or bleeding, blood clots or swollen lymph nodes. · Allergic/Immunologic: No nasal congestion or hives.       Physical Exam:  Vitals:    05/04/21 1327   BP: 124/68   Pulse: 83   SpO2: 96%   Weight: 192 lb (87.1 kg)   Height: 5' 4\" (1.626 m)     Body mass index is 32.96 kg/m².      Wt Readings from Last 3 Encounters:   05/04/21 192 lb (87.1 kg)   04/27/21 193 lb (87.5 kg)   02/23/21 191 lb (86.6 kg)     BP Readings from Last 3 Encounters:   05/04/21 124/68   04/27/21 118/64   02/23/21 130/78            Constitutional and General Appearance:   WD/WN in NAD  HEENT: NC/AT  KATHRYN  No problems with hearing  Skin:  Warm, dry  Respiratory:  · Normal excursion and expansion without use of accessory muscles  · Resp Auscultation: Normal breath sounds without dullness  Cardiovascular:  · The apical impulses not displaced  · Heart tones are crisp and normal  · Cervical veins are not engorged  · The carotid upstroke is normal in amplitude and contour without delay or bruit  · JVP normal  RRR with nl S1 and S2 without m,r,g  · Peripheral pulses are symmetrical and full  · There is no clubbing, cyanosis of the extremities. · No edema  · Femoral Arteries: 2+ and equal  · Pedal Pulses: 2+ and equal   Neck:  · No thyromegaly  Abdomen:  · No masses or tenderness  · Liver/Spleen: No Abnormalities Noted  Neurological/Psychiatric:  · Alert and oriented in all spheres  · Moves all extremities well  · Exhibits normal gait balance and coordination  · No abnormalities of mood, affect, memory, mentation, or behavior are noted    Echo: 11/05/19  Summary   Left ventricular systolic function is low with a visually estimated ejection   fraction of 45%. The left ventricle is normal in size with mild septal hypertrophy. Abnormal (paradoxical) septal wall motion consistent with left bundle branch   block. Grade II diastolic dysfunction with elevated LV pressure. Right ventricular systolic function is indeterminate. Systolic pulmonary artery pressure (SPAP) is normal and estimated at 25 mmHg   (right atrial pressure 3 mmHg). Labs were reviewed including labs from other hospital systems through [facility]. Cardiac testing was reviewed including echos, nuclear scans, cardiac catheterization, including from other hospital systems through [facility]. Assessment:    1. Chest pain, unspecified type    2. Mixed hyperlipidemia    3. Essential hypertension    4. Systolic CHF, chronic (HCC)    5. Cardiomyopathy, idiopathic ([address]    6.  Low serum vitamin D      Lipitor side effects of leg cramps. Crestor intolerance due to muscle cramps    Plan:  1. Discussed COVID vaccine but she would like to hold off for now. Call if you would like to get scheduled   2. Continue current medications   3. Call if chest pain does not decrease over time or if it starts to come on with activity. 4. Labs- Vitamin D, CBC, BMP, BNP and fasting lipids   5. Follow up with me in 6 months with an echocardiogram        Scribe's attestation: This note was scribed in the presence of Tiffany Schuster M.D. By Lino Arellano RN     The scribe's documentation has been prepared under my direction and personally reviewed by me in its entirety. I confirm that the note above accurately reflects all work, treatment, procedures, and medical decision making performed by me. Time Based Itemization  A total of 40 minutes was spent on today's patient encounter.   If applicable, non-patient-facing activities:  ( x)Preparing to see the patient and reviewing records  ( ) Individual interpretation of results  ( ) Discussion or coordination of care with other health care professionals  ( x) Ordering of unique tests, medications, or procedures  ( x) Documentation within the EHR        Tiffany Schuster M.D., [facility]

## 2021-05-18 NOTE — TELEPHONE ENCOUNTER
Pt called stating she has a respiratory \"cold\" right now. She is asking what OTC medications she could take along with her cardiac medications. Please advise and call pt at [phone]. Tetracycline Counseling: Patient counseled regarding possible photosensitivity and increased risk for sunburn.  Patient instructed to avoid sunlight, if possible.  When exposed to sunlight, patients should wear protective clothing, sunglasses, and sunscreen.  The patient was instructed to call the office immediately if the following severe adverse effects occur:  hearing changes, easy bruising/bleeding, severe headache, or vision changes.  The patient verbalized understanding of the proper use and possible adverse effects of tetracycline.  All of the patient's questions and concerns were addressed. Patient understands to avoid pregnancy while on therapy due to potential birth defects. Topical Clindamycin Pregnancy And Lactation Text: This medication is Pregnancy Category B and is considered safe during pregnancy. It is unknown if it is excreted in breast milk. Azithromycin Counseling:  I discussed with the patient the risks of azithromycin including but not limited to GI upset, allergic reaction, drug rash, diarrhea, and yeast infections. Sarecycline Counseling: Patient advised regarding possible photosensitivity and discoloration of the teeth, skin, lips, tongue and gums.  Patient instructed to avoid sunlight, if possible.  When exposed to sunlight, patients should wear protective clothing, sunglasses, and sunscreen.  The patient was instructed to call the office immediately if the following severe adverse effects occur:  hearing changes, easy bruising/bleeding, severe headache, or vision changes.  The patient verbalized understanding of the proper use and possible adverse effects of sarecycline.  All of the patient's questions and concerns were addressed. Doxycycline Pregnancy And Lactation Text: This medication is Pregnancy Category D and not consider safe during pregnancy. It is also excreted in breast milk but is considered safe for shorter treatment courses. Use Enhanced Medication Counseling?: No Topical Retinoid Pregnancy And Lactation Text: This medication is Pregnancy Category C. It is unknown if this medication is excreted in breast milk. Birth Control Pills Pregnancy And Lactation Text: This medication should be avoided if pregnant and for the first 30 days post-partum. Tetracycline Pregnancy And Lactation Text: This medication is Pregnancy Category D and not consider safe during pregnancy. It is also excreted in breast milk. High Dose Vitamin A Counseling: Side effects reviewed, pt to contact office should one occur. Azithromycin Pregnancy And Lactation Text: This medication is considered safe during pregnancy and is also secreted in breast milk. Topical Sulfur Applications Counseling: Topical Sulfur Counseling: Patient counseled that this medication may cause skin irritation or allergic reactions.  In the event of skin irritation, the patient was advised to reduce the amount of the drug applied or use it less frequently.   The patient verbalized understanding of the proper use and possible adverse effects of topical sulfur application.  All of the patient's questions and concerns were addressed. Tazorac Counseling:  Patient advised that medication is irritating and drying.  Patient may need to apply sparingly and wash off after an hour before eventually leaving it on overnight.  The patient verbalized understanding of the proper use and possible adverse effects of tazorac.  All of the patient's questions and concerns were addressed. Erythromycin Counseling:  I discussed with the patient the risks of erythromycin including but not limited to GI upset, allergic reaction, drug rash, diarrhea, increase in liver enzymes, and yeast infections. Dapsone Counseling: I discussed with the patient the risks of dapsone including but not limited to hemolytic anemia, agranulocytosis, rashes, methemoglobinemia, kidney failure, peripheral neuropathy, headaches, GI upset, and liver toxicity.  Patients who start dapsone require monitoring including baseline LFTs and weekly CBCs for the first month, then every month thereafter.  The patient verbalized understanding of the proper use and possible adverse effects of dapsone.  All of the patient's questions and concerns were addressed. Topical Sulfur Applications Pregnancy And Lactation Text: This medication is Pregnancy Category C and has an unknown safety profile during pregnancy. It is unknown if this topical medication is excreted in breast milk. Bactrim Counseling:  I discussed with the patient the risks of sulfa antibiotics including but not limited to GI upset, allergic reaction, drug rash, diarrhea, dizziness, photosensitivity, and yeast infections.  Rarely, more serious reactions can occur including but not limited to aplastic anemia, agranulocytosis, methemoglobinemia, blood dyscrasias, liver or kidney failure, lung infiltrates or desquamative/blistering drug rashes. High Dose Vitamin A Pregnancy And Lactation Text: High dose vitamin A therapy is contraindicated during pregnancy and breast feeding. Erythromycin Pregnancy And Lactation Text: This medication is Pregnancy Category B and is considered safe during pregnancy. It is also excreted in breast milk. Benzoyl Peroxide Counseling: Patient counseled that medicine may cause skin irritation and bleach clothing.  In the event of skin irritation, the patient was advised to reduce the amount of the drug applied or use it less frequently.   The patient verbalized understanding of the proper use and possible adverse effects of benzoyl peroxide.  All of the patient's questions and concerns were addressed. Spironolactone Counseling: Patient advised regarding risks of diarrhea, abdominal pain, hyperkalemia, birth defects (for female patients), liver toxicity and renal toxicity. The patient may need blood work to monitor liver and kidney function and potassium levels while on therapy. The patient verbalized understanding of the proper use and possible adverse effects of spironolactone.  All of the patient's questions and concerns were addressed. Dapsone Pregnancy And Lactation Text: This medication is Pregnancy Category C and is not considered safe during pregnancy or breast feeding. Minocycline Counseling: Patient advised regarding possible photosensitivity and discoloration of the teeth, skin, lips, tongue and gums.  Patient instructed to avoid sunlight, if possible.  When exposed to sunlight, patients should wear protective clothing, sunglasses, and sunscreen.  The patient was instructed to call the office immediately if the following severe adverse effects occur:  hearing changes, easy bruising/bleeding, severe headache, or vision changes.  The patient verbalized understanding of the proper use and possible adverse effects of minocycline.  All of the patient's questions and concerns were addressed. Detail Level: Detailed Tazorac Pregnancy And Lactation Text: This medication is not safe during pregnancy. It is unknown if this medication is excreted in breast milk. Bactrim Pregnancy And Lactation Text: This medication is Pregnancy Category D and is known to cause fetal risk.  It is also excreted in breast milk. Benzoyl Peroxide Pregnancy And Lactation Text: This medication is Pregnancy Category C. It is unknown if benzoyl peroxide is excreted in breast milk. Isotretinoin Counseling: Patient should get monthly blood tests, not donate blood, not drive at night if vision affected, not share medication, and not undergo elective surgery for 6 months after tx completed. Side effects reviewed, pt to contact office should one occur. Spironolactone Pregnancy And Lactation Text: This medication can cause feminization of the male fetus and should be avoided during pregnancy. The active metabolite is also found in breast milk. Doxycycline Counseling:  Patient counseled regarding possible photosensitivity and increased risk for sunburn.  Patient instructed to avoid sunlight, if possible.  When exposed to sunlight, patients should wear protective clothing, sunglasses, and sunscreen.  The patient was instructed to call the office immediately if the following severe adverse effects occur:  hearing changes, easy bruising/bleeding, severe headache, or vision changes.  The patient verbalized understanding of the proper use and possible adverse effects of doxycycline.  All of the patient's questions and concerns were addressed. Topical Clindamycin Counseling: Patient counseled that this medication may cause skin irritation or allergic reactions.  In the event of skin irritation, the patient was advised to reduce the amount of the drug applied or use it less frequently.   The patient verbalized understanding of the proper use and possible adverse effects of clindamycin.  All of the patient's questions and concerns were addressed. Birth Control Pills Counseling: Birth Control Pill Counseling: I discussed with the patient the potential side effects of OCPs including but not limited to increased risk of stroke, heart attack, thrombophlebitis, deep venous thrombosis, hepatic adenomas, breast changes, GI upset, headaches, and depression.  The patient verbalized understanding of the proper use and possible adverse effects of OCPs. All of the patient's questions and concerns were addressed. Isotretinoin Pregnancy And Lactation Text: This medication is Pregnancy Category X and is considered extremely dangerous during pregnancy. It is unknown if it is excreted in breast milk. Topical Retinoid counseling:  Patient advised to apply a pea-sized amount only at bedtime and wait 30 minutes after washing their face before applying.  If too drying, patient may add a non-comedogenic moisturizer. The patient verbalized understanding of the proper use and possible adverse effects of retinoids.  All of the patient's questions and concerns were addressed.

## 2021-06-29 ENCOUNTER — OFFICE VISIT (OUTPATIENT)
Dept: PRIMARY CARE CLINIC | Age: 71
End: 2021-06-29
Payer: MEDICARE

## 2021-06-29 DIAGNOSIS — Z11.59 SCREENING FOR VIRAL DISEASE: Primary | ICD-10-CM

## 2021-06-29 PROCEDURE — G8417 CALC BMI ABV UP PARAM F/U: HCPCS | Performed by: NURSE PRACTITIONER

## 2021-06-29 PROCEDURE — 99211 OFF/OP EST MAY X REQ PHY/QHP: CPT | Performed by: NURSE PRACTITIONER

## 2021-06-29 PROCEDURE — G8428 CUR MEDS NOT DOCUMENT: HCPCS | Performed by: NURSE PRACTITIONER

## 2021-06-29 NOTE — PROGRESS NOTES
Chance Ornelas received a viral test for COVID-19. They were educated on isolation and quarantine as appropriate. For any symptoms, they were directed to seek care from their PCP, given contact information to establish with a doctor, directed to an urgent care or the emergency room.

## 2021-06-29 NOTE — PATIENT INSTRUCTIONS
Advance Care Planning  People with COVID-19 may have no symptoms, mild symptoms, such as fever, cough, and shortness of breath or they may have more severe illness, developing severe and fatal pneumonia. As a result, Advance Care Planning with attention to naming a health care decision maker (someone you trust to make healthcare decisions for you if you could not speak for yourself) and sharing other health care preferences is important BEFORE a possible health crisis. Please contact your Primary Care Provider to discuss Advance Care Planning. Preventing the Spread of Coronavirus Disease 2019 in Homes and Residential Communities  For the most recent information go to [email or web address]    Prevention steps for People with confirmed or suspected COVID-19 (including persons under investigation) who do not need to be hospitalized  and   People with confirmed COVID-19 who were hospitalized and determined to be medically stable to go home    Your healthcare provider and public health staff will evaluate whether you can be cared for at home. If it is determined that you do not need to be hospitalized and can be isolated at home, you will be monitored by staff from your local or state health department. You should follow the prevention steps below until a healthcare provider or local or state health department says you can return to your normal activities. Stay home except to get medical care  People who are mildly ill with COVID-19 are able to isolate at home during their illness. You should restrict activities outside your home, except for getting medical care. Do not go to work, school, or public areas. Avoid using public transportation, ride-sharing, or taxis. Separate yourself from other people and animals in your home  People: As much as possible, you should stay in a specific room and away from other people in your home.  Also, you should use a separate before eating or preparing food. If soap and water are not readily available, use an alcohol-based hand  with at least 60% alcohol, covering all surfaces of your hands and rubbing them together until they feel dry. Soap and water are the best option if hands are visibly dirty. Avoid touching your eyes, nose, and mouth with unwashed hands. Avoid sharing personal household items  You should not share dishes, drinking glasses, cups, eating utensils, towels, or bedding with other people or pets in your home. After using these items, they should be washed thoroughly with soap and water. Clean all high-touch surfaces everyday  High touch surfaces include counters, tabletops, doorknobs, bathroom fixtures, toilets, phones, keyboards, tablets, and bedside tables. Also, clean any surfaces that may have blood, stool, or body fluids on them. Use a household cleaning spray or wipe, according to the label instructions. Labels contain instructions for safe and effective use of the cleaning product including precautions you should take when applying the product, such as wearing gloves and making sure you have good ventilation during use of the product. Monitor your symptoms  Seek prompt medical attention if your illness is worsening (e.g., difficulty breathing). Before seeking care, call your healthcare provider and tell them that you have, or are being evaluated for, COVID-19. Put on a facemask before you enter the facility. These steps will help the healthcare providers office to keep other people in the office or waiting room from getting infected or exposed. Ask your healthcare provider to call the local or state health department. Persons who are placed under active monitoring or facilitated self-monitoring should follow instructions provided by their local health department or occupational health professionals, as appropriate. When working with your local health department check their available hours.   If you have a medical emergency and need to call 911, notify the dispatch personnel that you have, or are being evaluated for COVID-19. If possible, put on a facemask before emergency medical services arrive. Discontinuing home isolation  Patients with confirmed COVID-19 should remain under home isolation precautions until the risk of secondary transmission to others is thought to be low. The decision to discontinue home isolation precautions should be made on a case-by-case basis, in consultation with healthcare providers and state and local health departments.

## 2021-06-30 LAB — SARS-COV-2: NOT DETECTED

## 2021-07-26 ENCOUNTER — TELEPHONE (OUTPATIENT)
Dept: FAMILY MEDICINE CLINIC | Age: 71
End: 2021-07-26

## 2021-07-26 NOTE — TELEPHONE ENCOUNTER
----- Message from Yudelka Sora sent at 7/26/2021 11:11 AM EDT -----  Subject: Message to Provider    QUESTIONS  Information for Provider? Pt called to verify her appt. She will be there!     ---------------------------------------------------------------------------  --------------  CALL BACK INFO  What is the best way for the office to contact you? Do not leave any   message, patient will call back for answer  Preferred Call Back Phone Number? [phone]  ---------------------------------------------------------------------------  --------------  SCRIPT ANSWERS  Relationship to Patient?  Self

## 2021-07-27 ENCOUNTER — OFFICE VISIT (OUTPATIENT)
Dept: FAMILY MEDICINE CLINIC | Age: 71
End: 2021-07-27
Payer: COMMERCIAL

## 2021-07-27 VITALS
HEIGHT: 64 IN | WEIGHT: 192 LBS | OXYGEN SATURATION: 94 % | HEART RATE: 80 BPM | BODY MASS INDEX: 32.78 KG/M2 | SYSTOLIC BLOOD PRESSURE: 134 MMHG | DIASTOLIC BLOOD PRESSURE: 68 MMHG

## 2021-07-27 DIAGNOSIS — M50.321 DEGENERATION OF INTERVERTEBRAL DISC AT C4-C5 LEVEL: ICD-10-CM

## 2021-07-27 DIAGNOSIS — M50.223 HERNIATION OF INTERVERTEBRAL DISC AT C6-C7 LEVEL: ICD-10-CM

## 2021-07-27 DIAGNOSIS — M50.322 DEGENERATION OF C5-C6 INTERVERTEBRAL DISC: Primary | ICD-10-CM

## 2021-07-27 DIAGNOSIS — M50.221 HERNIATION OF INTERVERTEBRAL DISC AT C4-C5 LEVEL: ICD-10-CM

## 2021-07-27 PROCEDURE — 99214 OFFICE O/P EST MOD 30 MIN: CPT | Performed by: FAMILY MEDICINE

## 2021-07-27 SDOH — ECONOMIC STABILITY: FOOD INSECURITY: WITHIN THE PAST 12 MONTHS, THE FOOD YOU BOUGHT JUST DIDN'T LAST AND YOU DIDN'T HAVE MONEY TO GET MORE.: NEVER TRUE

## 2021-07-27 SDOH — ECONOMIC STABILITY: FOOD INSECURITY: WITHIN THE PAST 12 MONTHS, YOU WORRIED THAT YOUR FOOD WOULD RUN OUT BEFORE YOU GOT MONEY TO BUY MORE.: NEVER TRUE

## 2021-07-27 ASSESSMENT — SOCIAL DETERMINANTS OF HEALTH (SDOH): HOW HARD IS IT FOR YOU TO PAY FOR THE VERY BASICS LIKE FOOD, HOUSING, MEDICAL CARE, AND HEATING?: NOT HARD AT ALL

## 2021-07-27 NOTE — PROGRESS NOTES
Chief Complaint   Patient presents with    Neck Pain        Internal Administration   First Dose      Second Dose           Last COVID Lab SARS-CoV-2 (no units)   Date Value   2021 Not Detected             Wt Readings from Last 3 Encounters:   21 192 lb (87.1 kg)   21 190 lb (86.2 kg)   21 192 lb (87.1 kg)     BP Readings from Last 3 Encounters:   21 134/68   21 (!) 128/91   21 124/68      Lab Results   Component Value Date    LABA1C 6.3 2020    LABA1C 5.9 2020       HPI:  Carlos Daniel is a 79 y.o. (: 1950) here today for    Patient is here today for her workman's comp case from [facility]. She continues to have the bee sting sensation around her posterior scar on the neck. She states that she is having a lot of neck pressure near the skill. And has noted an increase in her headaches. She does admit that her stress level has also been higher than usual.       [x] Patient has completed an advance directive  [] Patient has NOT completed an advanced directive  [x] Patient has a documented healthcare surrogate  [] Patient does NOT have a documented healthcare surrogate  [] Discussed the importance of establishing and updating an advanced directive. Patient has questions at this time and those were answered. [x] Discussed the importance of establishing and updating an advanced directive. Patient does NOT have questions at this time. Discussed with: [x] Patient            [] Family             [] Other caregiver    Patient's medications, allergies, past medical, surgical, social and family histories were reviewed and updated asappropriate on 2021 at 5:22 PM.    ROS:  Review of Systems    All other systems reviewed and are negative except as noted above on 2021 at 5:22 PM. Additional review of systems may be scanned into the media section ofthis medical record. Any responses requiring further intervention were pursued.     Past Medical History: Diagnosis Date    Arthritis     Asthma     Back pain     Fibromyalgia     Fibromyalgia     Heart failure with reduced ejection fraction (HCC)     Herpes zoster     Hypercholesteremia     Hyperlipidemia     Hypertriglyceridemia     Neck pain     Osteopenia     Stress-induced cardiomyopathy 2017     Family History   Problem Relation Age of Onset    High Blood Pressure Mother     Heart Disease Father      Social History     Socioeconomic History    Marital status:      Spouse name: Not on file    Number of children: Not on file    Years of education: Not on file    Highest education level: Not on file   Occupational History    Not on file   Tobacco Use    Smoking status: Former Smoker     Packs/day: 0.50     Years: 3.00     Pack years: 1.50     Types: Cigarettes     Quit date: 2015     Years since quittin.5    Smokeless tobacco: Never Used    Tobacco comment: pt smoked off and on   Vaping Use    Vaping Use: Never used   Substance and Sexual Activity    Alcohol use: No     Alcohol/week: 0.0 standard drinks    Drug use: No    Sexual activity: Yes     Partners: Male   Other Topics Concern    Not on file   Social History Narrative    Not on file     Social Determinants of Health     Financial Resource Strain: Low Risk     Difficulty of Paying Living Expenses: Not hard at all   Food Insecurity: No Food Insecurity    Worried About Running Out of Food in the Last Year: Never true    Ike of Food in the Last Year: Never true   Transportation Needs:     Lack of Transportation (Medical):      Lack of Transportation (Non-Medical):    Physical Activity:     Days of Exercise per Week:     Minutes of Exercise per Session:    Stress:     Feeling of Stress :    Social Connections:     Frequency of Communication with Friends and Family:     Frequency of Social Gatherings with Friends and Family:     Attends Confucianism Services:     Active Member of Clubs or Organizations:  Attends Club or Organization Meetings:     Marital Status:    Intimate Partner Violence:     Fear of Current or Ex-Partner:     Emotionally Abused:     Physically Abused:     Sexually Abused:      Prior to Visit Medications    Medication Sig Taking? Authorizing Provider   gabapentin (NEURONTIN) 100 MG capsule TAKE TWO CAPSULES BY MOUTH THREE TIMES A DAY AS DIRECTED Yes Miguel Mendoza MD   lidocaine (LIDODERM) 5 % Place patch on skin 12 hours on, 12 hours off. Patient taking differently: Place patch on skin 12 hours on, 12 hours off. As needed Yes Miguel Mendoza MD   fenofibrate (TRICOR) 54 MG tablet TAKE ONE TABLET BY MOUTH DAILY Yes Tana Preciado MD   lisinopril (PRINIVIL;ZESTRIL) 40 MG tablet TAKE ONE TABLET BY MOUTH EVERY EVENING Yes Tana Preciado MD   spironolactone (ALDACTONE) 25 MG tablet Elmore Ashlyn DAILY Yes Tana Preciado MD   metoprolol succinate (TOPROL XL) 50 MG extended release tablet TAKE ONE TABLET BY MOUTH DAILY Yes Tana Preciado MD   atorvastatin (LIPITOR) 20 MG tablet TAKE ONE TABLET BY MOUTH DAILY Yes Tana Preciado MD   ezetimibe (ZETIA) 10 MG tablet Take 1 tablet by mouth daily Yes Tana Preciado MD   naloxone 4 MG/0.1ML LIQD nasal spray 1 spray by Nasal route as needed for Opioid Reversal Yes Vita García MD   albuterol sulfate HFA (VENTOLIN HFA) 108 (90 Base) MCG/ACT inhaler Inhale 2 puffs into the lungs every 6 hours as needed for Wheezing Yes Miguel Mendoza MD   aspirin EC 81 MG EC tablet Take 1 tablet by mouth daily Yes Miguel Mendoza MD   Cholecalciferol (VITAMIN D3) 2000 UNITS CAPS Take 2 tablets by mouth daily  Yes Historical Provider, MD   Calcium 4361-4622 MG-UNIT CHEW Take  by mouth.  Yes Historical Provider, MD     Allergies   Allergen Reactions    Shellfish-Derived Products Nausea And Vomiting    Celebrex [Celecoxib] Swelling    Codeine     Ibuprofen Swelling    Methadone     Vioxx Swelling OBJECTIVE:  Estimated body mass index is 32.96 kg/m² as calculated from the following:    Height as of this encounter: 5' 4\" (1.626 m). Weight as of this encounter: 192 lb (87.1 kg). Vitals:    07/27/21 1552   BP: 134/68   Site: Left Upper Arm   Position: Sitting   Cuff Size: Medium Adult   Pulse: 80   SpO2: 94%   Weight: 192 lb (87.1 kg)   Height: 5' 4\" (1.626 m)       Physical Exam  Constitutional:       Appearance: Normal appearance. Cardiovascular:      Rate and Rhythm: Regular rhythm. Heart sounds: Normal heart sounds. Pulmonary:      Breath sounds: Normal breath sounds. Abdominal:      Tenderness: There is no abdominal tenderness. Musculoskeletal:         General: Tenderness (right proximal trapezius spasm ) present. Cervical back: Spasms, tenderness (of the right paracervical and trapezius  muscle on palpitation) and bony tenderness (of c7) present. Decreased range of motion. Right lower leg: No edema. Left lower leg: No edema. Comments: C spine flexion is att 25 degrees, extension 5 degrees, left rotation 50 degrees, right rotation 30 degrees, left flexion 20 degrees, right flexion 20 degrees   Skin:     General: Skin is warm and dry. Neurological:      Mental Status: She is alert and oriented to person, place, and time. Sensory: Sensory deficit (mildly diminished left radial wrist webspace) present. Deep Tendon Reflexes:      Reflex Scores:       Bicep reflexes are 2+ on the right side and 2+ on the left side. Comments:  strength of upper extremities is equal.   She does has trigger point of left paracervical.  Trapezius spasm is greater on the left than right. Psychiatric:         Mood and Affect: Mood normal.         Behavior: Behavior normal.              ASSESSMENT PLAN      Diagnosis Orders   1. Degeneration of C5-C6 intervertebral disc     2. Degeneration of intervertebral disc at C4-C5 level     3.  Herniation of intervertebral disc at

## 2021-08-24 ENCOUNTER — OFFICE VISIT (OUTPATIENT)
Dept: FAMILY MEDICINE CLINIC | Age: 71
End: 2021-08-24
Payer: MEDICARE

## 2021-08-24 VITALS
DIASTOLIC BLOOD PRESSURE: 66 MMHG | WEIGHT: 189 LBS | OXYGEN SATURATION: 97 % | SYSTOLIC BLOOD PRESSURE: 126 MMHG | HEIGHT: 64 IN | HEART RATE: 78 BPM | BODY MASS INDEX: 32.27 KG/M2

## 2021-08-24 DIAGNOSIS — E78.2 MIXED HYPERLIPIDEMIA: ICD-10-CM

## 2021-08-24 DIAGNOSIS — R73.03 PRE-DIABETES: ICD-10-CM

## 2021-08-24 DIAGNOSIS — E55.9 VITAMIN D DEFICIENCY: ICD-10-CM

## 2021-08-24 DIAGNOSIS — J45.20 MILD INTERMITTENT ASTHMA WITHOUT COMPLICATION: ICD-10-CM

## 2021-08-24 DIAGNOSIS — I42.9 CARDIOMYOPATHY, IDIOPATHIC (HCC): ICD-10-CM

## 2021-08-24 DIAGNOSIS — F41.8 SITUATIONAL ANXIETY: Primary | ICD-10-CM

## 2021-08-24 DIAGNOSIS — I10 ESSENTIAL HYPERTENSION: ICD-10-CM

## 2021-08-24 DIAGNOSIS — Z12.11 SCREEN FOR COLON CANCER: ICD-10-CM

## 2021-08-24 PROCEDURE — G8417 CALC BMI ABV UP PARAM F/U: HCPCS | Performed by: FAMILY MEDICINE

## 2021-08-24 PROCEDURE — 99214 OFFICE O/P EST MOD 30 MIN: CPT | Performed by: FAMILY MEDICINE

## 2021-08-24 PROCEDURE — 4040F PNEUMOC VAC/ADMIN/RCVD: CPT | Performed by: FAMILY MEDICINE

## 2021-08-24 PROCEDURE — 3017F COLORECTAL CA SCREEN DOC REV: CPT | Performed by: FAMILY MEDICINE

## 2021-08-24 PROCEDURE — 1123F ACP DISCUSS/DSCN MKR DOCD: CPT | Performed by: FAMILY MEDICINE

## 2021-08-24 PROCEDURE — 1090F PRES/ABSN URINE INCON ASSESS: CPT | Performed by: FAMILY MEDICINE

## 2021-08-24 PROCEDURE — 1036F TOBACCO NON-USER: CPT | Performed by: FAMILY MEDICINE

## 2021-08-24 PROCEDURE — G8427 DOCREV CUR MEDS BY ELIG CLIN: HCPCS | Performed by: FAMILY MEDICINE

## 2021-08-24 PROCEDURE — G8400 PT W/DXA NO RESULTS DOC: HCPCS | Performed by: FAMILY MEDICINE

## 2021-08-24 RX ORDER — CITALOPRAM 10 MG/1
10 TABLET ORAL DAILY
Qty: 30 TABLET | Refills: 3 | Status: SHIPPED | OUTPATIENT
Start: 2021-08-24 | End: 2021-09-24 | Stop reason: SDUPTHER

## 2021-08-24 NOTE — PROGRESS NOTES
Chief Complaint   Patient presents with    Hyperlipidemia    Hypertension    Other     vit d def    Other     patient has multiple medical complaints        Internal Administration   First Dose      Second Dose           Last COVID Lab SARS-CoV-2 (no units)   Date Value   2021 Not Detected             Wt Readings from Last 3 Encounters:   21 189 lb (85.7 kg)   21 192 lb (87.1 kg)   21 190 lb (86.2 kg)     BP Readings from Last 3 Encounters:   21 126/66   21 134/68   21 (!) 128/91      Lab Results   Component Value Date    LABA1C 6.3 2020    LABA1C 5.9 2020       HPI:  Dulce Parra is a 79 y.o. (: 1950) here today for     patient states that her breathing is doing okay. Only has issues when it is really hot outside. She has not been checking her blood pressures. Blood pressure was okay today in office. Patient states that she is really having trouble with anxiety with car problems and work related stress. Discussed anxiety medication. She is agreeable to trying something to help with this anxiety. Discussed with patient the need for another colonoscopy. She states that she did not tolerate the prep at all, she just seemed to vomit it all up. She is very much leery of wanting to do another colonoscopy due to the way the prep made her feel. She really does not want to do a colonoscopy. Discussed the other option of a cologuard. [x] Patient has completed an advance directive  [] Patient has NOT completed an advanced directive  [x] Patient has a documented healthcare surrogate  [] Patient does NOT have a documented healthcare surrogate  [] Discussed the importance of establishing and updating an advanced directive. Patient has questions at this time and those were answered. [x] Discussed the importance of establishing and updating an advanced directive. Patient does NOT have questions at this time.     Discussed with: [x] Patient [] Family             [] Other caregiver    Patient's medications, allergies, past medical, surgical, social and family histories were reviewed and updated asappropriate on 2021 at 4:00 PM.    ROS:  Review of Systems    All other systems reviewed and are negative except as noted above on 2021 at 4:00 PM. Additional review of systems may be scanned into the media section ofthis medical record. Any responses requiring further intervention were pursued.     Past Medical History:   Diagnosis Date    Arthritis     Asthma     Back pain     Fibromyalgia     Fibromyalgia     Heart failure with reduced ejection fraction (HCC)     Herpes zoster     Hypercholesteremia     Hyperlipidemia     Hypertriglyceridemia     Neck pain     Osteopenia     Stress-induced cardiomyopathy 2017     Family History   Problem Relation Age of Onset    High Blood Pressure Mother     Heart Disease Father      Social History     Socioeconomic History    Marital status:      Spouse name: Not on file    Number of children: Not on file    Years of education: Not on file    Highest education level: Not on file   Occupational History    Not on file   Tobacco Use    Smoking status: Former Smoker     Packs/day: 0.50     Years: 3.00     Pack years: 1.50     Types: Cigarettes     Quit date: 2015     Years since quittin.6    Smokeless tobacco: Never Used    Tobacco comment: pt smoked off and on   Vaping Use    Vaping Use: Never used   Substance and Sexual Activity    Alcohol use: No     Alcohol/week: 0.0 standard drinks    Drug use: No    Sexual activity: Yes     Partners: Male   Other Topics Concern    Not on file   Social History Narrative    Not on file     Social Determinants of Health     Financial Resource Strain: Low Risk     Difficulty of Paying Living Expenses: Not hard at all   Food Insecurity: No Food Insecurity    Worried About Running Out of Food in the Last Year: Never true    Ran Out of Food in the Last Year: Never true   Transportation Needs:     Lack of Transportation (Medical):  Lack of Transportation (Non-Medical):    Physical Activity:     Days of Exercise per Week:     Minutes of Exercise per Session:    Stress:     Feeling of Stress :    Social Connections:     Frequency of Communication with Friends and Family:     Frequency of Social Gatherings with Friends and Family:     Attends [employer] Services:     Active Member of Clubs or Organizations:     Attends Club or Organization Meetings:     Marital Status:    Intimate Partner Violence:     Fear of Current or Ex-Partner:     Emotionally Abused:     Physically Abused:     Sexually Abused:      Prior to Visit Medications    Medication Sig Taking? Authorizing Provider   traMADol (ULTRAM) 50 MG tablet Take 1 tablet by mouth every 6 hours for 30 days. Yes BRENDA Grier CNP   gabapentin (NEURONTIN) 100 MG capsule TAKE TWO CAPSULES BY MOUTH THREE TIMES A DAY AS DIRECTED Yes Dallas Casillas MD   lidocaine (LIDODERM) 5 % Place patch on skin 12 hours on, 12 hours off. Patient taking differently: Place patch on skin 12 hours on, 12 hours off.  As needed Yes Dallas Casillas MD   fenofibrate (TRICOR) 54 MG tablet TAKE ONE TABLET BY MOUTH DAILY Yes Analia Moore MD   lisinopril (PRINIVIL;ZESTRIL) 40 MG tablet TAKE ONE TABLET BY MOUTH EVERY EVENING Yes Analia Moore MD   spironolactone (ALDACTONE) 25 MG tablet Tali Gourd DAILY Yes Analia Moore MD   metoprolol succinate (TOPROL XL) 50 MG extended release tablet TAKE ONE TABLET BY MOUTH DAILY Yes Analia Moore MD   atorvastatin (LIPITOR) 20 MG tablet TAKE ONE TABLET BY MOUTH DAILY Yes Analia Moore MD   ezetimibe (ZETIA) 10 MG tablet Take 1 tablet by mouth daily Yes Analia Moore MD   naloxone 4 MG/0.1ML LIQD nasal spray 1 spray by Nasal route as needed for Opioid Reversal Yes Dalia Mckay MD   albuterol sulfate HFA (VENTOLIN HFA) 108 (90 Base) MCG/ACT inhaler Inhale 2 puffs into the lungs every 6 hours as needed for Wheezing Yes Nicole Rob MD   aspirin EC 81 MG EC tablet Take 1 tablet by mouth daily Yes Nicole Rob MD   Cholecalciferol (VITAMIN D3) 2000 UNITS CAPS Take 2 tablets by mouth daily  Yes Historical Provider, MD   Calcium 4529-4066 MG-UNIT CHEW Take  by mouth. Yes Historical Provider, MD     Allergies   Allergen Reactions    Shellfish-Derived Products Nausea And Vomiting    Celebrex [Celecoxib] Swelling    Codeine     Ibuprofen Swelling    Methadone     Vioxx Swelling       OBJECTIVE:  Estimated body mass index is 32.44 kg/m² as calculated from the following:    Height as of this encounter: 5' 4\" (1.626 m). Weight as of this encounter: 189 lb (85.7 kg). Vitals:    08/24/21 1537   BP: 126/66   Site: Left Upper Arm   Position: Sitting   Cuff Size: Large Adult   Pulse: 78   SpO2: 97%   Weight: 189 lb (85.7 kg)   Height: 5' 4\" (1.626 m)       Physical Exam  Vitals and nursing note reviewed. Constitutional:       General: She is not in acute distress. Appearance: She is well-developed. She is not diaphoretic. HENT:      Head: Normocephalic and atraumatic. Right Ear: External ear normal.      Left Ear: External ear normal.      Nose: Nose normal.   Eyes:      General: Lids are normal. No scleral icterus. Right eye: No discharge. Left eye: No discharge. Pupils: Pupils are equal, round, and reactive to light. Neck:      Thyroid: No thyromegaly. Vascular: No JVD. Cardiovascular:      Rate and Rhythm: Normal rate and regular rhythm. Heart sounds: Normal heart sounds. Pulmonary:      Effort: Pulmonary effort is normal. No respiratory distress. Breath sounds: Normal breath sounds. Abdominal:      Palpations: Abdomen is soft. There is no hepatomegaly or splenomegaly. Tenderness: There is no abdominal tenderness.    Musculoskeletal:      Right lower leg: Edema (1+) present. Left lower leg: Edema (1+) present. Skin:     General: Skin is warm and dry. Coloration: Skin is not pale. Findings: No erythema or rash. Comments: Turgor normal   Neurological:      Mental Status: She is oriented to person, place, and time. Psychiatric:         Mood and Affect: Mood normal.         Behavior: Behavior normal.         Thought Content: Thought content normal.         Judgment: Judgment normal.              ASSESSMENT PLAN      Diagnosis Orders   1. Situational anxiety  citalopram (CELEXA) 10 MG tablet   2. Screen for colon cancer  [place] (For External Results Only)   3. Vitamin D deficiency     4. Mild intermittent asthma without complication     5. Cardiomyopathy, idiopathic ([address]     6. Essential hypertension     7. Mixed hyperlipidemia     8. Pre-diabetes     Patient reports being easily irritated and stressed with numerous stressors. Was concerned because she is irritable with her daughter. She agreed to start Celexa 10 mg daily. Virtual follow-up on the efficacy of the medication. Regular follow-up in office for these conditions in 6 months. Agreed to Rubia. Vitamin D levels will be monitored as needed continue lipid monitoring as needed. No symptoms of elevated sugar. No evidence of heart failure, had the issue with Takotsubo cardiomyopathy in the past    Patient should call the office immediately with new or ongoing signs or symptoms or worsening, or proceed to the emergency room. No changes in past medical history, past surgical history, social history, or family history were noted during the patient encounter unless specifically listed above. All updates of past medical history, past surgical history, social history, or family history were reviewed personally by me during the office visit. All problems listed in the assessment are stable unless noted otherwise.   Medication profile reviewed personally by me during the visit. Medication side effects and possible impairments from medications were discussed as applicable. This document was prepared by a combination of typing and transcription through a voice recognition software. Scribe attestation: Fredrick oWo RN, am scribing for and in the presence of Mary Jane Gordon MD. Electronically signed by David Ritter RN on 8/24/2021 at 4:00 PM      Provider attestation:     I, Dr. Dannie Saldana, personally performed the services described in this documentation, as scribed by the above signed scribe in my presence, and it is both accurate and complete. I agree with the ROS and Past Histories independently gathered by the clinical support staff and the remaining scribed note accurately describes my personal service to the patient.       8/24/2021    5:10 PM

## 2021-08-24 NOTE — PATIENT INSTRUCTIONS

## 2021-09-24 ENCOUNTER — VIRTUAL VISIT (OUTPATIENT)
Dept: FAMILY MEDICINE CLINIC | Age: 71
End: 2021-09-24
Payer: MEDICARE

## 2021-09-24 DIAGNOSIS — I50.22 SYSTOLIC CHF, CHRONIC (HCC): ICD-10-CM

## 2021-09-24 DIAGNOSIS — F41.8 SITUATIONAL ANXIETY: Primary | ICD-10-CM

## 2021-09-24 DIAGNOSIS — I10 ESSENTIAL HYPERTENSION: ICD-10-CM

## 2021-09-24 DIAGNOSIS — E78.2 MIXED HYPERLIPIDEMIA: ICD-10-CM

## 2021-09-24 PROCEDURE — 99441 PR PHYS/QHP TELEPHONE EVALUATION 5-10 MIN: CPT | Performed by: FAMILY MEDICINE

## 2021-09-24 RX ORDER — EZETIMIBE 10 MG/1
10 TABLET ORAL DAILY
Qty: 90 TABLET | Refills: 3 | Status: SHIPPED | OUTPATIENT
Start: 2021-09-24 | End: 2022-10-20

## 2021-09-24 RX ORDER — CITALOPRAM 10 MG/1
10 TABLET ORAL DAILY
Qty: 90 TABLET | Refills: 3 | Status: SHIPPED | OUTPATIENT
Start: 2021-09-24 | End: 2022-11-04

## 2021-09-24 RX ORDER — METOPROLOL SUCCINATE 50 MG/1
TABLET, EXTENDED RELEASE ORAL
Qty: 90 TABLET | Refills: 3 | Status: SHIPPED | OUTPATIENT
Start: 2021-09-24 | End: 2022-10-20

## 2021-09-24 RX ORDER — ATORVASTATIN CALCIUM 20 MG/1
TABLET, FILM COATED ORAL
Qty: 90 TABLET | Refills: 3 | Status: SHIPPED | OUTPATIENT
Start: 2021-09-24 | End: 2022-10-20

## 2021-09-24 NOTE — PROGRESS NOTES
Darrell Wiley is a 79 y.o. female evaluated via telephone on 9/24/2021. Patient states that her anxiety is doing much better. She did bring up that she has been out of her metoprolol and lipitor for a week now and she has reached out to Dr. Poonam Tierney office about the issue, he is her cardiologist who prescribes the medications but has still not received a new script. She was reminded of need for her colonoscopy. She states she has the cologuard test at her post office for her to  she is planning to get it this weekend and complete the test.      Internal Administration   First Dose      Second Dose           Last COVID Lab SARS-CoV-2 (no units)   Date Value   06/29/2021 Not Detected            Wt Readings from Last 3 Encounters:   08/24/21 189 lb (85.7 kg)   07/27/21 192 lb (87.1 kg)   06/01/21 190 lb (86.2 kg)     BP Readings from Last 3 Encounters:   08/24/21 126/66   07/27/21 134/68   06/01/21 (!) 128/91     Lab Results   Component Value Date    LABA1C 6.3 09/02/2020    LABA1C 5.9 03/17/2020        ASSESSMENT PLAN      Diagnosis Orders   1. Situational anxiety  citalopram (CELEXA) 10 MG tablet   2. Essential hypertension  metoprolol succinate (TOPROL XL) 50 MG extended release tablet   3. Systolic CHF, chronic (HCC)  metoprolol succinate (TOPROL XL) 50 MG extended release tablet   4. Mixed hyperlipidemia  atorvastatin (LIPITOR) 20 MG tablet    ezetimibe (ZETIA) 10 MG tablet    patient feels more relaxed with the citalopram. Sleeping 6 to 8 hours. Can walk away from stressful situations easier. Easier to relax her mind. I also assisted with the refills she needed and made more of the prescriptions 90 days to match up with the prescriptions that she had. She has three follow-up appointments already scheduled. Patient should call the office immediately with new or ongoing signs or symptoms or worsening, or proceed to the emergency room.   No changes in past medical history, past surgical history, social history, or family history were noted during the patient encounter unless specifically listed above. All updates of past medical history, past surgical history, social history, or family history were reviewed personally by me during the office visit. All problems listed in the assessment are stable unless noted otherwise. Medication profile reviewed personally by me during the visit. Medication side effects and possible impairments from medications were discussed as applicable. This document was prepared by a combination of typing and transcription through a voice recognition software. I, Dr. Moncho Rodrigues, personally performed the services described in this documentation, as scribed by the above signed scribe in my presence, and it is both accurate and complete. I agree with the ROS and Past Histories independently gathered by the clinical support staff and the remaining scribed note accurately describes my personal service to the patient. 9/24/2021    4:17 PM      [x] Patient has completed an advance directive  [] Patient has NOT completed an advanced directive  [x] Patient has a documented healthcare surrogate  [] Patient does NOT have a documented healthcare surrogate  [] Discussed the importance of establishing and updating an advanced directive. Patient has questions at this time and those were answered. [x] Discussed the importance of establishing and updating an advanced directive. Patient does NOT have questions at this time. Discussed with: [x] Patient            [] Family             [] Other caregiver         Consent:  She and/or health care decision maker is aware that that she may receive a bill for this telephone service, depending on her insurance coverage, and has provided verbal consent to proceed: Yes      Documentation:  I communicated with the patient and/or health care decision maker about see above.    Details of this discussion including any medical advice provided: see above      I affirm this is a Patient Initiated Episode with an Established Patient who has not had a related appointment within my department in the past 7 days or scheduled within the next 24 hours.     Total Time: minutes: 5-10 minutes    Note: not billable if this call serves to triage the patient into an appointment for the relevant concern      Jered Gleason RN

## 2021-10-11 DIAGNOSIS — I10 ESSENTIAL HYPERTENSION: ICD-10-CM

## 2021-10-11 DIAGNOSIS — I50.22 SYSTOLIC CHF, CHRONIC (HCC): ICD-10-CM

## 2021-10-11 RX ORDER — LISINOPRIL 40 MG/1
TABLET ORAL
Qty: 90 TABLET | Refills: 1 | Status: SHIPPED | OUTPATIENT
Start: 2021-10-11 | End: 2022-04-26 | Stop reason: SDUPTHER

## 2021-10-25 ENCOUNTER — OFFICE VISIT (OUTPATIENT)
Dept: FAMILY MEDICINE CLINIC | Age: 71
End: 2021-10-25
Payer: COMMERCIAL

## 2021-10-25 VITALS
WEIGHT: 185 LBS | OXYGEN SATURATION: 97 % | DIASTOLIC BLOOD PRESSURE: 68 MMHG | SYSTOLIC BLOOD PRESSURE: 114 MMHG | BODY MASS INDEX: 31.58 KG/M2 | HEIGHT: 64 IN | HEART RATE: 76 BPM

## 2021-10-25 DIAGNOSIS — M51.37 DEGENERATION OF LUMBAR OR LUMBOSACRAL INTERVERTEBRAL DISC: ICD-10-CM

## 2021-10-25 DIAGNOSIS — M51.36 DEGENERATION OF L4-L5 INTERVERTEBRAL DISC: Primary | ICD-10-CM

## 2021-10-25 DIAGNOSIS — M51.37 DEGENERATIVE DISC DISEASE AT L5-S1 LEVEL: ICD-10-CM

## 2021-10-25 DIAGNOSIS — S80.12XD CONTUSION OF LEFT LOWER EXTREMITY, SUBSEQUENT ENCOUNTER: Chronic | ICD-10-CM

## 2021-10-25 DIAGNOSIS — S20.229D CONTUSION OF BACK, UNSPECIFIED LATERALITY, SUBSEQUENT ENCOUNTER: ICD-10-CM

## 2021-10-25 PROCEDURE — 99214 OFFICE O/P EST MOD 30 MIN: CPT | Performed by: FAMILY MEDICINE

## 2021-10-25 RX ORDER — GABAPENTIN 100 MG/1
CAPSULE ORAL
Qty: 180 CAPSULE | Refills: 5 | Status: SHIPPED | OUTPATIENT
Start: 2021-10-25 | End: 2022-01-31 | Stop reason: SDUPTHER

## 2021-10-25 NOTE — PROGRESS NOTES
and are negative except as noted above on 10/25/2021 at 3:30 PM. Additional review of systems may be scanned into the media section ofthis medical record. Any responses requiring further intervention were pursued. Past Medical History:   Diagnosis Date    Arthritis     Asthma     Back pain     Fibromyalgia     Fibromyalgia     Heart failure with reduced ejection fraction (HCC)     Herpes zoster     Hypercholesteremia     Hyperlipidemia     Hypertriglyceridemia     Neck pain     Osteopenia     Stress-induced cardiomyopathy 2017     Family History   Problem Relation Age of Onset    High Blood Pressure Mother     Heart Disease Father      Social History     Socioeconomic History    Marital status:      Spouse name: Not on file    Number of children: Not on file    Years of education: Not on file    Highest education level: Not on file   Occupational History    Not on file   Tobacco Use    Smoking status: Former Smoker     Packs/day: 0.50     Years: 3.00     Pack years: 1.50     Types: Cigarettes     Quit date: 2015     Years since quittin.8    Smokeless tobacco: Never Used    Tobacco comment: pt smoked off and on   Vaping Use    Vaping Use: Never used   Substance and Sexual Activity    Alcohol use: No     Alcohol/week: 0.0 standard drinks    Drug use: No    Sexual activity: Yes     Partners: Male   Other Topics Concern    Not on file   Social History Narrative    Not on file     Social Determinants of Health     Financial Resource Strain: Low Risk     Difficulty of Paying Living Expenses: Not hard at all   Food Insecurity: No Food Insecurity    Worried About Running Out of Food in the Last Year: Never true    Ike of Food in the Last Year: Never true   Transportation Needs:     Lack of Transportation (Medical):      Lack of Transportation (Non-Medical):    Physical Activity:     Days of Exercise per Week:     Minutes of Exercise per Session:    Stress:     Feeling of Stress :    Social Connections:     Frequency of Communication with Friends and Family:     Frequency of Social Gatherings with Friends and Family:     Attends Nondenominational Services:     Active Member of Clubs or Organizations:     Attends Club or Organization Meetings:     Marital Status:    Intimate Partner Violence:     Fear of Current or Ex-Partner:     Emotionally Abused:     Physically Abused:     Sexually Abused:      Prior to Visit Medications    Medication Sig Taking? Authorizing Provider   traMADol (ULTRAM) 50 MG tablet Take 1 tablet by mouth every 6 hours for 30 days. Yes Theresa Marquez MD   lisinopril (PRINIVIL;ZESTRIL) 40 MG tablet TAKE ONE TABLET BY MOUTH EVERY EVENING Yes Raji Velez MD   metoprolol succinate (TOPROL XL) 50 MG extended release tablet TAKE ONE TABLET BY MOUTH DAILY Yes Melanie Hernandez MD   atorvastatin (LIPITOR) 20 MG tablet TAKE ONE TABLET BY MOUTH DAILY Yes Melanie Hernandez MD   ezetimibe (ZETIA) 10 MG tablet Take 1 tablet by mouth daily Yes Melanie Hernandez MD   citalopram (CELEXA) 10 MG tablet Take 1 tablet by mouth daily Yes Melanie Hernandez MD   lidocaine (LIDODERM) 5 % Place patch on skin 12 hours on, 12 hours off. Patient taking differently: Place patch on skin 12 hours on, 12 hours off.  As needed Yes Melanie Hernandez MD   fenofibrate (TRICOR) 54 MG tablet TAKE ONE TABLET BY MOUTH DAILY Yes Ada Ibarra MD   spironolactone (ALDACTONE) 25 MG tablet TAKE ONE-HALF TABLET BY MOUTH DAILY Yes Ada Ibarra MD   albuterol sulfate HFA (VENTOLIN HFA) 108 (90 Base) MCG/ACT inhaler Inhale 2 puffs into the lungs every 6 hours as needed for Wheezing Yes Melanie Hernandez MD   aspirin EC 81 MG EC tablet Take 1 tablet by mouth daily Yes Melanie Hernandez MD   Cholecalciferol (VITAMIN D3) 2000 UNITS CAPS Take 2 tablets by mouth daily  Yes Historical Provider, MD   Calcium 7252-5475 MG-UNIT CHEW Take by mouth. Yes Historical Provider, MD   gabapentin (NEURONTIN) 100 MG capsule TAKE TWO CAPSULES BY MOUTH THREE TIMES A DAY AS DIRECTED  Rafal Felix MD     Allergies   Allergen Reactions    Shellfish-Derived Products Nausea And Vomiting    Celebrex [Celecoxib] Swelling    Codeine     Ibuprofen Swelling    Methadone     Vioxx Swelling       OBJECTIVE:  Estimated body mass index is 31.76 kg/m² as calculated from the following:    Height as of this encounter: 5' 4\" (1.626 m). Weight as of this encounter: 185 lb (83.9 kg). Vitals:    10/25/21 1529   BP: 114/68   Site: Left Upper Arm   Position: Sitting   Cuff Size: Medium Adult   Pulse: 76   SpO2: 97%   Weight: 185 lb (83.9 kg)   Height: 5' 4\" (1.626 m)       Physical Exam  Vitals and nursing note reviewed. Constitutional:       General: She is not in acute distress. Appearance: She is well-developed. She is not diaphoretic. HENT:      Head: Normocephalic and atraumatic. Right Ear: External ear normal.      Left Ear: External ear normal.      Nose: Nose normal.   Eyes:      General: Lids are normal. No scleral icterus. Right eye: No discharge. Left eye: No discharge. Pupils: Pupils are equal, round, and reactive to light. Neck:      Thyroid: No thyromegaly. Vascular: No JVD. Cardiovascular:      Rate and Rhythm: Normal rate and regular rhythm. Pulmonary:      Effort: Pulmonary effort is normal. No respiratory distress. Abdominal:      Palpations: Abdomen is soft. There is no hepatomegaly or splenomegaly. Tenderness: There is no abdominal tenderness. Musculoskeletal:      Comments: 1+ patellar reflex of left leg  2+ patellar reflex of right leg  Tenderness over the T4-L5     Skin:     General: Skin is warm and dry. Coloration: Skin is not pale. Findings: No erythema or rash.       Comments: Turgor normal   Neurological:      Deep Tendon Reflexes:      Reflex Scores:       Patellar reflexes are 2+ on the right side and 1+ on the left side. Comments: Straight leg raising negative. Psychiatric:         Behavior: Behavior normal.         Thought Content: Thought content normal.         Judgment: Judgment normal.              ASSESSMENT PLAN      Diagnosis Orders   1. Degeneration of L4-L5 intervertebral disc  NEETU Viramontes MD, Neurosurgery, [facility]   2. Degenerative disc disease at L5-S1 level  NEETU Viramontes MD, Neurosurgery, [facility]   3. Contusion of left lower extremity, subsequent encounter     4. Contusion of back, unspecified laterality, subsequent encounter     5. Degeneration of lumbar or lumbosacral intervertebral disc  gabapentin (NEURONTIN) 100 MG capsule    L4-L5, L5-S1   Patient with worsening pain in the low back running down the left leg into the left foot. Diminished patellar reflex on that side. Injections no longer help, tramadol does help some. Both pain management and I believe patient should return to neurosurgery for further evaluation. C9 placed for Dr. Wilmar Miner. Continue gabapentin. Follow-up on this claim in 6 months. Patient should call the office immediately with new or ongoing signs or symptoms or worsening, or proceed to the emergency room. No changes in past medical history, past surgical history, social history, or family history were noted during the patient encounter unless specifically listed above. All updates of past medical history, past surgical history, social history, or family history were reviewed personally by me during the office visit. All problems listed in the assessment are stable unless noted otherwise. Medication profile reviewed personally by me during the visit. Medication side effects and possible impairments from medications were discussed as applicable. This document was prepared by a combination of typing and transcription through a voice recognition software.                 Scribe attestation: Alison Strong, RN, am scribing for and in the presence of Hernan Dumont MD. Electronically signed by Lucero Gaspar RN on 10/25/2021 at 3:30 PM      Provider attestation:     I, Dr. Darwin Max, personally performed the services described in this documentation, as scribed by the above signed scribe in my presence, and it is both accurate and complete. I agree with the ROS and Past Histories independently gathered by the clinical support staff and the remaining scribed note accurately describes my personal service to the patient.       10/25/2021    4:27 PM

## 2021-10-25 NOTE — PATIENT INSTRUCTIONS

## 2021-10-27 ENCOUNTER — NURSE ONLY (OUTPATIENT)
Dept: FAMILY MEDICINE CLINIC | Age: 71
End: 2021-10-27
Payer: MEDICARE

## 2021-10-27 DIAGNOSIS — I50.22 SYSTOLIC CHF, CHRONIC (HCC): ICD-10-CM

## 2021-10-27 DIAGNOSIS — E78.2 MIXED HYPERLIPIDEMIA: ICD-10-CM

## 2021-10-27 DIAGNOSIS — I10 ESSENTIAL HYPERTENSION: ICD-10-CM

## 2021-10-27 DIAGNOSIS — R79.89 LOW SERUM VITAMIN D: ICD-10-CM

## 2021-10-27 DIAGNOSIS — R73.03 PRE-DIABETES: Primary | ICD-10-CM

## 2021-10-27 DIAGNOSIS — I42.9 CARDIOMYOPATHY, IDIOPATHIC (HCC): ICD-10-CM

## 2021-10-27 LAB
ANION GAP SERPL CALCULATED.3IONS-SCNC: 12 MMOL/L (ref 3–16)
BUN BLDV-MCNC: 13 MG/DL (ref 7–20)
CALCIUM SERPL-MCNC: 9.6 MG/DL (ref 8.3–10.6)
CHLORIDE BLD-SCNC: 102 MMOL/L (ref 99–110)
CHOLESTEROL, TOTAL: 250 MG/DL (ref 0–199)
CO2: 24 MMOL/L (ref 21–32)
CREAT SERPL-MCNC: 0.8 MG/DL (ref 0.6–1.2)
GFR AFRICAN AMERICAN: >60
GFR NON-AFRICAN AMERICAN: >60
GLUCOSE BLD-MCNC: 108 MG/DL (ref 70–99)
HCT VFR BLD CALC: 36.2 % (ref 36–48)
HDLC SERPL-MCNC: 43 MG/DL (ref 40–60)
HEMOGLOBIN: 12.1 G/DL (ref 12–16)
LDL CHOLESTEROL CALCULATED: 176 MG/DL
MCH RBC QN AUTO: 29.1 PG (ref 26–34)
MCHC RBC AUTO-ENTMCNC: 33.5 G/DL (ref 31–36)
MCV RBC AUTO: 87 FL (ref 80–100)
PDW BLD-RTO: 13.2 % (ref 12.4–15.4)
PLATELET # BLD: 244 K/UL (ref 135–450)
PMV BLD AUTO: 10.1 FL (ref 5–10.5)
POTASSIUM SERPL-SCNC: 4.1 MMOL/L (ref 3.5–5.1)
PRO-BNP: 163 PG/ML (ref 0–124)
RBC # BLD: 4.16 M/UL (ref 4–5.2)
SODIUM BLD-SCNC: 138 MMOL/L (ref 136–145)
TRIGL SERPL-MCNC: 153 MG/DL (ref 0–150)
VITAMIN D 25-HYDROXY: 77.3 NG/ML
VLDLC SERPL CALC-MCNC: 31 MG/DL
WBC # BLD: 6.2 K/UL (ref 4–11)

## 2021-10-27 PROCEDURE — 36415 COLL VENOUS BLD VENIPUNCTURE: CPT | Performed by: FAMILY MEDICINE

## 2021-10-28 LAB
ESTIMATED AVERAGE GLUCOSE: 122.6 MG/DL
HBA1C MFR BLD: 5.9 %

## 2021-11-01 ENCOUNTER — VIRTUAL VISIT (OUTPATIENT)
Dept: FAMILY MEDICINE CLINIC | Age: 71
End: 2021-11-01
Payer: MEDICARE

## 2021-11-01 DIAGNOSIS — I50.22 SYSTOLIC CHF, CHRONIC (HCC): ICD-10-CM

## 2021-11-01 DIAGNOSIS — F41.8 SITUATIONAL ANXIETY: ICD-10-CM

## 2021-11-01 DIAGNOSIS — F34.1 DYSTHYMIA: Primary | ICD-10-CM

## 2021-11-01 PROCEDURE — 99441 PR PHYS/QHP TELEPHONE EVALUATION 5-10 MIN: CPT | Performed by: FAMILY MEDICINE

## 2021-11-01 RX ORDER — SPIRONOLACTONE 25 MG/1
TABLET ORAL
Qty: 45 TABLET | Refills: 3 | Status: SHIPPED | OUTPATIENT
Start: 2021-11-01

## 2021-11-01 NOTE — PROGRESS NOTES
Porfirio Lepe is a 70 y.o. female evaluated via telephone on 11/1/2021. Patient is here today to talk about her citalopram and stopping the medication due to concerns about the side effects. She states that she heard it could cause seizures and she does not want to risk this side effects. She admits that since stopping the medication her anxiety has been creeping back up but not to the extent that it was before but it is starting to bother her some. Internal Administration   First Dose      Second Dose           Last COVID Lab SARS-CoV-2 (no units)   Date Value   06/29/2021 Not Detected            Wt Readings from Last 3 Encounters:   10/25/21 185 lb (83.9 kg)   08/24/21 189 lb (85.7 kg)   07/27/21 192 lb (87.1 kg)     BP Readings from Last 3 Encounters:   10/25/21 114/68   08/24/21 126/66   07/27/21 134/68     Lab Results   Component Value Date    LABA1C 5.9 10/27/2021    LABA1C 6.3 09/02/2020    LABA1C 5.9 03/17/2020        ASSESSMENT PLAN      Diagnosis Orders   1. Dysthymia     2. Systolic CHF, chronic (HCC)  spironolactone (ALDACTONE) 25 MG tablet   3. Situational anxiety     Patient felt the citalopram was helping her to feel better about herself and feeling less anxious. She then talked to Dr. Mili Arvizu and he was concerned about an interaction between tramadol and citalopram.  I told her that alone citalopram had a less than 1% chance for seizures. With the drug interaction  the recommendation is to simply monitor therapy is not a contraindication. I told her I had several patients on tramadol with SSRIs and had not seen any problems. I told her the potential problem always existed but in my mind the benefit she was getting from the citalopram outweighed any risk. She will restart the citalopram.  We refilled the spironolactone that she takes for her heart failure.   She has follow-up in January and we will discuss her citalopram again during that visit although since this is not a Worker's Comp. diagnoses we will not record any of her comments about the Celexa in the [employer]. note she will have to have a separate note. Patient should call the office immediately with new or ongoing signs or symptoms or worsening, or proceed to the emergency room. No changes in past medical history, past surgical history, social history, or family history were noted during the patient encounter unless specifically listed above. All updates of past medical history, past surgical history, social history, or family history were reviewed personally by me during the office visit. All problems listed in the assessment are stable unless noted otherwise. Medication profile reviewed personally by me during the visit. Medication side effects and possible impairments from medications were discussed as applicable. This document was prepared by a combination of typing and transcription through a voice recognition software. [x] Patient has completed an advance directive  [] Patient has NOT completed an advanced directive  [x] Patient has a documented healthcare surrogate  [] Patient does NOT have a documented healthcare surrogate  [] Discussed the importance of establishing and updating an advanced directive. Patient has questions at this time and those were answered. [x] Discussed the importance of establishing and updating an advanced directive. Patient does NOT have questions at this time. Discussed with: [x] Patient            [] Family             [] Other caregiver         Consent:  She and/or health care decision maker is aware that that she may receive a bill for this telephone service, depending on her insurance coverage, and has provided verbal consent to proceed: Yes      Documentation:  I communicated with the patient and/or health care decision maker about see above.    Details of this discussion including any medical advice provided: See above      I affirm this is a Patient Initiated Episode with an Established Patient who has not had a related appointment within my department in the past 7 days or scheduled within the next 24 hours.     Total Time: minutes: 5-10 minutes    Note: not billable if this call serves to triage the patient into an appointment for the relevant concern      Rafa Lyn RN

## 2021-11-02 ENCOUNTER — TELEPHONE (OUTPATIENT)
Dept: CARDIOLOGY CLINIC | Age: 71
End: 2021-11-02

## 2021-11-02 NOTE — TELEPHONE ENCOUNTER
----- Message from John Steiner MD sent at 10/29/2021  5:27 PM EDT -----  Call patient. Labs look okay except for her LDL still being significantly elevated (it is better, but not as good as it should be). Is she willing to increase atorvastatin to 40 mg a day?   MATTEO

## 2021-11-04 ENCOUNTER — TELEPHONE (OUTPATIENT)
Dept: FAMILY MEDICINE CLINIC | Age: 71
End: 2021-11-04

## 2021-11-04 NOTE — LETTER
[address] [facility]  [address] [place], [place] [address] [place] [address]  Phone: [phone]  Fax: [phone]    Elliot Jonas MD         November 5, 2021     Patient: Pillo Bell   YOB: 1950   Date of Visit: 11/4/2021       To Whom It May Concern: It is my medical opinion that Lidia Martinez requires a disability parking placard for the following reasons:  She cannot walk 200 feet without stopping to rest.  Duration of need: 5 years    If you have any questions or concerns, please don't hesitate to call.     Sincerely,        Elliot Jonas MD

## 2021-11-08 NOTE — PROGRESS NOTES
[address]  Advanced CHF/Pulmonary Hypertension   Cardiac Evaluation      Brian Cox  YOB: 1950    Date of Visit:  11/9/21    Chief Complaint   Patient presents with    Follow-up    Edema           History of Present Illness:  Brian Cox is a 70 y.o. female who presents from referral from Dr. Lindsey Martinez for consultation and management of CHF, non ischemic CMP. She has a with PMH of HTN, HLD, obesity, abnormal stress test, and fibromyalgia, who presented to [facility] with chest pain in March 2017. History obtained from patient and review of EMR. She underwent a stress test on 3/20/17 that showed moderate sized anteroseptal partial reversibility defect consistent with  ischemia and infarction in the territory of the mid and distal LAD  She has right subclavian artery stenosis and has seen Dr. Burgess Miller for this. She underwent LHC on 3/23/17 with no intervention needed. She had a recent VL Duplex on 5/2/18 (report below). Her echo from 06/07/18 showed her EF was 45%. Her echo from 11/05/19 showed her EF was 45% with grade II DD. Today she had a preliminary Echo which showed her EF was 40-50%. She states that she does not have a lot of strength or energy but makes it through the day ok. She was started on citalopram by her PCP. She says that it is helping her anxiety. Her LDL was elevated on her last check due to being out of medications from the pharmacy. She is currently looking for a new pharmacy. She wants to start tumeric. Patient currently denies any weight gain, edema, palpitations, chest pain, shortness of breath, dizziness, and syncope.         Allergies   Allergen Reactions    Shellfish-Derived Products Nausea And Vomiting    Celebrex [Celecoxib] Swelling    Codeine     Ibuprofen Swelling    Methadone     Vioxx Swelling     Current Outpatient Medications   Medication Sig Dispense Refill    spironolactone (ALDACTONE) 25 MG tablet TAKE ONE-HALF TABLET BY MOUTH DAILY 45 tablet 3    gabapentin (NEURONTIN) 100 MG capsule TAKE TWO CAPSULES BY MOUTH THREE TIMES A DAY AS DIRECTED 180 capsule 5    traMADol (ULTRAM) 50 MG tablet Take 1 tablet by mouth every 6 hours for 30 days. 120 tablet 1    lisinopril (PRINIVIL;ZESTRIL) 40 MG tablet TAKE ONE TABLET BY MOUTH EVERY EVENING 90 tablet 1    metoprolol succinate (TOPROL XL) 50 MG extended release tablet TAKE ONE TABLET BY MOUTH DAILY 90 tablet 3    atorvastatin (LIPITOR) 20 MG tablet TAKE ONE TABLET BY MOUTH DAILY 90 tablet 3    ezetimibe (ZETIA) 10 MG tablet Take 1 tablet by mouth daily 90 tablet 3    citalopram (CELEXA) 10 MG tablet Take 1 tablet by mouth daily 90 tablet 3    lidocaine (LIDODERM) 5 % Place patch on skin 12 hours on, 12 hours off. (Patient taking differently: Place patch on skin 12 hours on, 12 hours off. As needed) 30 patch 5    fenofibrate (TRICOR) 54 MG tablet TAKE ONE TABLET BY MOUTH DAILY 90 tablet 3    albuterol sulfate HFA (VENTOLIN HFA) 108 (90 Base) MCG/ACT inhaler Inhale 2 puffs into the lungs every 6 hours as needed for Wheezing 1 Inhaler 3    aspirin EC 81 MG EC tablet Take 1 tablet by mouth daily 30 tablet 3    Cholecalciferol (VITAMIN D3) 2000 UNITS CAPS Take 2 tablets by mouth daily       Calcium 3969-4477 MG-UNIT CHEW Take  by mouth. No current facility-administered medications for this visit.        Past Medical History:   Diagnosis Date    Arthritis     Asthma     Back pain     Fibromyalgia     Fibromyalgia     Heart failure with reduced ejection fraction (HCC)     Herpes zoster     Hypercholesteremia     Hyperlipidemia     Hypertriglyceridemia     Neck pain     Osteopenia     Stress-induced cardiomyopathy 03/2017     Past Surgical History:   Procedure Laterality Date    BACK SURGERY      BLADDER REPAIR      CARDIAC CATHETERIZATION      COLONOSCOPY  4/23/12    diverticulosis    HYSTERECTOMY      NECK SURGERY  1/98, 10/05, '07 or '08    [id number] '05 Family History   Problem Relation Age of Onset    High Blood Pressure Mother     Heart Disease Father      Social History     Socioeconomic History    Marital status:      Spouse name: Not on file    Number of children: Not on file    Years of education: Not on file    Highest education level: Not on file   Occupational History    Not on file   Tobacco Use    Smoking status: Former Smoker     Packs/day: 0.50     Years: 3.00     Pack years: 1.50     Types: Cigarettes     Quit date: 2015     Years since quittin.8    Smokeless tobacco: Never Used    Tobacco comment: pt smoked off and on   Vaping Use    Vaping Use: Never used   Substance and Sexual Activity    Alcohol use: No     Alcohol/week: 0.0 standard drinks    Drug use: No    Sexual activity: Yes     Partners: Male   Other Topics Concern    Not on file   Social History Narrative    Not on file     Social Determinants of Health     Financial Resource Strain: Low Risk     Difficulty of Paying Living Expenses: Not hard at all   Food Insecurity: No Food Insecurity    Worried About Running Out of Food in the Last Year: Never true    Ike of Food in the Last Year: Never true   Transportation Needs:     Lack of Transportation (Medical): Not on file    Lack of Transportation (Non-Medical):  Not on file   Physical Activity:     Days of Exercise per Week: Not on file    Minutes of Exercise per Session: Not on file   Stress:     Feeling of Stress : Not on file   Social Connections:     Frequency of Communication with Friends and Family: Not on file    Frequency of Social Gatherings with Friends and Family: Not on file    Attends Cheondoism Services: Not on file    Active Member of Clubs or Organizations: Not on file    Attends Club or Organization Meetings: Not on file    Marital Status: Not on file   Intimate Partner Violence:     Fear of Current or Ex-Partner: Not on file    Emotionally Abused: Not on file    Physically Abused: Not on file    Sexually Abused: Not on file   Housing Stability:     Unable to Pay for Housing in the Last Year: Not on file    Number of Places Lived in the Last Year: Not on file    Unstable Housing in the Last Year: Not on file       Review of Systems:   · Constitutional: there has been no unanticipated weight loss. There's been no change in energy level, sleep pattern, or activity level. · Eyes: No visual changes or diplopia. No scleral icterus. · ENT: No Headaches, hearing loss or vertigo. No mouth sores or sore throat. · Cardiovascular: Reviewed in HPI  · Respiratory: No cough or wheezing, no sputum production. No hematemesis. · Gastrointestinal: No abdominal pain, appetite loss, blood in stools. No change in bowel or bladder habits. · Genitourinary: No dysuria, trouble voiding, or hematuria. · Musculoskeletal:  No gait disturbance, weakness or joint complaints. · Integumentary: No rash or pruritis. · Neurological: No headache, diplopia, change in muscle strength, numbness or tingling. No change in gait, balance, coordination, mood, affect, memory, mentation, behavior. · Psychiatric: No anxiety, no depression. · Endocrine: No malaise, fatigue or temperature intolerance. No excessive thirst, fluid intake, or urination. No tremor. · Hematologic/Lymphatic: No abnormal bruising or bleeding, blood clots or swollen lymph nodes. · Allergic/Immunologic: No nasal congestion or hives.       Physical Exam:  Vitals:    11/09/21 1344   BP: 120/70   Pulse: 68   SpO2: 98%   Weight: 182 lb (82.6 kg)   Height: 5' 4\" (1.626 m)     Body mass index is 31.24 kg/m².      Wt Readings from Last 3 Encounters:   11/09/21 182 lb (82.6 kg)   10/25/21 185 lb (83.9 kg)   08/24/21 189 lb (85.7 kg)     BP Readings from Last 3 Encounters:   11/09/21 120/70   10/25/21 114/68   08/24/21 126/66               Constitutional and General Appearance:   WD/WN in NAD  HEENT:  NC/AT  KATHRYN  No problems with hearing  Skin: Warm, dry  Respiratory:  · Normal excursion and expansion without use of accessory muscles  · Resp Auscultation: Normal breath sounds without dullness  Cardiovascular:  · The apical impulses not displaced  · Heart tones are crisp and normal  · Cervical veins are not engorged  · The carotid upstroke is normal in amplitude and contour without delay or bruit  · JVP normal  RRR with nl S1 and S2 without m,r,g  · Peripheral pulses are symmetrical and full  · There is no clubbing, cyanosis of the extremities. · No edema  · Femoral Arteries: 2+ and equal  · Pedal Pulses: 2+ and equal   Neck:  · No thyromegaly  Abdomen:  · No masses or tenderness  · Liver/Spleen: No Abnormalities Noted  Neurological/Psychiatric:  · Alert and oriented in all spheres  · Moves all extremities well  · Exhibits normal gait balance and coordination  · No abnormalities of mood, affect, memory, mentation, or behavior are noted    Echo: 11/05/19  Summary   Left ventricular systolic function is low with a visually estimated ejection   fraction of 45%. The left ventricle is normal in size with mild septal hypertrophy. Abnormal (paradoxical) septal wall motion consistent with left bundle branch   block. Grade II diastolic dysfunction with elevated LV pressure. Right ventricular systolic function is indeterminate. Systolic pulmonary artery pressure (SPAP) is normal and estimated at 25 mmHg   (right atrial pressure 3 mmHg). Labs were reviewed including labs from other hospital systems through [address] [place] [address] Loop. Cardiac testing was reviewed including echos, nuclear scans, cardiac catheterization, including from other hospital systems through [address] [place] [address] Loop. Assessment:    1. Systolic CHF, chronic ([address]    2. Essential hypertension    3. Mixed hyperlipidemia          Lipitor side effects of leg cramps. Crestor intolerance due to muscle cramps    Plan:  1. CBC, CMP, BNP, and Lipids in March  2.  Follow up in 6 months      Donta's attestation: This note was scribed in the presence of Jeremy Almeida M.D. By Marty Lancaster RN    The scribe's documentation has been prepared under my direction and personally reviewed by me in its entirety. I confirm that the note above accurately reflects all work, treatment, procedures, and medical decision making performed by me. Time Based Itemization  A total of 30 minutes was spent on today's patient encounter.   If applicable, non-patient-facing activities:  ( x)Preparing to see the patient and reviewing records  ( ) Individual interpretation of results  ( ) Discussion or coordination of care with other health care professionals  ( x) Ordering of unique tests, medications, or procedures  ( x) Documentation within the EHR                 Jeremy Almeida M.D., [facility]

## 2021-11-09 ENCOUNTER — HOSPITAL ENCOUNTER (OUTPATIENT)
Dept: CARDIOLOGY | Age: 71
Discharge: HOME | End: 2021-11-09
Payer: MEDICARE

## 2021-11-09 ENCOUNTER — OFFICE VISIT (OUTPATIENT)
Dept: CARDIOLOGY CLINIC | Age: 71
End: 2021-11-09
Payer: MEDICARE

## 2021-11-09 VITALS
BODY MASS INDEX: 31.07 KG/M2 | SYSTOLIC BLOOD PRESSURE: 120 MMHG | HEART RATE: 68 BPM | HEIGHT: 64 IN | OXYGEN SATURATION: 98 % | DIASTOLIC BLOOD PRESSURE: 70 MMHG | WEIGHT: 182 LBS

## 2021-11-09 DIAGNOSIS — I50.22 SYSTOLIC CHF, CHRONIC (HCC): Primary | ICD-10-CM

## 2021-11-09 DIAGNOSIS — I42.9 CARDIOMYOPATHY, IDIOPATHIC (HCC): ICD-10-CM

## 2021-11-09 DIAGNOSIS — I10 ESSENTIAL HYPERTENSION: ICD-10-CM

## 2021-11-09 DIAGNOSIS — I50.22 SYSTOLIC CHF, CHRONIC (HCC): ICD-10-CM

## 2021-11-09 DIAGNOSIS — E78.2 MIXED HYPERLIPIDEMIA: ICD-10-CM

## 2021-11-09 LAB
LV EF: 45 %
LVEF MODALITY: NORMAL

## 2021-11-09 PROCEDURE — 3017F COLORECTAL CA SCREEN DOC REV: CPT | Performed by: INTERNAL MEDICINE

## 2021-11-09 PROCEDURE — G8417 CALC BMI ABV UP PARAM F/U: HCPCS | Performed by: INTERNAL MEDICINE

## 2021-11-09 PROCEDURE — 1090F PRES/ABSN URINE INCON ASSESS: CPT | Performed by: INTERNAL MEDICINE

## 2021-11-09 PROCEDURE — 99214 OFFICE O/P EST MOD 30 MIN: CPT | Performed by: INTERNAL MEDICINE

## 2021-11-09 PROCEDURE — 1036F TOBACCO NON-USER: CPT | Performed by: INTERNAL MEDICINE

## 2021-11-09 PROCEDURE — 4040F PNEUMOC VAC/ADMIN/RCVD: CPT | Performed by: INTERNAL MEDICINE

## 2021-11-09 PROCEDURE — 1123F ACP DISCUSS/DSCN MKR DOCD: CPT | Performed by: INTERNAL MEDICINE

## 2021-11-09 PROCEDURE — C8929 TTE W OR WO FOL WCON,DOPPLER: HCPCS

## 2021-11-09 PROCEDURE — 6360000004 HC RX CONTRAST MEDICATION: Performed by: INTERNAL MEDICINE

## 2021-11-09 PROCEDURE — G8484 FLU IMMUNIZE NO ADMIN: HCPCS | Performed by: INTERNAL MEDICINE

## 2021-11-09 PROCEDURE — G8427 DOCREV CUR MEDS BY ELIG CLIN: HCPCS | Performed by: INTERNAL MEDICINE

## 2021-11-09 PROCEDURE — G8400 PT W/DXA NO RESULTS DOC: HCPCS | Performed by: INTERNAL MEDICINE

## 2021-11-09 RX ADMIN — PERFLUTREN 2.2 MG: 6.52 INJECTION, SUSPENSION INTRAVENOUS at 13:32

## 2021-11-09 NOTE — TELEPHONE ENCOUNTER
History and Physical - Critical Care   Antwan Cho 70 y o  female MRN: [id number]  Unit/Bed#: ED 25 Encounter: [id number]    Reason for Admission / Chief Complaint: headache    History of Present Illness:  Antwan Cho is a 70 y o  female who presents with a past medical history of CAD s/p PCI to the LAD and OM in 2000, HTN, HLD, obesity, mild aortic stenosis, venous thrombosis secondary to factor V Leiden mutation on coumadin therapy, and COPD  She states that she has had symptoms of headache and visual disturbances for approximately 2 weeks  She recently had bronchitis and the symptoms seemed to start after that  She developed head ache and left eye pain  She had noted that her blood pressure had been higher than normal   Her family members had noted that she was "off" from her baseline  She was less talkative, and not always speaking coherently  She had an outpatient CT of the head  However, she does not know the results of this scan  Her symptoms persisted, which prompted her visit to the ED  She was noted to be hypertensive with systolic blood pressure in the 250's  CT of the showing no acute intracranial abnormalities  She being admitted to the step down unit for further management and care  History obtained from chart review and the patient      Past Medical History:  Past Medical History:   Diagnosis Date    Aortic ejection murmur     AS (aortic stenosis)     Atrial fibrillation (HCC)     COPD (chronic obstructive pulmonary disease) (HCC)     Diabetes mellitus (HCC)     Exertional shortness of breath     HTN (hypertension)     Hyperlipidemia     Obesity        Past Surgical History:  Past Surgical History:   Procedure Laterality Date    ADENOIDECTOMY      CARDIAC CATHETERIZATION      HYSTERECTOMY      KNEE SURGERY      ROTATOR CUFF REPAIR      TONSILLECTOMY         Past Family History:  Family History   Problem Relation Age of Onset    COPD Mother        Social Left VM to call office back per MATTEO message. History:  History   Smoking Status    Never Smoker   Smokeless Tobacco    Never Used     History   Alcohol Use No     History   Drug Use No     Marital Status:     Medications:  Current Facility-Administered Medications   Medication Dose Route Frequency    chlorhexidine (PERIDEX) 0 12 % oral rinse 15 mL  15 mL Swish & Spit Q12H [facility]    furosemide (LASIX) tablet 40 mg  40 mg Oral Daily    heparin (ACS LOW)   Intravenous Once    loratadine (CLARITIN) tablet 10 mg  10 mg Oral Daily    nadolol (CORGARD) tablet 40 mg  40 mg Oral Daily    pantoprazole (PROTONIX) EC tablet 20 mg  20 mg Oral BID AC    pravastatin (PRAVACHOL) tablet 40 mg  40 mg Oral Daily With Dinner     Home medications:  Prior to Admission medications    Medication Sig Start Date End Date Taking?  Authorizing Provider   Biotin 10 MG TABS Take by mouth    Historical Provider, MD   cetirizine (ZYRTEC ALLERGY) 10 mg tablet Take 1 tablet by mouth daily    Historical Provider, MD   furosemide (LASIX) 40 mg tablet Take 1 tablet by mouth daily 6/6/16   Historical Provider, MD   glimepiride (AMARYL) 2 mg tablet Take 1 tablet by mouth daily 7/29/16   Historical Provider, MD   lisinopril (ZESTRIL) 2 5 mg tablet Take 1 tablet by mouth daily 5/12/15   Historical Provider, MD   metFORMIN (GLUCOPHAGE) 1000 MG tablet Take 1 tablet by mouth 2 (two) times a day 7/29/16   Historical Provider, MD   multivitamin (THERAGRAN) TABS Take 1 tablet by mouth daily    Historical Provider, MD   nadolol (CORGARD) 40 mg tablet Take by mouth    Historical Provider, MD   Omega-3 Fatty Acids (FISH OIL) 645 MG CAPS Take 1 capsule by mouth daily    Historical Provider, MD   Omeprazole 20 MG TBEC Take 1 capsule by mouth 2 (two) times a day    Historical Provider, MD   potassium chloride (K-DUR,KLOR-CON) 20 mEq tablet Take 1 tablet (20 mEq total) by mouth 2 (two) times a day 3/22/18   Danielle Khoury MD   rosuvastatin (CRESTOR) 10 MG tablet Take 0 5 tablets by mouth daily 16   Historical Provider, MD   warfarin (COUMADIN) 10 mg tablet Take 1 tablet by mouth daily 3/31/16   Historical Provider, MD   warfarin (COUMADIN) 5 mg tablet Take 1 tablet by mouth daily 14   Historical Provider, MD   warfarin (COUMADIN) 7 5 mg tablet Take 1 tab daily or as directed 18   Glenroy Seth PA-C   potassium chloride (K-DUR,KLOR-CON) 20 mEq tablet Take 1 tablet by mouth 2 (two) times a day 3/30/15 3/22/18  Historical Provider, MD     Allergies: Allergies   Allergen Reactions    Iodine     Penicillins     Shellfish-Derived Products     Sulfa Antibiotics        ROS:   Review of Systems   Constitutional: Positive for activity change  HENT: Negative  Eyes: Positive for photophobia, pain and visual disturbance  Respiratory: Negative  Cardiovascular: Negative  Gastrointestinal: Negative  Endocrine: Negative  Genitourinary: Negative  Musculoskeletal: Negative  Skin: Negative  Allergic/Immunologic: Negative  Neurological: Positive for dizziness and headaches  Hematological: Negative  Psychiatric/Behavioral: Negative  Vitals:  Vitals:    18 1403 18 1405 18 1500 18 1545   BP: (!) 242/111 (!) 176/82 (!) 225/99 (!) 259/120   BP Location: Right arm Right arm Right arm Right arm   Pulse:   63 67   Resp:   22    Temp:       TempSrc:       SpO2:   98% 97%   Weight:         Temperature:   Temp (24hrs), Av 8 °F (36 6 °C), Min:97 8 °F (36 6 °C), Max:97 8 °F (36 6 °C)    Current Temperature: 97 8 °F (36 6 °C)    Weights:   IBW: -92 5 kg  Body mass index is 45 13 kg/m²      Hemodynamic Monitoring:  N/A     Non-Invasive/Invasive Ventilation Settings:  Respiratory    Lab Data (Last 4 hours)    None         O2/Vent Data (Last 4 hours)    None              No results found for: PHART, OIL1JPR, PO2ART, HQG2FNQ, D4SABRZY, BEART, SOURCE  SpO2: SpO2: 97 %    Physical Exam  PHYSICAL EXAM  General :   Well developed, well nourished, age appropriate affect, behavior, orientation, thought content, thought processes, judgement, and insight  Articulate  English primary language  Neuro:   GCS=15 CN 2-12 intact  Non Focal  HEENT:  Normocephalic, atraumatic, Pupils 3 brisk bilat  PERRLA, EOMI, hearing grossly intact  Symmetrical facial expressions without droop or slurred speech  Tongue midline without fasiculations  Neck:  No JVD, FROM, No masses/adenopathy  Back:   Symmetrical, atruamatic, spinous process pain free on palpation  Cardiovascular:   No heaves,lifts,thrills  S1/S2 [employer]  No noted MRGC  Pulmonary:   Symmetrical expansion of chest  Resp even & unlabored  GI :   Abd SNT + BSX4 quads  No palp/pulsitile masses  :  N/A  Musculoskeletal:  Symmetrical  No obvious deformity  FROM in UE & LE  Muscle strength 5/5  No lymphadenopathy  Extrem warm to touch  DTR grossly intact  Brachial/radial/femoral/popliteal/pedal/posterior tibial pulses +2  No lesions noted  CN 2-12 grossly intact  No rhomberg  Sensation and motor function intact  Labs:    Results from last 7 days  Lab Units 03/22/18  1324   WBC Thousand/uL 8 28   HEMOGLOBIN g/dL 13 1   HEMATOCRIT % 39 6   PLATELETS Thousands/uL 252   NEUTROS PCT % 61   MONOS PCT % 9      Results from last 7 days  Lab Units 03/22/18  1324   SODIUM mmol/L 140   POTASSIUM mmol/L 4 3   CHLORIDE mmol/L 101   CO2 mmol/L 29   BUN mg/dL 21   CREATININE mg/dL 0 87   CALCIUM mg/dL 9 4   TOTAL PROTEIN g/dL 8 5*   BILIRUBIN TOTAL mg/dL 0 50   ALK PHOS U/L 49   ALT U/L 58   AST U/L 37   GLUCOSE RANDOM mg/dL 127                Results from last 7 days  Lab Units 03/22/18  1324   INR  1 65*   PTT seconds 37*           0  Lab Value Date/Time   TROPONINI <0 02 03/22/2018 1325       Imaging:   CT head without contrast   Final Result      No acute intracranial abnormality                    Workstation performed: [id number]         VAS renal artery complete    (Results Pending)     EKG: NSR with 1st degree This was personally reviewed by myself  Micro:  No results found for: Lisbeth Orellana, WOUNDCULT, [place]    ______________________________________________________________________    Assessment:   1  Hypertensive emergency   2  Visual disturbances secondary to # 1  3  CAD s/p stenting  4  Venous thromboembolism secondary to factor V leiden on coumadin therapy   5  HLD   6  History of mild aortic stenosis  7  Obesity  8  ADAL with CPAP therapy  9  History of breast CA s/p lumpectomy   10   Subtherapeutic INR    Plan:      Neuro:   -monitor neuro status closely  -CT of head in ED negative for any intracranial abnormalities   -neurology consulted in ER for persistent visual disturbances in the setting of factor V leiden mutation and subtherapeutic INR  -neuro symptoms likely secondary to hypertension    CV:   -10 mg IV labetalol given in the ED, without much improvement in blood pressure  -peak systolic blood pressure was 225, will start cardene gtt for systolic goal -858   -will continue home dose BB with nadolol, will increase lisinopril dosing to 5mg  -will add norvasc 10 mg  -titrate cardene to off after PO medications administered   -continue home dose lasix   -continue statin   -patient is known to Dr Yasmeen Graves, will consult for further recommendations  -echo in June of 2017 showing no regional wall motion abnormalities, mild concentric hypertrophy and grade 1 diastolic dysfunction   -trend troponins   Lung:   -monitor respiratory status  -early mobilization  -incentive spirometry    GI:   -cardiac diet  -PPI for GI prophylaxis    FEN:   -monitor electrolytes and replete as necessary    :   -monitor urine output closely   -check UA  -check renal artery ultra sound   ID:   -no infectious etiology   Heme:   -patient has had subtherapeutic INR  -will start on heparin gtt with bridge back to coumadin     Endo:   -monitor glucose via BMp  -check TSH    Msk/Skin:   -early mobilization  -turn and reposition while in bed   Disposition:   -will monitor in step down level of care    ______________________________________________________________________    VTE Pharmacologic Prophylaxis: Sequential compression device (Venodyne)  and Heparin  VTE Mechanical Prophylaxis: sequential compression device    Invasive lines and devices: Invasive Devices     Peripheral Intravenous Line            Peripheral IV 03/22/18 Right Antecubital less than 1 day                Code Status: Level 1 - Full Code  POA:    POLST:      Given critical illness, patient length of stay will require greater than two midnights  Counseling / Coordination of Care  Total Critical Care time spent 52 minutes excluding procedures, teaching and family updates  Portions of the record may have been created with voice recognition software  Occasional wrong word or "sound a like" substitutions may have occurred due to the inherent limitations of voice recognition software  Read the chart carefully and recognize, using context, where substitutions have occurred        Dawna Nageotte, CRNP

## 2021-11-12 ENCOUNTER — TELEPHONE (OUTPATIENT)
Dept: CARDIOLOGY CLINIC | Age: 71
End: 2021-11-12

## 2021-11-12 NOTE — TELEPHONE ENCOUNTER
----- Message from Michaela Fisher MD sent at 11/11/2021  3:36 PM EST -----  Call patient and let her know that her echo final report looks good. Ejection fraction 45%. No changes.   MATTEO

## 2021-12-23 ENCOUNTER — TELEPHONE (OUTPATIENT)
Dept: FAMILY MEDICINE CLINIC | Age: 71
End: 2021-12-23

## 2021-12-23 DIAGNOSIS — B02.9 HERPES ZOSTER WITHOUT COMPLICATION: ICD-10-CM

## 2021-12-23 RX ORDER — VALACYCLOVIR HYDROCHLORIDE 1 G/1
1000 TABLET, FILM COATED ORAL 3 TIMES DAILY
Qty: 21 TABLET | Refills: 0 | Status: SHIPPED | OUTPATIENT
Start: 2021-12-23

## 2021-12-23 NOTE — TELEPHONE ENCOUNTER
Able to complete E-Visit? Able to walk through with E-Visit?   Can send to [facility] and I can complete

## 2021-12-23 NOTE — TELEPHONE ENCOUNTER
Rx sent into pharmacy.   She should take medication as soon as possible to prevent further worsening symptoms and for medication to work

## 2021-12-23 NOTE — TELEPHONE ENCOUNTER
Pt states that she is having another break out of the shingles. And was wanting to see if she could get something called in to [address].

## 2021-12-23 NOTE — TELEPHONE ENCOUNTER
Called pt to walk through. Pt doesn't have access to internet and unable to do evisit. Other options?

## 2022-01-13 ENCOUNTER — TELEPHONE (OUTPATIENT)
Dept: FAMILY MEDICINE CLINIC | Age: 72
End: 2022-01-13

## 2022-01-13 NOTE — TELEPHONE ENCOUNTER
----- Message from Tiffany Marinelli sent at 1/13/2022  3:45 PM EST -----  Subject: Message to Provider    QUESTIONS  Information for Provider? Pt is requesting a Drs excuse for jury duty. 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   ---------------------------------------------------------------------------  --------------  Amilcar LOUIS  What is the best way for the office to contact you? OK to leave message on   voicemail  Preferred Call Back Phone Number? [phone]  ---------------------------------------------------------------------------  --------------  SCRIPT ANSWERS  Relationship to Patient?  Self

## 2022-01-13 NOTE — LETTER
[address] [facility]  [address] [place] [address],[address] [place] [address]  Phone: [phone]  Fax: [phone]    Garrett Pepe MD        January 13, 2022     Patient: Samreen Montes   YOB: 1950           To Whom It May Concern: It is my medical opinion that Elva Pill is unable to perform jury duty at this time. If you have any questions or concerns, please don't hesitate to call.     Sincerely,  Garrett Pepe MD

## 2022-01-13 NOTE — Clinical Note
[address] [facility]  [address] [place] [address],[address] [place] [address]  Phone: [phone]  Fax: [phone]    Kieran Bull MD        January 13, 2022     Patient: Eilud Holbrook   YOB: 1950   Date of Visit: 1/13/2022       To Whom It May Concern: It is my medical opinion that Carson Anthony {Work release (duty restriction):[id number]}. If you have any questions or concerns, please don't hesitate to call.     Sincerely,        Kieran Bull MD

## 2022-01-31 ENCOUNTER — OFFICE VISIT (OUTPATIENT)
Dept: FAMILY MEDICINE CLINIC | Age: 72
End: 2022-01-31
Payer: COMMERCIAL

## 2022-01-31 VITALS
OXYGEN SATURATION: 95 % | WEIGHT: 183 LBS | SYSTOLIC BLOOD PRESSURE: 120 MMHG | BODY MASS INDEX: 31.41 KG/M2 | HEART RATE: 79 BPM | DIASTOLIC BLOOD PRESSURE: 75 MMHG

## 2022-01-31 DIAGNOSIS — M50.321 DEGENERATION OF INTERVERTEBRAL DISC AT C4-C5 LEVEL: ICD-10-CM

## 2022-01-31 DIAGNOSIS — M50.223 HERNIATION OF INTERVERTEBRAL DISC AT C6-C7 LEVEL: ICD-10-CM

## 2022-01-31 DIAGNOSIS — M50.221 HERNIATION OF INTERVERTEBRAL DISC AT C4-C5 LEVEL: ICD-10-CM

## 2022-01-31 DIAGNOSIS — M50.322 DEGENERATION OF C5-C6 INTERVERTEBRAL DISC: Primary | ICD-10-CM

## 2022-01-31 PROCEDURE — 99214 OFFICE O/P EST MOD 30 MIN: CPT | Performed by: FAMILY MEDICINE

## 2022-01-31 RX ORDER — GABAPENTIN 100 MG/1
CAPSULE ORAL
Qty: 180 CAPSULE | Refills: 5 | Status: SHIPPED | OUTPATIENT
Start: 2022-01-31 | End: 2022-11-02 | Stop reason: SDUPTHER

## 2022-01-31 ASSESSMENT — PATIENT HEALTH QUESTIONNAIRE - PHQ9
2. FEELING DOWN, DEPRESSED OR HOPELESS: 0
SUM OF ALL RESPONSES TO PHQ9 QUESTIONS 1 & 2: 0
SUM OF ALL RESPONSES TO PHQ QUESTIONS 1-9: 0
SUM OF ALL RESPONSES TO PHQ QUESTIONS 1-9: 0
1. LITTLE INTEREST OR PLEASURE IN DOING THINGS: 0
SUM OF ALL RESPONSES TO PHQ QUESTIONS 1-9: 0
SUM OF ALL RESPONSES TO PHQ QUESTIONS 1-9: 0

## 2022-01-31 NOTE — PROGRESS NOTES
Chief Complaint   Patient presents with    Other     worker's comp injury         Internal Administration   First Dose COVID-19, J&J, PF, 0.5 mL  2021   Second Dose           Last COVID Lab SARS-CoV-2 (no units)   Date Value   2021 Not Detected             Wt Readings from Last 3 Encounters:   22 183 lb (83 kg)   21 182 lb (82.6 kg)   10/25/21 185 lb (83.9 kg)     BP Readings from Last 3 Encounters:   22 120/75   21 120/70   10/25/21 114/68      Lab Results   Component Value Date    LABA1C 5.9 10/27/2021    LABA1C 6.3 2020    LABA1C 5.9 2020       HPI:  Latrice Singh is a 70 y.o. (: 1950) here today for    6 month follow up regarding her workmans comp neck claim from . Patient states she continues to have neck pain and bee sting sensation around her incision site. Pain is more so on the back left outer most side of her neck. There is a Bilateral trapezius spasm noted during exam. Continues to follow up with Dr. Saeid Patel and is currently waiting on visits to be approved from [facility] to make her next appt with him. Patient feels her symptoms are worsening based on the chronic headaches at the base of the skull. SStates the stinging pain bilateral paracervical    [x] Patient has completed an advance directive  [] Patient has NOT completed an advanced directive  [x] Patient has a documented healthcare surrogate  [] Patient does NOT have a documented healthcare surrogate  [] Discussed the importance of establishing and updating an advanced directive. Patient has questions at this time and those were answered. [x] Discussed the importance of establishing and updating an advanced directive. Patient does NOT have questions at this time.     Discussed with: [x] Patient            [] Family             [] Other caregiver    Patient's medications, allergies, past medical, surgical, social and family histories were reviewed and updated asappropriate on 2022 at 4:27 PM.    ROS:  Review of Systems    All other systems reviewed and are negative except as noted above on 2022 at 4:27 PM. Additional review of systems may be scanned into the media section ofthis medical record. Any responses requiring further intervention were pursued. Past Medical History:   Diagnosis Date    Arthritis     Asthma     Back pain     Fibromyalgia     Fibromyalgia     Heart failure with reduced ejection fraction (HCC)     Herpes zoster     Hypercholesteremia     Hyperlipidemia     Hypertriglyceridemia     Neck pain     Osteopenia     Stress-induced cardiomyopathy 2017     Family History   Problem Relation Age of Onset    High Blood Pressure Mother     Heart Disease Father      Social History     Socioeconomic History    Marital status:      Spouse name: Not on file    Number of children: Not on file    Years of education: Not on file    Highest education level: Not on file   Occupational History    Not on file   Tobacco Use    Smoking status: Former Smoker     Packs/day: 0.50     Years: 3.00     Pack years: 1.50     Types: Cigarettes     Quit date: 2015     Years since quittin.0    Smokeless tobacco: Never Used    Tobacco comment: pt smoked off and on   Vaping Use    Vaping Use: Never used   Substance and Sexual Activity    Alcohol use: No     Alcohol/week: 0.0 standard drinks    Drug use: No    Sexual activity: Yes     Partners: Male   Other Topics Concern    Not on file   Social History Narrative    Not on file     Social Determinants of Health     Financial Resource Strain: Low Risk     Difficulty of Paying Living Expenses: Not hard at all   Food Insecurity: No Food Insecurity    Worried About Running Out of Food in the Last Year: Never true    Ike of Food in the Last Year: Never true   Transportation Needs:     Lack of Transportation (Medical): Not on file    Lack of Transportation (Non-Medical):  Not on file   Physical Activity:  Days of Exercise per Week: Not on file    Minutes of Exercise per Session: Not on file   Stress:     Feeling of Stress : Not on file   Social Connections:     Frequency of Communication with Friends and Family: Not on file    Frequency of Social Gatherings with Friends and Family: Not on file    Attends Mormon Services: Not on file    Active Member of [address] [place] [address] or Organizations: Not on file    Attends Club or Organization Meetings: Not on file    Marital Status: Not on file   Intimate Partner Violence:     Fear of Current or Ex-Partner: Not on file    Emotionally Abused: Not on file    Physically Abused: Not on file    Sexually Abused: Not on file   Housing Stability:     Unable to Pay for Housing in the Last Year: Not on file    Number of [place] in the Last Year: Not on file    Unstable Housing in the Last Year: Not on file     Prior to Visit Medications    Medication Sig Taking?  Authorizing Provider   gabapentin (NEURONTIN) 100 MG capsule TAKE TWO CAPSULES BY MOUTH THREE TIMES A DAY AS DIRECTED Yes Aminta Ghotra MD   valACYclovir (VALTREX) 1 g tablet Take 1 tablet by mouth 3 times daily Yes BRENDA Berry CNP   naloxone 4 MG/0.1ML LIQD nasal spray 1 spray by Nasal route as needed for Opioid Reversal Yes Queen Beata MD   spironolactone (ALDACTONE) 25 MG tablet TAKE ONE-HALF TABLET BY MOUTH DAILY Yes Aminta Ghotra MD   lisinopril (PRINIVIL;ZESTRIL) 40 MG tablet TAKE ONE TABLET BY MOUTH EVERY EVENING Yes Corina Johnson MD   metoprolol succinate (TOPROL XL) 50 MG extended release tablet TAKE ONE TABLET BY MOUTH DAILY Yes Aminta Ghotra MD   atorvastatin (LIPITOR) 20 MG tablet TAKE ONE TABLET BY MOUTH DAILY Yes Aminta Ghotra MD   ezetimibe (ZETIA) 10 MG tablet Take 1 tablet by mouth daily Yes Aminta Ghotra MD   citalopram (CELEXA) 10 MG tablet Take 1 tablet by mouth daily Yes Aminta Ghotra MD lidocaine (LIDODERM) 5 % Place patch on skin 12 hours on, 12 hours off. Patient taking differently: Place patch on skin 12 hours on, 12 hours off. As needed Yes Aishwarya Zapata MD   fenofibrate (TRICOR) 54 MG tablet TAKE ONE TABLET BY MOUTH DAILY Yes Barbie Alvarez MD   albuterol sulfate HFA (VENTOLIN HFA) 108 (90 Base) MCG/ACT inhaler Inhale 2 puffs into the lungs every 6 hours as needed for Wheezing Yes Aishwarya Zapata MD   aspirin EC 81 MG EC tablet Take 1 tablet by mouth daily Yes Aishwarya Zapata MD   Cholecalciferol (VITAMIN D3) 2000 UNITS CAPS Take 2 tablets by mouth daily  Yes Historical Provider, MD   Calcium 6456-5836 MG-UNIT CHEW Take  by mouth. Yes Historical Provider, MD     Allergies   Allergen Reactions    Shellfish-Derived Products Nausea And Vomiting    Celebrex [Celecoxib] Swelling    Codeine     Ibuprofen Swelling    Methadone     Vioxx Swelling       OBJECTIVE:  Estimated body mass index is 31.41 kg/m² as calculated from the following:    Height as of 11/9/21: 5' 4\" (1.626 m). Weight as of this encounter: 183 lb (83 kg). Vitals:    01/31/22 1600 01/31/22 1605   BP: (!) 148/84 120/75   Site: Left Upper Arm Left Upper Arm   Position: Sitting Sitting   Cuff Size: Large Adult Large Adult   Pulse: 79    SpO2: 95%    Weight: 183 lb (83 kg)        Physical Exam  Vitals and nursing note reviewed. Constitutional:       General: She is not in acute distress. Appearance: She is well-developed. She is not diaphoretic. HENT:      Head: Normocephalic and atraumatic. Right Ear: External ear normal.      Left Ear: External ear normal.      Nose: Nose normal.   Eyes:      General: Lids are normal. No scleral icterus. Right eye: No discharge. Left eye: No discharge. Pupils: Pupils are equal, round, and reactive to light. Neck:      Thyroid: No thyromegaly. Vascular: No JVD.      Cardiovascular:      Rate and Rhythm: Normal rate and regular rhythm. Pulmonary:      Effort: Pulmonary effort is normal. No respiratory distress. Abdominal:      Palpations: Abdomen is soft. There is no hepatomegaly or splenomegaly. Tenderness: There is no abdominal tenderness. Musculoskeletal:      Cervical back: Pain with movement and muscular tenderness (Bilateral trapezii trunk and neck portion) present. Decreased range of motion. Comments: C spine flexion is att 25 degrees, extension 15 degrees, left rotation 35 degrees, right rotation 30 degrees, left flexion 20 degrees, right flexion 10 degrees    Skin:     General: Skin is warm and dry. Coloration: Skin is not pale. Findings: No erythema or rash. Comments: Turgor normal   Psychiatric:         Behavior: Behavior normal.         Thought Content: Thought content normal.         Judgment: Judgment normal.              ASSESSMENT PLAN      Diagnosis Orders   1. Degeneration of C5-C6 intervertebral disc     2. Degeneration of intervertebral disc at C4-C5 level     3. Herniation of intervertebral disc at C4-C5 level     4. Herniation of intervertebral disc at C6-C7 level     Patient states that we have put in a C9 for follow-up with Dr. Magdalene Tijerina her neurosurgeon. We received approval but she never received notification therefore she did not make an appointment. She will reach out to [address] [place] [address] to see if the approval can be extended. She has areas with more limited range of motion as noted above compared to the visit 6 months ago. The pins-and-needles be stinging discomfort she has about the incision seems superficial, but she is having more posterior headache at the base of the skull. Follow-up on this claim in 6 months. Patient should call the office immediately with new or ongoing signs or symptoms or worsening, or proceed to the emergency room.   No changes in past medical history, past surgical history, social history, or family history were noted during the patient encounter unless specifically listed above. All updates of past medical history, past surgical history, social history, or family history were reviewed personally by me during the office visit. All problems listed in the assessment are stable unless noted otherwise. Medication profile reviewed personally by me during the visit. Medication side effects and possible impairments from medications were discussed as applicable. This document was prepared by a combination of typing and transcription through a voice recognition software. Scribe attestation: I,Kaci Michelle, am scribing for and in the presence of Garrett Pepe MD. Electronically signed by Deloris Schroeder on 1/31/2022 at 4:27 PM      Provider attestation:     I, Dr. Roseann Hayes, personally performed the services described in this documentation, as scribed by the above signed scribe in my presence, and it is both accurate and complete. I agree with the ROS and Past Histories independently gathered by the clinical support staff and the remaining scribed note accurately describes my personal service to the patient.       1/31/2022    4:43 PM

## 2022-01-31 NOTE — PATIENT INSTRUCTIONS

## 2022-03-02 ENCOUNTER — OFFICE VISIT (OUTPATIENT)
Dept: FAMILY MEDICINE CLINIC | Age: 72
End: 2022-03-02
Payer: MEDICARE

## 2022-03-02 VITALS
SYSTOLIC BLOOD PRESSURE: 136 MMHG | WEIGHT: 184 LBS | BODY MASS INDEX: 31.58 KG/M2 | DIASTOLIC BLOOD PRESSURE: 67 MMHG | HEART RATE: 82 BPM | OXYGEN SATURATION: 92 %

## 2022-03-02 DIAGNOSIS — I50.22 SYSTOLIC CHF, CHRONIC (HCC): Primary | ICD-10-CM

## 2022-03-02 DIAGNOSIS — E78.2 MIXED HYPERLIPIDEMIA: ICD-10-CM

## 2022-03-02 DIAGNOSIS — I77.1 SUBCLAVIAN ARTERY STENOSIS, RIGHT (HCC): ICD-10-CM

## 2022-03-02 DIAGNOSIS — I42.9 CARDIOMYOPATHY, IDIOPATHIC (HCC): ICD-10-CM

## 2022-03-02 DIAGNOSIS — J45.20 MILD INTERMITTENT ASTHMA WITHOUT COMPLICATION: ICD-10-CM

## 2022-03-02 DIAGNOSIS — F41.8 SITUATIONAL ANXIETY: ICD-10-CM

## 2022-03-02 DIAGNOSIS — I10 ESSENTIAL HYPERTENSION: ICD-10-CM

## 2022-03-02 DIAGNOSIS — F34.1 DYSTHYMIA: ICD-10-CM

## 2022-03-02 DIAGNOSIS — E55.9 VITAMIN D DEFICIENCY: ICD-10-CM

## 2022-03-02 PROCEDURE — 1090F PRES/ABSN URINE INCON ASSESS: CPT | Performed by: FAMILY MEDICINE

## 2022-03-02 PROCEDURE — G8400 PT W/DXA NO RESULTS DOC: HCPCS | Performed by: FAMILY MEDICINE

## 2022-03-02 PROCEDURE — 4040F PNEUMOC VAC/ADMIN/RCVD: CPT | Performed by: FAMILY MEDICINE

## 2022-03-02 PROCEDURE — G8417 CALC BMI ABV UP PARAM F/U: HCPCS | Performed by: FAMILY MEDICINE

## 2022-03-02 PROCEDURE — 1123F ACP DISCUSS/DSCN MKR DOCD: CPT | Performed by: FAMILY MEDICINE

## 2022-03-02 PROCEDURE — 99214 OFFICE O/P EST MOD 30 MIN: CPT | Performed by: FAMILY MEDICINE

## 2022-03-02 PROCEDURE — G8484 FLU IMMUNIZE NO ADMIN: HCPCS | Performed by: FAMILY MEDICINE

## 2022-03-02 PROCEDURE — 1036F TOBACCO NON-USER: CPT | Performed by: FAMILY MEDICINE

## 2022-03-02 PROCEDURE — G8427 DOCREV CUR MEDS BY ELIG CLIN: HCPCS | Performed by: FAMILY MEDICINE

## 2022-03-02 PROCEDURE — 3017F COLORECTAL CA SCREEN DOC REV: CPT | Performed by: FAMILY MEDICINE

## 2022-03-02 RX ORDER — FENOFIBRATE 54 MG/1
TABLET ORAL
Qty: 90 TABLET | Refills: 3 | Status: SHIPPED | OUTPATIENT
Start: 2022-03-02

## 2022-03-02 NOTE — PROGRESS NOTES
Chief Complaint   Patient presents with    Anxiety    Hypertension        Internal Administration   First Dose COVID-19, J&J, PF, 0.5 mL  2021   Second Dose           Last COVID Lab SARS-CoV-2 (no units)   Date Value   2021 Not Detected             Wt Readings from Last 3 Encounters:   22 184 lb (83.5 kg)   22 183 lb (83 kg)   21 182 lb (82.6 kg)     BP Readings from Last 3 Encounters:   22 136/67   22 120/75   21 120/70      Lab Results   Component Value Date    LABA1C 5.9 10/27/2021    LABA1C 6.3 2020    LABA1C 5.9 2020       HPI:  Marisel Mccullough is a 70 y.o. (: 1950) here today for    She states that she continues to see her cardiologist and denies any issues wit her heart. She states that her anxiety is doing much better on the citalopram.     She states that she feels that ever since she received the covid vaccine in December that she feels like she has been experiencing a lot more fatigue. She just has no motivation to do anything in the home but denies any depression. [x] Patient has completed an advance directive  [] Patient has NOT completed an advanced directive  [x] Patient has a documented healthcare surrogate  [] Patient does NOT have a documented healthcare surrogate  [] Discussed the importance of establishing and updating an advanced directive. Patient has questions at this time and those were answered. [x] Discussed the importance of establishing and updating an advanced directive. Patient does NOT have questions at this time.     Discussed with: [x] Patient            [] Family             [] Other caregiver    Patient's medications, allergies, past medical, surgical, social and family histories were reviewed and updated asappropriate on 3/2/2022 at 11:22 AM.    ROS:  Review of Systems    All other systems reviewed and are negative except as noted above on 3/2/2022 at 11:22 AM. Additional review of systems may be scanned into the media section ofhospitals medical record. Any responses requiring further intervention were pursued. Past Medical History:   Diagnosis Date    Arthritis     Asthma     Back pain     Fibromyalgia     Fibromyalgia     Heart failure with reduced ejection fraction (HCC)     Herpes zoster     Hypercholesteremia     Hyperlipidemia     Hypertriglyceridemia     Neck pain     Osteopenia     Stress-induced cardiomyopathy 2017     Family History   Problem Relation Age of Onset    High Blood Pressure Mother     Heart Disease Father      Social History     Socioeconomic History    Marital status:      Spouse name: Not on file    Number of children: Not on file    Years of education: Not on file    Highest education level: Not on file   Occupational History    Not on file   Tobacco Use    Smoking status: Former Smoker     Packs/day: 0.50     Years: 3.00     Pack years: 1.50     Types: Cigarettes     Quit date: 2015     Years since quittin.1    Smokeless tobacco: Never Used    Tobacco comment: pt smoked off and on   Vaping Use    Vaping Use: Never used   Substance and Sexual Activity    Alcohol use: No     Alcohol/week: 0.0 standard drinks    Drug use: No    Sexual activity: Yes     Partners: Male   Other Topics Concern    Not on file   Social History Narrative    Not on file     Social Determinants of Health     Financial Resource Strain: Low Risk     Difficulty of Paying Living Expenses: Not hard at all   Food Insecurity: No Food Insecurity    Worried About Running Out of Food in the Last Year: Never true    Ike of Food in the Last Year: Never true   Transportation Needs:     Lack of Transportation (Medical): Not on file    Lack of Transportation (Non-Medical):  Not on file   Physical Activity:     Days of Exercise per Week: Not on file    Minutes of Exercise per Session: Not on file   Stress:     Feeling of Stress : Not on file   Social Connections:     Frequency of Communication with Friends and Family: Not on file    Frequency of Social Gatherings with Friends and Family: Not on file    Attends Mu-ism Services: Not on file    Active Member of Clubs or Organizations: Not on file    Attends Club or Organization Meetings: Not on file    Marital Status: Not on file   Intimate Partner Violence:     Fear of Current or Ex-Partner: Not on file    Emotionally Abused: Not on file    Physically Abused: Not on file    Sexually Abused: Not on file   Housing Stability:     Unable to Pay for Housing in the Last Year: Not on file    Number of [place] in the Last Year: Not on file    Unstable Housing in the Last Year: Not on file     Prior to Visit Medications    Medication Sig Taking? Authorizing Provider   traMADol (ULTRAM) 50 MG tablet Take 1 tablet by mouth every 6 hours for 30 days. Yes Para Payer, BRENDA - CNP   gabapentin (NEURONTIN) 100 MG capsule TAKE TWO CAPSULES BY MOUTH THREE TIMES A DAY AS DIRECTED Yes Bev Mtz MD   valACYclovir (VALTREX) 1 g tablet Take 1 tablet by mouth 3 times daily Yes BRENDA Montoya - CNP   spironolactone (ALDACTONE) 25 MG tablet TAKE ONE-HALF TABLET BY MOUTH DAILY Yes Bev Mtz MD   lisinopril (PRINIVIL;ZESTRIL) 40 MG tablet TAKE ONE TABLET BY MOUTH EVERY EVENING Yes Mary Sloan MD   metoprolol succinate (TOPROL XL) 50 MG extended release tablet TAKE ONE TABLET BY MOUTH DAILY Yes Bev Mtz MD   atorvastatin (LIPITOR) 20 MG tablet TAKE ONE TABLET BY MOUTH DAILY Yes Bev Mtz MD   ezetimibe (ZETIA) 10 MG tablet Take 1 tablet by mouth daily Yes Bev Mtz MD   citalopram (CELEXA) 10 MG tablet Take 1 tablet by mouth daily Yes Bev Mtz MD   lidocaine (LIDODERM) 5 % Place patch on skin 12 hours on, 12 hours off. Patient taking differently: Place patch on skin 12 hours on, 12 hours off.  As needed Yes Flower Perdomo sounds: Normal breath sounds. Abdominal:      Palpations: Abdomen is soft. There is no hepatomegaly or splenomegaly. Tenderness: There is no abdominal tenderness. Musculoskeletal:      Right lower leg: No edema. Left lower leg: No edema. Skin:     General: Skin is warm and dry. Coloration: Skin is not pale. Findings: No erythema or rash. Comments: Turgor normal   Neurological:      Mental Status: She is oriented to person, place, and time. Psychiatric:         Mood and Affect: Mood normal.         Behavior: Behavior normal.         Thought Content: Thought content normal.         Judgment: Judgment normal.              ASSESSMENT PLAN      Diagnosis Orders   1. Systolic CHF, chronic (HCC)     2. Subclavian artery stenosis, right ([address]     3. Dysthymia     4. Vitamin D deficiency     5. Mild intermittent asthma without complication     6. Cardiomyopathy, idiopathic ([address]     7. Essential hypertension     8. Mixed hyperlipidemia     9. Situational anxiety     The patient's COPD or asthma appears to be under adequate control in terms of appropriate use of rescue medication and lack of symptoms therefore no change in treatment regimen or changes in medications for breathing as listed in the medication list are necessary. There is no clinical evidence of coronary insufficiency or heart failure that requires any change in the treatment regimen and no changes in medications for cardiac conditions as listed in the medication list are necessary. No evidence of syncope or limb ischemia. Mood is about the same. Vitamin D levels will be monitored as needed and changes to medications related to vitamin D as listed in the medication list will be changed to maintain parameters as needed. Current blood pressure readings in the office or at home are acceptable and current antihypertensive medications as listed in the medication list require no change.   Follow-up 6 months    Patient should call the office immediately with new or ongoing signs or symptoms or worsening, or proceed to the emergency room. No changes in past medical history, past surgical history, social history, or family history were noted during the patient encounter unless specifically listed above. All updates of past medical history, past surgical history, social history, or family history were reviewed personally by me during the office visit. All problems listed in the assessment are stable unless noted otherwise. Medication profile reviewed personally by me during the visit. Medication side effects and possible impairments from medications were discussed as applicable. This document was prepared by a combination of typing and transcription through a voice recognition software. Scribe attestation: Declan Camp RN, am scribing for and in the presence of Kassandra Beckwith MD. Electronically signed by Lucie Worrell RN on 3/2/2022 at 11:22 AM      Provider attestation:     I, Dr. Bartolo Wang, personally performed the services described in this documentation, as scribed by the above signed scribe in my presence, and it is both accurate and complete. I agree with the ROS and Past Histories independently gathered by the clinical support staff and the remaining scribed note accurately describes my personal service to the patient.       3/3/2022    11:34 AM

## 2022-03-02 NOTE — PATIENT INSTRUCTIONS

## 2022-03-02 NOTE — TELEPHONE ENCOUNTER
Pts pharmacy told pt that they sent a request to [address] [facility] [place], operated by [facility] for   fenofibrate (TRICOR) 54 MG tablet     I told pt that we would get the request sent out since we did not have one. Pt has been out of medication.  Preferred pharmacy is   [facility], [address] [facility]

## 2022-04-26 DIAGNOSIS — I50.22 SYSTOLIC CHF, CHRONIC (HCC): ICD-10-CM

## 2022-04-26 DIAGNOSIS — I10 ESSENTIAL HYPERTENSION: ICD-10-CM

## 2022-04-26 RX ORDER — LISINOPRIL 40 MG/1
TABLET ORAL
Qty: 90 TABLET | Refills: 3 | Status: SHIPPED | OUTPATIENT
Start: 2022-04-26

## 2022-04-27 ENCOUNTER — OFFICE VISIT (OUTPATIENT)
Dept: FAMILY MEDICINE CLINIC | Age: 72
End: 2022-04-27
Payer: COMMERCIAL

## 2022-04-27 VITALS
HEART RATE: 72 BPM | BODY MASS INDEX: 31.82 KG/M2 | OXYGEN SATURATION: 98 % | SYSTOLIC BLOOD PRESSURE: 110 MMHG | DIASTOLIC BLOOD PRESSURE: 70 MMHG | WEIGHT: 185.4 LBS

## 2022-04-27 DIAGNOSIS — M51.36 DEGENERATION OF L4-L5 INTERVERTEBRAL DISC: Chronic | ICD-10-CM

## 2022-04-27 DIAGNOSIS — S80.12XD CONTUSION OF LEFT LOWER EXTREMITY, SUBSEQUENT ENCOUNTER: Chronic | ICD-10-CM

## 2022-04-27 DIAGNOSIS — M51.37 DEGENERATIVE DISC DISEASE AT L5-S1 LEVEL: Chronic | ICD-10-CM

## 2022-04-27 DIAGNOSIS — S30.0XXD CONTUSION OF BUTTOCK, SUBSEQUENT ENCOUNTER: Chronic | ICD-10-CM

## 2022-04-27 PROCEDURE — 99214 OFFICE O/P EST MOD 30 MIN: CPT | Performed by: FAMILY MEDICINE

## 2022-04-27 NOTE — PROGRESS NOTES
Chief Complaint   Patient presents with    Follow-up     Workers Comp , still seeing pain management, pain is worse, has not called to schedule with Dr Linda Farias yet        Internal Administration   First Dose COVID-19, J&J, PF, 0.5 mL  2021   Second Dose           Last COVID Lab SARS-CoV-2 (no units)   Date Value   2021 Not Detected             Wt Readings from Last 3 Encounters:   22 185 lb 6.4 oz (84.1 kg)   22 184 lb (83.5 kg)   22 183 lb (83 kg)     BP Readings from Last 3 Encounters:   22 110/70   22 136/67   22 120/75      Lab Results   Component Value Date    LABA1C 5.9 10/27/2021    LABA1C 6.3 2020    LABA1C 5.9 2020       HPI:  Amanda Coburn is a 70 y.o. (: 1950) here today for    Patient is here today for her  [employer] case. She was referred last visit to Dr. Linda Farias a neurosurgeon due to her worsening back pain. She was also informed to return to him by dr. Margaret Alejo and a C-9 was made for this. She never did get this done. Informed her that she needs to reach out to the nurse about whether an extension could be made or if a new c-9 needs placed. She says that she does not have as much discomfort in her left leg but she states that her low back is just getting so uncomfortable. [x] Patient has completed an advance directive  [] Patient has NOT completed an advanced directive  [x] Patient has a documented healthcare surrogate  [] Patient does NOT have a documented healthcare surrogate  [] Discussed the importance of establishing and updating an advanced directive. Patient has questions at this time and those were answered. [x] Discussed the importance of establishing and updating an advanced directive. Patient does NOT have questions at this time.     Discussed with: [x] Patient            [] Family             [] Other caregiver    Patient's medications, allergies, past medical, surgical, social and family histories were reviewed and updated asappropriate on 2022 at 3:43 PM.    ROS:  Review of Systems    All other systems reviewed and are negative except as noted above on 2022 at 3:43 PM. Additional review of systems may be scanned into the media section ofthis medical record. Any responses requiring further intervention were pursued. Past Medical History:   Diagnosis Date    Arthritis     Asthma     Back pain     Fibromyalgia     Fibromyalgia     Heart failure with reduced ejection fraction (HCC)     Herpes zoster     Hypercholesteremia     Hyperlipidemia     Hypertriglyceridemia     Neck pain     Osteopenia     Stress-induced cardiomyopathy 2017     Family History   Problem Relation Age of Onset    High Blood Pressure Mother     Heart Disease Father      Social History     Socioeconomic History    Marital status:      Spouse name: Not on file    Number of children: Not on file    Years of education: Not on file    Highest education level: Not on file   Occupational History    Not on file   Tobacco Use    Smoking status: Former Smoker     Packs/day: 0.50     Years: 3.00     Pack years: 1.50     Types: Cigarettes     Quit date: 2015     Years since quittin.3    Smokeless tobacco: Never Used    Tobacco comment: pt smoked off and on   Vaping Use    Vaping Use: Never used   Substance and Sexual Activity    Alcohol use: No     Alcohol/week: 0.0 standard drinks    Drug use: No    Sexual activity: Yes     Partners: Male   Other Topics Concern    Not on file   Social History Narrative    Not on file     Social Determinants of Health     Financial Resource Strain: Low Risk     Difficulty of Paying Living Expenses: Not hard at all   Food Insecurity: No Food Insecurity    Worried About Running Out of Food in the Last Year: Never true    Ike of Food in the Last Year: Never true   Transportation Needs:     Lack of Transportation (Medical):  Not on file    Lack of Transportation (Non-Medical): Not on file   Physical Activity:     Days of Exercise per Week: Not on file    Minutes of Exercise per Session: Not on file   Stress:     Feeling of Stress : Not on file   Social Connections:     Frequency of Communication with Friends and Family: Not on file    Frequency of Social Gatherings with Friends and Family: Not on file    Attends Christian Services: Not on file    Active Member of [address] [place] [address] or Organizations: Not on file    Attends Club or Organization Meetings: Not on file    Marital Status: Not on file   Intimate Partner Violence:     Fear of Current or Ex-Partner: Not on file    Emotionally Abused: Not on file    Physically Abused: Not on file    Sexually Abused: Not on file   Housing Stability:     Unable to Pay for Housing in the Last Year: Not on file    Number of [place] in the Last Year: Not on file    Unstable Housing in the Last Year: Not on file     Prior to Visit Medications    Medication Sig Taking? Authorizing Provider   traMADol (ULTRAM) 50 MG tablet Take 1 tablet by mouth every 6 hours for 30 days.  Yes Vita García MD   lisinopril (PRINIVIL;ZESTRIL) 40 MG tablet TAKE ONE TABLET BY MOUTH EVERY EVENING Yes Tana Preciado MD   fenofibrate (TRICOR) 54 MG tablet TAKE ONE TABLET BY MOUTH DAILY Yes Tana Preciado MD   gabapentin (NEURONTIN) 100 MG capsule TAKE TWO CAPSULES BY MOUTH THREE TIMES A DAY AS DIRECTED Yes Miguel Mendoza MD   valACYclovir (VALTREX) 1 g tablet Take 1 tablet by mouth 3 times daily Yes [facility], APRN - CNP   spironolactone (ALDACTONE) 25 MG tablet TAKE ONE-HALF TABLET BY MOUTH DAILY Yes Miguel Mendoza MD   metoprolol succinate (TOPROL XL) 50 MG extended release tablet TAKE ONE TABLET BY MOUTH DAILY Yes Miguel Mendoza MD   atorvastatin (LIPITOR) 20 MG tablet TAKE ONE TABLET BY MOUTH DAILY Yes Miguel Mendoza MD   ezetimibe (ZETIA) 10 MG tablet Take 1 tablet by mouth daily Yes Barbara Cuevas MD   citalopram (CELEXA) 10 MG tablet Take 1 tablet by mouth daily Yes Barbara Cuevas MD   lidocaine (LIDODERM) 5 % Place patch on skin 12 hours on, 12 hours off. Patient taking differently: Place patch on skin 12 hours on, 12 hours off. As needed Yes Barbara Cuevas MD   albuterol sulfate HFA (VENTOLIN HFA) 108 (90 Base) MCG/ACT inhaler Inhale 2 puffs into the lungs every 6 hours as needed for Wheezing Yes Barbara Cuevas MD   aspirin EC 81 MG EC tablet Take 1 tablet by mouth daily Yes Barbara Cuevas MD   Cholecalciferol (VITAMIN D3) 2000 UNITS CAPS Take 2 tablets by mouth daily  Yes Historical Provider, MD   Calcium 5527-9085 MG-UNIT CHEW Take  by mouth. Yes Historical Provider, MD   naloxone 4 MG/0.1ML LIQD nasal spray 1 spray by Nasal route as needed for Opioid Reversal  Patient not taking: Reported on 3/2/2022  Lilia Willard MD     Allergies   Allergen Reactions    Shellfish-Derived Products Nausea And Vomiting    Celebrex [Celecoxib] Swelling    Codeine     Ibuprofen Swelling    Methadone     Vioxx Swelling       OBJECTIVE:  Estimated body mass index is 31.82 kg/m² as calculated from the following:    Height as of 11/9/21: 5' 4\" (1.626 m). Weight as of this encounter: 185 lb 6.4 oz (84.1 kg). Vitals:    04/27/22 1532   BP: 110/70   Site: Left Upper Arm   Position: Sitting   Cuff Size: Medium Adult   Pulse: 72   SpO2: 98%   Weight: 185 lb 6.4 oz (84.1 kg)       Physical Exam  Vitals and nursing note reviewed. Constitutional:       General: She is not in acute distress. Appearance: She is well-developed. She is not diaphoretic. HENT:      Head: Normocephalic and atraumatic. Right Ear: External ear normal.      Left Ear: External ear normal.      Nose: Nose normal.   Eyes:      General: Lids are normal. No scleral icterus. Right eye: No discharge. Left eye: No discharge.       Pupils: Pupils are equal, round, and reactive to light. Neck:      Thyroid: No thyromegaly. Vascular: No JVD. Cardiovascular:      Rate and Rhythm: Normal rate and regular rhythm. Heart sounds: Normal heart sounds. Pulmonary:      Effort: Pulmonary effort is normal. No respiratory distress. Breath sounds: Normal breath sounds. Abdominal:      Palpations: Abdomen is soft. There is no hepatomegaly or splenomegaly. Tenderness: There is no abdominal tenderness. Musculoskeletal:         General: Tenderness (L4-L5 tenderness, midline- patient jumped from pain) present. Right lower leg: No edema. Left lower leg: No edema. Skin:     General: Skin is warm and dry. Coloration: Skin is not pale. Findings: No erythema or rash. Comments: Turgor normal   Neurological:      Mental Status: She is oriented to person, place, and time. Psychiatric:         Mood and Affect: Mood normal.         Behavior: Behavior normal.         Thought Content: Thought content normal.         Judgment: Judgment normal.              ASSESSMENT PLAN      Diagnosis Orders   1. Degenerative disc disease at L5-S1 level     2. Degeneration of L4-L5 intervertebral disc     3. Contusion of left lower extremity, subsequent encounter     4. Contusion of buttock, subsequent encounter     Patient did not follow-up with Dr. Suhas Boss before but would still like to do so. She will reach back out to her  to see if the C9 approval would still be good or she would have to have another one. Still on tramadol and gabapentin. No other procedures since last seen. Patient should call the office immediately with new or ongoing signs or symptoms or worsening, or proceed to the emergency room. No changes in past medical history, past surgical history, social history, or family history were noted during the patient encounter unless specifically listed above.   All updates of past medical history, past surgical history, social history, or family history were reviewed personally by me during the office visit. All problems listed in the assessment are stable unless noted otherwise. Medication profile reviewed personally by me during the visit. Medication side effects and possible impairments from medications were discussed as applicable. This document was prepared by a combination of typing and transcription through a voice recognition software. Scribe attestation: Dione Boles RN, am scribing for and in the presence of Harmony Huang MD. Electronically signed by Sheng Page RN on 4/27/2022 at 3:43 PM      Provider attestation:     I, Dr. Jeff Spencer, personally performed the services described in this documentation, as scribed by the above signed scribe in my presence, and it is both accurate and complete. I agree with the ROS and Past Histories independently gathered by the clinical support staff and the remaining scribed note accurately describes my personal service to the patient.       4/27/2022    5:15 PM

## 2022-06-20 NOTE — PROGRESS NOTES
[address]  Advanced CHF/Pulmonary Hypertension   Cardiac Evaluation      Rosalia Scott  YOB: 1950    Date of Visit:  6/21/22    Chief Complaint   Patient presents with    Follow-up    Congestive Heart Failure            History of Present Illness:  Rosalia Scott is a 70 y.o. female who presents from referral from Dr. Renata Mcarthur for consultation and management of CHF, non ischemic CMP. She has a with PMH of HTN, HLD, obesity, abnormal stress test, and fibromyalgia, who presented to [address] [place] [address] with chest pain in March 2017. History obtained from patient and review of EMR. She underwent a stress test on 3/20/17 that showed moderate sized anteroseptal partial reversibility defect consistent with  ischemia and infarction in the territory of the mid and distal LAD  She has right subclavian artery stenosis and has seen Dr. Nayeli Grimm for this. She underwent LHC on 3/23/17 with no intervention needed. She had a recent VL Duplex on 5/2/18 (report below). Her echo from 06/07/18 showed her EF was 45%. Her echo from 11/05/19 showed her EF was 45% with grade II DD. Her echo from 11/9/2021 showed her LVEF 45%. Today, 6/21/2022, patient reports she has been feeling more fatigued lately. She only does what she needs to do and then spends the rest of her day resting/laying down. Patient states she gets a lot of sleep but still has fatigue during the day. She feels more shortness of breath on really hot days, but for the most part feels like her breathing as been stable. She has chronic back pain with 4 prior back surgeries by Dr. Stephanie Guadalupe. She states she needs another back surgery. She wear a lidoderm patch while she works and she uses tramadol to help relieve the pain. She follows with pain management as well. She works for [employer]. Patient is taking all cardiac medications as prescribed and tolerates them well.           Allergies   Allergen Reactions    Shellfish-Derived Products Nausea And Vomiting    Celebrex [Celecoxib] Swelling    Codeine     Ibuprofen Swelling    Methadone     Vioxx Swelling     Current Outpatient Medications   Medication Sig Dispense Refill    traMADol (ULTRAM) 50 MG tablet Take 50 mg by mouth See Admin Instructions. 2 tabs am, 1 tab pm      lisinopril (PRINIVIL;ZESTRIL) 40 MG tablet TAKE ONE TABLET BY MOUTH EVERY EVENING 90 tablet 3    fenofibrate (TRICOR) 54 MG tablet TAKE ONE TABLET BY MOUTH DAILY 90 tablet 3    gabapentin (NEURONTIN) 100 MG capsule TAKE TWO CAPSULES BY MOUTH THREE TIMES A DAY AS DIRECTED 180 capsule 5    valACYclovir (VALTREX) 1 g tablet Take 1 tablet by mouth 3 times daily 21 tablet 0    spironolactone (ALDACTONE) 25 MG tablet TAKE ONE-HALF TABLET BY MOUTH DAILY 45 tablet 3    metoprolol succinate (TOPROL XL) 50 MG extended release tablet TAKE ONE TABLET BY MOUTH DAILY 90 tablet 3    atorvastatin (LIPITOR) 20 MG tablet TAKE ONE TABLET BY MOUTH DAILY 90 tablet 3    ezetimibe (ZETIA) 10 MG tablet Take 1 tablet by mouth daily 90 tablet 3    citalopram (CELEXA) 10 MG tablet Take 1 tablet by mouth daily 90 tablet 3    lidocaine (LIDODERM) 5 % Place patch on skin 12 hours on, 12 hours off. (Patient taking differently: Place patch on skin 12 hours on, 12 hours off. As needed) 30 patch 5    albuterol sulfate HFA (VENTOLIN HFA) 108 (90 Base) MCG/ACT inhaler Inhale 2 puffs into the lungs every 6 hours as needed for Wheezing 1 Inhaler 3    aspirin EC 81 MG EC tablet Take 1 tablet by mouth daily 30 tablet 3    Cholecalciferol (VITAMIN D3) 2000 UNITS CAPS Take 2 tablets by mouth daily       Calcium 1614-3966 MG-UNIT CHEW Take  by mouth.  naloxone 4 MG/0.1ML LIQD nasal spray 1 spray by Nasal route as needed for Opioid Reversal (Patient not taking: Reported on 3/2/2022) 1 each 0     No current facility-administered medications for this visit.        Past Medical History:   Diagnosis Date    Arthritis     Asthma     Back pain     Fibromyalgia     Fibromyalgia     Heart failure with reduced ejection fraction (HCC)     Herpes zoster     Hypercholesteremia     Hyperlipidemia     Hypertriglyceridemia     Neck pain     Osteopenia     Stress-induced cardiomyopathy 2017     Past Surgical History:   Procedure Laterality Date    BACK SURGERY      BLADDER REPAIR      CARDIAC CATHETERIZATION      COLONOSCOPY  12    diverticulosis    HYSTERECTOMY (CERVIX STATUS UNKNOWN)      NECK SURGERY  , 10/05, '07 or     C6-C7spur '05     Family History   Problem Relation Age of Onset    High Blood Pressure Mother     Heart Disease Father      Social History     Socioeconomic History    Marital status:      Spouse name: Not on file    Number of children: Not on file    Years of education: Not on file    Highest education level: Not on file   Occupational History    Not on file   Tobacco Use    Smoking status: Former Smoker     Packs/day: 0.50     Years: 3.00     Pack years: 1.50     Types: Cigarettes     Quit date: 2015     Years since quittin.4    Smokeless tobacco: Never Used    Tobacco comment: pt smoked off and on   Vaping Use    Vaping Use: Never used   Substance and Sexual Activity    Alcohol use: No     Alcohol/week: 0.0 standard drinks    Drug use: No    Sexual activity: Yes     Partners: Male   Other Topics Concern    Not on file   Social History Narrative    Not on file     Social Determinants of Health     Financial Resource Strain: Low Risk     Difficulty of Paying Living Expenses: Not hard at all   Food Insecurity: No Food Insecurity    Worried About Running Out of Food in the Last Year: Never true    Ike of Food in the Last Year: Never true   Transportation Needs:     Lack of Transportation (Medical): Not on file    Lack of Transportation (Non-Medical):  Not on file   Physical Activity:     Days of Exercise per Week: Not on file    Minutes of Exercise per Session: Not on file Stress:     Feeling of Stress : Not on file   Social Connections:     Frequency of Communication with Friends and Family: Not on file    Frequency of Social Gatherings with Friends and Family: Not on file    Attends Jehovah's witness Services: Not on file    Active Member of Clubs or Organizations: Not on file    Attends Club or Organization Meetings: Not on file    Marital Status: Not on file   Intimate Partner Violence:     Fear of Current or Ex-Partner: Not on file    Emotionally Abused: Not on file    Physically Abused: Not on file    Sexually Abused: Not on file   Housing Stability:     Unable to Pay for Housing in the Last Year: Not on file    Number of [place] in the Last Year: Not on file    Unstable Housing in the Last Year: Not on file       Review of Systems:   · Constitutional: there has been no unanticipated weight loss. There's been no change in energy level, sleep pattern, or activity level. · Eyes: No visual changes or diplopia. No scleral icterus. · ENT: No Headaches, hearing loss or vertigo. No mouth sores or sore throat. · Cardiovascular: Reviewed in HPI  · Respiratory: No cough or wheezing, no sputum production. No hematemesis. · Gastrointestinal: No abdominal pain, appetite loss, blood in stools. No change in bowel or bladder habits. · Genitourinary: No dysuria, trouble voiding, or hematuria. · Musculoskeletal:  No gait disturbance, weakness or joint complaints. · Integumentary: No rash or pruritis. · Neurological: No headache, diplopia, change in muscle strength, numbness or tingling. No change in gait, balance, coordination, mood, affect, memory, mentation, behavior. · Psychiatric: No anxiety, no depression. · Endocrine: No malaise, fatigue or temperature intolerance. No excessive thirst, fluid intake, or urination. No tremor. · Hematologic/Lymphatic: No abnormal bruising or bleeding, blood clots or swollen lymph nodes.   · Allergic/Immunologic: No nasal congestion or hives.       Physical Exam:  Vitals:    06/21/22 1310   BP: 138/74   Pulse: 78   SpO2: 96%   Weight: 187 lb (84.8 kg)   Height: 5' 4\" (1.626 m)     Body mass index is 32.1 kg/m². Wt Readings from Last 3 Encounters:   06/21/22 187 lb (84.8 kg)   04/27/22 185 lb 6.4 oz (84.1 kg)   03/02/22 184 lb (83.5 kg)     BP Readings from Last 3 Encounters:   06/21/22 138/74   04/27/22 110/70   03/02/22 136/67          Constitutional and General Appearance:   WD/WN in NAD  HEENT:  NC/AT  KATHRYN  No problems with hearing  Skin:  Warm, dry  Respiratory:  · Normal excursion and expansion without use of accessory muscles  · Resp Auscultation: Normal breath sounds without dullness  Cardiovascular:  · The apical impulses not displaced  · Heart tones are crisp and normal  · Cervical veins are not engorged  · The carotid upstroke is normal in amplitude and contour without delay or bruit  · JVP normal  RRR with nl S1 and S2 without m,r,g  · Peripheral pulses are symmetrical and full  · There is no clubbing, cyanosis of the extremities. · No edema  · Femoral Arteries: 2+ and equal  · Pedal Pulses: 2+ and equal   Neck:  · No thyromegaly  Abdomen:  · No masses or tenderness  · Liver/Spleen: No Abnormalities Noted  Neurological/Psychiatric:  · Alert and oriented in all spheres  · Moves all extremities well  · Exhibits normal gait balance and coordination  · No abnormalities of mood, affect, memory, mentation, or behavior are noted    Echo: 11/05/19  Summary   Left ventricular systolic function is low with a visually estimated ejection   fraction of 45%. The left ventricle is normal in size with mild septal hypertrophy. Abnormal (paradoxical) septal wall motion consistent with left bundle branch   block. Grade II diastolic dysfunction with elevated LV pressure. Right ventricular systolic function is indeterminate.    Systolic pulmonary artery pressure (SPAP) is normal and estimated at 25 mmHg   (right atrial pressure 3 mmHg).    Echo 11/9/2021:  Summary   Global left ventricular systolic function is mildly decreased with ejection   fraction estimated at 45%. Abnormal (paradoxical) septal motion is present likely due to left bundle   branch block. Normal left ventricular size with mild concentric left ventricular   hypertrophy. Grade I diastolic dysfunction. Normal RV size and systolic function. Mildly dilated left atrium. Mild tricuspid regurgitation. Systolic pulmonary artery pressure (SPAP) is normal and estimated at 22 mmHg   (right atrial pressure 3 mmHg)        Labs were reviewed including labs from other hospital systems through [facility]. Cardiac testing was reviewed including echos, nuclear scans, cardiac catheterization, including from other hospital systems through [facility]. Assessment:    1. Systolic CHF, chronic ([address]    2. Essential hypertension    3. Mixed hyperlipidemia    4. Cardiomyopathy, idiopathic (HCC)         Lipitor side effects of leg cramps. Crestor intolerance due to muscle cramps    Plan:  1. May recommend starting Leqvio for your cholesterol in the future  2. Labs: CBC, CMP, BNP, fasting lipids, vitamin D, TSH    3. Follow up with me in 6 months with same day echo      Scribe's attestation: This note was scribed in the presence of Joby Gary MD by Sandy Luna RN      The scribe's documentation has been prepared under my direction and personally reviewed by me in its entirety. I confirm that the note above accurately reflects all work, treatment, procedures, and medical decision making performed by me. Time Based Itemization  A total of 30 minutes was spent on today's patient encounter.   If applicable, non-patient-facing activities:  ( x)Preparing to see the patient and reviewing records  ( ) Individual interpretation of results  ( ) Discussion or coordination of care with other health care professionals  ( x) Ordering of unique tests, medications, or procedures  ( x) Documentation within the Lilibeth Guevara M.D., [address] [facility]

## 2022-06-21 ENCOUNTER — OFFICE VISIT (OUTPATIENT)
Dept: CARDIOLOGY CLINIC | Age: 72
End: 2022-06-21
Payer: MEDICARE

## 2022-06-21 VITALS
SYSTOLIC BLOOD PRESSURE: 138 MMHG | WEIGHT: 187 LBS | DIASTOLIC BLOOD PRESSURE: 74 MMHG | OXYGEN SATURATION: 96 % | BODY MASS INDEX: 31.92 KG/M2 | HEIGHT: 64 IN | HEART RATE: 78 BPM

## 2022-06-21 DIAGNOSIS — I50.22 SYSTOLIC CHF, CHRONIC (HCC): Primary | ICD-10-CM

## 2022-06-21 DIAGNOSIS — I42.9 CARDIOMYOPATHY, IDIOPATHIC (HCC): ICD-10-CM

## 2022-06-21 DIAGNOSIS — E78.2 MIXED HYPERLIPIDEMIA: ICD-10-CM

## 2022-06-21 DIAGNOSIS — I10 ESSENTIAL HYPERTENSION: ICD-10-CM

## 2022-06-21 PROCEDURE — 1036F TOBACCO NON-USER: CPT | Performed by: INTERNAL MEDICINE

## 2022-06-21 PROCEDURE — 3017F COLORECTAL CA SCREEN DOC REV: CPT | Performed by: INTERNAL MEDICINE

## 2022-06-21 PROCEDURE — 99214 OFFICE O/P EST MOD 30 MIN: CPT | Performed by: INTERNAL MEDICINE

## 2022-06-21 PROCEDURE — 1090F PRES/ABSN URINE INCON ASSESS: CPT | Performed by: INTERNAL MEDICINE

## 2022-06-21 PROCEDURE — G8427 DOCREV CUR MEDS BY ELIG CLIN: HCPCS | Performed by: INTERNAL MEDICINE

## 2022-06-21 PROCEDURE — G8400 PT W/DXA NO RESULTS DOC: HCPCS | Performed by: INTERNAL MEDICINE

## 2022-06-21 PROCEDURE — G8417 CALC BMI ABV UP PARAM F/U: HCPCS | Performed by: INTERNAL MEDICINE

## 2022-06-21 PROCEDURE — 1123F ACP DISCUSS/DSCN MKR DOCD: CPT | Performed by: INTERNAL MEDICINE

## 2022-06-21 RX ORDER — TRAMADOL HYDROCHLORIDE 50 MG/1
50 TABLET ORAL SEE ADMIN INSTRUCTIONS
COMMUNITY
End: 2022-08-24 | Stop reason: SDUPTHER

## 2022-06-21 NOTE — PATIENT INSTRUCTIONS
Plan:  1. May recommend starting Leqvio for your cholesterol in the future  2. Labs: CBC, CMP, BNP, fasting lipids, vitamin D, TSH    3.  Follow up with me in 6 months with same day echo

## 2022-07-29 ENCOUNTER — TELEPHONE (OUTPATIENT)
Dept: CARDIOLOGY CLINIC | Age: 72
End: 2022-07-29

## 2022-07-29 NOTE — TELEPHONE ENCOUNTER
Pt calling in to make MATTEO aware that she has been unable to get her blood work completed. Her parents are currently not doing well health wise & she is their sole provider. Pt stated she will get the blood work complete as soon as she is able.

## 2022-07-30 ENCOUNTER — HOSPITAL ENCOUNTER (EMERGENCY)
Age: 72
Discharge: HOME | End: 2022-07-30
Attending: EMERGENCY MEDICINE
Payer: MEDICARE

## 2022-07-30 VITALS
SYSTOLIC BLOOD PRESSURE: 130 MMHG | DIASTOLIC BLOOD PRESSURE: 70 MMHG | RESPIRATION RATE: 18 BRPM | TEMPERATURE: 98.4 F | HEIGHT: 64 IN | OXYGEN SATURATION: 96 % | HEART RATE: 78 BPM | BODY MASS INDEX: 30.73 KG/M2 | WEIGHT: 180 LBS

## 2022-07-30 DIAGNOSIS — R21 RASH AND OTHER NONSPECIFIC SKIN ERUPTION: Primary | ICD-10-CM

## 2022-07-30 PROCEDURE — 99283 EMERGENCY DEPT VISIT LOW MDM: CPT

## 2022-07-30 RX ORDER — ONDANSETRON 4 MG/1
4 TABLET, FILM COATED ORAL DAILY PRN
Qty: 30 TABLET | Refills: 0 | Status: SHIPPED | OUTPATIENT
Start: 2022-07-30 | End: 2022-08-24

## 2022-07-30 RX ORDER — CEPHALEXIN 500 MG/1
500 CAPSULE ORAL 4 TIMES DAILY
Qty: 40 CAPSULE | Refills: 0 | Status: SHIPPED | OUTPATIENT
Start: 2022-07-30 | End: 2022-08-24 | Stop reason: ALTCHOICE

## 2022-07-30 RX ORDER — BACITRACIN ZINC AND POLYMYXIN B SULFATE 500; 1000 [USP'U]/G; [USP'U]/G
1 OINTMENT TOPICAL 3 TIMES DAILY
Qty: 30 G | Refills: 1 | Status: SHIPPED | OUTPATIENT
Start: 2022-07-30 | End: 2022-08-06

## 2022-07-30 ASSESSMENT — PAIN - FUNCTIONAL ASSESSMENT
PAIN_FUNCTIONAL_ASSESSMENT: NONE - DENIES PAIN
PAIN_FUNCTIONAL_ASSESSMENT: NONE - DENIES PAIN

## 2022-07-30 NOTE — ED NOTES
Pt alert and without further c/o's. States cause of rash is unknown, but getting progressively worse over the past 2 days.  Pt without s/s distress or discomfort, resps even and unlab     Mp Prudent, AYSE  07/30/22 7263

## 2022-07-30 NOTE — ED PROVIDER NOTES
eMERGENCY dEPARTMENT eNCOUnter        [address] [facility]    Chief Complaint   Patient presents with    Rash     States itchy scattered rash to abd, BLE and lower back       HPI    Barbi Phillips is a 70 y.o. female who presents to the ER for evaluation of scattered erythematous rash which appear to be more insect like bite she was concerned about the possibility of monkey pox. She is had no credible exposures no travel, no sick contact no immunosuppression. She was concerned about this and presents ER for evaluation of red exanthem    REVIEW OF SYSTEMS    See HPI for further details. Review of systems otherwise negative. PAST MEDICAL HISTORY    Past Medical History:   Diagnosis Date    Arthritis     Asthma     Back pain     Fibromyalgia     Fibromyalgia     Heart failure with reduced ejection fraction (HCC)     Herpes zoster     Hypercholesteremia     Hyperlipidemia     Hypertriglyceridemia     Neck pain     Osteopenia     Stress-induced cardiomyopathy 03/2017       SURGICAL HISTORY    Past Surgical History:   Procedure Laterality Date    BACK SURGERY      BLADDER REPAIR      CARDIAC CATHETERIZATION      COLONOSCOPY  4/23/12    diverticulosis    HYSTERECTOMY (CERVIX STATUS UNKNOWN)      NECK SURGERY  1/98, 10/05, '07 or '08    C6-C7spur '05       CURRENT MEDICATIONS    Current Outpatient Rx   Medication Sig Dispense Refill    cephALEXin (KEFLEX) 500 MG capsule Take 1 capsule by mouth in the morning and 1 capsule at noon and 1 capsule in the evening and 1 capsule before bedtime. 40 capsule 0    ondansetron (ZOFRAN) 4 MG tablet Take 1 tablet by mouth daily as needed for Nausea or Vomiting 30 tablet 0    traMADol (ULTRAM) 50 MG tablet Take 50 mg by mouth See Admin Instructions.  2 tabs am, 1 tab pm      lisinopril (PRINIVIL;ZESTRIL) 40 MG tablet TAKE ONE TABLET BY MOUTH EVERY EVENING 90 tablet 3    fenofibrate (TRICOR) 54 MG tablet TAKE ONE TABLET BY MOUTH DAILY 90 tablet 3    gabapentin (NEURONTIN) 100 MG capsule TAKE TWO CAPSULES BY MOUTH THREE TIMES A DAY AS DIRECTED 180 capsule 5    valACYclovir (VALTREX) 1 g tablet Take 1 tablet by mouth 3 times daily 21 tablet 0    naloxone 4 MG/0.1ML LIQD nasal spray 1 spray by Nasal route as needed for Opioid Reversal 1 each 0    spironolactone (ALDACTONE) 25 MG tablet TAKE ONE-HALF TABLET BY MOUTH DAILY 45 tablet 3    metoprolol succinate (TOPROL XL) 50 MG extended release tablet TAKE ONE TABLET BY MOUTH DAILY 90 tablet 3    atorvastatin (LIPITOR) 20 MG tablet TAKE ONE TABLET BY MOUTH DAILY 90 tablet 3    ezetimibe (ZETIA) 10 MG tablet Take 1 tablet by mouth daily 90 tablet 3    citalopram (CELEXA) 10 MG tablet Take 1 tablet by mouth daily 90 tablet 3    lidocaine (LIDODERM) 5 % Place patch on skin 12 hours on, 12 hours off. (Patient taking differently: Place patch on skin 12 hours on, 12 hours off. As needed) 30 patch 5    albuterol sulfate HFA (VENTOLIN HFA) 108 (90 Base) MCG/ACT inhaler Inhale 2 puffs into the lungs every 6 hours as needed for Wheezing 1 Inhaler 3    aspirin EC 81 MG EC tablet Take 1 tablet by mouth daily 30 tablet 3    Cholecalciferol (VITAMIN D3) 2000 UNITS CAPS Take 2 tablets by mouth daily       Calcium 7935-7178 MG-UNIT CHEW Take  by mouth.          ALLERGIES    Allergies   Allergen Reactions    Shellfish-Derived Products Nausea And Vomiting    Celebrex [Celecoxib] Swelling    Codeine     Ibuprofen Swelling    Methadone     Vioxx Swelling       FAMILY HISTORY    Family History   Problem Relation Age of Onset    High Blood Pressure Mother     Heart Disease Father        SOCIAL HISTORY    Social History     Socioeconomic History    Marital status:      Spouse name: None    Number of children: None    Years of education: None    Highest education level: None   Tobacco Use    Smoking status: Former     Packs/day: 0.50     Years: 3.00     Pack years: 1.50     Types: Cigarettes     Quit date: 2015     Years since quittin.6    Smokeless tobacco: Never Tobacco comments:     pt smoked off and on   Vaping Use    Vaping Use: Never used   Substance and Sexual Activity    Alcohol use: No     Alcohol/week: 0.0 standard drinks    Drug use: No    Sexual activity: Yes     Partners: Male     Social Determinants of Health     Financial Resource Strain: Low Risk     Difficulty of Paying Living Expenses: Not hard at all   Food Insecurity: No Food Insecurity    Worried About Running Out of Food in the Last Year: Never true    Ran Out of Food in the Last Year: Never true   Physical Activity: Inactive    Days of Exercise per Week: 0 days    Minutes of Exercise per Session: 0 min       PHYSICAL EXAM    VITAL SIGNS: /70   Pulse 78   Temp 98.4 °F (36.9 °C) (Oral)   Resp 18 Comment: 98% RA  Ht 5' 4\" (1.626 m)   Wt 180 lb (81.6 kg)   LMP  (LMP Unknown)   SpO2 96%   BMI 30.90 kg/m²   Constitutional:  Well developed, well nourished, no acute distress, non-toxic appearance   HENT:  Atraumatic, external ears normal, nose normal, oropharynx moist, no pharyngeal exudates. Neck- supple   Respiratory:  No respiratory distress, normal breath sounds   Cardiovascular:  Normal rate, normal rhythm, no murmurs, no gallops, no rubs   GI:  Soft, nondistended, normal bowel sounds, nontender, no organomegaly   Musculoskeletal:  No edema, no tenderness, no deformities. Integument: Small scattered erythematous insect bite noted of the anterior abdomen bilateral legs, no vesicles,      RADIOLOGY/PROCEDURES    none    ED COURSE & MEDICAL DECISION MAKING    Pertinent Labs & Imaging studies reviewed. (See chart for details)  Has no clinical evidence for monkey pox, no evidence of disseminated zoster, likely secondary bacterial infection superficial skin infections associated with bug bite. Oral antibiotics antihistamines return if worse or new symptom    FINAL IMPRESSION    1. Rash NOS  2.           Madalyn Tran MD  62/09/47 [id number]

## 2022-08-03 ENCOUNTER — OFFICE VISIT (OUTPATIENT)
Dept: FAMILY MEDICINE CLINIC | Age: 72
End: 2022-08-03
Payer: MEDICARE

## 2022-08-03 VITALS — HEART RATE: 80 BPM | OXYGEN SATURATION: 96 % | DIASTOLIC BLOOD PRESSURE: 82 MMHG | SYSTOLIC BLOOD PRESSURE: 132 MMHG

## 2022-08-03 DIAGNOSIS — S80.869D INSECT BITE OF LOWER LEG, UNSPECIFIED LATERALITY, SUBSEQUENT ENCOUNTER: ICD-10-CM

## 2022-08-03 DIAGNOSIS — Z00.00 INITIAL MEDICARE ANNUAL WELLNESS VISIT: Primary | ICD-10-CM
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
## 2022-08-03 PROBLEM — S80.869A INSECT BITE OF LOWER LEG: Status: ACTIVE | Noted: 2022-08-03

## 2022-08-03 PROCEDURE — 1090F PRES/ABSN URINE INCON ASSESS: CPT | Performed by: FAMILY MEDICINE

## 2022-08-03 PROCEDURE — G8417 CALC BMI ABV UP PARAM F/U: HCPCS | Performed by: FAMILY MEDICINE

## 2022-08-03 PROCEDURE — 1123F ACP DISCUSS/DSCN MKR DOCD: CPT | Performed by: FAMILY MEDICINE

## 2022-08-03 PROCEDURE — G8427 DOCREV CUR MEDS BY ELIG CLIN: HCPCS | Performed by: FAMILY MEDICINE

## 2022-08-03 PROCEDURE — 99213 OFFICE O/P EST LOW 20 MIN: CPT | Performed by: FAMILY MEDICINE

## 2022-08-03 PROCEDURE — 1036F TOBACCO NON-USER: CPT | Performed by: FAMILY MEDICINE

## 2022-08-03 PROCEDURE — 3017F COLORECTAL CA SCREEN DOC REV: CPT | Performed by: FAMILY MEDICINE

## 2022-08-03 PROCEDURE — G0438 PPPS, INITIAL VISIT: HCPCS | Performed by: FAMILY MEDICINE

## 2022-08-03 PROCEDURE — G8400 PT W/DXA NO RESULTS DOC: HCPCS | Performed by: FAMILY MEDICINE

## 2022-08-03 SDOH — ECONOMIC STABILITY: FOOD INSECURITY: WITHIN THE PAST 12 MONTHS, THE FOOD YOU BOUGHT JUST DIDN'T LAST AND YOU DIDN'T HAVE MONEY TO GET MORE.: NEVER TRUE

## 2022-08-03 SDOH — ECONOMIC STABILITY: FOOD INSECURITY: WITHIN THE PAST 12 MONTHS, YOU WORRIED THAT YOUR FOOD WOULD RUN OUT BEFORE YOU GOT MONEY TO BUY MORE.: NEVER TRUE

## 2022-08-03 ASSESSMENT — PATIENT HEALTH QUESTIONNAIRE - PHQ9
SUM OF ALL RESPONSES TO PHQ QUESTIONS 1-9: 2
SUM OF ALL RESPONSES TO PHQ9 QUESTIONS 1 & 2: 1
10. IF YOU CHECKED OFF ANY PROBLEMS, HOW DIFFICULT HAVE THESE PROBLEMS MADE IT FOR YOU TO DO YOUR WORK, TAKE CARE OF THINGS AT HOME, OR GET ALONG WITH OTHER PEOPLE: 1
2. FEELING DOWN, DEPRESSED OR HOPELESS: 1
6. FEELING BAD ABOUT YOURSELF - OR THAT YOU ARE A FAILURE OR HAVE LET YOURSELF OR YOUR FAMILY DOWN: 0
3. TROUBLE FALLING OR STAYING ASLEEP: 0
9. THOUGHTS THAT YOU WOULD BE BETTER OFF DEAD, OR OF HURTING YOURSELF: 0
5. POOR APPETITE OR OVEREATING: 0
SUM OF ALL RESPONSES TO PHQ QUESTIONS 1-9: 2
8. MOVING OR SPEAKING SO SLOWLY THAT OTHER PEOPLE COULD HAVE NOTICED. OR THE OPPOSITE, BEING SO FIGETY OR RESTLESS THAT YOU HAVE BEEN MOVING AROUND A LOT MORE THAN USUAL: 0
7. TROUBLE CONCENTRATING ON THINGS, SUCH AS READING THE NEWSPAPER OR WATCHING TELEVISION: 0
4. FEELING TIRED OR HAVING LITTLE ENERGY: 1
1. LITTLE INTEREST OR PLEASURE IN DOING THINGS: 0

## 2022-08-03 ASSESSMENT — SOCIAL DETERMINANTS OF HEALTH (SDOH): HOW HARD IS IT FOR YOU TO PAY FOR THE VERY BASICS LIKE FOOD, HOUSING, MEDICAL CARE, AND HEATING?: NOT HARD AT ALL

## 2022-08-03 ASSESSMENT — LIFESTYLE VARIABLES
HOW OFTEN DO YOU HAVE A DRINK CONTAINING ALCOHOL: NEVER
HOW MANY STANDARD DRINKS CONTAINING ALCOHOL DO YOU HAVE ON A TYPICAL DAY: PATIENT DOES NOT DRINK

## 2022-08-03 NOTE — PATIENT INSTRUCTIONS
Personalized Preventive Plan for Barbi Phillips - 8/3/2022  Medicare offers a range of preventive health benefits. Some of the tests and screenings are paid in full while other may be subject to a deductible, co-insurance, and/or copay. Some of these benefits include a comprehensive review of your medical history including lifestyle, illnesses that may run in your family, and various assessments and screenings as appropriate. After reviewing your medical record and screening and assessments performed today your provider may have ordered immunizations, labs, imaging, and/or referrals for you. A list of these orders (if applicable) as well as your Preventive Care list are included within your After Visit Summary for your review. Other Preventive Recommendations:    A preventive eye exam performed by an eye specialist is recommended every 1-2 years to screen for glaucoma; cataracts, macular degeneration, and other eye disorders. A preventive dental visit is recommended every 6 months. Try to get at least 150 minutes of exercise per week or 10,000 steps per day on a pedometer . Order or download the FREE \"Exercise & Physical Activity: Your Everyday Guide\" from [employer]. Call 9-713.861.4584 or search [employer] online. You need 9964-2698 mg of calcium and 5213-8114 IU of vitamin D per day. It is possible to meet your calcium requirement with diet alone, but a vitamin D supplement is usually necessary to meet this goal.  When exposed to the sun, use a sunscreen that protects against both UVA and UVB radiation with an SPF of 30 or greater. Reapply every 2 to 3 hours or after sweating, drying off with a towel, or swimming. Always wear a seat belt when traveling in a car. Always wear a helmet when riding a bicycle or motorcycle.

## 2022-08-03 NOTE — PROGRESS NOTES
in the evening and 1 capsule before bedtime. Yes Reggie Rivero MD   ondansetron (ZOFRAN) 4 MG tablet Take 1 tablet by mouth daily as needed for Nausea or Vomiting Yes Reggie Rivero MD   bacitracin-polymyxin b (POLYSPORIN) 500-12044 UNIT/GM ointment Apply 1 g topically in the morning and 1 g at noon and 1 g before bedtime. Do all this for 7 days. Apply topically 2 times daily. Ludivina Mcgill MD   traMADol (ULTRAM) 50 MG tablet Take 50 mg by mouth See Admin Instructions. 2 tabs am, 1 tab pm Yes Louie Mcnamara MD   lisinopril (PRINIVIL;ZESTRIL) 40 MG tablet TAKE ONE TABLET BY MOUTH EVERY EVENING Yes Javan Fishman MD   fenofibrate (TRICOR) 54 MG tablet TAKE ONE TABLET BY MOUTH DAILY Yes Javan Fishman MD   valACYclovir (VALTREX) 1 g tablet Take 1 tablet by mouth 3 times daily Yes BRENDA Gómez CNP   naloxone 4 MG/0.1ML LIQD nasal spray 1 spray by Nasal route as needed for Opioid Reversal Yes Salomon Silva MD   spironolactone (ALDACTONE) 25 MG tablet TAKE ONE-HALF TABLET BY MOUTH DAILY Yes Crystal Grubbs MD   metoprolol succinate (TOPROL XL) 50 MG extended release tablet TAKE ONE TABLET BY MOUTH DAILY Yes Crystal Grubbs MD   atorvastatin (LIPITOR) 20 MG tablet TAKE ONE TABLET BY MOUTH DAILY Yes Crystal Grubbs MD   ezetimibe (ZETIA) 10 MG tablet Take 1 tablet by mouth daily Yes Crystal Grubbs MD   citalopram (CELEXA) 10 MG tablet Take 1 tablet by mouth daily Yes Crystal Grubbs MD   lidocaine (LIDODERM) 5 % Place patch on skin 12 hours on, 12 hours off. Patient taking differently: Place patch on skin 12 hours on, 12 hours off.  As needed Yes Crystal Grubbs MD   albuterol sulfate HFA (VENTOLIN HFA) 108 (90 Base) MCG/ACT inhaler Inhale 2 puffs into the lungs every 6 hours as needed for Wheezing Yes Crystal Grubbs MD   aspirin EC 81 MG EC tablet Take 1 tablet by mouth daily Yes Crystal Grubbs MD   Cholecalciferol (VITAMIN D3) 2000 UNITS CAPS Take 2 tablets by mouth daily  Yes Historical Provider, MD   Calcium 1285-8444 MG-UNIT CHEW Take  by mouth. Yes Historical Provider, MD   gabapentin (NEURONTIN) 100 MG capsule TAKE TWO CAPSULES BY MOUTH THREE TIMES A DAY AS DIRECTED  Bull Owen MD       [facility] (Including outside providers/suppliers regularly involved in providing care):   Patient Care Team:  Bull Owen MD as PCP - General  Bull Owen MD as PCP - [facility] Empaneled Provider  Jennifer Leger as  (Spiritual Services)     Reviewed and updated this visit:  Tobacco  Allergies  Meds  Problems  Med Hx  Surg Hx  Soc Hx  Fam Hx              I, Jack Rubio LPN, 2/9/4774, performed the documented evaluation under the direct supervision of the attending physician. This encounter was performed under my, Cecille Lara, direct supervision, 8/3/2022.

## 2022-08-03 NOTE — PROGRESS NOTES
Chief Complaint   Patient presents with    Follow-Up from Hospital        Internal Administration   First Dose COVID-19, J&J, (age 18y+), IM, 0.5 mL  2021   Second Dose           Last COVID Lab SARS-CoV-2 (no units)   Date Value   2021 Not Detected             Wt Readings from Last 3 Encounters:   22 180 lb (81.6 kg)   22 187 lb (84.8 kg)   22 185 lb 6.4 oz (84.1 kg)     BP Readings from Last 3 Encounters:   22 132/82   22 130/70   22 138/74      Lab Results   Component Value Date    LABA1C 5.9 10/27/2021    LABA1C 6.3 2020    LABA1C 5.9 2020       HPI:  Tara Gamble is a 70 y.o. (: 1950) here today for    Patient was in the ER on  for insect bites. She was treated in the ER and it has healed substantially from when she was in the ER. There was 11 total bites. She states that it was a brown recluse bite. She states that she was working to clean her mothers house, her mother is a hoarder. She states that after laying on a bed for a couple hours there she then developed all the bites. She was treated with keflex. She states that the area of black that was on her leg is now gone. [x] Patient has completed an advance directive  [] Patient has NOT completed an advanced directive  [x] Patient has a documented healthcare surrogate  [] Patient does NOT have a documented healthcare surrogate  [] Discussed the importance of establishing and updating an advanced directive. Patient has questions at this time and those were answered. [x] Discussed the importance of establishing and updating an advanced directive. Patient does NOT have questions at this time.     Discussed with: [x] Patient            [] Family             [] Other caregiver    Patient's medications, allergies, past medical, surgical, social and family histories were reviewed and updated asappropriate on 8/3/2022 at 3:50 PM.    ROS:  Review of Systems    All other systems reviewed and are negative except as noted above on 8/3/2022 at 3:50 PM. Additional review of systems may be scanned into the media section ofthis medical record. Any responses requiring further intervention were pursued.     Past Medical History:   Diagnosis Date    Arthritis     Asthma     Back pain     Fibromyalgia     Fibromyalgia     Heart failure with reduced ejection fraction (HCC)     Herpes zoster     Hypercholesteremia     Hyperlipidemia     Hypertriglyceridemia     Neck pain     Osteopenia     Stress-induced cardiomyopathy 2017     Family History   Problem Relation Age of Onset    High Blood Pressure Mother     Heart Disease Father      Social History     Socioeconomic History    Marital status:      Spouse name: Not on file    Number of children: Not on file    Years of education: Not on file    Highest education level: Not on file   Occupational History    Not on file   Tobacco Use    Smoking status: Former     Packs/day: 0.50     Years: 3.00     Pack years: 1.50     Types: Cigarettes     Quit date: 2015     Years since quittin.5    Smokeless tobacco: Never    Tobacco comments:     pt smoked off and on   Vaping Use    Vaping Use: Never used   Substance and Sexual Activity    Alcohol use: No     Alcohol/week: 0.0 standard drinks    Drug use: No    Sexual activity: Yes     Partners: Male   Other Topics Concern    Not on file   Social History Narrative    Not on file     Social Determinants of Health     Financial Resource Strain: Low Risk     Difficulty of Paying Living Expenses: Not hard at all   Food Insecurity: No Food Insecurity    Worried About Running Out of Food in the Last Year: Never true    Ran Out of Food in the Last Year: Never true   Transportation Needs: Not on file   Physical Activity: Not on file   Stress: Not on file   Social Connections: Not on file   Intimate Partner Violence: Not on file   Housing Stability: Not on file     Prior to Visit Medications    Medication Sig Taking? Authorizing Provider   cephALEXin (KEFLEX) 500 MG capsule Take 1 capsule by mouth in the morning and 1 capsule at noon and 1 capsule in the evening and 1 capsule before bedtime. Yes Rashaad Butterfield MD   ondansetron (ZOFRAN) 4 MG tablet Take 1 tablet by mouth daily as needed for Nausea or Vomiting Yes Rashaad Butterfield MD   bacitracin-polymyxin b (POLYSPORIN) 500-02219 UNIT/GM ointment Apply 1 g topically in the morning and 1 g at noon and 1 g before bedtime. Do all this for 7 days. Apply topically 2 times daily. Antonella Khan MD   traMADol (ULTRAM) 50 MG tablet Take 50 mg by mouth See Admin Instructions. 2 tabs am, 1 tab pm Yes Historical Provider, MD   lisinopril (PRINIVIL;ZESTRIL) 40 MG tablet TAKE ONE TABLET BY MOUTH EVERY EVENING Yes Surinder Glover MD   fenofibrate (TRICOR) 54 MG tablet TAKE ONE TABLET BY MOUTH DAILY Yes Surinder Glover MD   valACYclovir (VALTREX) 1 g tablet Take 1 tablet by mouth 3 times daily Yes BRENDA Love - CNP   naloxone 4 MG/0.1ML LIQD nasal spray 1 spray by Nasal route as needed for Opioid Reversal Yes Sarah Kiser MD   spironolactone (ALDACTONE) 25 MG tablet TAKE ONE-HALF TABLET BY MOUTH DAILY Yes Marcy Venegas MD   metoprolol succinate (TOPROL XL) 50 MG extended release tablet TAKE ONE TABLET BY MOUTH DAILY Yes Marcy Venegas MD   atorvastatin (LIPITOR) 20 MG tablet TAKE ONE TABLET BY MOUTH DAILY Yes Marcy Venegas MD   ezetimibe (ZETIA) 10 MG tablet Take 1 tablet by mouth daily Yes Marcy Venegas MD   citalopram (CELEXA) 10 MG tablet Take 1 tablet by mouth daily Yes Marcy Venegas MD   lidocaine (LIDODERM) 5 % Place patch on skin 12 hours on, 12 hours off. Patient taking differently: Place patch on skin 12 hours on, 12 hours off.  As needed Yes Marcy Venegas MD   albuterol sulfate HFA (VENTOLIN HFA) 108 (90 Base) MCG/ACT inhaler Inhale 2 puffs into the lungs every 6 hours as needed for Wheezing Yes Daryle Staggers, MD   aspirin EC 81 MG EC tablet Take 1 tablet by mouth daily Yes Daryle Staggers, MD   Cholecalciferol (VITAMIN D3) 2000 UNITS CAPS Take 2 tablets by mouth daily  Yes Historical Provider, MD   Calcium 2346-2674 MG-UNIT CHEW Take  by mouth. Yes Historical Provider, MD   gabapentin (NEURONTIN) 100 MG capsule TAKE TWO CAPSULES BY MOUTH THREE TIMES A DAY AS DIRECTED  Daryle Staggers, MD     Allergies   Allergen Reactions    Shellfish-Derived Products Nausea And Vomiting    Celebrex [Celecoxib] Swelling    Codeine     Ibuprofen Swelling    Methadone     Vioxx Swelling       OBJECTIVE:  Estimated body mass index is 30.9 kg/m² as calculated from the following:    Height as of 7/30/22: 5' 4\" (1.626 m). Weight as of 7/30/22: 180 lb (81.6 kg). Vitals:    08/03/22 1541   BP: 132/82   Pulse: 80   SpO2: 96%       Physical Exam  Vitals and nursing note reviewed. Constitutional:       General: She is not in acute distress. Appearance: She is well-developed. She is not diaphoretic. HENT:      Head: Normocephalic and atraumatic. Cardiovascular:      Rate and Rhythm: Normal rate. Pulmonary:      Effort: Pulmonary effort is normal. No respiratory distress. Skin:     Comments: Multiple insect bites of lower legs, abdomen and back   Neurological:      Mental Status: She is alert and oriented to person, place, and time. Psychiatric:         Mood and Affect: Mood normal.         Behavior: Behavior normal.         Thought Content: Thought content normal.         Judgment: Judgment normal.            ASSESSMENT PLAN      Diagnosis Orders   1. Initial Medicare annual wellness visit        2. Insect bite of lower leg, unspecified laterality, subsequent encounter        Patient was seen in the ER and given Keflex and Polysporin with improvement.   There is residual bruising about the lesions on the upper left thigh and some induration around the lesion of the inner right thigh, but no active infection. At this point it is difficult to say what the etiology was, possibility of brown recluse spider bite is present as patient presumes, but would not be able to say that the lesions are pathognomonic of such a bite. For now she is improved no further needs identified. Patient should call the office immediately with new or ongoing signs or symptoms or worsening, or proceed to the emergency room. No changes in past medical history, past surgical history, social history, or family history were noted during the patient encounter unless specifically listed above. All updates of past medical history, past surgical history, social history, or family history were reviewed personally by me during the office visit. All problems listed in the assessment are stable unless noted otherwise. Medication profile reviewed personally by me during the visit. Medication side effects and possible impairments from medications were discussed as applicable. This document was prepared by a combination of typing and transcription through a voice recognition software. Scribe attestation: Imelda Lakhani RN, am scribing for and in the presence of Mar Rehman MD. Electronically signed by Chandler Schmidt RN on 8/3/2022 at 3:50 PM      Provider attestation:     I, Dr. Antonette Hawthonre, personally performed the services described in this documentation, as scribed by the above signed scribe in my presence, and it is both accurate and complete. I agree with the ROS and Past Histories independently gathered by the clinical support staff and the remaining scribed note accurately describes my personal service to the patient.       8/3/2022    4:52 PM

## 2022-08-24 ENCOUNTER — OFFICE VISIT (OUTPATIENT)
Dept: FAMILY MEDICINE CLINIC | Age: 72
End: 2022-08-24
Payer: MEDICARE

## 2022-08-24 VITALS
WEIGHT: 177 LBS | SYSTOLIC BLOOD PRESSURE: 132 MMHG | BODY MASS INDEX: 30.22 KG/M2 | HEART RATE: 71 BPM | HEIGHT: 64 IN | OXYGEN SATURATION: 96 % | DIASTOLIC BLOOD PRESSURE: 62 MMHG

## 2022-08-24 DIAGNOSIS — M50.223 HERNIATION OF INTERVERTEBRAL DISC AT C6-C7 LEVEL: Primary | ICD-10-CM

## 2022-08-24 DIAGNOSIS — M50.322 DEGENERATION OF C5-C6 INTERVERTEBRAL DISC: ICD-10-CM

## 2022-08-24 DIAGNOSIS — M50.221 HERNIATION OF INTERVERTEBRAL DISC AT C4-C5 LEVEL: ICD-10-CM

## 2022-08-24 DIAGNOSIS — F32.3 DEPRESSIVE PSYCHOSIS (HCC): ICD-10-CM

## 2022-08-24 DIAGNOSIS — M50.321 DEGENERATION OF INTERVERTEBRAL DISC AT C4-C5 LEVEL: ICD-10-CM

## 2022-08-24 PROCEDURE — G8427 DOCREV CUR MEDS BY ELIG CLIN: HCPCS | Performed by: FAMILY MEDICINE

## 2022-08-24 PROCEDURE — 1123F ACP DISCUSS/DSCN MKR DOCD: CPT | Performed by: FAMILY MEDICINE

## 2022-08-24 PROCEDURE — 99214 OFFICE O/P EST MOD 30 MIN: CPT | Performed by: FAMILY MEDICINE

## 2022-08-24 PROCEDURE — G8400 PT W/DXA NO RESULTS DOC: HCPCS | Performed by: FAMILY MEDICINE

## 2022-08-24 PROCEDURE — 1036F TOBACCO NON-USER: CPT | Performed by: FAMILY MEDICINE

## 2022-08-24 PROCEDURE — 3017F COLORECTAL CA SCREEN DOC REV: CPT | Performed by: FAMILY MEDICINE

## 2022-08-24 PROCEDURE — G8417 CALC BMI ABV UP PARAM F/U: HCPCS | Performed by: FAMILY MEDICINE

## 2022-08-24 PROCEDURE — 1090F PRES/ABSN URINE INCON ASSESS: CPT | Performed by: FAMILY MEDICINE

## 2022-08-24 NOTE — PROGRESS NOTES
Chief Complaint   Patient presents with    Other     Pt still has neck pain from injury. Internal Administration   First Dose COVID-19, J&J, (age 18y+), IM, 0.5 mL  2021   Second Dose           Last COVID Lab SARS-CoV-2 (no units)   Date Value   2021 Not Detected             Wt Readings from Last 3 Encounters:   22 177 lb (80.3 kg)   22 180 lb (81.6 kg)   22 187 lb (84.8 kg)     BP Readings from Last 3 Encounters:   22 132/62   22 132/82   22 130/70      Lab Results   Component Value Date    LABA1C 5.9 10/27/2021    LABA1C 6.3 2020    LABA1C 5.9 2020       HPI:  Alex Cuevas is a 70 y.o. (: 1950) here today for    Patient is here today for her workers comp claim of . She states that she has continuous neck pain. [x] Patient has completed an advance directive  [] Patient has NOT completed an advanced directive  [x] Patient has a documented healthcare surrogate  [] Patient does NOT have a documented healthcare surrogate  [] Discussed the importance of establishing and updating an advanced directive. Patient has questions at this time and those were answered. [x] Discussed the importance of establishing and updating an advanced directive. Patient does NOT have questions at this time. Discussed with: [x] Patient            [] Family             [] Other caregiver    Patient's medications, allergies, past medical, surgical, social and family histories were reviewed and updated asappropriate on 2022 at 4:56 PM.    ROS:  Review of Systems    All other systems reviewed and are negative except as noted above on 2022 at 4:56 PM. Additional review of systems may be scanned into the media section ofthis medical record. Any responses requiring further intervention were pursued.     Past Medical History:   Diagnosis Date    Arthritis     Asthma     Back pain     Fibromyalgia     Fibromyalgia     Heart failure with reduced ejection fraction (HCC)     Herpes zoster     Hypercholesteremia     Hyperlipidemia     Hypertriglyceridemia     Neck pain     Osteopenia     Stress-induced cardiomyopathy 2017     Family History   Problem Relation Age of Onset    High Blood Pressure Mother     Heart Disease Father      Social History     Socioeconomic History    Marital status:      Spouse name: Not on file    Number of children: Not on file    Years of education: Not on file    Highest education level: Not on file   Occupational History    Not on file   Tobacco Use    Smoking status: Former     Packs/day: 0.50     Years: 3.00     Pack years: 1.50     Types: Cigarettes     Quit date: 2015     Years since quittin.6    Smokeless tobacco: Never    Tobacco comments:     pt smoked off and on   Vaping Use    Vaping Use: Never used   Substance and Sexual Activity    Alcohol use: No     Alcohol/week: 0.0 standard drinks    Drug use: No    Sexual activity: Yes     Partners: Male   Other Topics Concern    Not on file   Social History Narrative    Not on file     Social Determinants of Health     Financial Resource Strain: Low Risk     Difficulty of Paying Living Expenses: Not hard at all   Food Insecurity: No Food Insecurity    Worried About Running Out of Food in the Last Year: Never true    Ran Out of Food in the Last Year: Never true   Transportation Needs: Not on file   Physical Activity: Inactive    Days of Exercise per Week: 0 days    Minutes of Exercise per Session: 0 min   Stress: Not on file   Social Connections: Not on file   Intimate Partner Violence: Not on file   Housing Stability: Not on file     Prior to Visit Medications    Medication Sig Taking? Authorizing Provider   traMADol (ULTRAM) 50 MG tablet Take 1 tablet by mouth in the morning and 1 tablet at noon and 1 tablet in the evening and 1 tablet before bedtime. Do all this for 30 days.  Yes BRENDA Lennon - CNP   lisinopril (PRINIVIL;ZESTRIL) 40 MG tablet TAKE ONE TABLET BY MOUTH EVERY EVENING Yes Christine Acosta MD   fenofibrate (TRICOR) 54 MG tablet TAKE ONE TABLET BY MOUTH DAILY Yes Christine Acosta MD   gabapentin (NEURONTIN) 100 MG capsule TAKE TWO CAPSULES BY MOUTH THREE TIMES A DAY AS DIRECTED Yes Bozena Sanchez MD   valACYclovir (VALTREX) 1 g tablet Take 1 tablet by mouth 3 times daily Yes BRENDA Mercado - CNP   spironolactone (ALDACTONE) 25 MG tablet TAKE ONE-HALF TABLET BY MOUTH DAILY Yes Bozena Sanchez MD   metoprolol succinate (TOPROL XL) 50 MG extended release tablet TAKE ONE TABLET BY MOUTH DAILY Yes Bozena Sanchez MD   atorvastatin (LIPITOR) 20 MG tablet TAKE ONE TABLET BY MOUTH DAILY Yes Bozena Sanchez MD   ezetimibe (ZETIA) 10 MG tablet Take 1 tablet by mouth daily Yes Bozena Sanchez MD   citalopram (CELEXA) 10 MG tablet Take 1 tablet by mouth daily Yes Bozena Sanchez MD   lidocaine (LIDODERM) 5 % Place patch on skin 12 hours on, 12 hours off. Patient taking differently: Place patch on skin 12 hours on, 12 hours off. As needed Yes Bozena Sanchez MD   albuterol sulfate HFA (VENTOLIN HFA) 108 (90 Base) MCG/ACT inhaler Inhale 2 puffs into the lungs every 6 hours as needed for Wheezing Yes Bozena Sanchez MD   aspirin EC 81 MG EC tablet Take 1 tablet by mouth daily Yes Bozena Sanchez MD   Cholecalciferol (VITAMIN D3) 2000 UNITS CAPS Take 2 tablets by mouth daily  Yes Historical Provider, MD   Calcium 2670-3912 MG-UNIT CHEW Take  by mouth. Yes Historical Provider, MD     Allergies   Allergen Reactions    Shellfish-Derived Products Nausea And Vomiting    Celebrex [Celecoxib] Swelling    Codeine     Ibuprofen Swelling    Methadone     Vioxx Swelling       OBJECTIVE:  Estimated body mass index is 30.38 kg/m² as calculated from the following:    Height as of this encounter: 5' 4\" (1.626 m). Weight as of this encounter: 177 lb (80.3 kg).   Vitals:    08/24/22 1630   BP: 132/62   Site: Left Upper Arm   Position: Sitting   Cuff Size: Medium Adult   Pulse: 71   SpO2: 96%   Weight: 177 lb (80.3 kg)   Height: 5' 4\" (1.626 m)       Physical Exam  Vitals and nursing note reviewed. Constitutional:       General: She is not in acute distress. Appearance: She is well-developed. She is not diaphoretic. HENT:      Head: Normocephalic and atraumatic. Right Ear: External ear normal.      Left Ear: External ear normal.      Nose: Nose normal.   Eyes:      General: Lids are normal. No scleral icterus. Right eye: No discharge. Left eye: No discharge. Pupils: Pupils are equal, round, and reactive to light. Neck:      Thyroid: No thyromegaly. Vascular: No JVD. Cardiovascular:      Rate and Rhythm: Normal rate and regular rhythm. Heart sounds: Normal heart sounds. Pulmonary:      Effort: Pulmonary effort is normal. No respiratory distress. Breath sounds: Normal breath sounds. Abdominal:      Palpations: Abdomen is soft. There is no hepatomegaly or splenomegaly. Tenderness: There is no abdominal tenderness. Musculoskeletal:      Cervical back: Pain with movement and muscular tenderness (Bilateral trapezii trunk and neck portion) present. Decreased range of motion. Right lower leg: No edema. Left lower leg: No edema. Comments: Spasming of the trapezius muscles bilaterally    C spine:  Flexion- 25 degrees  Extension- 15 degrees  Left rotation- 45 degrees  Right rotation- 30 degrees  Left flexion- 20 degrees  Right flexion- 10 degrees     Skin:     General: Skin is warm and dry. Coloration: Skin is not pale. Findings: No erythema or rash. Comments: Turgor normal   Neurological:      Mental Status: She is oriented to person, place, and time. Psychiatric:         Mood and Affect: Mood normal.         Behavior: Behavior normal.         Thought Content:  Thought content normal.         Judgment: Judgment normal.            ASSESSMENT PLAN      Diagnosis Orders   1. Herniation of intervertebral disc at C6-C7 level        2. Herniation of intervertebral disc at C4-C5 level        3. Degeneration of intervertebral disc at C4-C5 level        4. Degeneration of C5-C6 intervertebral disc        5. Depressive psychosis ([address]        Patient states she has not had the opportunity to see Dr. Alyson Cannon yet. Still finds some relief from gabapentin and tramadol. Her pain is complicated by the significant stress she is now under trying to care for her mother. Follow-up again in 6 months. Patient should call the office immediately with new or ongoing signs or symptoms or worsening, or proceed to the emergency room. No changes in past medical history, past surgical history, social history, or family history were noted during the patient encounter unless specifically listed above. All updates of past medical history, past surgical history, social history, or family history were reviewed personally by me during the office visit. All problems listed in the assessment are stable unless noted otherwise. Medication profile reviewed personally by me during the visit. Medication side effects and possible impairments from medications were discussed as applicable. This document was prepared by a combination of typing and transcription through a voice recognition software. Scribe attestation: Gisela Araujo RN, am scribing for and in the presence of Merissa Nunn MD. Electronically signed by John Bai RN on 8/24/2022 at 4:56 PM      Provider attestation:     I, Dr. Ayanna Mendoza, personally performed the services described in this documentation, as scribed by the above signed scribe in my presence, and it is both accurate and complete.  I agree with the ROS and Past Histories independently gathered by the clinical support staff and the remaining scribed note accurately describes my personal service to the patient.       8/24/2022    5:10 PM

## 2022-08-24 NOTE — PATIENT INSTRUCTIONS

## 2022-10-19 DIAGNOSIS — I50.22 SYSTOLIC CHF, CHRONIC (HCC): ICD-10-CM

## 2022-10-19 DIAGNOSIS — E78.2 MIXED HYPERLIPIDEMIA: ICD-10-CM

## 2022-10-19 DIAGNOSIS — I10 ESSENTIAL HYPERTENSION: ICD-10-CM

## 2022-10-20 RX ORDER — METOPROLOL SUCCINATE 50 MG/1
TABLET, EXTENDED RELEASE ORAL
Qty: 90 TABLET | Refills: 0 | Status: SHIPPED | OUTPATIENT
Start: 2022-10-20 | End: 2022-11-29 | Stop reason: SDUPTHER

## 2022-10-20 RX ORDER — EZETIMIBE 10 MG/1
TABLET ORAL
Qty: 90 TABLET | Refills: 0 | Status: SHIPPED | OUTPATIENT
Start: 2022-10-20 | End: 2022-11-29 | Stop reason: SDUPTHER

## 2022-10-20 RX ORDER — ATORVASTATIN CALCIUM 20 MG/1
TABLET, FILM COATED ORAL
Qty: 90 TABLET | Refills: 0 | Status: SHIPPED | OUTPATIENT
Start: 2022-10-20 | End: 2022-11-29 | Stop reason: SDUPTHER

## 2022-10-26 ENCOUNTER — OFFICE VISIT (OUTPATIENT)
Dept: FAMILY MEDICINE CLINIC | Age: 72
End: 2022-10-26
Payer: MEDICARE

## 2022-10-26 VITALS
OXYGEN SATURATION: 95 % | BODY MASS INDEX: 30.9 KG/M2 | WEIGHT: 180 LBS | SYSTOLIC BLOOD PRESSURE: 130 MMHG | DIASTOLIC BLOOD PRESSURE: 80 MMHG | HEART RATE: 94 BPM

## 2022-10-26 DIAGNOSIS — M51.36 DEGENERATION OF L4-L5 INTERVERTEBRAL DISC: Chronic | ICD-10-CM

## 2022-10-26 DIAGNOSIS — M51.37 DEGENERATIVE DISC DISEASE AT L5-S1 LEVEL: Chronic | ICD-10-CM

## 2022-10-26 DIAGNOSIS — S80.12XD CONTUSION OF LEFT LOWER EXTREMITY, SUBSEQUENT ENCOUNTER: Chronic | ICD-10-CM

## 2022-10-26 DIAGNOSIS — S20.229D CONTUSION OF BACK, UNSPECIFIED LATERALITY, SUBSEQUENT ENCOUNTER: ICD-10-CM

## 2022-10-26 DIAGNOSIS — B02.29 POSTHERPETIC NEURALGIA: ICD-10-CM

## 2022-10-26 DIAGNOSIS — S30.0XXD CONTUSION OF BUTTOCK, SUBSEQUENT ENCOUNTER: Chronic | ICD-10-CM

## 2022-10-26 PROCEDURE — G8484 FLU IMMUNIZE NO ADMIN: HCPCS | Performed by: FAMILY MEDICINE

## 2022-10-26 PROCEDURE — 1090F PRES/ABSN URINE INCON ASSESS: CPT | Performed by: FAMILY MEDICINE

## 2022-10-26 PROCEDURE — 99214 OFFICE O/P EST MOD 30 MIN: CPT | Performed by: FAMILY MEDICINE

## 2022-10-26 PROCEDURE — G8417 CALC BMI ABV UP PARAM F/U: HCPCS | Performed by: FAMILY MEDICINE

## 2022-10-26 PROCEDURE — 3017F COLORECTAL CA SCREEN DOC REV: CPT | Performed by: FAMILY MEDICINE

## 2022-10-26 PROCEDURE — 3078F DIAST BP <80 MM HG: CPT | Performed by: FAMILY MEDICINE

## 2022-10-26 PROCEDURE — 1123F ACP DISCUSS/DSCN MKR DOCD: CPT | Performed by: FAMILY MEDICINE

## 2022-10-26 PROCEDURE — G8400 PT W/DXA NO RESULTS DOC: HCPCS | Performed by: FAMILY MEDICINE

## 2022-10-26 PROCEDURE — 1036F TOBACCO NON-USER: CPT | Performed by: FAMILY MEDICINE

## 2022-10-26 PROCEDURE — 3074F SYST BP LT 130 MM HG: CPT | Performed by: FAMILY MEDICINE

## 2022-10-26 PROCEDURE — G8427 DOCREV CUR MEDS BY ELIG CLIN: HCPCS | Performed by: FAMILY MEDICINE

## 2022-10-26 RX ORDER — LIDOCAINE 50 MG/G
PATCH TOPICAL
Qty: 30 PATCH | Refills: 5 | Status: SHIPPED | OUTPATIENT
Start: 2022-10-26

## 2022-10-26 RX ORDER — LIDOCAINE 50 MG/G
PATCH TOPICAL
Qty: 30 PATCH | Refills: 5 | Status: CANCELLED | OUTPATIENT
Start: 2022-10-26

## 2022-10-26 NOTE — PATIENT INSTRUCTIONS

## 2022-10-26 NOTE — PROGRESS NOTES
Chief Complaint   Patient presents with    Work Related Injury      [facility] claim        Internal Administration   First Dose COVID-19, J&J, (age 18y+), IM, 0.5 mL  2021   Second Dose           Last COVID Lab SARS-CoV-2 (no units)   Date Value   2021 Not Detected             Wt Readings from Last 3 Encounters:   10/26/22 180 lb (81.6 kg)   22 177 lb (80.3 kg)   22 180 lb (81.6 kg)     BP Readings from Last 3 Encounters:   10/26/22 130/80   22 132/62   22 132/82      Lab Results   Component Value Date    LABA1C 5.9 10/27/2021    LABA1C 6.3 2020    LABA1C 5.9 2020       HPI:  Gilma Perez is a 70 y.o. (: 1950) here today for    She states that she never got back in to see Dr. Chino Esparza. She states that she is still seeing pain management dr. Jim Calvillo. She states that her back still hurts. She is having a lot of increased pain in bilateral hips, discussed that this is likely not due to her back. More likely a bursitis of the hip or arthritis of the hips. She states that she is also having a lot of back pain across her lower lumbar when she turns at night informed its likely due to her DDD. [x] Patient has completed an advance directive  [] Patient has NOT completed an advanced directive  [x] Patient has a documented healthcare surrogate  [] Patient does NOT have a documented healthcare surrogate  [] Discussed the importance of establishing and updating an advanced directive. Patient has questions at this time and those were answered. [x] Discussed the importance of establishing and updating an advanced directive. Patient does NOT have questions at this time.     Discussed with: [x] Patient            [] Family             [] Other caregiver    Patient's medications, allergies, past medical, surgical, social and family histories were reviewed and updated asappropriate on 10/26/2022 at 3:10 PM.    ROS:  Review of Systems    All other systems reviewed and are negative except as noted above on 10/26/2022 at 3:10 PM. Additional review of systems may be scanned into the media section ofthis medical record. Any responses requiring further intervention were pursued.     Past Medical History:   Diagnosis Date    Arthritis     Asthma     Back pain     Fibromyalgia     Fibromyalgia     Heart failure with reduced ejection fraction (HCC)     Herpes zoster     Hypercholesteremia     Hyperlipidemia     Hypertriglyceridemia     Neck pain     Osteopenia     Stress-induced cardiomyopathy 2017     Family History   Problem Relation Age of Onset    High Blood Pressure Mother     Heart Disease Father      Social History     Socioeconomic History    Marital status:      Spouse name: Not on file    Number of children: Not on file    Years of education: Not on file    Highest education level: Not on file   Occupational History    Not on file   Tobacco Use    Smoking status: Former     Packs/day: 0.50     Years: 3.00     Pack years: 1.50     Types: Cigarettes     Quit date: 2015     Years since quittin.8    Smokeless tobacco: Never    Tobacco comments:     pt smoked off and on   Vaping Use    Vaping Use: Never used   Substance and Sexual Activity    Alcohol use: No     Alcohol/week: 0.0 standard drinks    Drug use: No    Sexual activity: Yes     Partners: Male   Other Topics Concern    Not on file   Social History Narrative    Not on file     Social Determinants of Health     Financial Resource Strain: Low Risk     Difficulty of Paying Living Expenses: Not hard at all   Food Insecurity: No Food Insecurity    Worried About Running Out of Food in the Last Year: Never true    Ran Out of Food in the Last Year: Never true   Transportation Needs: Not on file   Physical Activity: Inactive    Days of Exercise per Week: 0 days    Minutes of Exercise per Session: 0 min   Stress: Not on file   Social Connections: Not on file   Intimate Partner Violence: Not on file   Housing Stability: Not on file     Prior to Visit Medications    Medication Sig Taking? Authorizing Provider   metoprolol succinate (TOPROL XL) 50 MG extended release tablet TAKE ONE TABLET BY MOUTH DAILY Yes BRENDA Guzman - CNP   atorvastatin (LIPITOR) 20 MG tablet TAKE ONE TABLET BY MOUTH DAILY Yes BRENDA Guzman - CNP   ezetimibe (ZETIA) 10 MG tablet TAKE ONE TABLET BY MOUTH DAILY Yes [facility] BRENDA John - CNP   lisinopril (PRINIVIL;ZESTRIL) 40 MG tablet TAKE ONE TABLET BY MOUTH EVERY EVENING Yes Luiza Skinner MD   fenofibrate (TRICOR) 54 MG tablet TAKE ONE TABLET BY MOUTH DAILY Yes Luiza Skinner MD   valACYclovir (VALTREX) 1 g tablet Take 1 tablet by mouth 3 times daily Yes BRENDA Guzman - CNP   spironolactone (ALDACTONE) 25 MG tablet TAKE ONE-HALF TABLET BY MOUTH DAILY Yes Greta Pope MD   citalopram (CELEXA) 10 MG tablet Take 1 tablet by mouth daily Yes Greta Pope MD   lidocaine (LIDODERM) 5 % Place patch on skin 12 hours on, 12 hours off. Patient taking differently: Place patch on skin 12 hours on, 12 hours off. As needed Yes Greta Pope MD   albuterol sulfate HFA (VENTOLIN HFA) 108 (90 Base) MCG/ACT inhaler Inhale 2 puffs into the lungs every 6 hours as needed for Wheezing Yes Greta Pope MD   aspirin EC 81 MG EC tablet Take 1 tablet by mouth daily Yes Greta Pope MD   Cholecalciferol (VITAMIN D3) 2000 UNITS CAPS Take 2 tablets by mouth daily  Yes Historical Provider, MD   Calcium 4612-8985 MG-UNIT CHEW Take  by mouth.  Yes Historical Provider, MD   gabapentin (NEURONTIN) 100 MG capsule TAKE TWO CAPSULES BY MOUTH THREE TIMES A DAY AS DIRECTED  Greta Pope MD     Allergies   Allergen Reactions    Shellfish-Derived Products Nausea And Vomiting    Celebrex [Celecoxib] Swelling    Codeine     Ibuprofen Swelling    Methadone     Vioxx Swelling       OBJECTIVE:  Estimated body mass index is 30.9 kg/m² as calculated from the following:    Height as of 8/24/22: 5' 4\" (1.626 m). Weight as of this encounter: 180 lb (81.6 kg). Vitals:    10/26/22 1459   BP: 130/80   Pulse: 94   SpO2: 95%   Weight: 180 lb (81.6 kg)       Physical Exam  Vitals and nursing note reviewed. Constitutional:       General: She is not in acute distress. Appearance: She is well-developed. She is not diaphoretic. HENT:      Head: Normocephalic and atraumatic. Right Ear: External ear normal.      Left Ear: External ear normal.      Nose: Nose normal.   Eyes:      General: Lids are normal. No scleral icterus. Right eye: No discharge. Left eye: No discharge. Pupils: Pupils are equal, round, and reactive to light. Neck:      Thyroid: No thyromegaly. Vascular: No JVD. Cardiovascular:      Rate and Rhythm: Normal rate and regular rhythm. Heart sounds: Normal heart sounds. Pulmonary:      Effort: Pulmonary effort is normal. No respiratory distress. Breath sounds: Normal breath sounds. Abdominal:      Palpations: Abdomen is soft. There is no hepatomegaly or splenomegaly. Tenderness: There is no abdominal tenderness. Musculoskeletal:         General: Tenderness (Middle low back toward left iliac crest and down the left buttock.) present. Right lower leg: No edema. Left lower leg: No edema. Skin:     General: Skin is warm and dry. Coloration: Skin is not pale. Findings: No erythema or rash. Comments: Turgor normal   Neurological:      Mental Status: She is oriented to person, place, and time. Comments: No significant sciatic notch tenderness on the left. 1+ bilateral patellar reflex. Psychiatric:         Mood and Affect: Mood normal.         Behavior: Behavior normal.         Thought Content: Thought content normal.         Judgment: Judgment normal.            ASSESSMENT PLAN      Diagnosis Orders   1. Degenerative disc disease at L5-S1 level        2. Degeneration of L4-L5 intervertebral disc        3. Contusion of buttock, subsequent encounter        4. Contusion of left lower extremity, subsequent encounter        5. Contusion of back, unspecified laterality, subsequent encounter        Patient did not follow-up with Dr. Thalia Tony. She does follow-up with Dr. David Calderón. Her back is remaining basically the same. She will continue treatment with medication per pain specialist.  Follow-up on this claim in 6 months. Toradol and gabapentin she is receiving does seem to be helping her discomfort. Patient should call the office immediately with new or ongoing signs or symptoms or worsening, or proceed to the emergency room. No changes in past medical history, past surgical history, social history, or family history were noted during the patient encounter unless specifically listed above. All updates of past medical history, past surgical history, social history, or family history were reviewed personally by me during the office visit. All problems listed in the assessment are stable unless noted otherwise. Medication profile reviewed personally by me during the visit. Medication side effects and possible impairments from medications were discussed as applicable. This document was prepared by a combination of typing and transcription through a voice recognition software. Scribe attestation: Nyla Borges RN, am scribing for and in the presence of Ivana Antonio MD. Electronically signed by Kaila Bae RN on 10/26/2022 at 3:10 PM      Provider attestation:     I, Dr. Sissy Moody, personally performed the services described in this documentation, as scribed by the above signed scribe in my presence, and it is both accurate and complete.  I agree with the ROS and Past Histories independently gathered by the clinical support staff and the remaining scribed note accurately describes my personal service to the patient.       10/26/2022    6:28 PM

## 2022-10-27 ENCOUNTER — TELEPHONE (OUTPATIENT)
Dept: ADMINISTRATIVE | Age: 72
End: 2022-10-27

## 2022-10-27 PROBLEM — M51.379 DEGENERATION OF INTERVERTEBRAL DISC AT L5-S1 LEVEL: Status: ACTIVE | Noted: 2022-10-27

## 2022-10-27 PROBLEM — M51.37 DEGENERATION OF INTERVERTEBRAL DISC AT L5-S1 LEVEL: Status: ACTIVE | Noted: 2022-10-27

## 2022-10-27 NOTE — TELEPHONE ENCOUNTER
Submitted PA for Lidocaine 5% patches, Key: [id number]. Status: Approved, Coverage Starts on: 1/1/2022 12:00:00 AM, Coverage Ends on: 12/31/2023. Please notify patient. Thank you.

## 2022-11-02 DIAGNOSIS — M50.322 DEGENERATION OF C5-C6 INTERVERTEBRAL DISC: ICD-10-CM

## 2022-11-02 DIAGNOSIS — M50.221 HERNIATION OF INTERVERTEBRAL DISC AT C4-C5 LEVEL: ICD-10-CM

## 2022-11-02 DIAGNOSIS — M50.223 HERNIATION OF INTERVERTEBRAL DISC AT C6-C7 LEVEL: ICD-10-CM

## 2022-11-02 DIAGNOSIS — M50.321 DEGENERATION OF INTERVERTEBRAL DISC AT C4-C5 LEVEL: ICD-10-CM

## 2022-11-02 RX ORDER — GABAPENTIN 100 MG/1
CAPSULE ORAL
Qty: 180 CAPSULE | Refills: 5 | Status: SHIPPED | OUTPATIENT
Start: 2022-11-02 | End: 2023-05-26

## 2022-11-03 DIAGNOSIS — F41.8 SITUATIONAL ANXIETY: ICD-10-CM

## 2022-11-04 RX ORDER — CITALOPRAM 10 MG/1
TABLET ORAL
Qty: 90 TABLET | Refills: 0 | Status: SHIPPED | OUTPATIENT
Start: 2022-11-04

## 2022-11-17 ENCOUNTER — TELEMEDICINE (OUTPATIENT)
Dept: FAMILY MEDICINE CLINIC | Age: 72
End: 2022-11-17
Payer: MEDICARE

## 2022-11-17 ENCOUNTER — TELEPHONE (OUTPATIENT)
Dept: FAMILY MEDICINE CLINIC | Age: 72
End: 2022-11-17

## 2022-11-17 DIAGNOSIS — B02.9 HERPES ZOSTER WITHOUT COMPLICATION: ICD-10-CM

## 2022-11-17 PROCEDURE — 98967 PH1 ASSMT&MGMT NQHP 11-20: CPT | Performed by: NURSE PRACTITIONER

## 2022-11-17 RX ORDER — VALACYCLOVIR HYDROCHLORIDE 1 G/1
1000 TABLET, FILM COATED ORAL 3 TIMES DAILY
Qty: 21 TABLET | Refills: 1 | Status: SHIPPED | OUTPATIENT
Start: 2022-11-17

## 2022-11-17 ASSESSMENT — ENCOUNTER SYMPTOMS
COLOR CHANGE: 0
BACK PAIN: 1
RESPIRATORY NEGATIVE: 1

## 2022-11-17 NOTE — TELEPHONE ENCOUNTER
Pt called stating that she's broke out in shingles again on her lower hip area. States that when she gets stressed it seems to flare up. Pt requesting Rx for the valcyclovir 1g tablet be sent to [facility].  Call back pt [phone]  Routing to Jenni due to PCP out of office

## 2022-11-17 NOTE — PROGRESS NOTES
2022    TELEHEALTH EVALUATION -- Telephone (During OWDSP-58 public health emergency)    HPI:    Bibiana Lawrence (:  1950) has requested a telephone evaluation for the following concern(s):    HPI    Nikhil Turner admits to having a rash on her left side of her buttocks. She states the rash is painful and goes to her low back. The rash looks like blisters. She states she has shingles before and she admits to the rash looking like her previous shingles rash. She admits to the rash coming on at 4 AM in the beginning of the morning. She states she has taken Valtrex in the past and it helped with her shingles. She did well with Valtrex in the past. She has never had the shingles vaccine. Review of Systems   Constitutional: Negative. Respiratory: Negative. Cardiovascular: Negative. Musculoskeletal:  Positive for back pain. Negative for arthralgias, gait problem, joint swelling, myalgias, neck pain and neck stiffness. Skin:  Positive for rash. Negative for color change, pallor and wound. Neurological: Negative. Psychiatric/Behavioral: Negative. Prior to Visit Medications    Medication Sig Taking? Authorizing Provider   valACYclovir (VALTREX) 1 g tablet Take 1 tablet by mouth 3 times daily Yes BRENDA Bingham CNP   citalopram (CELEXA) 10 MG tablet TAKE ONE TABLET BY MOUTH DAILY Yes BRENDA Bingham CNP   gabapentin (NEURONTIN) 100 MG capsule TAKE TWO CAPSULES BY MOUTH THREE TIMES A DAY AS DIRECTED Yes Raphael Shaw MD   traMADol (ULTRAM) 50 MG tablet Take 1 tablet by mouth 4 times daily for 30 days. Yes BRENDA Ross CNP   lidocaine (LIDODERM) 5 % Place patch on skin 12 hours on, 12 hours off.  Yes Raphael Shaw MD   metoprolol succinate (TOPROL XL) 50 MG extended release tablet TAKE ONE TABLET BY MOUTH DAILY Yes BRENDA Bingham CNP   atorvastatin (LIPITOR) 20 MG tablet TAKE ONE TABLET BY MOUTH DAILY Yes Tani Page BRENDA Marmolejo - CNP   ezetimibe (ZETIA) 10 MG tablet TAKE ONE TABLET BY MOUTH DAILY Yes BRENDA Lara - CNP   lisinopril (PRINIVIL;ZESTRIL) 40 MG tablet TAKE ONE TABLET BY MOUTH EVERY EVENING Yes Yusef Miller MD   fenofibrate (TRICOR) 54 MG tablet TAKE ONE TABLET BY MOUTH DAILY Yes Yusef Miller MD   spironolactone (ALDACTONE) 25 MG tablet TAKE ONE-HALF TABLET BY MOUTH DAILY Yes Bere Calhoun MD   albuterol sulfate HFA (VENTOLIN HFA) 108 (90 Base) MCG/ACT inhaler Inhale 2 puffs into the lungs every 6 hours as needed for Wheezing Yes Bere Calhoun MD   aspirin EC 81 MG EC tablet Take 1 tablet by mouth daily Yes Bere Calhoun MD   Cholecalciferol (VITAMIN D3) 2000 UNITS CAPS Take 2 tablets by mouth daily  Yes Historical Provider, MD   Calcium 0641-4244 MG-UNIT CHEW Take  by mouth.  Yes Historical Provider, MD       Social History     Tobacco Use    Smoking status: Former     Packs/day: 0.50     Years: 3.00     Pack years: 1.50     Types: Cigarettes     Quit date: 2015     Years since quittin.8    Smokeless tobacco: Never    Tobacco comments:     pt smoked off and on   Vaping Use    Vaping Use: Never used   Substance Use Topics    Alcohol use: No     Alcohol/week: 0.0 standard drinks    Drug use: No        Allergies   Allergen Reactions    Shellfish-Derived Products Nausea And Vomiting    Celebrex [Celecoxib] Swelling    Codeine     Ibuprofen Swelling    Methadone     Vioxx Swelling   ,   Past Medical History:   Diagnosis Date    Arthritis     Asthma     Back pain     Fibromyalgia     Fibromyalgia     Heart failure with reduced ejection fraction (HCC)     Herpes zoster     Hypercholesteremia     Hyperlipidemia     Hypertriglyceridemia     Neck pain     Osteopenia     Stress-induced cardiomyopathy 2017   ,   Past Surgical History:   Procedure Laterality Date    BACK SURGERY      BLADDER REPAIR      CARDIAC CATHETERIZATION      COLONOSCOPY  12 diverticulosis    HYSTERECTOMY (CERVIX STATUS UNKNOWN)      NECK SURGERY  , 10/05, '07 or '08    C6-C7spur '05   ,   Social History     Tobacco Use    Smoking status: Former     Packs/day: 0.50     Years: 3.00     Pack years: 1.50     Types: Cigarettes     Quit date: 2015     Years since quittin.8    Smokeless tobacco: Never    Tobacco comments:     pt smoked off and on   Vaping Use    Vaping Use: Never used   Substance Use Topics    Alcohol use: No     Alcohol/week: 0.0 standard drinks    Drug use: No   ,   Family History   Problem Relation Age of Onset    High Blood Pressure Mother     Heart Disease Father    ,   Immunization History   Administered Date(s) Administered    COVID-19, J&J, (age 18y+), IM, 0.5 mL 2021    Influenza Virus Vaccine 10/22/2012    Influenza Whole 10/22/2012    Pneumococcal Polysaccharide ([id number]) 10/22/2012    Tdap (Boostrix, Adacel) 2013   ,   Health Maintenance   Topic Date Due    Colorectal Cancer Screen  2019    Breast cancer screen  2021    COVID-19 Vaccine (2 - Booster for Brenton series) 2022    Flu vaccine (1) 2022    A1C test (Diabetic or Prediabetic)  10/27/2022    Lipids  10/27/2022    Shingles vaccine (1 of 2) 2023 (Originally 10/27/2000)    Depression Monitoring  2023    DTaP/Tdap/Td vaccine (2 - Td or Tdap) 2023    DEXA (modify frequency per FRAX score)  Completed    Pneumococcal 65+ years Vaccine  Completed    Hepatitis C screen  Completed    Hepatitis A vaccine  Aged Out    Hib vaccine  Aged Out    Meningococcal (ACWY) vaccine  Aged Out       PHYSICAL EXAMINATION: Telephone visit     ASSESSMENT/PLAN:  Kelle Singh was seen today for herpes zoster. Diagnoses and all orders for this visit:    Herpes zoster without complication  -     valACYclovir (VALTREX) 1 g tablet; Take 1 tablet by mouth 3 times daily      Return if symptoms worsen or fail to improve.     Zaki Calderón is a 67 y.o. female being evaluated by a Virtual Visit (Telephone visit) encounter to address concerns as mentioned above. A caregiver was present when appropriate. Due to this being a TeleHealth encounter ([facility]), evaluation of the following organ systems was limited. Pursuant to the emergency declaration under the [facility], [address] [place] [address] authority and the [employer] Act, this Virtual Visit was conducted with patient's (and/or legal guardian's) consent, to reduce the patient's risk of exposure to COVID-19 and provide necessary medical care. The patient (and/or legal guardian) has also been advised to contact this office for worsening conditions or problems, and seek emergency medical treatment and/or call 911 if deemed necessary. Patient identification was verified at the start of the visit: Yes    Total time spent on this encounter: 14 minutes 15 seconds     Services were provided through an audio synchronous discussion virtually to substitute for in-person clinic visit. Patient was located at her individual homes. Provider was located at [address] [place] [address]. [facility] [place] [facility], DNP, APRN - CNP on 11/17/2022 at 3:51 PM    An electronic signature was used to authenticate this note. Patient should call the office immediately with new or ongoing signs or symptoms or worsening, or proceed to the emergency room. All entries in chief complaint and history of present illness are reviewed and validated by me. No changes in past medical history, past surgical history, social history, or family history were noted during the patient encounter unless specifically listed above. All updates of past medical history, past surgical history, social history, or family history were reviewed personally by me during the office visit. All problems listed in the assessment are stable unless noted otherwise. Medication profile reviewed personally by me during the office visit. Medication side effects and possible impairments from medications were discussed as applicable. Every effort has been made to assure accurate transcription by this voice recognition software. However, mistakes in transcription may still occur    You are being started on a new medication. All medications have the potential for adverse effects. All medications effect each person differently. Please read and review provided information related to medication. If the medication that you have been prescribed has the potential to cause sedation, do not drive or operate car, truck, or heavy machinery until you know how the medication will effect you. If you experience any adverse effects from the medication, please call the office or report to the emergency department. Shingles: Care Instructions  Your Care Instructions     Shingles (herpes zoster) causes pain and a blistered rash. The rash can appear anywhere on the body but will be on only one side of the body, the left or right. It will be in a band, a strip, or a small area. The pain can be very severe. Shingles can also cause tingling or itching in the area of the rash. The blisters scab over after a few days and heal in 2 to 4 weeks. Medicines can help you feel better and may help prevent more serious problems caused by shingles. Shingles is caused by the same virus that causes chickenpox. When you have chickenpox, the virus gets into your nerve roots and stays there (becomes dormant) long after you get over the chickenpox. If the virus becomes active again, it can cause shingles. Follow-up care is a key part of your treatment and safety. Be sure to make and go to all appointments, and call your doctor if you are having problems. It's also a good idea to know your test results and keep a list of the medicines you take. How can you care for yourself at home? Be safe with medicines.  Take your medicines exactly as prescribed. Call your doctor if you think you are having a problem with your medicine. Antiviral medicine helps you get better faster. Try not to scratch or pick at the blisters. They will crust over and fall off on their own if you leave them alone. Put cool, wet cloths on the area to relieve pain and itching. You can also use calamine lotion. Try not to use so much lotion that it cakes and is hard to get off. Put cornstarch or baking soda on the sores to help dry them out so they heal faster. Do not use thick ointment, such as petroleum jelly, on the sores. This will keep them from drying and healing. To help remove loose crusts, soak them in tap water. This can help decrease oozing, and dry and soothe the skin. Take an over-the-counter pain medicine, such as acetaminophen (Tylenol), ibuprofen (Advil, Motrin), or naproxen (Aleve). Read and follow all instructions on the label. Avoid close contact with people until the blisters have healed. It is very important for you to avoid contact with anyone who has never had chickenpox or the chickenpox vaccine. Eluterio Bream babies and anyone who is pregnant or has a hard time fighting infection (such as someone with HIV, diabetes, or cancer) is especially at risk. When should you call for help? Call your doctor now or seek immediate medical care if:    You have a new or higher fever. You have a severe headache and a stiff neck. You lose the ability to think clearly. The rash spreads to your forehead, nose, eyes, or eyelids. You have eye pain, or your vision gets worse. You have new pain in your face, or you cannot move the muscles in your face. Blisters spread to new parts of your body. Watch closely for changes in your health, and be sure to contact your doctor if:    The rash has not healed after 2 to 4 weeks. You still have pain after the rash has healed. Where can you learn more?   Go to [email or web address] and sign in to your [employer] account. Katie Pickard in the [facility] box to learn more about \"Shingles: Care Instructions. \"     If you do not have an account, please click on the \"Sign Up Now\" link. Current as of: February 9, 2022               Content Version: 13.4  © 2006-2022 [employer]. Care instructions adapted under license by [facility] ([facility]). If you have questions about a medical condition or this instruction, always ask your healthcare professional. [place] [address] any warranty or liability for your use of this information. Recombinant Zoster (Shingles) Vaccine: What You Need to Know  Why get vaccinated? Recombinant zoster (shingles) vaccine can prevent shingles. Shingles (also called herpes zoster, or just zoster) is a painful skin rash, usually with blisters. In addition to the rash, shingles can cause fever, headache, chills, or upset stomach. Rarely, shingles can lead to complications such as pneumonia, hearing problems, blindness, brain inflammation (encephalitis), or death. The risk of shingles increases with age. The most common complication of shingles is long-term nerve pain called postherpetic neuralgia (PHN). PHN occurs in the areas where the shingles rash was and can last for months or years after the rash goes away. The pain from PHN can be severe and debilitating. The risk of PHN increases with age. An older adult with shingles is more likely to develop PHN and have longer lasting and more severe pain than a younger person. People with weakened immune systems also have a higher risk of getting shingles and complications from the disease. Shingles is caused by varicella-zoster virus, the same virus that causes chickenpox. After you have chickenpox, the virus stays in your body and can cause shingles later in life.  Shingles cannot be passed from one person to another, but the virus that causes shingles can spread and cause chickenpox in someone who has never had chickenpox or has never received chickenpox vaccine. Recombinant shingles vaccine  Recombinant shingles vaccine provides strong protection against shingles. By preventing shingles, recombinant shingles vaccine also protects against PHN and other complications. Recombinant shingles vaccine is recommended for:  Adults 48 years and older  Adults 19 years and older who have a weakened immune system because of disease or treatments  Shingles vaccine is given as a two-dose series. For most people, the second dose should be given 2 to 6 months after the first dose. Some people who have or will have a weakened immune system can get the second dose 1 to 2 months after the first dose. Ask your health care provider for guidance. People who have had shingles in the past and people who have received varicella (chickenpox) vaccine are recommended to get recombinant shingles vaccine. The vaccine is also recommended for people who have already gotten another type of shingles vaccine, the live shingles vaccine. There is no live virus in recombinant shingles vaccine. Shingles vaccine may be given at the same time as other vaccines. Talk with your health care provider  Tell your vaccination provider if the person getting the vaccine:  Has had an allergic reaction after a previous dose of recombinant shingles vaccine, or has any severe, life-threatening allergies  Is currently experiencing an episode of shingles  Is pregnant  In some cases, your health care provider may decide to postpone shingles vaccination until a future visit. People with minor illnesses, such as a cold, may be vaccinated. People who are moderately or severely ill should usually wait until they recover before getting recombinant shingles vaccine. Your health care provider can give you more information.   Risks of a vaccine reaction  A sore arm with mild or moderate pain is very common after recombinant shingles vaccine. Redness and swelling can also happen at the site of the injection. Tiredness, muscle pain, headache, shivering, fever, stomach pain, and nausea are common after recombinant shingles vaccine. These side effects may temporarily prevent a vaccinated person from doing regular activities. Symptoms usually go away on their own in 2 to 3 days. You should still get the second dose of recombinant shingles vaccine even if you had one of these reactions after the first dose. Guillain-Barré syndrome (GBS), a serious nervous system disorder, has been reported very rarely after recombinant zoster vaccine. People sometimes faint after medical procedures, including vaccination. Tell your provider if you feel dizzy or have vision changes or ringing in the ears. As with any medicine, there is a very remote chance of a vaccine causing a severe allergic reaction, other serious injury, or death. What if there is a serious problem? An allergic reaction could occur after the vaccinated person leaves the clinic. If you see signs of a severe allergic reaction (hives, swelling of the face and throat, difficulty breathing, a fast heartbeat, dizziness, or weakness), call 9-1-1 and get the person to the nearest hospital.  For other signs that concern you, call your health care provider. Adverse reactions should be reported to the Vaccine Adverse Event Reporting System (VAERS). Your health care provider will usually file this report, or you can do it yourself. Visit the VAERS website at www.vaers. hhs.gov or call 4-513.653.7547. VAERS is only for reporting reactions, and VAERS staff members do not give medical advice. How can I learn more? Ask your health care provider. Call your local or state health department. Visit the website of the Food and Drug Administration (FDA) for vaccine package inserts and additional information at www.fda.gov/vaccines-blood-biologics/vaccines.   Contact the Centers for Disease Control and Prevention (CDC): Call 6-135.152.1669 (1-800-CDC-INFO) or  Visit CDCs website at www.cdc.gov/vaccines. Vaccine Information Statement  Recombinant Zoster Vaccine  02/04/2022  Department of Health and Human Services  Centers for Disease Control and Prevention  Many vaccine information statements are available in Croatian and other languages. See www.immunize.org/vis  Hojas de información sobre vacunas están disponibles en español y en muchos otros idiomas. Visite Patrick.si  Care instructions adapted under license by [facility] ([facility]). If you have questions about a medical condition or this instruction, always ask your healthcare professional. [address] any warranty or liability for your use of this information.

## 2022-11-29 ENCOUNTER — OFFICE VISIT (OUTPATIENT)
Dept: CARDIOLOGY CLINIC | Age: 72
End: 2022-11-29

## 2022-11-29 ENCOUNTER — HOSPITAL ENCOUNTER (OUTPATIENT)
Dept: CARDIOLOGY | Age: 72
Discharge: HOME | End: 2022-11-29
Payer: MEDICARE

## 2022-11-29 VITALS
SYSTOLIC BLOOD PRESSURE: 110 MMHG | DIASTOLIC BLOOD PRESSURE: 68 MMHG | HEART RATE: 76 BPM | WEIGHT: 179.5 LBS | BODY MASS INDEX: 30.64 KG/M2 | HEIGHT: 64 IN | OXYGEN SATURATION: 94 %

## 2022-11-29 DIAGNOSIS — I42.9 CARDIOMYOPATHY, IDIOPATHIC (HCC): ICD-10-CM

## 2022-11-29 DIAGNOSIS — E78.2 MIXED HYPERLIPIDEMIA: ICD-10-CM

## 2022-11-29 DIAGNOSIS — I50.22 SYSTOLIC CHF, CHRONIC (HCC): Primary | ICD-10-CM

## 2022-11-29 DIAGNOSIS — I10 ESSENTIAL HYPERTENSION: ICD-10-CM

## 2022-11-29 DIAGNOSIS — I50.22 SYSTOLIC CHF, CHRONIC (HCC): ICD-10-CM

## 2022-11-29 PROCEDURE — 93306 TTE W/DOPPLER COMPLETE: CPT

## 2022-11-29 RX ORDER — ATORVASTATIN CALCIUM 20 MG/1
TABLET, FILM COATED ORAL
Qty: 90 TABLET | Refills: 3 | Status: SHIPPED | OUTPATIENT
Start: 2022-11-29

## 2022-11-29 RX ORDER — SPIRONOLACTONE 25 MG/1
TABLET ORAL
Qty: 45 TABLET | Refills: 3 | Status: SHIPPED | OUTPATIENT
Start: 2022-11-29

## 2022-11-29 RX ORDER — METOPROLOL SUCCINATE 50 MG/1
TABLET, EXTENDED RELEASE ORAL
Qty: 90 TABLET | Refills: 3 | Status: SHIPPED | OUTPATIENT
Start: 2022-11-29

## 2022-11-29 RX ORDER — EZETIMIBE 10 MG/1
TABLET ORAL
Qty: 90 TABLET | Refills: 3 | Status: SHIPPED | OUTPATIENT
Start: 2022-11-29

## 2022-11-29 NOTE — LETTER
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 She has chronic congestive heart failure, and as such, the stress of being primary caregiver plus keeping a full time job would be detrimental to her health. If you have any questions or concerns, please don't hesitate to call.     Sincerely,        Celestino Cheema MD

## 2022-11-29 NOTE — PROGRESS NOTES
[address]  Advanced CHF/Pulmonary Hypertension   Cardiac Evaluation      Lexi Patel  YOB: 1950    Date of Visit:  11/29/22    Chief Complaint   Patient presents with    6 Month Follow-Up    Hyperlipidemia    Congestive Heart Failure    Cardiomyopathy    Hypertension    Shortness of Breath     Little ache near sternum accompanying SOB    Medication Refill     aldactone       History of Present Illness:  Lexi Patel is a 67 y.o. female who presents from referral from Dr. Jaime Woody for consultation and management of CHF, non ischemic CMP. She has a with PMH of HTN, HLD, obesity, abnormal stress test, and fibromyalgia, who presented to [facility] with chest pain in March 2017. History obtained from patient and review of EMR. She underwent a stress test on 3/20/17 that showed moderate sized anteroseptal partial reversibility defect consistent with  ischemia and infarction in the territory of the mid and distal LAD  She has right subclavian artery stenosis and has seen Dr. Blaine Reich for this. She underwent LHC on 3/23/17 with no intervention needed. She had a recent VL Duplex on 5/2/18 (report below). Her echo from 06/07/18 showed her EF was 45%. Her echo from 11/05/19 showed her EF was 45% with grade II DD. Her echo from 11/9/2021 showed her LVEF 45%. OV, 6/21/2022, patient reports she has been feeling more fatigued lately. She only does what she needs to do and then spends the rest of her day resting/laying down. Patient states she gets a lot of sleep but still has fatigue during the day. She feels more shortness of breath on really hot days, but for the most part feels like her breathing as been stable. She has chronic back pain with 4 prior back surgeries by Dr. Araceli Sullivan. She states she needs another back surgery. She wear a lidoderm patch while she works and she uses tramadol to help relieve the pain. She follows with pain management as well.  She works for [employer]   Today, 11/29/2022, she says she is doing well. She is dealing with stress at home caring for her mother. Patient is taking all cardiac medications as prescribed and tolerates them well. Patient denies current edema, chest pain, sob, palpitations, dizziness or syncope. Allergies   Allergen Reactions    Shellfish-Derived Products Nausea And Vomiting    Celebrex [Celecoxib] Swelling    Codeine     Ibuprofen Swelling    Methadone     Vioxx Swelling     Current Outpatient Medications   Medication Sig Dispense Refill    valACYclovir (VALTREX) 1 g tablet Take 1 tablet by mouth 3 times daily 21 tablet 1    citalopram (CELEXA) 10 MG tablet TAKE ONE TABLET BY MOUTH DAILY 90 tablet 0    gabapentin (NEURONTIN) 100 MG capsule TAKE TWO CAPSULES BY MOUTH THREE TIMES A DAY AS DIRECTED 180 capsule 5    lidocaine (LIDODERM) 5 % Place patch on skin 12 hours on, 12 hours off. 30 patch 5    metoprolol succinate (TOPROL XL) 50 MG extended release tablet TAKE ONE TABLET BY MOUTH DAILY 90 tablet 0    atorvastatin (LIPITOR) 20 MG tablet TAKE ONE TABLET BY MOUTH DAILY 90 tablet 0    ezetimibe (ZETIA) 10 MG tablet TAKE ONE TABLET BY MOUTH DAILY 90 tablet 0    lisinopril (PRINIVIL;ZESTRIL) 40 MG tablet TAKE ONE TABLET BY MOUTH EVERY EVENING 90 tablet 3    fenofibrate (TRICOR) 54 MG tablet TAKE ONE TABLET BY MOUTH DAILY 90 tablet 3    spironolactone (ALDACTONE) 25 MG tablet TAKE ONE-HALF TABLET BY MOUTH DAILY 45 tablet 3    albuterol sulfate HFA (VENTOLIN HFA) 108 (90 Base) MCG/ACT inhaler Inhale 2 puffs into the lungs every 6 hours as needed for Wheezing 1 Inhaler 3    aspirin EC 81 MG EC tablet Take 1 tablet by mouth daily 30 tablet 3    Cholecalciferol (VITAMIN D3) 2000 UNITS CAPS Take 2 tablets by mouth daily       Calcium 3576-4785 MG-UNIT CHEW Take  by mouth. No current facility-administered medications for this visit.        Past Medical History:   Diagnosis Date    Arthritis     Asthma     Back pain     Fibromyalgia Fibromyalgia     Heart failure with reduced ejection fraction (HCC)     Herpes zoster     Hypercholesteremia     Hyperlipidemia     Hypertriglyceridemia     Neck pain     Osteopenia     Stress-induced cardiomyopathy 2017     Past Surgical History:   Procedure Laterality Date    BACK SURGERY      BLADDER REPAIR      CARDIAC CATHETERIZATION      COLONOSCOPY  12    diverticulosis    HYSTERECTOMY (CERVIX STATUS UNKNOWN)      NECK SURGERY  , 10/05, '07 or     [id number] '05     Family History   Problem Relation Age of Onset    High Blood Pressure Mother     Heart Disease Father      Social History     Socioeconomic History    Marital status:      Spouse name: Not on file    Number of children: Not on file    Years of education: Not on file    Highest education level: Not on file   Occupational History    Not on file   Tobacco Use    Smoking status: Former     Packs/day: 0.50     Years: 3.00     Pack years: 1.50     Types: Cigarettes     Quit date: 2015     Years since quittin.9    Smokeless tobacco: Never    Tobacco comments:     pt smoked off and on   Vaping Use    Vaping Use: Never used   Substance and Sexual Activity    Alcohol use: No     Alcohol/week: 0.0 standard drinks    Drug use: No    Sexual activity: Yes     Partners: Male   Other Topics Concern    Not on file   Social History Narrative    Not on file     Social Determinants of Health     Financial Resource Strain: Low Risk     Difficulty of Paying Living Expenses: Not hard at all   Food Insecurity: No Food Insecurity    Worried About Running Out of Food in the Last Year: Never true    Ran Out of Food in the Last Year: Never true   Transportation Needs: Not on file   Physical Activity: Inactive    Days of Exercise per Week: 0 days    Minutes of Exercise per Session: 0 min   Stress: Not on file   Social Connections: Not on file   Intimate Partner Violence: Not on file   Housing Stability: Not on file       Review of Systems: Constitutional: there has been no unanticipated weight loss. There's been no change in energy level, sleep pattern, or activity level. Eyes: No visual changes or diplopia. No scleral icterus. ENT: No Headaches, hearing loss or vertigo. No mouth sores or sore throat. Cardiovascular: Reviewed in HPI  Respiratory: No cough or wheezing, no sputum production. No hematemesis. Gastrointestinal: No abdominal pain, appetite loss, blood in stools. No change in bowel or bladder habits. Genitourinary: No dysuria, trouble voiding, or hematuria. Musculoskeletal:  No gait disturbance, weakness or joint complaints. Integumentary: No rash or pruritis. Neurological: No headache, diplopia, change in muscle strength, numbness or tingling. No change in gait, balance, coordination, mood, affect, memory, mentation, behavior. Psychiatric: No anxiety, no depression. Endocrine: No malaise, fatigue or temperature intolerance. No excessive thirst, fluid intake, or urination. No tremor. Hematologic/Lymphatic: No abnormal bruising or bleeding, blood clots or swollen lymph nodes. Allergic/Immunologic: No nasal congestion or hives. Physical Exam:  Vitals:    11/29/22 1432   BP: 110/68   Site: Right Upper Arm   Position: Sitting   Cuff Size: Medium Adult   Pulse: 76   SpO2: 94%   Weight: 179 lb 8 oz (81.4 kg)   Height: 5' 4\" (1.626 m)       Body mass index is 30.81 kg/m². Wt Readings from Last 3 Encounters:   11/29/22 179 lb 8 oz (81.4 kg)   10/26/22 180 lb (81.6 kg)   08/24/22 177 lb (80.3 kg)     BP Readings from Last 3 Encounters:   11/29/22 110/68   10/26/22 130/80   08/24/22 132/62          Constitutional and General Appearance:   WD/WN in NAD  HEENT:  NC/AT  KATHRYN  No problems with hearing  Skin:  Warm, dry  Respiratory:  Normal excursion and expansion without use of accessory muscles  Resp Auscultation: Normal breath sounds without dullness  Cardiovascular:   The apical impulses not displaced  Heart tones are crisp and normal  Cervical veins are not engorged  The carotid upstroke is normal in amplitude and contour without delay or bruit  JVP normal  RRR with nl S1 and S2 without m,r,g  Peripheral pulses are symmetrical and full  There is no clubbing, cyanosis of the extremities. No edema  Femoral Arteries: 2+ and equal  Pedal Pulses: 2+ and equal   Neck:  No thyromegaly  Abdomen:  No masses or tenderness  Liver/Spleen: No Abnormalities Noted  Neurological/Psychiatric:  Alert and oriented in all spheres  Moves all extremities well  Exhibits normal gait balance and coordination  No abnormalities of mood, affect, memory, mentation, or behavior are noted    Echo: 11/05/19  Summary   Left ventricular systolic function is low with a visually estimated ejection   fraction of 45%. The left ventricle is normal in size with mild septal hypertrophy. Abnormal (paradoxical) septal wall motion consistent with left bundle branch   block. Grade II diastolic dysfunction with elevated LV pressure. Right ventricular systolic function is indeterminate. Systolic pulmonary artery pressure (SPAP) is normal and estimated at 25 mmHg   (right atrial pressure 3 mmHg). Echo 11/9/2021:  Summary   Global left ventricular systolic function is mildly decreased with ejection   fraction estimated at 45%. Abnormal (paradoxical) septal motion is present likely due to left bundle   branch block. Normal left ventricular size with mild concentric left ventricular   hypertrophy. Grade I diastolic dysfunction. Normal RV size and systolic function. Mildly dilated left atrium. Mild tricuspid regurgitation. Systolic pulmonary artery pressure (SPAP) is normal and estimated at 22 mmHg   (right atrial pressure 3 mmHg)        Labs were reviewed including labs from other hospital systems through [facility].   Cardiac testing was reviewed including echos, nuclear scans, cardiac catheterization, including from other hospital systems through [facility]. Assessment:    1. Systolic CHF, chronic (HCC)    2. Cardiomyopathy, idiopathic ([address]    3. Mixed hyperlipidemia    4. Essential hypertension           Lipitor side effects of leg cramps. Crestor intolerance due to muscle cramps    Plan:  1. Get labs ordered at last visit  2. Continue current medications  3. Follow up in 6 months   4. Letter written for patient stating that she cannot be the sole caregiver for her mother due to patient's heart condition. This note was scribed in the presence of Ally Oshea MD by Bonnie Bryant RN    The scribe's documentation has been prepared under my direction and personally reviewed by me in its entirety. I confirm that the note above accurately reflects all work, treatment, procedures, and medical decision making performed by me. Time Based Itemization  A total of 40 minutes was spent on today's patient encounter.   If applicable, non-patient-facing activities:  ( x)Preparing to see the patient and reviewing records  ( ) Individual interpretation of results  ( ) Discussion or coordination of care with other health care professionals  ( x) Ordering of unique tests, medications, or procedures  ( x) Documentation within the EHR          Teetee Dumas M.D., [facility]

## 2022-11-29 NOTE — LETTER
[address] Card[facility] - [address]  Phone: [phone]  Fax: [phone]    Lucy Wood MD    November 29, 2022         Patient: Daniel Ruth   MR Number: [id number]   YOB: 1950   Date of Visit: 11/29/2022       To whom it may concern,    Daniel Ruth is not medically able to be a caregiver for her mother. She has chronic congestive heart failure, and as such, the stress of being primary caregiver plus being employed in her current position would be detrimental to her health. If you have questions, please do not hesitate to call me. I look forward to following Julia Ortiz along with you.     Sincerely,      Lucy Wood MD

## 2022-11-29 NOTE — PATIENT INSTRUCTIONS
Plan:  1. Get labs ordered at last visit  2. Continue current medications  3.  Follow up in 6 months

## 2022-12-02 ENCOUNTER — NURSE ONLY (OUTPATIENT)
Dept: FAMILY MEDICINE CLINIC | Age: 72
End: 2022-12-02

## 2022-12-02 DIAGNOSIS — I10 ESSENTIAL HYPERTENSION: ICD-10-CM

## 2022-12-02 DIAGNOSIS — E78.2 MIXED HYPERLIPIDEMIA: ICD-10-CM

## 2022-12-02 DIAGNOSIS — I50.22 SYSTOLIC CHF, CHRONIC (HCC): ICD-10-CM

## 2022-12-02 DIAGNOSIS — I42.9 CARDIOMYOPATHY, IDIOPATHIC (HCC): ICD-10-CM

## 2022-12-02 LAB
A/G RATIO: 1.6 (ref 1.1–2.2)
ALBUMIN SERPL-MCNC: 4.3 G/DL (ref 3.4–5)
ALP BLD-CCNC: 90 U/L (ref 40–129)
ALT SERPL-CCNC: 14 U/L (ref 10–40)
ANION GAP SERPL CALCULATED.3IONS-SCNC: 10 MMOL/L (ref 3–16)
AST SERPL-CCNC: 27 U/L (ref 15–37)
BASOPHILS ABSOLUTE: 0.1 K/UL (ref 0–0.2)
BASOPHILS RELATIVE PERCENT: 1 %
BILIRUB SERPL-MCNC: 0.5 MG/DL (ref 0–1)
BUN BLDV-MCNC: 13 MG/DL (ref 7–20)
CALCIUM SERPL-MCNC: 9.6 MG/DL (ref 8.3–10.6)
CHLORIDE BLD-SCNC: 101 MMOL/L (ref 99–110)
CHOLESTEROL, FASTING: 246 MG/DL (ref 0–199)
CO2: 27 MMOL/L (ref 21–32)
CREAT SERPL-MCNC: 0.9 MG/DL (ref 0.6–1.2)
EOSINOPHILS ABSOLUTE: 0.3 K/UL (ref 0–0.6)
EOSINOPHILS RELATIVE PERCENT: 5 %
GFR SERPL CREATININE-BSD FRML MDRD: >60 ML/MIN/{1.73_M2}
GLUCOSE BLD-MCNC: 121 MG/DL (ref 70–99)
HCT VFR BLD CALC: 39 % (ref 36–48)
HDLC SERPL-MCNC: 41 MG/DL (ref 40–60)
HEMOGLOBIN: 12.6 G/DL (ref 12–16)
LDL CHOLESTEROL CALCULATED: 176 MG/DL
LYMPHOCYTES ABSOLUTE: 2 K/UL (ref 1–5.1)
LYMPHOCYTES RELATIVE PERCENT: 36.7 %
MCH RBC QN AUTO: 28.9 PG (ref 26–34)
MCHC RBC AUTO-ENTMCNC: 32.4 G/DL (ref 31–36)
MCV RBC AUTO: 89 FL (ref 80–100)
MONOCYTES ABSOLUTE: 0.4 K/UL (ref 0–1.3)
MONOCYTES RELATIVE PERCENT: 6.5 %
NEUTROPHILS ABSOLUTE: 2.8 K/UL (ref 1.7–7.7)
NEUTROPHILS RELATIVE PERCENT: 50.8 %
PDW BLD-RTO: 13.3 % (ref 12.4–15.4)
PLATELET # BLD: 277 K/UL (ref 135–450)
PMV BLD AUTO: 10 FL (ref 5–10.5)
POTASSIUM SERPL-SCNC: 4.8 MMOL/L (ref 3.5–5.1)
PRO-BNP: 105 PG/ML (ref 0–124)
RBC # BLD: 4.38 M/UL (ref 4–5.2)
SODIUM BLD-SCNC: 138 MMOL/L (ref 136–145)
TOTAL PROTEIN: 7 G/DL (ref 6.4–8.2)
TRIGLYCERIDE, FASTING: 144 MG/DL (ref 0–150)
TSH REFLEX FT4: 1.47 UIU/ML (ref 0.27–4.2)
VITAMIN D 25-HYDROXY: 74 NG/ML
VLDLC SERPL CALC-MCNC: 29 MG/DL
WBC # BLD: 5.5 K/UL (ref 4–11)

## 2022-12-09 ENCOUNTER — TELEPHONE (OUTPATIENT)
Dept: CARDIOLOGY CLINIC | Age: 72
End: 2022-12-09

## 2022-12-09 NOTE — TELEPHONE ENCOUNTER
----- Message from Kevin Randle MD sent at 12/8/2022  4:47 PM EST -----  Please call patient and let her know that her labs look good. No changes.   MATTEO

## 2022-12-19 ENCOUNTER — NURSE ONLY (OUTPATIENT)
Dept: FAMILY MEDICINE CLINIC | Age: 72
End: 2022-12-19

## 2022-12-19 ENCOUNTER — TELEPHONE (OUTPATIENT)
Dept: FAMILY MEDICINE CLINIC | Age: 72
End: 2022-12-19

## 2022-12-19 DIAGNOSIS — R68.89 FLU-LIKE SYMPTOMS: Primary | ICD-10-CM

## 2022-12-19 LAB
Lab: NORMAL
QC PASS/FAIL: NORMAL
SARS-COV-2 RDRP RESP QL NAA+PROBE: NEGATIVE

## 2022-12-19 PROCEDURE — 87635 SARS-COV-2 COVID-19 AMP PRB: CPT | Performed by: FAMILY MEDICINE

## 2022-12-20 ENCOUNTER — TELEMEDICINE (OUTPATIENT)
Dept: FAMILY MEDICINE CLINIC | Age: 72
End: 2022-12-20
Payer: MEDICARE

## 2022-12-20 DIAGNOSIS — K29.70 VIRAL GASTRITIS: Primary | ICD-10-CM

## 2022-12-20 PROCEDURE — 98967 PH1 ASSMT&MGMT NQHP 11-20: CPT | Performed by: NURSE PRACTITIONER

## 2022-12-20 ASSESSMENT — ENCOUNTER SYMPTOMS
RESPIRATORY NEGATIVE: 1
FLATUS: 1
ABDOMINAL DISTENTION: 0
VOMITING: 0
ABDOMINAL PAIN: 1
COUGH: 0
BLOOD IN STOOL: 0
NAUSEA: 0
RECTAL PAIN: 0
DIARRHEA: 1
BLOATING: 1
CONSTIPATION: 0
ANAL BLEEDING: 0

## 2022-12-20 NOTE — PROGRESS NOTES
2022    TELEHEALTH EVALUATION -- Telephone (During [facility]-60 public health emergency)    HPI:    Matt Perace (:  1950) has requested a telephone evaluation for the following concern(s):    Diarrhea   This is a new problem. The current episode started in the past 7 days (4 days). The problem has been resolved. The stool consistency is described as Watery. The patient states that diarrhea awakens her from sleep. Associated symptoms include abdominal pain (Now resolved), bloating, increased flatus and sweats (Now resolved). Pertinent negatives include no arthralgias, chills, coughing, fever, headaches, myalgias, URI, vomiting or weight loss. Nothing aggravates the symptoms. Risk factors include ill contacts. She has tried increased fluids for the symptoms. The treatment provided significant relief. There is no history of bowel resection, inflammatory bowel disease, irritable bowel syndrome, malabsorption, a recent abdominal surgery or short gut syndrome. She admits to her COVID-19 being negative. Review of Systems   Constitutional:  Positive for fatigue (Improving). Negative for activity change, appetite change, chills, diaphoresis, fever, unexpected weight change and weight loss. Respiratory: Negative. Negative for cough. Cardiovascular: Negative. Gastrointestinal:  Positive for abdominal pain (Now resolved), bloating, diarrhea (Now resolved) and flatus. Negative for abdominal distention, anal bleeding, blood in stool, constipation, nausea, rectal pain and vomiting. Musculoskeletal: Negative. Negative for arthralgias and myalgias. Skin: Negative. Neurological: Negative. Negative for headaches. Psychiatric/Behavioral: Negative. Prior to Visit Medications    Medication Sig Taking?  Authorizing Provider   spironolactone (ALDACTONE) 25 MG tablet TAKE ONE-HALF TABLET BY MOUTH DAILY Yes Lynn Boss MD   ezetimibe (ZETIA) 10 MG tablet TAKE ONE TABLET BY MOUTH DAILY Yes Kevin Randle MD   metoprolol succinate (TOPROL XL) 50 MG extended release tablet Take one tab daily Yes Kevin Randle MD   atorvastatin (LIPITOR) 20 MG tablet Take one tab daily Yes Kevin Randle MD   valACYclovir (VALTREX) 1 g tablet Take 1 tablet by mouth 3 times daily Yes BRENDA Arellano CNP   citalopram (CELEXA) 10 MG tablet TAKE ONE TABLET BY MOUTH DAILY Yes BRENDA Arellano CNP   gabapentin (NEURONTIN) 100 MG capsule TAKE TWO CAPSULES BY MOUTH THREE TIMES A DAY AS DIRECTED Yes Vj Harp MD   lidocaine (LIDODERM) 5 % Place patch on skin 12 hours on, 12 hours off. Yes Vj Harp MD   lisinopril (PRINIVIL;ZESTRIL) 40 MG tablet TAKE ONE TABLET BY MOUTH EVERY EVENING Yes Kevin Randle MD   fenofibrate (TRICOR) 54 MG tablet TAKE ONE TABLET BY MOUTH DAILY Yes Kevin Randle MD   albuterol sulfate HFA (VENTOLIN HFA) 108 (90 Base) MCG/ACT inhaler Inhale 2 puffs into the lungs every 6 hours as needed for Wheezing Yes Vj Harp MD   aspirin EC 81 MG EC tablet Take 1 tablet by mouth daily Yes Vj Harp MD   Cholecalciferol (VITAMIN D3) 2000 UNITS CAPS Take 2 tablets by mouth daily  Yes Historical Provider, MD   Calcium 9091-1461 MG-UNIT CHEW Take  by mouth.  Yes Historical Provider, MD       Social History     Tobacco Use    Smoking status: Former     Packs/day: 0.50     Years: 3.00     Pack years: 1.50     Types: Cigarettes     Quit date: 2015     Years since quittin.9    Smokeless tobacco: Never    Tobacco comments:     pt smoked off and on   Vaping Use    Vaping Use: Never used   Substance Use Topics    Alcohol use: No     Alcohol/week: 0.0 standard drinks    Drug use: No        Allergies   Allergen Reactions    Shellfish-Derived Products Nausea And Vomiting    Celebrex [Celecoxib] Swelling    Codeine     Ibuprofen Swelling    Methadone     Vioxx Swelling   ,   Past Medical History:   Diagnosis Date    Arthritis Asthma     Back pain     Fibromyalgia     Fibromyalgia     Heart failure with reduced ejection fraction (HCC)     Herpes zoster     Hypercholesteremia     Hyperlipidemia     Hypertriglyceridemia     Neck pain     Osteopenia     Stress-induced cardiomyopathy 2017   ,   Past Surgical History:   Procedure Laterality Date    BACK SURGERY      BLADDER REPAIR      CARDIAC CATHETERIZATION      COLONOSCOPY  12    diverticulosis    HYSTERECTOMY (CERVIX STATUS UNKNOWN)      NECK SURGERY  , 10/05, '07 or     C6-C7spur '   ,   Social History     Tobacco Use    Smoking status: Former     Packs/day: 0.50     Years: 3.00     Pack years: 1.50     Types: Cigarettes     Quit date: 2015     Years since quittin.9    Smokeless tobacco: Never    Tobacco comments:     pt smoked off and on   Vaping Use    Vaping Use: Never used   Substance Use Topics    Alcohol use: No     Alcohol/week: 0.0 standard drinks    Drug use: No   ,   Family History   Problem Relation Age of Onset    High Blood Pressure Mother     Heart Disease Father    ,   Immunization History   Administered Date(s) Administered    COVID-19, J&J, (age 18y+), IM, 0.5 mL 2021    Influenza Virus Vaccine 10/22/2012    Influenza Whole 10/22/2012    Pneumococcal Polysaccharide ([id number]) 10/22/2012    Tdap (Boostrix, Adacel) 2013   ,   Health Maintenance   Topic Date Due    Colorectal Cancer Screen  2019    Breast cancer screen  2021    COVID-19 Vaccine (2 - Booster for Brenton series) 2022    Flu vaccine (1) 2022    A1C test (Diabetic or Prediabetic)  10/27/2022    Shingles vaccine (1 of 2) 2023 (Originally 10/27/2000)    Depression Monitoring  2023    Lipids  2023    DTaP/Tdap/Td vaccine (2 - Td or Tdap) 2023    DEXA (modify frequency per FRAX score)  Completed    Pneumococcal 65+ years Vaccine  Completed    Hepatitis C screen  Completed    Hepatitis A vaccine  Aged Out    Hib vaccine  Aged Out Meningococcal (ACWY) vaccine  Aged Out       PHYSICAL EXAMINATION: Telephone Only    ASSESSMENT/PLAN:  Xenia Dover was seen today for other. Symptoms have resolved over the last 24 hours. Recommend continuing to get rest, increase fluids, good nutrition. She is asking she can be around others including her family for the holiday. Informed her as long as she is fever free and her symptoms have resolved and she may be around her family and friends as her COVID-19 test was negative. Recommend slowly increasing her diet as tolerated like she has been. Recommend limiting fat, milk, ice cream in her diet for right now over the next several more days until tolerated. She verbalized understanding. Diagnoses and all orders for this visit:    Viral gastritis      Return if symptoms worsen or fail to improve. Dank Hill is a 67 y.o. female being evaluated by a Virtual Visit (Telephone visit) encounter to address concerns as mentioned above. A caregiver was present when appropriate. Due to this being a TeleHealth encounter (During [place] [address] public health emergency), evaluation of the following organ systems was limited. Pursuant to the emergency declaration under the [facility], [address] [place] [address] authority and the [employer] Act, this Virtual Visit was conducted with patient's (and/or legal guardian's) consent, to reduce the patient's risk of exposure to COVID-19 and provide necessary medical care. The patient (and/or legal guardian) has also been advised to contact this office for worsening conditions or problems, and seek emergency medical treatment and/or call 911 if deemed necessary.      Patient identification was verified at the start of the visit: Yes    Total time spent on this encounter: 16 minutes 17 seconds     Services were provided through a telephone synchronous discussion virtually to substitute for in-person clinic visit. Patient and provider were located at their individual homes. --Babatunde Cadena, SINAN, APRN - CNP on 12/20/2022 at 4:01 PM    An electronic signature was used to authenticate this note. Patient should call the office immediately with new or ongoing signs or symptoms or worsening, or proceed to the emergency room. All entries in chief complaint and history of present illness are reviewed and validated by me. No changes in past medical history, past surgical history, social history, or family history were noted during the patient encounter unless specifically listed above. All updates of past medical history, past surgical history, social history, or family history were reviewed personally by me during the office visit. All problems listed in the assessment are stable unless noted otherwise. Medication profile reviewed personally by me during the office visit. Medication side effects and possible impairments from medications were discussed as applicable. Every effort has been made to assure accurate transcription by this voice recognition software. However, mistakes in transcription may still occur    You are being started on a new medication. All medications have the potential for adverse effects. All medications effect each person differently. Please read and review provided information related to medication. If the medication that you have been prescribed has the potential to cause sedation, do not drive or operate car, truck, or heavy machinery until you know how the medication will effect you. If you experience any adverse effects from the medication, please call the office or report to the emergency department. Gastroenteritis: Care Instructions  Overview     Gastroenteritis is an illness that may cause nausea, vomiting, and diarrhea. It can be caused by bacteria or a virus. You will probably begin to feel better in 1 to 2 days.  In the meantime, get plenty of rest and make sure you do not become dehydrated. Dehydration occurs when your body loses too much fluid. Follow-up care is a key part of your treatment and safety. Be sure to make and go to all appointments, and call your doctor if you are having problems. It's also a good idea to know your test results and keep a list of the medicines you take. How can you care for yourself at home? If your doctor prescribed antibiotics, take them as directed. Do not stop taking them just because you feel better. You need to take the full course of antibiotics. Drink plenty of fluids to prevent dehydration. Choose water and other clear liquids until you feel better. If you have kidney, heart, or liver disease and have to limit fluids, talk with your doctor before you increase your fluid intake. Drink fluids slowly, in frequent, small amounts, because drinking too much too fast can cause vomiting. Begin eating mild foods, such as dry toast, yogurt, applesauce, bananas, and rice. Avoid spicy, hot, or high-fat foods, and do not drink alcohol or caffeine for a day or two. Do not drink milk or eat ice cream until you are feeling better. How to prevent gastroenteritis  Keep hot foods hot and cold foods cold. Do not eat meats, dressings, salads, or other foods that have been kept at room temperature for more than 2 hours. Use a thermometer to check your refrigerator. It should be between 34°F and 40°F.  Defrost meats in the refrigerator or microwave, not on the kitchen counter. Keep your hands and your kitchen clean. Wash your hands, cutting boards, and countertops with hot soapy water frequently. Cook meat until it is well done. Do not eat raw eggs or uncooked sauces made with raw eggs. Do not take chances. If food looks or tastes spoiled, throw it out. When should you call for help? Call 911 anytime you think you may need emergency care. For example, call if:    You vomit blood or what looks like coffee grounds.      You passed out (lost consciousness). You pass maroon or very bloody stools. Call your doctor now or seek immediate medical care if:    You have severe belly pain. You have signs of needing more fluids. You have sunken eyes, a dry mouth, and pass only a little urine. You feel like you are going to faint. You have increased belly pain that does not go away in 1 to 2 days. You have new or increased nausea, or you are vomiting. You have a new or higher fever. Your stools are black and tarlike or have streaks of blood. Watch closely for changes in your health, and be sure to contact your doctor if:    You are dizzy or lightheaded. You urinate less than usual, or your urine is dark yellow or brown. You do not feel better with each day that goes by. Where can you learn more? Go to http://www.woods.com/ and enter N142 to learn more about \"Gastroenteritis: Care Instructions. \"  Current as of: June 6, 2022               Content Version: 13.5  © 2006-2022 [employer]. Care instructions adapted under license by [facility] ([facility]). If you have questions about a medical condition or this instruction, always ask your healthcare professional. [place] [address] any warranty or liability for your use of this information.

## 2023-01-23 DIAGNOSIS — I10 ESSENTIAL HYPERTENSION: ICD-10-CM

## 2023-01-23 DIAGNOSIS — I50.22 SYSTOLIC CHF, CHRONIC (HCC): ICD-10-CM

## 2023-01-23 DIAGNOSIS — E78.2 MIXED HYPERLIPIDEMIA: ICD-10-CM

## 2023-01-23 RX ORDER — EZETIMIBE 10 MG/1
TABLET ORAL
Qty: 90 TABLET | Refills: 3 | Status: SHIPPED | OUTPATIENT
Start: 2023-01-23

## 2023-01-23 RX ORDER — METOPROLOL SUCCINATE 50 MG/1
TABLET, EXTENDED RELEASE ORAL
Qty: 90 TABLET | Refills: 3 | Status: SHIPPED | OUTPATIENT
Start: 2023-01-23

## 2023-01-26 ENCOUNTER — OFFICE VISIT (OUTPATIENT)
Dept: FAMILY MEDICINE CLINIC | Age: 73
End: 2023-01-26
Payer: MEDICARE

## 2023-01-26 ENCOUNTER — HOSPITAL ENCOUNTER (OUTPATIENT)
Dept: GENERAL RADIOLOGY | Age: 73
Discharge: HOME | End: 2023-01-26
Payer: MEDICARE

## 2023-01-26 ENCOUNTER — HOSPITAL ENCOUNTER (OUTPATIENT)
Age: 73
Discharge: HOME | End: 2023-01-26
Payer: MEDICARE

## 2023-01-26 VITALS
OXYGEN SATURATION: 98 % | HEIGHT: 64 IN | HEART RATE: 74 BPM | DIASTOLIC BLOOD PRESSURE: 80 MMHG | WEIGHT: 185 LBS | BODY MASS INDEX: 31.58 KG/M2 | SYSTOLIC BLOOD PRESSURE: 120 MMHG

## 2023-01-26 DIAGNOSIS — M25.561 CHRONIC PAIN OF RIGHT KNEE: ICD-10-CM

## 2023-01-26 DIAGNOSIS — G89.29 CHRONIC PAIN OF RIGHT KNEE: ICD-10-CM

## 2023-01-26 DIAGNOSIS — R73.9 HYPERGLYCEMIA: ICD-10-CM

## 2023-01-26 DIAGNOSIS — M25.561 CHRONIC PAIN OF RIGHT KNEE: Primary | ICD-10-CM

## 2023-01-26 DIAGNOSIS — G89.29 CHRONIC PAIN OF RIGHT KNEE: Primary | ICD-10-CM

## 2023-01-26 PROCEDURE — 3017F COLORECTAL CA SCREEN DOC REV: CPT | Performed by: NURSE PRACTITIONER

## 2023-01-26 PROCEDURE — G8484 FLU IMMUNIZE NO ADMIN: HCPCS | Performed by: NURSE PRACTITIONER

## 2023-01-26 PROCEDURE — 73560 X-RAY EXAM OF KNEE 1 OR 2: CPT

## 2023-01-26 PROCEDURE — 1036F TOBACCO NON-USER: CPT | Performed by: NURSE PRACTITIONER

## 2023-01-26 PROCEDURE — G8400 PT W/DXA NO RESULTS DOC: HCPCS | Performed by: NURSE PRACTITIONER

## 2023-01-26 PROCEDURE — 1123F ACP DISCUSS/DSCN MKR DOCD: CPT | Performed by: NURSE PRACTITIONER

## 2023-01-26 PROCEDURE — G8417 CALC BMI ABV UP PARAM F/U: HCPCS | Performed by: NURSE PRACTITIONER

## 2023-01-26 PROCEDURE — 99213 OFFICE O/P EST LOW 20 MIN: CPT | Performed by: NURSE PRACTITIONER

## 2023-01-26 PROCEDURE — G8427 DOCREV CUR MEDS BY ELIG CLIN: HCPCS | Performed by: NURSE PRACTITIONER

## 2023-01-26 PROCEDURE — 3079F DIAST BP 80-89 MM HG: CPT | Performed by: NURSE PRACTITIONER

## 2023-01-26 PROCEDURE — 1090F PRES/ABSN URINE INCON ASSESS: CPT | Performed by: NURSE PRACTITIONER

## 2023-01-26 PROCEDURE — 3074F SYST BP LT 130 MM HG: CPT | Performed by: NURSE PRACTITIONER

## 2023-01-26 PROCEDURE — 36415 COLL VENOUS BLD VENIPUNCTURE: CPT | Performed by: NURSE PRACTITIONER

## 2023-01-26 ASSESSMENT — ENCOUNTER SYMPTOMS
ALLERGIC/IMMUNOLOGIC NEGATIVE: 1
SHORTNESS OF BREATH: 0
EYES NEGATIVE: 1
ANAL BLEEDING: 0
NAUSEA: 0
ABDOMINAL PAIN: 0
RESPIRATORY NEGATIVE: 1
BACK PAIN: 1
BLOOD IN STOOL: 0
GASTROINTESTINAL NEGATIVE: 1

## 2023-01-26 NOTE — PROGRESS NOTES
[address]  FAMILY MEDICINE  [address] [facility] [address]  Dept: [phone]  Dept Fax: [phone]  Loc: [phone]    Lord Montesinos is a 67 y.o. female who presents today for her medical conditions/complaints as noted below. Lord Montesinos is c/o of Other (Right knee pain and swollen for a month )       Subjective:     Chief Complaint   Patient presents with    Other     Right knee pain and swollen for a month        Knee Pain   The incident occurred more than 1 week ago (2-3 months ago). There was no injury mechanism. The pain is present in the right knee. The quality of the pain is described as aching and shooting. The pain has been Intermittent since onset. Associated symptoms include an inability to bear weight and a loss of motion. Pertinent negatives include no loss of sensation, muscle weakness, numbness or tingling. Associated symptoms comments: Gave out yesterday getting out of bed. The symptoms are aggravated by palpation and weight bearing. She has tried rest, heat and ice (epson salt soak) for the symptoms. The treatment provided mild relief. Son has history of gout. Past Medical History:   Diagnosis Date    Arthritis     Asthma     Back pain     Fibromyalgia     Fibromyalgia     Heart failure with reduced ejection fraction (HCC)     Herpes zoster     Hypercholesteremia     Hyperlipidemia     Hypertriglyceridemia     Neck pain     Osteopenia     Stress-induced cardiomyopathy 03/2017         Review of Systems   Constitutional: Negative. Negative for appetite change, fatigue and unexpected weight change. HENT: Negative. Eyes: Negative. Respiratory: Negative. Negative for shortness of breath. Cardiovascular: Negative. Negative for chest pain, palpitations and leg swelling. Gastrointestinal: Negative. Negative for abdominal pain, anal bleeding, blood in stool and nausea. Endocrine: Negative. Genitourinary: Negative.   Negative for hematuria. Musculoskeletal:  Positive for arthralgias (right knee) and back pain (chronic). Skin: Negative. Negative for rash. Allergic/Immunologic: Negative. Neurological: Negative. Negative for dizziness, tingling, syncope, light-headedness and numbness. Hematological: Negative. Does not bruise/bleed easily. Psychiatric/Behavioral: Negative. All other systems reviewed and are negative. Current Outpatient Medications   Medication Sig Dispense Refill    ezetimibe (ZETIA) 10 MG tablet TAKE ONE TABLET BY MOUTH DAILY 90 tablet 3    metoprolol succinate (TOPROL XL) 50 MG extended release tablet Take one tab daily 90 tablet 3    traMADol (ULTRAM) 50 MG tablet Take 1 tablet by mouth 4 times daily for 30 days. 120 tablet 1    spironolactone (ALDACTONE) 25 MG tablet TAKE ONE-HALF TABLET BY MOUTH DAILY 45 tablet 3    atorvastatin (LIPITOR) 20 MG tablet Take one tab daily 90 tablet 3    valACYclovir (VALTREX) 1 g tablet Take 1 tablet by mouth 3 times daily 21 tablet 1    citalopram (CELEXA) 10 MG tablet TAKE ONE TABLET BY MOUTH DAILY 90 tablet 0    gabapentin (NEURONTIN) 100 MG capsule TAKE TWO CAPSULES BY MOUTH THREE TIMES A DAY AS DIRECTED 180 capsule 5    lidocaine (LIDODERM) 5 % Place patch on skin 12 hours on, 12 hours off. 30 patch 5    lisinopril (PRINIVIL;ZESTRIL) 40 MG tablet TAKE ONE TABLET BY MOUTH EVERY EVENING 90 tablet 3    fenofibrate (TRICOR) 54 MG tablet TAKE ONE TABLET BY MOUTH DAILY 90 tablet 3    albuterol sulfate HFA (VENTOLIN HFA) 108 (90 Base) MCG/ACT inhaler Inhale 2 puffs into the lungs every 6 hours as needed for Wheezing 1 Inhaler 3    aspirin EC 81 MG EC tablet Take 1 tablet by mouth daily 30 tablet 3    Cholecalciferol (VITAMIN D3) 2000 UNITS CAPS Take 2 tablets by mouth daily       Calcium 1461-9100 MG-UNIT CHEW Take  by mouth. No current facility-administered medications for this visit.         No changes in past medical history, past surgical history, social history, orfamily history were noted during the patient encounter unless specifically listed above. All updates of past medical history, past surgical history, social history, or family history were reviewed personally by me duringthe office visit. All problems listed in the assessment are stable unless noted otherwise. Medication profile reviewed personally by me during the office visit. Medication side effects and possible impairments frommedications were discussed as applicable. Objective:     Physical Exam  Constitutional:       General: She is not in acute distress. Appearance: Normal appearance. She is well-developed. She is not toxic-appearing. HENT:      Head: Normocephalic and atraumatic. Right Ear: Hearing, tympanic membrane and ear canal normal.      Left Ear: Hearing, tympanic membrane and ear canal normal.      Nose: Nose normal.      Mouth/Throat:      Pharynx: Uvula midline. Eyes:      General: Lids are normal.      Conjunctiva/sclera: Conjunctivae normal.   Cardiovascular:      Rate and Rhythm: Normal rate and regular rhythm. Pulses: Normal pulses. Heart sounds: Normal heart sounds. Pulmonary:      Effort: Pulmonary effort is normal. No accessory muscle usage or respiratory distress. Breath sounds: Normal breath sounds. Abdominal:      Palpations: Abdomen is soft. Tenderness: There is no abdominal tenderness. Musculoskeletal:      Cervical back: Neck supple. Right knee: Swelling present. No crepitus. Decreased range of motion. Tenderness present over the MCL. Normal alignment. Normal pulse. Instability Tests: Anterior drawer test negative. Posterior drawer test negative. Legs:    Lymphadenopathy:      Head:      Right side of head: No submental or submandibular adenopathy. Left side of head: No submental or submandibular adenopathy. Cervical: No cervical adenopathy. Skin:     General: Skin is warm and dry.       Findings: No lesion or rash. Neurological:      Mental Status: She is alert and oriented to person, place, and time. Psychiatric:         Speech: Speech normal.         Behavior: Behavior normal. Behavior is cooperative. /80 (Site: Left Upper Arm, Position: Sitting, Cuff Size: Large Adult)   Pulse 74   Ht 5' 4\" (1.626 m)   Wt 185 lb (83.9 kg)   LMP  (LMP Unknown)   SpO2 98%   BMI 31.76 kg/m²   Body mass index is 31.76 kg/m².     BP Readings from Last 2 Encounters:   01/26/23 120/80   11/29/22 110/68       Wt Readings from Last 3 Encounters:   01/26/23 185 lb (83.9 kg)   11/29/22 179 lb 8 oz (81.4 kg)   10/26/22 180 lb (81.6 kg)       Lab Review   Nurse Only on 12/19/2022   Component Date Value    SARS-COV-2, RdRp gene 12/19/2022 Negative     Lot Number 12/19/2022 [id number]     QC Pass/Fail 12/19/2022 pass    Nurse Only on 12/02/2022   Component Date Value    TSH Reflex FT4 12/02/2022 1.47     WBC 12/02/2022 5.5     RBC 12/02/2022 4.38     Hemoglobin 12/02/2022 12.6     Hematocrit 12/02/2022 39.0     MCV 12/02/2022 89.0     MCH 12/02/2022 28.9     MCHC 12/02/2022 32.4     RDW 12/02/2022 13.3     Platelets 12/80/3897 277     MPV 12/02/2022 10.0     Neutrophils % 12/02/2022 50.8     Lymphocytes % 12/02/2022 36.7     Monocytes % 12/02/2022 6.5     Eosinophils % 12/02/2022 5.0     Basophils % 12/02/2022 1.0     Neutrophils Absolute 12/02/2022 2.8     Lymphocytes Absolute 12/02/2022 2.0     Monocytes Absolute 12/02/2022 0.4     Eosinophils Absolute 12/02/2022 0.3     Basophils Absolute 12/02/2022 0.1     Sodium 12/02/2022 138     Potassium 12/02/2022 4.8     Chloride 12/02/2022 101     CO2 12/02/2022 27     Anion Gap 12/02/2022 10     Glucose 12/02/2022 121 (A)     BUN 12/02/2022 13     Creatinine 12/02/2022 0.9     Est, Glom Filt Rate 12/02/2022 >60     Calcium 12/02/2022 9.6     Total Protein 12/02/2022 7.0     Albumin 12/02/2022 4.3     Albumin/Globulin Ratio 12/02/2022 1.6     Total Bilirubin 12/02/2022 0.5 Alkaline Phosphatase 12/02/2022 90     ALT 12/02/2022 14     AST 12/02/2022 27     Pro-BNP 12/02/2022 105     Vit D, 25-Hydroxy 12/02/2022 74.0     Cholesterol, Fasting 12/02/2022 246 (A)     Triglyceride, Fasting 12/02/2022 144     HDL 12/02/2022 41     LDL Calculated 12/02/2022 176 (A)     VLDL Cholesterol Calcula* 12/02/2022 [address] Outpatient Visit on 11/29/2022   Component Date Value    Left Ventricular Ejectio* 11/29/2022 43     LVEF MODALITY 11/29/2022 ECHO        No results found for this visit on 01/26/23. Assessment:       1. Chronic pain of right knee    2. Hyperglycemia        No results found for this visit on 01/26/23. Plan:       Tyson Ibarra was seen today for other. Diagnoses and all orders for this visit:    Chronic pain of right knee  Patient did have some redness and increased heat on and off and she is concerned because her son recently had gout. I will go ahead and do a uric acid level on the patient  -     Uric Acid  -     XR KNEE RIGHT (1-2 VIEWS); Future  Recommend rest, ice, compress and elevate. Will wait on x-rays for further management but I do feel she will need to see orthopedist    Hyperglycemia  -     Hemoglobin A1C      Patient has been instructed call the office immediately with new symptoms, change in symptoms or worseningof symptoms. If this is not feasible, patient is instructed to report to the emergency room. Medication profile reviewed. Medication side effects and possible impairments from medications were discussed as applicable. Allergies were reviewed. Health maintenance was reviewed and updated as appropriate. Return if symptoms worsen or fail to improve. (Comment: Please note this report has been produced using a combination of typing and speech recognition software and may contain errors related to that system including errors in grammar, punctuation, and spelling, as well as words and phrases that may be inappropriate.  If there are any questions or concerns please feel free to contact the dictating provider for clarification.)

## 2023-01-27 LAB
ESTIMATED AVERAGE GLUCOSE: 131.2 MG/DL
HBA1C MFR BLD: 6.2 %
URIC ACID, SERUM: 5.3 MG/DL (ref 2.6–6)

## 2023-01-30 DIAGNOSIS — M25.561 CHRONIC PAIN OF RIGHT KNEE: Primary | ICD-10-CM

## 2023-01-30 DIAGNOSIS — G89.29 CHRONIC PAIN OF RIGHT KNEE: Primary | ICD-10-CM

## 2023-02-14 DIAGNOSIS — F41.8 SITUATIONAL ANXIETY: ICD-10-CM

## 2023-02-14 RX ORDER — CITALOPRAM 10 MG/1
10 TABLET ORAL DAILY
Qty: 90 TABLET | Refills: 0 | Status: SHIPPED | OUTPATIENT
Start: 2023-02-14

## 2023-02-16 ENCOUNTER — OFFICE VISIT (OUTPATIENT)
Dept: ORTHOPEDIC SURGERY | Age: 73
End: 2023-02-16

## 2023-02-16 VITALS — BODY MASS INDEX: 31.58 KG/M2 | WEIGHT: 185 LBS | HEIGHT: 64 IN

## 2023-02-16 DIAGNOSIS — M25.561 RIGHT KNEE PAIN, UNSPECIFIED CHRONICITY: Primary | ICD-10-CM

## 2023-02-16 NOTE — PROGRESS NOTES
Date:  2023    Name:  Ceci Vela  Address:  [address] [place] [id number]    :  1950      Age:   67 y.o.    SSN:  [id number]      Medical Record Number:  [id number]    Reason for Visit:    Chief Complaint    New Patient (Right knee pain increased since October. No prior sx)      DOS:2023     HPI: Onur Bernstein is a 67 y.o. female here today for new patient evaluation regarding her right knee. The patient was referred to me by Dr. Araya [place] office. The patient reports that back in  she had a fall when she missed a step. However at that time she did not notice any major problems or issues with her knee. Over the past several months she has noted worsening pain to her knee. She has started to greatly increase her activity level to take care of her mother, including helping her mother stand up and position. There is a lot of twisting and motion of her knee when doing so. Recently she notices catching in the knee especially on the inside of the knee. She has a couple trigger points that she pinpoints along the inside part of the knee near the joint line. She does have a complex medical history including a heart issue as well has 4 back surgeries for which she is on gabapentin and tramadol and pain patches chronically. ROS: All systems reviewed on patient intake form. Pertinent items are noted in HPI.         Past Medical History:   Diagnosis Date    Arthritis     Asthma     Back pain     Fibromyalgia     Fibromyalgia     Heart failure with reduced ejection fraction ([address]     Herpes zoster     Hypercholesteremia     Hyperlipidemia     Hypertriglyceridemia     Neck pain     Osteopenia     Stress-induced cardiomyopathy 2017        Past Surgical History:   Procedure Laterality Date    BACK SURGERY      BLADDER REPAIR      CARDIAC CATHETERIZATION      COLONOSCOPY  12    diverticulosis    HYSTERECTOMY (CERVIX STATUS UNKNOWN)      NECK SURGERY  , 10/05, 07 or '08    C6-C7spur '05       Family History   Problem Relation Age of Onset    High Blood Pressure Mother     Heart Disease Father        Social History     Socioeconomic History    Marital status:      Spouse name: None    Number of children: None    Years of education: None    Highest education level: None   Tobacco Use    Smoking status: Former     Packs/day: 0.50     Years: 3.00     Pack years: 1.50     Types: Cigarettes     Quit date: 2015     Years since quittin.1    Smokeless tobacco: Never    Tobacco comments:     pt smoked off and on   Vaping Use    Vaping Use: Never used   Substance and Sexual Activity    Alcohol use: No     Alcohol/week: 0.0 standard drinks    Drug use: No    Sexual activity: Yes     Partners: Male     Social Determinants of Health     Financial Resource Strain: Low Risk     Difficulty of Paying Living Expenses: Not hard at all   Food Insecurity: No Food Insecurity    Worried About Running Out of Food in the Last Year: Never true    Ran Out of Food in the Last Year: Never true   Physical Activity: Inactive    Days of Exercise per Week: 0 days    Minutes of Exercise per Session: 0 min       Current Outpatient Medications   Medication Sig Dispense Refill    citalopram (CELEXA) 10 MG tablet Take 1 tablet by mouth daily 90 tablet 0    ezetimibe (ZETIA) 10 MG tablet TAKE ONE TABLET BY MOUTH DAILY 90 tablet 3    metoprolol succinate (TOPROL XL) 50 MG extended release tablet Take one tab daily 90 tablet 3    spironolactone (ALDACTONE) 25 MG tablet TAKE ONE-HALF TABLET BY MOUTH DAILY 45 tablet 3    atorvastatin (LIPITOR) 20 MG tablet Take one tab daily 90 tablet 3    valACYclovir (VALTREX) 1 g tablet Take 1 tablet by mouth 3 times daily 21 tablet 1    gabapentin (NEURONTIN) 100 MG capsule TAKE TWO CAPSULES BY MOUTH THREE TIMES A DAY AS DIRECTED 180 capsule 5    lidocaine (LIDODERM) 5 % Place patch on skin 12 hours on, 12 hours off.  30 patch 5    lisinopril (PRINIVIL;ZESTRIL) 40 MG tablet TAKE ONE TABLET BY MOUTH EVERY EVENING 90 tablet 3    fenofibrate (TRICOR) 54 MG tablet TAKE ONE TABLET BY MOUTH DAILY 90 tablet 3    albuterol sulfate HFA (VENTOLIN HFA) 108 (90 Base) MCG/ACT inhaler Inhale 2 puffs into the lungs every 6 hours as needed for Wheezing 1 Inhaler 3    aspirin EC 81 MG EC tablet Take 1 tablet by mouth daily 30 tablet 3    Cholecalciferol (VITAMIN D3) 2000 UNITS CAPS Take 2 tablets by mouth daily       Calcium 3866-3009 MG-UNIT CHEW Take  by mouth. No current facility-administered medications for this visit. Allergies   Allergen Reactions    Shellfish-Derived Products Nausea And Vomiting    Celebrex [Celecoxib] Swelling    Codeine     Ibuprofen Swelling    Methadone     Vioxx Swelling       Vital signs:  Ht 5' 4\" (1.626 m)   Wt 185 lb (83.9 kg)   LMP  (LMP Unknown)   BMI 31.76 kg/m²        Right knee exam    Gait: No use of assistive devices. No antalgic gait. Alignment: normal alignment. Inspection/skin: Skin is intact without erythema or ecchymosis. No gross deformity. Palpation: mild crepitus. Tender to the medial joint line both at the midline and posterior medially. Some tenderness along the proximal medial tibia as well. Range of Motion: There is full range of motion. Strength: Normal quadriceps development. Effusion: No effusion or swelling present. Ligamentous stability: No cruciate or collateral ligament instability. Neurologic and vascular: Skin is warm and well-perfused. Sensation is intact to light-touch. Special tests: Positive Max with positive deep flexion grinding          Diagnostics:  Radiology:       2 views of the right knee taken in the office today demonstrate some chondrocalcinosis of the medial meniscus but overall well-maintained joint spaces medially and laterally. Good patellofemoral joint line as well.       Assessment: 67 y.o. female with right knee pain, concern for meniscus tear in the setting of chondrocalcinosis    Plan: Pertinent imaging was reviewed. The etiology, natural history, and treatment options for the disorder were discussed. The roles of activity medication, antiinflammatories, injections, bracing, physical therapy, and surgical interventions were all described to the patient and questions were answered. Based on the patient's x-ray findings and her history I do believe the patient is at high risk for having a medial meniscus tear. Her chondrocalcinosis is noted on x-ray would make her more susceptible to this as well as her increased activity level. Based on these findings and her mechanical catching and locking of the knee I do believe she is a good candidate for an MRI. We will get this and I will see her after the MRI to discuss treatment options    . Selina Copeland is in agreement with this plan. All questions were answered to patient's satisfaction and was encouraged to call with any further questions. Brigida Melgar, DO  Orthopedic Surgery and Sports Medicine  2/16/2023    Orders Placed This Encounter   Procedures    XR KNEE RIGHT (1-2 VIEWS)     Standing Status:   Future     Number of Occurrences:   1     Standing Expiration Date:   2/16/2024     Scheduling Instructions:      Tunnel and sunrise view    MRI KNEE RIGHT WO CONTRAST     Standing Status:   Future     Standing Expiration Date:   2/16/2024     Order Specific Question:   Reason for exam:     Answer:   r/o medial meniscus     Order Specific Question:   What is the sedation requirement?      Answer:   None

## 2023-02-22 ENCOUNTER — OFFICE VISIT (OUTPATIENT)
Dept: FAMILY MEDICINE CLINIC | Age: 73
End: 2023-02-22

## 2023-02-22 ENCOUNTER — TELEPHONE (OUTPATIENT)
Dept: ORTHOPEDIC SURGERY | Age: 73
End: 2023-02-22

## 2023-02-22 VITALS
OXYGEN SATURATION: 92 % | HEART RATE: 83 BPM | DIASTOLIC BLOOD PRESSURE: 84 MMHG | SYSTOLIC BLOOD PRESSURE: 126 MMHG | WEIGHT: 189 LBS | BODY MASS INDEX: 32.44 KG/M2

## 2023-02-22 DIAGNOSIS — F32.3 DEPRESSIVE PSYCHOSIS (HCC): ICD-10-CM

## 2023-02-22 DIAGNOSIS — M50.221 HERNIATION OF INTERVERTEBRAL DISC AT C4-C5 LEVEL: Primary | ICD-10-CM

## 2023-02-22 DIAGNOSIS — M50.322 DEGENERATION OF C5-C6 INTERVERTEBRAL DISC: ICD-10-CM

## 2023-02-22 DIAGNOSIS — M50.321 DEGENERATION OF INTERVERTEBRAL DISC AT C4-C5 LEVEL: ICD-10-CM

## 2023-02-22 DIAGNOSIS — M50.223 HERNIATION OF INTERVERTEBRAL DISC AT C6-C7 LEVEL: ICD-10-CM

## 2023-02-22 SDOH — ECONOMIC STABILITY: FOOD INSECURITY: WITHIN THE PAST 12 MONTHS, YOU WORRIED THAT YOUR FOOD WOULD RUN OUT BEFORE YOU GOT MONEY TO BUY MORE.: NEVER TRUE

## 2023-02-22 SDOH — ECONOMIC STABILITY: INCOME INSECURITY: HOW HARD IS IT FOR YOU TO PAY FOR THE VERY BASICS LIKE FOOD, HOUSING, MEDICAL CARE, AND HEATING?: NOT HARD AT ALL

## 2023-02-22 SDOH — ECONOMIC STABILITY: FOOD INSECURITY: WITHIN THE PAST 12 MONTHS, THE FOOD YOU BOUGHT JUST DIDN'T LAST AND YOU DIDN'T HAVE MONEY TO GET MORE.: NEVER TRUE

## 2023-02-22 SDOH — ECONOMIC STABILITY: HOUSING INSECURITY
IN THE LAST 12 MONTHS, WAS THERE A TIME WHEN YOU DID NOT HAVE A STEADY PLACE TO SLEEP OR SLEPT IN A SHELTER (INCLUDING NOW)?: NO

## 2023-02-22 NOTE — PROGRESS NOTES
Chief Complaint   Patient presents with    Work Related Injury     [facility]        Internal Administration   First Dose COVID-19, J&J, (age 18y+), IM, 0.5 mL  2021   Second Dose           Last COVID Lab SARS-CoV-2 (no units)   Date Value   2021 Not Detected     SARS-COV-2, RdRp gene (no units)   Date Value   2022 Negative             Wt Readings from Last 3 Encounters:   23 189 lb (85.7 kg)   23 185 lb (83.9 kg)   23 185 lb (83.9 kg)     BP Readings from Last 3 Encounters:   23 126/84   23 120/80   22 110/68      Lab Results   Component Value Date    LABA1C 6.2 2023    LABA1C 5.9 10/27/2021    LABA1C 6.3 2020       HPI:  Boni Dubin is a 67 y.o. (: 1950) here today for    Patient is here today for her [address] claim for her chronic neck pain. She states that she never did get to go to see. Dr. Stefan Guptaed due to so much occurring at once with her mother and her job. She states that she continues to see pain management for her neck. She states that her pain medication helps to control her pain. [x] Patient has completed an advance directive  [] Patient has NOT completed an advanced directive  [x] Patient has a documented healthcare surrogate  [] Patient does NOT have a documented healthcare surrogate  [] Discussed the importance of establishing and updating an advanced directive. Patient has questions at this time and those were answered. [x] Discussed the importance of establishing and updating an advanced directive. Patient does NOT have questions at this time.     Discussed with: [x] Patient            [] Family             [] Other caregiver    Patient's medications, allergies, past medical, surgical, social and family histories were reviewed and updated asappropriate on 2023 at 3:55 PM.    ROS:  Review of Systems    All other systems reviewed and are negative except as noted above on 2023 at 3:55 PM. Additional review of systems [facility] Any responses requiring further intervention were pursued. Past Medical History:   Diagnosis Date    Arthritis     Asthma     Back pain     Fibromyalgia     Fibromyalgia     Heart failure with reduced ejection fraction (HCC)     Herpes zoster     Hypercholesteremia     Hyperlipidemia     Hypertriglyceridemia     Neck pain     Osteopenia     Stress-induced cardiomyopathy 2017     Family History   Problem Relation Age of Onset    High Blood Pressure Mother     Heart Disease Father      Social History     Socioeconomic History    Marital status:      Spouse name: Not on file    Number of children: Not on file    Years of education: Not on file    Highest education level: Not on file   Occupational History    Not on file   Tobacco Use    Smoking status: Former     Packs/day: 0.50     Years: 3.00     Pack years: 1.50     Types: Cigarettes     Quit date: 2015     Years since quittin.1    Smokeless tobacco: Never    Tobacco comments:     pt smoked off and on   Vaping Use    Vaping Use: Never used   Substance and Sexual Activity    Alcohol use: No     Alcohol/week: 0.0 standard drinks    Drug use: No    Sexual activity: Yes     Partners: Male   Other Topics Concern    Not on file   Social History Narrative    Not on file     Social Determinants of Health     Financial Resource Strain: Low Risk     Difficulty of Paying Living Expenses: Not hard at all   Food Insecurity: No Food Insecurity    Worried About Running Out of Food in the Last Year: Never true    Ran Out of Food in the Last Year: Never true   Transportation Needs: Unknown    Lack of Transportation (Medical): Not on file    Lack of Transportation (Non-Medical):  No   Physical Activity: Inactive    Days of Exercise per Week: 0 days    Minutes of Exercise per Session: 0 min   Stress: Not on file   Social Connections: Not on file   Intimate Partner Violence: Not on file Housing Stability: Unknown    Unable to Pay for Housing in the Last Year: Not on file    Number of Places Lived in the Last Year: Not on file    Unstable Housing in the Last Year: No     Prior to Visit Medications    Medication Sig Taking? Authorizing Provider   citalopram (CELEXA) 10 MG tablet Take 1 tablet by mouth daily Yes Moses Jefferson MD   ezetimibe (ZETIA) 10 MG tablet TAKE ONE TABLET BY MOUTH DAILY Yes Moses Jefferson MD   metoprolol succinate (TOPROL XL) 50 MG extended release tablet Take one tab daily Yes Moses Jefferson MD   spironolactone (ALDACTONE) 25 MG tablet TAKE ONE-HALF TABLET BY MOUTH DAILY Yes Celestino Cheema MD   atorvastatin (LIPITOR) 20 MG tablet Take one tab daily Yes Celestino Cheema MD   valACYclovir (VALTREX) 1 g tablet Take 1 tablet by mouth 3 times daily Yes Mat Patch, APRN - CNP   gabapentin (NEURONTIN) 100 MG capsule TAKE TWO CAPSULES BY MOUTH THREE TIMES A DAY AS DIRECTED Yes Moses Jefferson MD   lidocaine (LIDODERM) 5 % Place patch on skin 12 hours on, 12 hours off. Yes Moses Jefferson MD   lisinopril (PRINIVIL;ZESTRIL) 40 MG tablet TAKE ONE TABLET BY MOUTH EVERY EVENING Yes Celestino Cheema MD   fenofibrate (TRICOR) 54 MG tablet TAKE ONE TABLET BY MOUTH DAILY Yes Celestino Cheema MD   albuterol sulfate HFA (VENTOLIN HFA) 108 (90 Base) MCG/ACT inhaler Inhale 2 puffs into the lungs every 6 hours as needed for Wheezing Yes Moses Jefferson MD   aspirin EC 81 MG EC tablet Take 1 tablet by mouth daily Yes Moses Jefferson MD   Cholecalciferol (VITAMIN D3) 2000 UNITS CAPS Take 2 tablets by mouth daily  Yes Historical Provider, MD   Calcium 5081-8414 MG-UNIT CHEW Take  by mouth.  Yes Historical Provider, MD     Allergies   Allergen Reactions    Shellfish-Derived Products Nausea And Vomiting    Celebrex [Celecoxib] Swelling    Codeine     Ibuprofen Swelling    Methadone     Vioxx Swelling       OBJECTIVE:  Estimated body mass index is 32.44 kg/m² as calculated from the following:    Height as of 2/16/23: 5' 4\" (1.626 m). Weight as of this encounter: 189 lb (85.7 kg). Vitals:    02/22/23 1546   BP: 126/84   Pulse: 83   SpO2: 92%   Weight: 189 lb (85.7 kg)       Physical Exam  Vitals and nursing note reviewed. Constitutional:       General: She is not in acute distress. Appearance: She is well-developed. She is not diaphoretic. HENT:      Head: Normocephalic and atraumatic. Right Ear: External ear normal.      Left Ear: External ear normal.      Nose: Nose normal.   Eyes:      General: Lids are normal. No scleral icterus. Right eye: No discharge. Left eye: No discharge. Pupils: Pupils are equal, round, and reactive to light. Neck:      Thyroid: No thyromegaly. Vascular: No JVD. Cardiovascular:      Rate and Rhythm: Normal rate and regular rhythm. Heart sounds: Normal heart sounds. Pulmonary:      Effort: Pulmonary effort is normal. No respiratory distress. Breath sounds: Normal breath sounds. Abdominal:      Palpations: Abdomen is soft. There is no hepatomegaly or splenomegaly. Tenderness: There is no abdominal tenderness. Musculoskeletal:         General: Tenderness (over the neck and across the shoulders with palpitation) present. Cervical back: Pain with movement and muscular tenderness (Bilateral trapezii trunk and neck portion) present. Decreased range of motion. Right lower leg: No edema. Left lower leg: No edema. Comments: Spasming of the trapezius muscles bilaterally    C spine:  Flexion- 25 degrees  Extension- 15 degrees  Left rotation- 45 degrees  Right rotation- 30 degrees  Left flexion- 20 degrees  Right flexion- 10 degrees     Skin:     General: Skin is warm and dry. Coloration: Skin is not pale. Findings: No erythema or rash.       Comments: Turgor normal   Neurological:      Mental Status: She is oriented to person, place, and time. Psychiatric:         Mood and Affect: Mood normal.         Behavior: Behavior normal.         Thought Content: Thought content normal.         Judgment: Judgment normal.            ASSESSMENT PLAN      Diagnosis Orders   1. Herniation of intervertebral disc at C4-C5 level        2. Depressive psychosis ([address]        3. Degeneration of intervertebral disc at C4-C5 level        4. Degeneration of C5-C6 intervertebral disc        5. Herniation of intervertebral disc at C6-C7 level        Exam similar to previous, continues on tramadol and gabapentin. She was unable to see Dr. Marko Lopez for follow-up due to family emergency. Continue same treatment for now, recheck in 6 months    Patient should call the office immediately with new or ongoing signs or symptoms or worsening, or proceed to the emergency room. No changes in past medical history, past surgical history, social history, or family history were noted during the patient encounter unless specifically listed above. All updates of past medical history, past surgical history, social history, or family history were reviewed personally by me during the office visit. All problems listed in the assessment are stable unless noted otherwise. Medication profile reviewed personally by me during the visit. Medication side effects and possible impairments from medications were discussed as applicable. Unless stated otherwise, we will continue current medications as listed in the medication review report contained in the patient's medical record. This document was prepared by a combination of typing and transcription through a voice recognition software.                 Scribe attestation: Corrie Valenzuela RN, am scribing for and in the presence of Valeria Chavarria MD. Electronically signed by Hayden Todd RN on 2/22/2023 at 3:55 PM      Provider attestation:     I, Dr. Maehndra Lyle, personally performed the services described in this documentation, as scribed by the above signed scribe in my presence, and it is both accurate and complete. I agree with the ROS and Past Histories independently gathered by the clinical support staff and the remaining scribed note accurately describes my personal service to the patient.       2/22/2023    4:12 PM

## 2023-03-24 ENCOUNTER — OFFICE VISIT (OUTPATIENT)
Dept: ORTHOPEDIC SURGERY | Age: 73
End: 2023-03-24

## 2023-03-24 VITALS — BODY MASS INDEX: 32.27 KG/M2 | WEIGHT: 189 LBS | HEIGHT: 64 IN

## 2023-03-24 DIAGNOSIS — M17.11 PRIMARY OSTEOARTHRITIS OF RIGHT KNEE: Primary | ICD-10-CM

## 2023-03-24 RX ORDER — BUPIVACAINE HYDROCHLORIDE 2.5 MG/ML
4 INJECTION, SOLUTION INFILTRATION; PERINEURAL ONCE
Status: COMPLETED | OUTPATIENT
Start: 2023-03-24 | End: 2023-03-24

## 2023-03-24 RX ORDER — METHYLPREDNISOLONE ACETATE 40 MG/ML
80 INJECTION, SUSPENSION INTRA-ARTICULAR; INTRALESIONAL; INTRAMUSCULAR; SOFT TISSUE ONCE
Status: COMPLETED | OUTPATIENT
Start: 2023-03-24 | End: 2023-03-24

## 2023-03-24 RX ADMIN — BUPIVACAINE HYDROCHLORIDE 10 MG: 2.5 INJECTION, SOLUTION INFILTRATION; PERINEURAL at 16:19

## 2023-03-24 RX ADMIN — METHYLPREDNISOLONE ACETATE 80 MG: 40 INJECTION, SUSPENSION INTRA-ARTICULAR; INTRALESIONAL; INTRAMUSCULAR; SOFT TISSUE at 16:19

## 2023-03-24 NOTE — PROGRESS NOTES
Chief Complaint  Knee Pain (F/U RIGHT KNEE MRI)      History of Present Illness:  Ky Martinez is a pleasant 67 y.o. female here today for repeat evaluation of her right knee. The patient is here to go over her MRI results. He is still following her good amount. Prior HPI 2/16/23:  Moira Boone is a 67 y.o. female here today for new patient evaluation regarding her right knee. The patient was referred to me by Dr. Vera Correia office. The patient reports that back in 2021 she had a fall when she missed a step. However at that time she did not notice any major problems or issues with her knee. Over the past several months she has noted worsening pain to her knee. She has started to greatly increase her activity level to take care of her mother, including helping her mother stand up and position. There is a lot of twisting and motion of her knee when doing so. Recently she notices catching in the knee especially on the inside of the knee. She has a couple trigger points that she pinpoints along the inside part of the knee near the joint line. She does have a complex medical history including a heart issue as well has 4 back surgeries for which she is on gabapentin and tramadol and pain patches chronically. Pain Assessment  Location of Pain: Knee  Location Modifiers: Right  Work-Related Injury: No  Are there other pain locations you wish to document?: No    Medical History:  Patient's medications, allergies, past medical, surgical, social and family histories were reviewed and updated as appropriate. Pertinent items are noted in HPI  Review of systems reviewed from Patient History Form dated on 3/24/23 and available in the patient's chart under the Media tab. Vital Signs: There were no vitals filed for this visit. Constitutional: In no apparent distress. Normal affect. Alert and oriented X3 and is cooperative. Right knee exam     Gait: No use of assistive devices.  No antalgic

## 2023-04-03 DIAGNOSIS — E78.2 MIXED HYPERLIPIDEMIA: ICD-10-CM

## 2023-04-03 RX ORDER — FENOFIBRATE 54 MG/1
TABLET ORAL
Qty: 90 TABLET | Refills: 3 | Status: SHIPPED | OUTPATIENT
Start: 2023-04-03

## 2023-04-03 NOTE — TELEPHONE ENCOUNTER
Medication Refill    Medication needing refilled: Fenofibrate    Dosage of the medication: 54 MG    How are you taking this medication (QD, BID, TID, QID, PRN): TAKE ONE TABLET BY MOUTH DAILY    30 or 90 day supply called in: 80 DAY    When will you run out of your medication: Has been out for 2 wks    Which Pharmacy are we sending the medication to?:    [facility] [id number] - [place] [facility], [place] - [address] [phone]   [address] [address] [place] [address] [address], [address]   Phone:  [phone]  Fax:  [phone]

## 2023-04-26 ENCOUNTER — OFFICE VISIT (OUTPATIENT)
Dept: FAMILY MEDICINE CLINIC | Age: 73
End: 2023-04-26

## 2023-04-26 VITALS
BODY MASS INDEX: 31.96 KG/M2 | WEIGHT: 186.2 LBS | HEART RATE: 71 BPM | DIASTOLIC BLOOD PRESSURE: 80 MMHG | SYSTOLIC BLOOD PRESSURE: 124 MMHG | OXYGEN SATURATION: 99 %

## 2023-04-26 DIAGNOSIS — M51.37 DEGENERATION OF INTERVERTEBRAL DISC AT L5-S1 LEVEL: Primary | ICD-10-CM

## 2023-04-26 DIAGNOSIS — S20.229S CONTUSION OF BACK, UNSPECIFIED LATERALITY, SEQUELA: ICD-10-CM

## 2023-04-26 DIAGNOSIS — S80.12XS CONTUSION OF LEFT LOWER EXTREMITY, SEQUELA: Chronic | ICD-10-CM

## 2023-04-26 DIAGNOSIS — M51.36 DEGENERATION OF L4-L5 INTERVERTEBRAL DISC: Chronic | ICD-10-CM

## 2023-04-26 DIAGNOSIS — S30.0XXS CONTUSION OF BUTTOCK, SEQUELA: Chronic | ICD-10-CM

## 2023-04-26 PROBLEM — S20.221A CONTUSION OF RIGHT SIDE OF BACK: Status: RESOLVED | Noted: 2020-01-22 | Resolved: 2023-04-26

## 2023-04-26 ASSESSMENT — PATIENT HEALTH QUESTIONNAIRE - PHQ9
SUM OF ALL RESPONSES TO PHQ QUESTIONS 1-9: 0
10. IF YOU CHECKED OFF ANY PROBLEMS, HOW DIFFICULT HAVE THESE PROBLEMS MADE IT FOR YOU TO DO YOUR WORK, TAKE CARE OF THINGS AT HOME, OR GET ALONG WITH OTHER PEOPLE: 0
1. LITTLE INTEREST OR PLEASURE IN DOING THINGS: 0
9. THOUGHTS THAT YOU WOULD BE BETTER OFF DEAD, OR OF HURTING YOURSELF: 0
8. MOVING OR SPEAKING SO SLOWLY THAT OTHER PEOPLE COULD HAVE NOTICED. OR THE OPPOSITE, BEING SO FIGETY OR RESTLESS THAT YOU HAVE BEEN MOVING AROUND A LOT MORE THAN USUAL: 0
5. POOR APPETITE OR OVEREATING: 0
7. TROUBLE CONCENTRATING ON THINGS, SUCH AS READING THE NEWSPAPER OR WATCHING TELEVISION: 0
SUM OF ALL RESPONSES TO PHQ QUESTIONS 1-9: 0
SUM OF ALL RESPONSES TO PHQ QUESTIONS 1-9: 0
3. TROUBLE FALLING OR STAYING ASLEEP: 0
4. FEELING TIRED OR HAVING LITTLE ENERGY: 0
SUM OF ALL RESPONSES TO PHQ QUESTIONS 1-9: 0
SUM OF ALL RESPONSES TO PHQ9 QUESTIONS 1 & 2: 0
2. FEELING DOWN, DEPRESSED OR HOPELESS: 0
6. FEELING BAD ABOUT YOURSELF - OR THAT YOU ARE A FAILURE OR HAVE LET YOURSELF OR YOUR FAMILY DOWN: 0

## 2023-04-26 NOTE — PROGRESS NOTES
level        2. Degeneration of L4-L5 intervertebral disc        3. Contusion of buttock, sequela        4. Contusion of back, unspecified laterality, sequela        5. Contusion of left lower extremity, sequela        Patient recently had knee discomfort and hip discomfort. She has felt and pain along the Left sacroiliac joint. I told her likely the pain based on how she is walking differently was transferred to the SI joint. Obviously this could also affect the deteriorated areas of the low back. Continues to get gabapentin and tramadol from pain management for her back. No other changes necessary at this time. Follow-up 6 months on this claim. Patient should call the office immediately with new or ongoing signs or symptoms or worsening, or proceed to the emergency room. No changes in past medical history, past surgical history, social history, or family history were noted during the patient encounter unless specifically listed above. All updates of past medical history, past surgical history, social history, or family history were reviewed personally by me during the office visit. All problems listed in the assessment are stable unless noted otherwise. Medication profile reviewed personally by me during the visit. Medication side effects and possible impairments from medications were discussed as applicable. Unless stated otherwise, we will continue current medications as listed in the medication review report contained in the patient's medical record. This document was prepared by a combination of typing and transcription through a voice recognition software.                 Scribe attestation: Rosendo Thompson RN, am scribing for and in the presence of Uday Lubin MD. Electronically signed by Marce Hoyos RN on 4/26/2023 at 3:06 PM      Provider attestation:     I, Dr. Petr Espinoza, personally performed the services described in this documentation, as scribed by the above

## 2023-04-28 ENCOUNTER — OFFICE VISIT (OUTPATIENT)
Dept: ORTHOPEDIC SURGERY | Age: 73
End: 2023-04-28
Payer: MEDICARE

## 2023-04-28 VITALS — HEIGHT: 64 IN | BODY MASS INDEX: 31.76 KG/M2 | WEIGHT: 186 LBS

## 2023-04-28 DIAGNOSIS — M17.11 PRIMARY OSTEOARTHRITIS OF RIGHT KNEE: Primary | ICD-10-CM

## 2023-04-28 DIAGNOSIS — S83.231A COMPLEX TEAR OF MEDIAL MENISCUS OF RIGHT KNEE AS CURRENT INJURY, INITIAL ENCOUNTER: ICD-10-CM

## 2023-04-28 PROCEDURE — G8427 DOCREV CUR MEDS BY ELIG CLIN: HCPCS | Performed by: STUDENT IN AN ORGANIZED HEALTH CARE EDUCATION/TRAINING PROGRAM

## 2023-04-28 PROCEDURE — 1090F PRES/ABSN URINE INCON ASSESS: CPT | Performed by: STUDENT IN AN ORGANIZED HEALTH CARE EDUCATION/TRAINING PROGRAM

## 2023-04-28 PROCEDURE — G8417 CALC BMI ABV UP PARAM F/U: HCPCS | Performed by: STUDENT IN AN ORGANIZED HEALTH CARE EDUCATION/TRAINING PROGRAM

## 2023-04-28 PROCEDURE — G8400 PT W/DXA NO RESULTS DOC: HCPCS | Performed by: STUDENT IN AN ORGANIZED HEALTH CARE EDUCATION/TRAINING PROGRAM

## 2023-04-28 PROCEDURE — 1036F TOBACCO NON-USER: CPT | Performed by: STUDENT IN AN ORGANIZED HEALTH CARE EDUCATION/TRAINING PROGRAM

## 2023-04-28 PROCEDURE — 1123F ACP DISCUSS/DSCN MKR DOCD: CPT | Performed by: STUDENT IN AN ORGANIZED HEALTH CARE EDUCATION/TRAINING PROGRAM

## 2023-04-28 PROCEDURE — 3017F COLORECTAL CA SCREEN DOC REV: CPT | Performed by: STUDENT IN AN ORGANIZED HEALTH CARE EDUCATION/TRAINING PROGRAM

## 2023-04-28 PROCEDURE — 99213 OFFICE O/P EST LOW 20 MIN: CPT | Performed by: STUDENT IN AN ORGANIZED HEALTH CARE EDUCATION/TRAINING PROGRAM

## 2023-04-28 NOTE — PROGRESS NOTES
Chief Complaint  Knee Pain (F/U RIGHT KNEE)        History of Present Illness:  Patient is here for repeat evaluation of her right knee. She underwent right knee injection on 3/24/2023. She got good amount of relief but she still has some discomfort to the medial aspect of the knee. Prior HPI 3/24/23:  Ron Navarro is a pleasant 67 y.o. female here today for repeat evaluation of her right knee. The patient is here to go over her MRI results. The knee is still following her good amount. Prior HPI 2/16/23:  Umu Osborn is a 67 y.o. female here today for new patient evaluation regarding her right knee. The patient was referred to me by Dr. Rabia Aiken office. The patient reports that back in 2021 she had a fall when she missed a step. However at that time she did not notice any major problems or issues with her knee. Over the past several months she has noted worsening pain to her knee. She has started to greatly increase her activity level to take care of her mother, including helping her mother stand up and position. There is a lot of twisting and motion of her knee when doing so. Recently she notices catching in the knee especially on the inside of the knee. She has a couple trigger points that she pinpoints along the inside part of the knee near the joint line. She does have a complex medical history including a heart issue as well has 4 back surgeries for which she is on gabapentin and tramadol and pain patches chronically. Pain Assessment  Location of Pain: Knee  Location Modifiers: Right  Relieving Factors: Rest  Work-Related Injury: No  Are there other pain locations you wish to document?: No    Medical History:  Patient's medications, allergies, past medical, surgical, social and family histories were reviewed and updated as appropriate.     Pertinent items are noted in HPI  Review of systems reviewed from Patient History Form dated on 4/28/23 and available in the patient's chart

## 2023-05-23 ENCOUNTER — OFFICE VISIT (OUTPATIENT)
Dept: CARDIOLOGY CLINIC | Age: 73
End: 2023-05-23
Payer: MEDICARE

## 2023-05-23 VITALS
OXYGEN SATURATION: 95 % | HEIGHT: 64 IN | DIASTOLIC BLOOD PRESSURE: 72 MMHG | HEART RATE: 50 BPM | SYSTOLIC BLOOD PRESSURE: 110 MMHG | BODY MASS INDEX: 32.1 KG/M2 | WEIGHT: 188 LBS

## 2023-05-23 DIAGNOSIS — I10 ESSENTIAL HYPERTENSION: ICD-10-CM

## 2023-05-23 DIAGNOSIS — I42.9 CARDIOMYOPATHY, IDIOPATHIC (HCC): ICD-10-CM

## 2023-05-23 DIAGNOSIS — R00.1 BRADYCARDIA: ICD-10-CM

## 2023-05-23 DIAGNOSIS — E78.2 MIXED HYPERLIPIDEMIA: ICD-10-CM

## 2023-05-23 DIAGNOSIS — I20.0 UNSTABLE ANGINA (HCC): ICD-10-CM

## 2023-05-23 DIAGNOSIS — Z78.9 STATIN INTOLERANCE: ICD-10-CM

## 2023-05-23 DIAGNOSIS — I50.22 SYSTOLIC CHF, CHRONIC (HCC): Primary | ICD-10-CM

## 2023-05-23 DIAGNOSIS — R94.39 ABNORMAL STRESS TEST: ICD-10-CM

## 2023-05-23 PROCEDURE — 1090F PRES/ABSN URINE INCON ASSESS: CPT | Performed by: INTERNAL MEDICINE

## 2023-05-23 PROCEDURE — G8417 CALC BMI ABV UP PARAM F/U: HCPCS | Performed by: INTERNAL MEDICINE

## 2023-05-23 PROCEDURE — 3078F DIAST BP <80 MM HG: CPT | Performed by: INTERNAL MEDICINE

## 2023-05-23 PROCEDURE — 3017F COLORECTAL CA SCREEN DOC REV: CPT | Performed by: INTERNAL MEDICINE

## 2023-05-23 PROCEDURE — 93000 ELECTROCARDIOGRAM COMPLETE: CPT | Performed by: INTERNAL MEDICINE

## 2023-05-23 PROCEDURE — 1123F ACP DISCUSS/DSCN MKR DOCD: CPT | Performed by: INTERNAL MEDICINE

## 2023-05-23 PROCEDURE — 99215 OFFICE O/P EST HI 40 MIN: CPT | Performed by: INTERNAL MEDICINE

## 2023-05-23 PROCEDURE — 1036F TOBACCO NON-USER: CPT | Performed by: INTERNAL MEDICINE

## 2023-05-23 PROCEDURE — G8400 PT W/DXA NO RESULTS DOC: HCPCS | Performed by: INTERNAL MEDICINE

## 2023-05-23 PROCEDURE — G8427 DOCREV CUR MEDS BY ELIG CLIN: HCPCS | Performed by: INTERNAL MEDICINE

## 2023-05-23 PROCEDURE — 3074F SYST BP LT 130 MM HG: CPT | Performed by: INTERNAL MEDICINE

## 2023-05-23 RX ORDER — METOPROLOL SUCCINATE 25 MG/1
TABLET, EXTENDED RELEASE ORAL
Qty: 90 TABLET | Refills: 3 | Status: SHIPPED
Start: 2023-05-23

## 2023-05-23 RX ORDER — LISINOPRIL 40 MG/1
TABLET ORAL
Qty: 90 TABLET | Refills: 3 | Status: SHIPPED | OUTPATIENT
Start: 2023-05-23

## 2023-05-24 ENCOUNTER — TELEPHONE (OUTPATIENT)
Dept: CARDIOLOGY CLINIC | Age: 73
End: 2023-05-24

## 2023-05-24 PROBLEM — Z78.9 STATIN INTOLERANCE: Status: ACTIVE | Noted: 2023-05-24

## 2023-05-24 NOTE — TELEPHONE ENCOUNTER
Leqvio order scanned into chart under media tab. Please review and sign. You can fax to [phone].  Thanks

## 2023-05-27 DIAGNOSIS — F41.8 SITUATIONAL ANXIETY: ICD-10-CM

## 2023-05-30 ENCOUNTER — TELEPHONE (OUTPATIENT)
Dept: CARDIOLOGY CLINIC | Age: 73
End: 2023-05-30

## 2023-05-30 RX ORDER — CITALOPRAM 10 MG/1
TABLET ORAL
Qty: 90 TABLET | Refills: 0 | Status: SHIPPED | OUTPATIENT
Start: 2023-05-30

## 2023-05-31 RX ORDER — DIPHENHYDRAMINE HYDROCHLORIDE 50 MG/ML
50 INJECTION INTRAMUSCULAR; INTRAVENOUS
Status: CANCELLED | OUTPATIENT
Start: 2023-06-29

## 2023-05-31 RX ORDER — SODIUM CHLORIDE 9 MG/ML
INJECTION, SOLUTION INTRAVENOUS CONTINUOUS
Status: CANCELLED | OUTPATIENT
Start: 2023-06-29

## 2023-05-31 RX ORDER — EPINEPHRINE 1 MG/ML
0.3 INJECTION, SOLUTION INTRAMUSCULAR; SUBCUTANEOUS PRN
Status: CANCELLED | OUTPATIENT
Start: 2023-06-29

## 2023-05-31 RX ORDER — FAMOTIDINE 10 MG/ML
20 INJECTION, SOLUTION INTRAVENOUS
Status: CANCELLED | OUTPATIENT
Start: 2023-06-29

## 2023-05-31 RX ORDER — ALBUTEROL SULFATE 90 UG/1
4 AEROSOL, METERED RESPIRATORY (INHALATION) PRN
Status: CANCELLED | OUTPATIENT
Start: 2023-06-29

## 2023-05-31 RX ORDER — ONDANSETRON 2 MG/ML
8 INJECTION INTRAMUSCULAR; INTRAVENOUS
Status: CANCELLED | OUTPATIENT
Start: 2023-06-29

## 2023-05-31 RX ORDER — ACETAMINOPHEN 325 MG/1
650 TABLET ORAL
Status: CANCELLED | OUTPATIENT
Start: 2023-06-29

## 2023-06-07 ENCOUNTER — HOSPITAL ENCOUNTER (EMERGENCY)
Age: 73
Discharge: HOME | End: 2023-06-07
Attending: EMERGENCY MEDICINE
Payer: MEDICARE

## 2023-06-07 ENCOUNTER — APPOINTMENT (OUTPATIENT)
Dept: GENERAL RADIOLOGY | Age: 73
End: 2023-06-07
Payer: MEDICARE

## 2023-06-07 ENCOUNTER — TELEPHONE (OUTPATIENT)
Dept: FAMILY MEDICINE CLINIC | Age: 73
End: 2023-06-07

## 2023-06-07 VITALS
HEART RATE: 76 BPM | WEIGHT: 188 LBS | OXYGEN SATURATION: 96 % | BODY MASS INDEX: 32.1 KG/M2 | RESPIRATION RATE: 16 BRPM | TEMPERATURE: 98 F | HEIGHT: 64 IN | DIASTOLIC BLOOD PRESSURE: 58 MMHG | SYSTOLIC BLOOD PRESSURE: 124 MMHG

## 2023-06-07 DIAGNOSIS — R06.00 DYSPNEA AND RESPIRATORY ABNORMALITIES: Primary | ICD-10-CM

## 2023-06-07 DIAGNOSIS — J45.41 MODERATE PERSISTENT ASTHMATIC BRONCHITIS WITH ACUTE EXACERBATION: ICD-10-CM

## 2023-06-07 DIAGNOSIS — J40 BRONCHITIS: ICD-10-CM

## 2023-06-07 DIAGNOSIS — R06.89 DYSPNEA AND RESPIRATORY ABNORMALITIES: Primary | ICD-10-CM

## 2023-06-07 LAB
ALBUMIN SERPL-MCNC: 3.9 G/DL (ref 3.4–5)
ALBUMIN/GLOB SERPL: 1.6 {RATIO} (ref 1.1–2.2)
ALP SERPL-CCNC: 81 U/L (ref 40–129)
ALT SERPL-CCNC: 14 U/L (ref 10–40)
ANION GAP SERPL CALCULATED.3IONS-SCNC: 11 MMOL/L (ref 3–16)
AST SERPL-CCNC: 21 U/L (ref 15–37)
BASOPHILS # BLD: 0.1 K/UL (ref 0–0.2)
BASOPHILS NFR BLD: 1.3 %
BILIRUB SERPL-MCNC: 0.4 MG/DL (ref 0–1)
BUN SERPL-MCNC: 9 MG/DL (ref 7–20)
CALCIUM SERPL-MCNC: 9.5 MG/DL (ref 8.3–10.6)
CHLORIDE SERPL-SCNC: 107 MMOL/L (ref 99–110)
CO2 SERPL-SCNC: 25 MMOL/L (ref 21–32)
CREAT SERPL-MCNC: 0.9 MG/DL (ref 0.6–1.2)
DEPRECATED RDW RBC AUTO: 14 % (ref 12.4–15.4)
EOSINOPHIL # BLD: 0.2 K/UL (ref 0–0.6)
EOSINOPHIL NFR BLD: 3.6 %
GFR SERPLBLD CREATININE-BSD FMLA CKD-EPI: >60 ML/MIN/{1.73_M2}
GLUCOSE SERPL-MCNC: 125 MG/DL (ref 70–99)
HCT VFR BLD AUTO: 35.3 % (ref 36–48)
HGB BLD-MCNC: 11.8 G/DL (ref 12–16)
INR PPP: 0.98 (ref 0.84–1.16)
LYMPHOCYTES # BLD: 1.7 K/UL (ref 1–5.1)
LYMPHOCYTES NFR BLD: 32.6 %
MCH RBC QN AUTO: 28.6 PG (ref 26–34)
MCHC RBC AUTO-ENTMCNC: 33.5 G/DL (ref 31–36)
MCV RBC AUTO: 85.4 FL (ref 80–100)
MONOCYTES # BLD: 0.3 K/UL (ref 0–1.3)
MONOCYTES NFR BLD: 6.3 %
NEUTROPHILS # BLD: 2.9 K/UL (ref 1.7–7.7)
NEUTROPHILS NFR BLD: 56.2 %
NT-PROBNP SERPL-MCNC: 340 PG/ML (ref 0–124)
PLATELET # BLD AUTO: 251 K/UL (ref 135–450)
PMV BLD AUTO: 8.7 FL (ref 5–10.5)
POTASSIUM SERPL-SCNC: 4 MMOL/L (ref 3.5–5.1)
PROT SERPL-MCNC: 6.4 G/DL (ref 6.4–8.2)
PROTHROMBIN TIME: 12.9 SEC (ref 11.5–14.8)
RBC # BLD AUTO: 4.13 M/UL (ref 4–5.2)
SODIUM SERPL-SCNC: 143 MMOL/L (ref 136–145)
WBC # BLD AUTO: 5.2 K/UL (ref 4–11)

## 2023-06-07 PROCEDURE — 71045 X-RAY EXAM CHEST 1 VIEW: CPT

## 2023-06-07 PROCEDURE — 80053 COMPREHEN METABOLIC PANEL: CPT

## 2023-06-07 PROCEDURE — 85610 PROTHROMBIN TIME: CPT

## 2023-06-07 PROCEDURE — 36415 COLL VENOUS BLD VENIPUNCTURE: CPT

## 2023-06-07 PROCEDURE — 85025 COMPLETE CBC W/AUTO DIFF WBC: CPT

## 2023-06-07 PROCEDURE — 93005 ELECTROCARDIOGRAM TRACING: CPT | Performed by: EMERGENCY MEDICINE

## 2023-06-07 PROCEDURE — 6370000000 HC RX 637 (ALT 250 FOR IP): Performed by: EMERGENCY MEDICINE

## 2023-06-07 PROCEDURE — 83880 ASSAY OF NATRIURETIC PEPTIDE: CPT

## 2023-06-07 RX ORDER — ALBUTEROL SULFATE 90 UG/1
2 AEROSOL, METERED RESPIRATORY (INHALATION) EVERY 6 HOURS PRN
Qty: 1 EACH | Refills: 3 | Status: SHIPPED | OUTPATIENT
Start: 2023-06-07

## 2023-06-07 RX ORDER — IPRATROPIUM BROMIDE AND ALBUTEROL SULFATE 2.5; .5 MG/3ML; MG/3ML
1 SOLUTION RESPIRATORY (INHALATION) ONCE
Status: COMPLETED | OUTPATIENT
Start: 2023-06-07 | End: 2023-06-07

## 2023-06-07 RX ADMIN — IPRATROPIUM BROMIDE AND ALBUTEROL SULFATE 1 DOSE: .5; 2.5 SOLUTION RESPIRATORY (INHALATION) at 14:17

## 2023-06-07 ASSESSMENT — ENCOUNTER SYMPTOMS
BACK PAIN: 0
ABDOMINAL PAIN: 0
COUGH: 1
WHEEZING: 1
SHORTNESS OF BREATH: 1

## 2023-06-07 ASSESSMENT — PAIN - FUNCTIONAL ASSESSMENT: PAIN_FUNCTIONAL_ASSESSMENT: NONE - DENIES PAIN

## 2023-06-07 NOTE — TELEPHONE ENCOUNTER
Pt called and stated that she was having problem with breathing and pain in her back. States that she has breathed in paint fumes and she states that her chest hurts even when she even takes a deep breath. I told her that she needed to go to the ER and get evaluated.

## 2023-06-07 NOTE — ED NOTES
Pt discharge instructions, follow up and rx x 1 reviewed with pt. Pt verbalized understanding. No further needs. Pt discharged at this time.         Amalia Lopez RN  06/07/23 [id number]

## 2023-06-07 NOTE — ED PROVIDER NOTES
cardiomyopathy 03/2017         SURGICAL HISTORY       Past Surgical History:   Procedure Laterality Date    BACK SURGERY      BLADDER REPAIR      CARDIAC CATHETERIZATION      COLONOSCOPY  4/23/12    diverticulosis    HYSTERECTOMY (CERVIX STATUS UNKNOWN)      NECK SURGERY  1/98, 10/05, '07 or '08    C6-C7spur '05         CURRENT MEDICATIONS       Current Discharge Medication List        CONTINUE these medications which have NOT CHANGED    Details   citalopram (CELEXA) 10 MG tablet TAKE ONE TABLET BY MOUTH DAILY  Qty: 90 tablet, Refills: 0    Associated Diagnoses: Situational anxiety      Coenzyme Q10 (COQ10 PO) Take by mouth      metoprolol succinate (TOPROL XL) 25 MG extended release tablet Take one tab daily  Qty: 90 tablet, Refills: 3    Associated Diagnoses: Systolic CHF, chronic ([address] Essential hypertension      lisinopril (PRINIVIL;ZESTRIL) 40 MG tablet TAKE ONE TABLET BY MOUTH EVERY EVENING  Qty: 90 tablet, Refills: 3    Associated Diagnoses: Systolic CHF, chronic ([address] Essential hypertension      !! traMADol (ULTRAM) 50 MG tablet Take 1 tablet by mouth 4 times daily for 30 days. Qty: 120 tablet, Refills: 0    Comments: Reduce doses taken as pain becomes manageable  Associated Diagnoses: Degeneration of L4-L5 intervertebral disc      !! traMADol (ULTRAM) 50 MG tablet Take 1 tablet by mouth 4 times daily for 14 days.  Max Daily Amount: 200 mg  Qty: 56 tablet, Refills: 0    Comments: Reduce doses taken as pain becomes manageable  Associated Diagnoses: Degeneration of L4-L5 intervertebral disc      fenofibrate (TRICOR) 54 MG tablet TAKE ONE TABLET BY MOUTH DAILY  Qty: 90 tablet, Refills: 3    Associated Diagnoses: Mixed hyperlipidemia      naloxone 4 MG/0.1ML LIQD nasal spray 1 spray by Nasal route as needed for Opioid Reversal  Qty: 1 each, Refills: 0      ezetimibe (ZETIA) 10 MG tablet TAKE ONE TABLET BY MOUTH DAILY  Qty: 90 tablet, Refills: 3    Associated Diagnoses: Mixed hyperlipidemia      spironolactone

## 2023-06-08 LAB
EKG ATRIAL RATE: 75 BPM
EKG DIAGNOSIS: NORMAL
EKG P AXIS: 38 DEGREES
EKG P-R INTERVAL: 132 MS
EKG Q-T INTERVAL: 416 MS
EKG QRS DURATION: 142 MS
EKG QTC CALCULATION (BAZETT): 464 MS
EKG R AXIS: -35 DEGREES
EKG T AXIS: 70 DEGREES
EKG VENTRICULAR RATE: 75 BPM

## 2023-06-08 PROCEDURE — 93010 ELECTROCARDIOGRAM REPORT: CPT | Performed by: INTERNAL MEDICINE

## 2023-06-21 NOTE — TELEPHONE ENCOUNTER
Attempted to contact patient on 4/2/2019. Result: left message on the patient's voicemail asking patient to return my call. Pre-Visit planning not completed.      4/15 Consent (Spinal Accessory)/Introductory Paragraph: The rationale for Mohs was explained to the patient and consent was obtained. The risks, benefits and alternatives to therapy were discussed in detail. Specifically, the risks of damage to the spinal accessory nerve, infection, scarring, bleeding, prolonged wound healing, incomplete removal, allergy to anesthesia, and recurrence were addressed. Prior to the procedure, the treatment site was clearly identified and confirmed by the patient. All components of Universal Protocol/PAUSE Rule completed.

## 2023-06-29 ENCOUNTER — OFFICE VISIT (OUTPATIENT)
Dept: ORTHOPEDIC SURGERY | Age: 73
End: 2023-06-29

## 2023-06-29 ENCOUNTER — HOSPITAL ENCOUNTER (OUTPATIENT)
Dept: NURSING | Age: 73
Setting detail: INFUSION SERIES
Discharge: HOME | End: 2023-06-29

## 2023-06-29 DIAGNOSIS — S83.231A COMPLEX TEAR OF MEDIAL MENISCUS OF RIGHT KNEE AS CURRENT INJURY, INITIAL ENCOUNTER: Primary | ICD-10-CM

## 2023-07-31 ENCOUNTER — TELEPHONE (OUTPATIENT)
Dept: ORTHOPEDIC SURGERY | Age: 73
End: 2023-07-31

## 2023-07-31 DIAGNOSIS — M50.223 HERNIATION OF INTERVERTEBRAL DISC AT C6-C7 LEVEL: ICD-10-CM

## 2023-07-31 DIAGNOSIS — M50.221 HERNIATION OF INTERVERTEBRAL DISC AT C4-C5 LEVEL: ICD-10-CM

## 2023-07-31 DIAGNOSIS — M50.321 DEGENERATION OF INTERVERTEBRAL DISC AT C4-C5 LEVEL: ICD-10-CM

## 2023-07-31 DIAGNOSIS — M50.322 DEGENERATION OF C5-C6 INTERVERTEBRAL DISC: ICD-10-CM

## 2023-07-31 RX ORDER — GABAPENTIN 100 MG/1
CAPSULE ORAL
Qty: 180 CAPSULE | Refills: 5 | Status: SHIPPED | OUTPATIENT
Start: 2023-07-31 | End: 2024-01-31

## 2023-07-31 NOTE — TELEPHONE ENCOUNTER
Auth: NPR  Date: 8/28/2023  Reference # [id number]  Spoke with: NONE  Type of SX: OUTPATIENT  Location: [facility]  CPT: 16031   DX: [phone]  SX area: R KNEE  Insurance: MEDICARE

## 2023-08-09 ENCOUNTER — TELEPHONE (OUTPATIENT)
Dept: ORTHOPEDIC SURGERY | Age: 73
End: 2023-08-09

## 2023-08-09 NOTE — TELEPHONE ENCOUNTER
Surgery and/or Procedure Scheduling     Contact Name: Sal Prieto Request: RT KNEE SX   Patient Contact Number: [phone]    PT STATED THAT SHE IS [address] [facility]. FOR AUG 28, 2023 TO HAVE SX ON HER RT KNEE. PT HAS LOST HER PRE OP PAPER FOR INSTRUCTION ON WHAT TO DO BEFORE THE SX AND REQ FOR ANOTHER ONE TO BE PRINTED. PLEASE CALL BACK PT AT THE ABOVE NUMBER LISTED REGARDING THE MATTER.

## 2023-08-15 ENCOUNTER — TELEPHONE (OUTPATIENT)
Dept: FAMILY MEDICINE CLINIC | Age: 73
End: 2023-08-15

## 2023-08-15 NOTE — TELEPHONE ENCOUNTER
----- Message from CAMERON sent at 8/15/2023  2:54 PM EDT -----  Subject: Appointment Request    Reason for Call: Established Patient Appointment needed: Routine Pre-Op    QUESTIONS    Reason for appointment request? Available appointments did not meet   patient need     Additional Information for Provider? Patient is requesting a call back to   discuss possibly changing her 8/22 [place] [facility] appointment to a preop clearance   appointment. She is scheduled for right knee surgery 8/28 at [facility] Dr. Lidya TIJERINA.  She does not know when her last EKG was.  ---------------------------------------------------------------------------  --------------  Efren LOUIS  [phone]; OK to leave message on voicemail  ---------------------------------------------------------------------------  --------------  SCRIPT ANSWERS

## 2023-08-21 ENCOUNTER — TELEPHONE (OUTPATIENT)
Dept: CARDIOLOGY CLINIC | Age: 73
End: 2023-08-21

## 2023-08-21 NOTE — TELEPHONE ENCOUNTER
Can she come in tomorrow for an EKG while I am there? Doesn't need to see me. Although we need to talk to her while she is there about the Leqvio injections. They are approved, but she has not scheduled.

## 2023-08-21 NOTE — TELEPHONE ENCOUNTER
CARDIAC CLEARANCE REQUEST    What type of procedure are you having:   RIGHT KNEE VIDEO ARTHROSCOPY, PARTIAL MEDIAL MENISCECTOMY  Are you taking any blood thinners: ASA QD did not take one today. Does pt need EKG or is last EKG from 6/7/23 good.   When is your procedure scheduled for: 8/28/23    What physician is performing your procedure:  Dr Yadira Shaffer   Phone Number:  [phone]  Fax number to send the letter: [phone]

## 2023-08-22 ENCOUNTER — NURSE ONLY (OUTPATIENT)
Dept: CARDIOLOGY CLINIC | Age: 73
End: 2023-08-22
Payer: MEDICARE

## 2023-08-22 ENCOUNTER — OFFICE VISIT (OUTPATIENT)
Dept: FAMILY MEDICINE CLINIC | Age: 73
End: 2023-08-22

## 2023-08-22 VITALS
HEART RATE: 85 BPM | SYSTOLIC BLOOD PRESSURE: 104 MMHG | OXYGEN SATURATION: 95 % | WEIGHT: 190 LBS | DIASTOLIC BLOOD PRESSURE: 70 MMHG | BODY MASS INDEX: 32.44 KG/M2 | HEIGHT: 64 IN

## 2023-08-22 DIAGNOSIS — I10 ESSENTIAL HYPERTENSION: ICD-10-CM

## 2023-08-22 DIAGNOSIS — Z01.818 PRE-OP TESTING: Primary | ICD-10-CM

## 2023-08-22 DIAGNOSIS — J45.20 MILD INTERMITTENT ASTHMA WITHOUT COMPLICATION: ICD-10-CM

## 2023-08-22 DIAGNOSIS — E55.9 VITAMIN D DEFICIENCY: ICD-10-CM

## 2023-08-22 DIAGNOSIS — I77.1 SUBCLAVIAN ARTERY STENOSIS, RIGHT (HCC): ICD-10-CM

## 2023-08-22 DIAGNOSIS — I42.9 CARDIOMYOPATHY, IDIOPATHIC (HCC): ICD-10-CM

## 2023-08-22 DIAGNOSIS — Z01.818 PRE-OP EXAM: Primary | ICD-10-CM

## 2023-08-22 DIAGNOSIS — I50.22 SYSTOLIC CHF, CHRONIC (HCC): ICD-10-CM

## 2023-08-22 PROCEDURE — 93000 ELECTROCARDIOGRAM COMPLETE: CPT | Performed by: INTERNAL MEDICINE

## 2023-08-22 RX ORDER — TRAMADOL HYDROCHLORIDE 50 MG/1
TABLET ORAL
COMMUNITY
Start: 2023-08-12

## 2023-08-22 NOTE — PROGRESS NOTES
Markie Turcios MD, [address], [address] [facility]. [address]  [address] [place] [address]. Bony PIERCE  Phone: [phone]  Fax: [phone]  Preoperative Consultation      Cliff Ann  YOB: 1950    Date of Service:  8/22/2023     Internal Administration   First Dose COVID-19, J&J, (age 18y+), IM, 0.5 mL  12/01/2021   Second Dose           Last COVID Lab SARS-CoV-2 (no units)   Date Value   06/29/2021 Not Detected     SARS-COV-2, RdRp gene (no units)   Date Value   12/19/2022 Negative            Vitals:    08/22/23 1616   BP: 104/70   Site: Right Upper Arm   Position: Sitting   Cuff Size: Large Adult   Pulse: 85   SpO2: 95%   Weight: 190 lb (86.2 kg)   Height: 5' 4\" (1.626 m)      Wt Readings from Last 2 Encounters:   08/22/23 190 lb (86.2 kg)   06/07/23 188 lb (85.3 kg)     BP Readings from Last 3 Encounters:   08/22/23 104/70   06/07/23 (!) 124/58   05/23/23 110/72        Chief Complaint   Patient presents with    Pre-op Exam     Pt is here for pre op for right knee surgery with Dr Chuck Hawk on 8/28/23 at the [facility] in [place].       Allergies   Allergen Reactions    Shellfish-Derived Products Nausea And Vomiting    Celebrex [Celecoxib] Swelling    Codeine     Ibuprofen Swelling    Methadone     Vioxx Swelling     Outpatient Medications Marked as Taking for the 8/22/23 encounter (Office Visit) with Shira Caraballo MD   Medication Sig Dispense Refill    traMADol (ULTRAM) 50 MG tablet       gabapentin (NEURONTIN) 100 MG capsule TAKE TWO CAPSULES BY MOUTH THREE TIMES A DAY AS DIRECTED 180 capsule 5    atorvastatin (LIPITOR) 20 MG tablet Take one tab daily 90 tablet 3    ezetimibe (ZETIA) 10 MG tablet TAKE ONE TABLET BY MOUTH DAILY 90 tablet 3    albuterol sulfate HFA (VENTOLIN HFA) 108 (90 Base) MCG/ACT inhaler Inhale 2 puffs into the lungs every 6 hours as needed for Wheezing or Shortness of Breath (sob) 1 each 3    citalopram (CELEXA) 10 MG tablet TAKE ONE TABLET

## 2023-08-22 NOTE — TELEPHONE ENCOUNTER
She can proceed with surgery. Send a note. She has mild LV dysfunction with LVEF 40-45%. Please avoid giving her too much fluid perioperatively. Send EKG with note.   MATTEO

## 2023-08-22 NOTE — TELEPHONE ENCOUNTER
Pt in for EKG today. Pt reports she did not schedule CTA ordered at [address] [place] [address]  Pt reports she is not having chest pain anymore.  Please advise on cardiac risk

## 2023-08-23 DIAGNOSIS — R73.9 HYPERGLYCEMIA: Primary | ICD-10-CM

## 2023-08-23 LAB
ANION GAP SERPL CALCULATED.3IONS-SCNC: 10 MMOL/L (ref 3–16)
BASOPHILS # BLD: 0.1 K/UL (ref 0–0.2)
BASOPHILS NFR BLD: 1.1 %
BUN SERPL-MCNC: 14 MG/DL (ref 7–20)
CALCIUM SERPL-MCNC: 9.7 MG/DL (ref 8.3–10.6)
CHLORIDE SERPL-SCNC: 100 MMOL/L (ref 99–110)
CO2 SERPL-SCNC: 27 MMOL/L (ref 21–32)
CREAT SERPL-MCNC: 1 MG/DL (ref 0.6–1.2)
DEPRECATED RDW RBC AUTO: 12.8 % (ref 12.4–15.4)
EOSINOPHIL # BLD: 0.2 K/UL (ref 0–0.6)
EOSINOPHIL NFR BLD: 3.8 %
GFR SERPLBLD CREATININE-BSD FMLA CKD-EPI: 60 ML/MIN/{1.73_M2}
GLUCOSE SERPL-MCNC: 164 MG/DL (ref 70–99)
HCT VFR BLD AUTO: 36.2 % (ref 36–48)
HGB BLD-MCNC: 12.5 G/DL (ref 12–16)
LYMPHOCYTES # BLD: 2.1 K/UL (ref 1–5.1)
LYMPHOCYTES NFR BLD: 37.1 %
MCH RBC QN AUTO: 29.9 PG (ref 26–34)
MCHC RBC AUTO-ENTMCNC: 34.5 G/DL (ref 31–36)
MCV RBC AUTO: 86.8 FL (ref 80–100)
MONOCYTES # BLD: 0.3 K/UL (ref 0–1.3)
MONOCYTES NFR BLD: 5.5 %
NEUTROPHILS # BLD: 2.9 K/UL (ref 1.7–7.7)
NEUTROPHILS NFR BLD: 52.5 %
PLATELET # BLD AUTO: 246 K/UL (ref 135–450)
PMV BLD AUTO: 10 FL (ref 5–10.5)
POTASSIUM SERPL-SCNC: 4.4 MMOL/L (ref 3.5–5.1)
RBC # BLD AUTO: 4.17 M/UL (ref 4–5.2)
SODIUM SERPL-SCNC: 137 MMOL/L (ref 136–145)
WBC # BLD AUTO: 5.5 K/UL (ref 4–11)

## 2023-08-23 NOTE — PROGRESS NOTES
1.  Do not eat or drink anything after 12 midnight prior to surgery. This includes no water, chewing gum or mints. 2.  Take the following pills with a small sip of water on the morning of surgery metoprolol. 3.  Aspirin, Ibuprofen, Advil, Naproxen, Vitamin E and other Anti-inflammatory products should be stopped for 5 days before surgery or as directed by your physician. 4.  Check with your doctor regarding stopping Plavix, Coumadin, Lovenox, Fragmin or other blood thinners. 5.  Do not smoke and do not drink alcoholic beverages 24 hours prior to surgery. This includes NA Beer. 6.  You may brush your teeth and gargle the morning of surgery. DO NOT SWALLOW WATER.  7.  You MUST make arrangements for a responsible adult to take you home after your surgery. You will not be allowed to leave alone or drive yourself home. It is strongly suggested someone stay with you the first 24 hours. Your surgery will be cancelled if you do not have a ride home. 8.  A parent/legal guardian must accompany a child scheduled for surgery and plan to stay at the hospital until the child is discharged. Please do not bring other children with you. 9.  Please wear simple, loose fitting clothing to the hospital.  [place] not bring valuables ( money, credit cards, checkbooks, etc.)  Do not wear any makeup (including no eye makeup) or nail polish on your fingers or toes. 10.  Do not wear any jewelry or piercing on the day of surgery. All body piercing jewelry must be removed. 11.  If you have dentures, they will be removed before going to the OR; we will provide you a container. If you wear contact lenses or glasses, they will be removed; please bring a case for them. 12.  Please see your family doctor/pediatrician for a history & physical and/or concerning medications. Bring any test results/reports from your physician's office the day of surgery.     13.  Remember to bring Blood Bank Bracelet to the hospital on the day of

## 2023-08-24 LAB
EST. AVERAGE GLUCOSE BLD GHB EST-MCNC: 137 MG/DL
HBA1C MFR BLD: 6.4 %

## 2023-08-28 ENCOUNTER — HOSPITAL ENCOUNTER (OUTPATIENT)
Age: 73
Setting detail: OUTPATIENT SURGERY
Discharge: HOME | End: 2023-08-28
Attending: STUDENT IN AN ORGANIZED HEALTH CARE EDUCATION/TRAINING PROGRAM | Admitting: STUDENT IN AN ORGANIZED HEALTH CARE EDUCATION/TRAINING PROGRAM
Payer: MEDICARE

## 2023-08-28 ENCOUNTER — ANESTHESIA EVENT (OUTPATIENT)
Dept: OPERATING ROOM | Age: 73
End: 2023-08-28
Payer: MEDICARE

## 2023-08-28 ENCOUNTER — ANESTHESIA (OUTPATIENT)
Dept: OPERATING ROOM | Age: 73
End: 2023-08-28
Payer: MEDICARE

## 2023-08-28 VITALS
DIASTOLIC BLOOD PRESSURE: 70 MMHG | TEMPERATURE: 97.8 F | SYSTOLIC BLOOD PRESSURE: 131 MMHG | HEIGHT: 64 IN | RESPIRATION RATE: 20 BRPM | BODY MASS INDEX: 32.44 KG/M2 | WEIGHT: 190 LBS | HEART RATE: 79 BPM | OXYGEN SATURATION: 95 %

## 2023-08-28 DIAGNOSIS — G89.18 POST-OP PAIN: Primary | ICD-10-CM

## 2023-08-28 PROCEDURE — 29881 ARTHRS KNE SRG MNISECTMY M/L: CPT | Performed by: STUDENT IN AN ORGANIZED HEALTH CARE EDUCATION/TRAINING PROGRAM

## 2023-08-28 PROCEDURE — 7100000001 HC PACU RECOVERY - ADDTL 15 MIN: Performed by: STUDENT IN AN ORGANIZED HEALTH CARE EDUCATION/TRAINING PROGRAM

## 2023-08-28 PROCEDURE — 7100000011 HC PHASE II RECOVERY - ADDTL 15 MIN: Performed by: STUDENT IN AN ORGANIZED HEALTH CARE EDUCATION/TRAINING PROGRAM

## 2023-08-28 PROCEDURE — 2580000003 HC RX 258: Performed by: STUDENT IN AN ORGANIZED HEALTH CARE EDUCATION/TRAINING PROGRAM

## 2023-08-28 PROCEDURE — 6360000002 HC RX W HCPCS

## 2023-08-28 PROCEDURE — 3600000014 HC SURGERY LEVEL 4 ADDTL 15MIN: Performed by: STUDENT IN AN ORGANIZED HEALTH CARE EDUCATION/TRAINING PROGRAM

## 2023-08-28 PROCEDURE — 2580000003 HC RX 258: Performed by: ANESTHESIOLOGY

## 2023-08-28 PROCEDURE — 6360000002 HC RX W HCPCS: Performed by: STUDENT IN AN ORGANIZED HEALTH CARE EDUCATION/TRAINING PROGRAM

## 2023-08-28 PROCEDURE — 2500000003 HC RX 250 WO HCPCS

## 2023-08-28 PROCEDURE — 3700000001 HC ADD 15 MINUTES (ANESTHESIA): Performed by: STUDENT IN AN ORGANIZED HEALTH CARE EDUCATION/TRAINING PROGRAM

## 2023-08-28 PROCEDURE — 7100000010 HC PHASE II RECOVERY - FIRST 15 MIN: Performed by: STUDENT IN AN ORGANIZED HEALTH CARE EDUCATION/TRAINING PROGRAM

## 2023-08-28 PROCEDURE — 3600000004 HC SURGERY LEVEL 4 BASE: Performed by: STUDENT IN AN ORGANIZED HEALTH CARE EDUCATION/TRAINING PROGRAM

## 2023-08-28 PROCEDURE — 7100000000 HC PACU RECOVERY - FIRST 15 MIN: Performed by: STUDENT IN AN ORGANIZED HEALTH CARE EDUCATION/TRAINING PROGRAM

## 2023-08-28 PROCEDURE — 2709999900 HC NON-CHARGEABLE SUPPLY: Performed by: STUDENT IN AN ORGANIZED HEALTH CARE EDUCATION/TRAINING PROGRAM

## 2023-08-28 PROCEDURE — 3700000000 HC ANESTHESIA ATTENDED CARE: Performed by: STUDENT IN AN ORGANIZED HEALTH CARE EDUCATION/TRAINING PROGRAM

## 2023-08-28 RX ORDER — ONDANSETRON 2 MG/ML
4 INJECTION INTRAMUSCULAR; INTRAVENOUS
Status: DISCONTINUED | OUTPATIENT
Start: 2023-08-28 | End: 2023-08-28 | Stop reason: HOSPADM

## 2023-08-28 RX ORDER — OXYCODONE HYDROCHLORIDE 5 MG/1
5 TABLET ORAL
Status: DISCONTINUED | OUTPATIENT
Start: 2023-08-28 | End: 2023-08-28 | Stop reason: HOSPADM

## 2023-08-28 RX ORDER — ONDANSETRON 2 MG/ML
INJECTION INTRAMUSCULAR; INTRAVENOUS PRN
Status: DISCONTINUED | OUTPATIENT
Start: 2023-08-28 | End: 2023-08-28 | Stop reason: SDUPTHER

## 2023-08-28 RX ORDER — BUPIVACAINE HYDROCHLORIDE 2.5 MG/ML
INJECTION, SOLUTION INFILTRATION; PERINEURAL PRN
Status: DISCONTINUED | OUTPATIENT
Start: 2023-08-28 | End: 2023-08-28 | Stop reason: ALTCHOICE

## 2023-08-28 RX ORDER — HYDRALAZINE HYDROCHLORIDE 20 MG/ML
10 INJECTION INTRAMUSCULAR; INTRAVENOUS
Status: DISCONTINUED | OUTPATIENT
Start: 2023-08-28 | End: 2023-08-28 | Stop reason: HOSPADM

## 2023-08-28 RX ORDER — LIDOCAINE HYDROCHLORIDE 10 MG/ML
2 INJECTION, SOLUTION INFILTRATION; PERINEURAL
Status: DISCONTINUED | OUTPATIENT
Start: 2023-08-28 | End: 2023-08-28 | Stop reason: HOSPADM

## 2023-08-28 RX ORDER — ONDANSETRON 4 MG/1
4 TABLET, FILM COATED ORAL 3 TIMES DAILY PRN
Qty: 15 TABLET | Refills: 0 | Status: SHIPPED | OUTPATIENT
Start: 2023-08-28

## 2023-08-28 RX ORDER — DEXAMETHASONE SODIUM PHOSPHATE 4 MG/ML
INJECTION, SOLUTION INTRA-ARTICULAR; INTRALESIONAL; INTRAMUSCULAR; INTRAVENOUS; SOFT TISSUE PRN
Status: DISCONTINUED | OUTPATIENT
Start: 2023-08-28 | End: 2023-08-28 | Stop reason: SDUPTHER

## 2023-08-28 RX ORDER — HYDROCODONE BITARTRATE AND ACETAMINOPHEN 5; 325 MG/1; MG/1
1 TABLET ORAL EVERY 6 HOURS PRN
Qty: 12 TABLET | Refills: 0 | Status: SHIPPED | OUTPATIENT
Start: 2023-08-28 | End: 2023-08-31

## 2023-08-28 RX ORDER — SODIUM CHLORIDE, SODIUM LACTATE, POTASSIUM CHLORIDE, CALCIUM CHLORIDE 600; 310; 30; 20 MG/100ML; MG/100ML; MG/100ML; MG/100ML
INJECTION, SOLUTION INTRAVENOUS CONTINUOUS
Status: DISCONTINUED | OUTPATIENT
Start: 2023-08-28 | End: 2023-08-28 | Stop reason: HOSPADM

## 2023-08-28 RX ORDER — MAGNESIUM HYDROXIDE 1200 MG/15ML
LIQUID ORAL CONTINUOUS PRN
Status: COMPLETED | OUTPATIENT
Start: 2023-08-28 | End: 2023-08-28

## 2023-08-28 RX ORDER — PROPOFOL 10 MG/ML
INJECTION, EMULSION INTRAVENOUS PRN
Status: DISCONTINUED | OUTPATIENT
Start: 2023-08-28 | End: 2023-08-28 | Stop reason: SDUPTHER

## 2023-08-28 RX ORDER — LABETALOL HYDROCHLORIDE 5 MG/ML
10 INJECTION, SOLUTION INTRAVENOUS
Status: DISCONTINUED | OUTPATIENT
Start: 2023-08-28 | End: 2023-08-28 | Stop reason: HOSPADM

## 2023-08-28 RX ORDER — FENTANYL CITRATE 50 UG/ML
INJECTION, SOLUTION INTRAMUSCULAR; INTRAVENOUS PRN
Status: DISCONTINUED | OUTPATIENT
Start: 2023-08-28 | End: 2023-08-28 | Stop reason: SDUPTHER

## 2023-08-28 RX ORDER — POLYETHYLENE GLYCOL 3350 17 G/17G
17 POWDER, FOR SOLUTION ORAL DAILY
Qty: 510 G | Refills: 0 | Status: SHIPPED | OUTPATIENT
Start: 2023-08-28 | End: 2023-09-27

## 2023-08-28 RX ORDER — LIDOCAINE HYDROCHLORIDE 20 MG/ML
INJECTION, SOLUTION EPIDURAL; INFILTRATION; INTRACAUDAL; PERINEURAL PRN
Status: DISCONTINUED | OUTPATIENT
Start: 2023-08-28 | End: 2023-08-28 | Stop reason: SDUPTHER

## 2023-08-28 RX ADMIN — FENTANYL CITRATE 50 MCG: 50 INJECTION INTRAMUSCULAR; INTRAVENOUS at 07:10

## 2023-08-28 RX ADMIN — PROPOFOL 150 MG: 10 INJECTION, EMULSION INTRAVENOUS at 07:10

## 2023-08-28 RX ADMIN — LIDOCAINE HYDROCHLORIDE 50 MG: 20 INJECTION, SOLUTION EPIDURAL; INFILTRATION; INTRACAUDAL; PERINEURAL at 07:10

## 2023-08-28 RX ADMIN — SODIUM CHLORIDE, POTASSIUM CHLORIDE, SODIUM LACTATE AND CALCIUM CHLORIDE: 600; 310; 30; 20 INJECTION, SOLUTION INTRAVENOUS at 07:04

## 2023-08-28 RX ADMIN — FENTANYL CITRATE 50 MCG: 50 INJECTION INTRAMUSCULAR; INTRAVENOUS at 07:23

## 2023-08-28 RX ADMIN — PROPOFOL 50 MG: 10 INJECTION, EMULSION INTRAVENOUS at 07:25

## 2023-08-28 RX ADMIN — CEFAZOLIN 2000 MG: 2 INJECTION, POWDER, FOR SOLUTION INTRAMUSCULAR; INTRAVENOUS at 07:10

## 2023-08-28 RX ADMIN — ONDANSETRON HYDROCHLORIDE 4 MG: 2 INJECTION, SOLUTION INTRAMUSCULAR; INTRAVENOUS at 07:14

## 2023-08-28 RX ADMIN — DEXAMETHASONE SODIUM PHOSPHATE 4 MG: 4 INJECTION, SOLUTION INTRAMUSCULAR; INTRAVENOUS at 07:14

## 2023-08-28 ASSESSMENT — PAIN - FUNCTIONAL ASSESSMENT
PAIN_FUNCTIONAL_ASSESSMENT: PREVENTS OR INTERFERES WITH MANY ACTIVE NOT PASSIVE ACTIVITIES
PAIN_FUNCTIONAL_ASSESSMENT: 0-10

## 2023-08-28 ASSESSMENT — LIFESTYLE VARIABLES: SMOKING_STATUS: 0

## 2023-08-28 ASSESSMENT — PAIN DESCRIPTION - DESCRIPTORS: DESCRIPTORS: ACHING

## 2023-08-28 NOTE — H&P
Orthopedic Preoperative Note      CHIEF COMPLAINT:  R knee pain    HISTORY OF PRESENT ILLNESS:      The patient is a 67 y.o. female with R knee pain and medial meniscus tear. Past Medical History:    Past Medical History:   Diagnosis Date    Arthritis     Asthma     Back pain     Fibromyalgia     Fibromyalgia     Heart failure with reduced ejection fraction ([address])     Herpes zoster     Hypercholesteremia     Hyperlipidemia     Hypertriglyceridemia     Neck pain     Osteopenia     Stress-induced cardiomyopathy 03/2017       Past Surgical History:    Past Surgical History:   Procedure Laterality Date    BACK SURGERY      BLADDER REPAIR      CARDIAC CATHETERIZATION      COLONOSCOPY  4/23/12    diverticulosis    HYSTERECTOMY (CERVIX STATUS UNKNOWN)      NECK SURGERY  1/98, 10/05, '07 or '08    C6-C7spur '05       Medications Prior to Admission:   Prior to Admission medications    Medication Sig Start Date End Date Taking?  Authorizing Provider   traMADol (ULTRAM) 50 MG tablet  8/12/23   Historical Provider, MD   gabapentin (NEURONTIN) 100 MG capsule TAKE TWO CAPSULES BY MOUTH THREE TIMES A DAY AS DIRECTED 7/31/23 1/31/24  Vik Mosley MD   atorvastatin (LIPITOR) 20 MG tablet Take one tab daily 7/25/23   Lazaro Lopez MD   ezetimibe (ZETIA) 10 MG tablet TAKE ONE TABLET BY MOUTH DAILY 7/25/23   Lazaro Lopez MD   albuterol sulfate HFA (VENTOLIN HFA) 108 (90 Base) MCG/ACT inhaler Inhale 2 puffs into the lungs every 6 hours as needed for Wheezing or Shortness of Breath (sob) 6/7/23   Sherrie Harrison MD   citalopram (CELEXA) 10 MG tablet TAKE ONE TABLET BY MOUTH DAILY 5/30/23   BRENDA Colon CNP   Coenzyme Q10 (COQ10 PO) Take by mouth    Historical Provider, MD   metoprolol succinate (TOPROL XL) 25 MG extended release tablet Take one tab daily 5/23/23   Lazaro Lopez MD   lisinopril (PRINIVIL;ZESTRIL) 40 MG tablet TAKE ONE TABLET BY MOUTH EVERY EVENING 5/23/23   Lazaro Lopez MD   fenofibrate

## 2023-08-28 NOTE — DISCHARGE INSTRUCTIONS
Matt Springer, DO   ARTHROSCOPIC KNEE SURGERY    1. Elevate the operated area above heart level and apply ice bag 20 minutes of every hour to operative extremity. 2. Dressing will be removed in the clinic / PT. DO NOT remove sutures, staples or Steri-strips. 3. Use crutches as needed. 4. Toe touch weight bearing with crutches for assistance to operative extremity, and wean to full weight bearing as tolerated. 5. Ankle pumps frequently. Take a baby aspirin twice a day for blood clot prevention for 2-3 weeks after surgery. 6. Follow up with previously scheduled appointment or call the office for an appointment if you do not already have one ([phone]). I would like to see you back in 10-14 days. 7. Call your doctor if:   Your incision becomes red, swollen or develops drainage  If the wound becomes increasingly painful uncontrolled with the pain medications. If you develop fever greater than 101 degrees after the first 48 hours. 8. Please call with questions/concerns. Matt Springer, DO  Orthopedic Surgery and Sports Medicine   ANESTHESIA DISCHARGE INSTRUCTIONS    You are under the influence of drugs- do not drink alcohol, drive a car, operate machinery(such as power tools, kitchen appliances, etc), sign legal documents, or make any important decisions for 24 hours (or while on pain medications). Children should not ride bikes or [place] or play on gym sets  for 24 hours after surgery. A responsible adult should be with you for 24 hours. Rest at home today- increase activity as tolerated. Progress slowly to a regular diet unless your physician has instructed you otherwise. Drink plenty of water. CALL YOUR DOCTOR IF YOU:  Have moderate to severe nausea or vomiting AND are unable to hold down fluids or prescribed medications. Have bright red bloody drainage from your dressing that won't stop oozing.   Do not get relief with your pain medication    NORMAL (POSSIBLE) SIDE EFFECTS FROM

## 2023-08-28 NOTE — BRIEF OP NOTE
Brief Postoperative Note      Patient: Randie Hamman  YOB: 1950  MRN: [id number]    Date of Procedure: 8/28/2023    Pre-Op Diagnosis Codes:     * Complex tear of medial meniscus of right knee as current injury, initial encounter [S83.231A]    Post-Op Diagnosis:  Right knee degenerative meniscus tear midbody white white and red white zone, grade 2 chondromalacia medial compartment, grade 3 chondromalacia patellofemoral joint       Procedure(s):  RIGHT KNEE VIDEO ARTHROSCOPY, PARTIAL MEDIAL MENISCECTOMY    Surgeon(s):  Aramis Nava DO    Assistant:  KATIE    Anesthesia: General and local    Estimated Blood Loss (mL): Minimal    Complications: None    TT: 20min    Findings: see op note      Electronically signed by Aramis Nava DO on 8/28/2023 at 7:43 AM

## 2023-08-28 NOTE — ANESTHESIA PRE PROCEDURE
Department of Anesthesiology  Preprocedure Note       Name:  Elham Ramirez   Age:  67 y.o.  :  1950                                          MRN:  [id number]         Date:  2023      Surgeon: Soraya Garcia):  Emilio Torres DO    Procedure: Procedure(s):  RIGHT KNEE VIDEO ARTHROSCOPY, PARTIAL MEDIAL MENISCECTOMY    Medications prior to admission:   Prior to Admission medications    Medication Sig Start Date End Date Taking?  Authorizing Provider   traMADol (ULTRAM) 50 MG tablet  23   Historical Provider, MD   gabapentin (NEURONTIN) 100 MG capsule TAKE TWO CAPSULES BY MOUTH THREE TIMES A DAY AS DIRECTED 23  Vik Mosley MD   atorvastatin (LIPITOR) 20 MG tablet Take one tab daily 23   Lazaro Lopez MD   ezetimibe (ZETIA) 10 MG tablet TAKE ONE TABLET BY MOUTH DAILY 23   Lazaro Lopez MD   albuterol sulfate HFA (VENTOLIN HFA) 108 (90 Base) MCG/ACT inhaler Inhale 2 puffs into the lungs every 6 hours as needed for Wheezing or Shortness of Breath (sob) 23   Sherrie Harrison MD   citalopram (CELEXA) 10 MG tablet TAKE ONE TABLET BY MOUTH DAILY 23   BRENDA Colon CNP   Coenzyme Q10 (COQ10 PO) Take by mouth    Historical Provider, MD   metoprolol succinate (TOPROL XL) 25 MG extended release tablet Take one tab daily 23   Lazaro Lopez MD   lisinopril (PRINIVIL;ZESTRIL) 40 MG tablet TAKE ONE TABLET BY MOUTH EVERY EVENING 23   Lazaro Lopez MD   fenofibrate (TRICOR) 54 MG tablet TAKE ONE TABLET BY MOUTH DAILY 4/3/23   Lazaro Lopez MD   naloxone 4 MG/0.1ML LIQD nasal spray 1 spray by Nasal route as needed for Opioid Reversal 3/1/23   BRENDA Davila CNP   spironolactone (ALDACTONE) 25 MG tablet TAKE ONE-HALF TABLET BY MOUTH DAILY 22   Lazaro Lopez MD   valACYclovir (VALTREX) 1 g tablet Take 1 tablet by mouth 3 times daily 22   BRENDA Colon CNP   lidocaine (LIDODERM) 5 % Place patch on skin 12 hours on, 12

## 2023-08-28 NOTE — OP NOTE
was then drained of any remaining fluid. 30 cc of 0.25% Marcaine plain were injected into both of the portal sites and subcutaneously around the anterior knee and the anterior interval for postop pain control. The incisions were then cleansed and closed with 3-0 nylon in an interrupted fashion. A sterile dressing consisting of Xeroform, fluffs, ABD, web roll, and Ace wrap were placed. The tourniquet was deflated. The patient was then awoken from general anesthesia without complication and transferred back to the PACU in stable condition. Postoperative plan:  The patient will be weightbearing as tolerated with crutch assist, wean from crutches. Okay to start immediate knee range of motion to prevent knee stiffness. Patient can remove the wrap on postoperative day #2 leave the incisions open to air. Place a Band-Aid if they are leaking. Norco for pain control. Aspirin for 2 to 3 weeks to decrease risk of blood clots. Follow-up with me in 2 weeks to check the incisions and remove sutures.       Electronically signed by Dorothea Dominguez DO on 8/28/2023 at 7:44 AM

## 2023-08-28 NOTE — ANESTHESIA POSTPROCEDURE EVALUATION
Department of Anesthesiology  Postprocedure Note    Patient: Russell Haq  MRN: [id number]  YOB: 1950  Date of evaluation: 8/28/2023      Procedure Summary     Date: 08/28/23 Room / Location: [address] [place] [address] OR 01 / [facility]    Anesthesia Start: 9871 Anesthesia Stop: 8862    Procedure: RIGHT KNEE VIDEO ARTHROSCOPY, PARTIAL MEDIAL MENISCECTOMY (Right: Knee) Diagnosis:       Complex tear of medial meniscus of right knee as current injury, initial encounter      (Complex tear of medial meniscus of right knee as current injury, initial encounter [N12.868W])    Surgeons: Erin Paris DO Responsible Provider: Raj Park MD    Anesthesia Type: general ASA Status: 3          Anesthesia Type: No value filed.     Christie Phase I: Christie Score: 10    Christie Phase II:        Anesthesia Post Evaluation    Patient location during evaluation: PACU  Patient participation: complete - patient participated  Level of consciousness: awake and alert  Airway patency: patent  Nausea & Vomiting: no vomiting and no nausea  Complications: no  Cardiovascular status: hemodynamically stable  Respiratory status: acceptable  Hydration status: stable  Pain management: adequate

## 2023-09-12 ENCOUNTER — OFFICE VISIT (OUTPATIENT)
Dept: ORTHOPEDIC SURGERY | Age: 73
End: 2023-09-12

## 2023-09-12 DIAGNOSIS — S83.231A COMPLEX TEAR OF MEDIAL MENISCUS OF RIGHT KNEE AS CURRENT INJURY, INITIAL ENCOUNTER: Primary | ICD-10-CM

## 2023-09-12 PROCEDURE — 99024 POSTOP FOLLOW-UP VISIT: CPT | Performed by: STUDENT IN AN ORGANIZED HEALTH CARE EDUCATION/TRAINING PROGRAM

## 2023-09-12 NOTE — PROGRESS NOTES
therapy to work on strengthening her quad and hamstring for gait training. Follow-up in 4 to 6 weeks. Colleen Barboza is in agreement with this plan. All questions were answered to patient's satisfaction and was encouraged to call with any further questions. Andria Dacosta, DO  Orthopedic Surgery and Sports Medicine  9/12/2023      This dictation was performed with a verbal recognition program [facility] ) and it was checked for errors. It is possible that there are still dictated errors within this office note. If so, please bring any errors to my attention for an addendum. All efforts were made to ensure that this office note is accurate.

## 2023-09-19 ENCOUNTER — HOSPITAL ENCOUNTER (OUTPATIENT)
Dept: PHYSICAL THERAPY | Age: 73
Setting detail: THERAPIES SERIES
Discharge: HOME | End: 2023-09-19
Payer: MEDICARE

## 2023-09-19 PROCEDURE — 97016 VASOPNEUMATIC DEVICE THERAPY: CPT | Performed by: SPECIALIST

## 2023-09-19 PROCEDURE — 97161 PT EVAL LOW COMPLEX 20 MIN: CPT | Performed by: SPECIALIST

## 2023-09-19 PROCEDURE — 97140 MANUAL THERAPY 1/> REGIONS: CPT | Performed by: SPECIALIST

## 2023-09-19 PROCEDURE — 97110 THERAPEUTIC EXERCISES: CPT | Performed by: SPECIALIST

## 2023-09-19 NOTE — FLOWSHEET NOTE
mobility as indicated by patients Functional Deficits. [x] Progressing: [] Met: [] Not Met: [] Adjusted     4. Patient will return to [facility] for  functional activities without increased symptoms or restriction. [x] Progressing: [] Met: [] Not Met: [] Adjusted     5. Walk/ stairs with min to no limitations (patient specific functional goal)    [x] Progressing: [] Met: [] Not Met: [] Adjusted          Overall Progression Towards Functional goals/ Treatment Progress Update:  [] Patient is progressing as expected towards functional goals listed. [] Progression is slowed due to complexities/Impairments listed. [] Progression has been slowed due to co-morbidities.   [x] Plan just implemented, too soon to assess goals progression <30days   [] Goals require adjustment due to lack of progress  [] Patient is not progressing as expected and requires additional follow up with physician  [] Other    Prognosis for POC: [x] Good [] Fair  [] Poor      Patient requires continued skilled intervention: [x] Yes  [] No    Treatment/Activity Tolerance:  [x] Patient able to complete treatment  [] Patient limited by fatigue  [] Patient limited by pain    [] Patient limited by other medical complications  [] Other:     Return to Play: (if applicable)   []  Stage 1: Intro to Strength   []  Stage 2: Return to Run and Strength   []  Stage 3: Return to Jump and Strength   []  Stage 4: Dynamic Strength and Agility   []  Stage 5: Sport Specific Training     []  Ready to Return to Play, Meets All Above Stages   [x]  Not Ready for Return to Sports   Comments:                          PLAN: See eval  [] Continue per plan of care [] Alter current plan (see comments above)  [x] Plan of care initiated [] Hold pending MD visit [] Discharge    Electronically signed by:  Lolis Calloway PT    Note: If patient does not return for scheduled/ recommended follow up visits, this note will serve as a discharge from care along with most recent update on

## 2023-09-19 NOTE — PLAN OF CARE
Met: [] Adjusted     3. Patient will demonstrate an increase in Strength to good proximal hip strength and control, within 5lb HHD in LE to allow for proper functional mobility as indicated by patients Functional Deficits. [x] Progressing: [] Met: [] Not Met: [] Adjusted     4. Patient will return to [facility] for  functional activities without increased symptoms or restriction. [x] Progressing: [] Met: [] Not Met: [] Adjusted     5.  Walk/ stairs with min to no limitations (patient specific functional goal)    [x] Progressing: [] Met: [] Not Met: [] Adjusted      Electronically signed by:  Surya Aquino PT

## 2023-09-20 DIAGNOSIS — F41.8 SITUATIONAL ANXIETY: ICD-10-CM

## 2023-09-20 RX ORDER — CITALOPRAM HYDROBROMIDE 10 MG/1
10 TABLET ORAL DAILY
Qty: 90 TABLET | Refills: 0 | Status: SHIPPED | OUTPATIENT
Start: 2023-09-20

## 2023-09-20 NOTE — TELEPHONE ENCOUNTER
Refill Request     CONFIRM preferrred pharmacy with the patient. If Mail Order Rx - Pend for 90 day refill. Last Seen: Last Seen Department: 8/22/2023  Last Seen by PCP: 8/22/2023    Last Written: 05/30/2023      If no future appointment scheduled, route STAFF MESSAGE with patient name to the [facility] for scheduling. Next Appointment:   Future Appointments   Date Time Provider [address]   9/26/2023  2:00 PM Dietra Glenham, PT [facility] [place] [facility]   9/28/2023  1:30 PM Dietra Glenham, PT [facility]   10/3/2023  1:30 PM Dietra Glenham, PT [facility] [place] [facility]   10/4/2023  3:00 PM BRENDA Hong - CNP [facility] [place]   10/5/2023  1:30 PM Dietra Glenham, PT [facility] [place] [facility]   10/10/2023  1:45 PM Rhoderick Koyanagi, PTA [facility] [place] [facility]   10/10/2023  3:15 PM Remy Etienne DO [facility]   10/11/2023  4:20 PM Raymond Skiff, MD [place]  [place] [facility]   10/12/2023  1:45 PM Hollace Dikes, PTA [facility] PT [place] [facility]   10/17/2023  1:45 PM Hollace Dikes, PTA [facility] [place] [facility]   10/19/2023  1:45 PM Hollace Dikes, PTA [facility]   10/24/2023  1:45 PM Hollace Dikes, PTA [facility] [place] [facility]   10/26/2023  1:45 PM Hollace Dikes, PTA [facility] [place] [facility]   11/1/2023  1:20 PM Raymond Skiff, MD [place] [facility]   11/28/2023  2:00 PM Fernando Duran MD [facility]       Message sent to [address] [place] [address] to schedule appt with patient?   N/A      Requested Prescriptions     Pending Prescriptions Disp Refills    citalopram (CELEXA) 10 MG tablet 90 tablet 0     Sig: Take 1 tablet by mouth daily

## 2023-09-21 ENCOUNTER — OFFICE VISIT (OUTPATIENT)
Dept: FAMILY MEDICINE CLINIC | Age: 73
End: 2023-09-21

## 2023-09-21 VITALS
OXYGEN SATURATION: 95 % | BODY MASS INDEX: 32.1 KG/M2 | HEART RATE: 82 BPM | WEIGHT: 188 LBS | DIASTOLIC BLOOD PRESSURE: 69 MMHG | SYSTOLIC BLOOD PRESSURE: 120 MMHG | HEIGHT: 64 IN

## 2023-09-21 DIAGNOSIS — B37.0 THRUSH: Primary | ICD-10-CM

## 2023-09-21 DIAGNOSIS — J02.9 SORE THROAT: ICD-10-CM

## 2023-09-21 RX ORDER — CLOTRIMAZOLE 10 MG/1
10 LOZENGE ORAL; TOPICAL
Qty: 50 TABLET | Refills: 0 | Status: SHIPPED | OUTPATIENT
Start: 2023-09-21 | End: 2023-10-01

## 2023-09-21 RX ORDER — FLUCONAZOLE 150 MG/1
150 TABLET ORAL ONCE
Qty: 1 TABLET | Refills: 0 | Status: SHIPPED | OUTPATIENT
Start: 2023-09-21 | End: 2023-09-21

## 2023-09-21 NOTE — PROGRESS NOTES
1 tablet by mouth once for 1 dose      Prior to Visit Medications    Medication Sig Taking? Authorizing Provider   clotrimazole (MYCELEX) 10 MG sonali Take 1 tablet by mouth 5 times daily for 10 days Yes BRENDA Taveras CNP   citalopram (CELEXA) 10 MG tablet Take 1 tablet by mouth daily Yes Denis Mendoza MD   polyethylene glycol [facility]) 17 GM/SCOOP powder Take 17 g by mouth daily Yes Luis Steele DO   ondansetron (ZOFRAN) 4 MG tablet Take 1 tablet by mouth 3 times daily as needed for Nausea or Vomiting Yes Luis Steele DO   gabapentin (NEURONTIN) 100 MG capsule TAKE TWO CAPSULES BY MOUTH THREE TIMES A DAY AS DIRECTED Yes Denis Mendoza MD   atorvastatin (LIPITOR) 20 MG tablet Take one tab daily Yes Shima Davis MD   ezetimibe (ZETIA) 10 MG tablet TAKE ONE TABLET BY MOUTH DAILY Yes Shima Davis MD   albuterol sulfate HFA (VENTOLIN HFA) 108 (90 Base) MCG/ACT inhaler Inhale 2 puffs into the lungs every 6 hours as needed for Wheezing or Shortness of Breath (sob) Yes Scott Warner MD   Coenzyme Q10 (COQ10 PO) Take by mouth Yes Historical Provider, MD   metoprolol succinate (TOPROL XL) 25 MG extended release tablet Take one tab daily Yes Shima Davis MD   lisinopril (PRINIVIL;ZESTRIL) 40 MG tablet TAKE ONE TABLET BY MOUTH EVERY EVENING Yes Shima Davis MD   fenofibrate (TRICOR) 54 MG tablet TAKE ONE TABLET BY MOUTH DAILY Yes Shima Davis MD   naloxone 4 MG/0.1ML LIQD nasal spray 1 spray by Nasal route as needed for Opioid Reversal Yes BRENDA Almendarez CNP   spironolactone (ALDACTONE) 25 MG tablet TAKE ONE-HALF TABLET BY MOUTH DAILY Yes Shima Davis MD   valACYclovir (VALTREX) 1 g tablet Take 1 tablet by mouth 3 times daily Yes BRENDA Taveras CNP   lidocaine (LIDODERM) 5 % Place patch on skin 12 hours on, 12 hours off.  Yes Denis Mendoza MD   aspirin EC 81 MG EC tablet Take 1 tablet by mouth daily Yes Denis Mendoza MD

## 2023-09-24 ASSESSMENT — ENCOUNTER SYMPTOMS
TROUBLE SWALLOWING: 0
RESPIRATORY NEGATIVE: 1
FACIAL SWELLING: 0
SORE THROAT: 1
SINUS PRESSURE: 0
VOICE CHANGE: 1
SINUS PAIN: 0
RHINORRHEA: 0

## 2023-09-26 ENCOUNTER — HOSPITAL ENCOUNTER (OUTPATIENT)
Dept: PHYSICAL THERAPY | Age: 73
Setting detail: THERAPIES SERIES
Discharge: HOME | End: 2023-09-26
Payer: MEDICARE

## 2023-09-26 NOTE — FLOWSHEET NOTE
[facility], [address] [place] [address]  Phone: [phone]   Fax:     [phone]    Physical Therapy  Cancellation/No-show Note  Patient Name:  Justice Colorado  :  1950   Date:  2023    Cancelled visits to date: 1  No-shows to date: 0    For today's appointment patient:  [x]  Cancelled  []  Rescheduled appointment  []  No-show     Reason given by patient:  []  Patient ill  []  Conflicting appointment  []  No transportation    []  Conflict with work  []  No reason given  [x]  Other:     Comments:  knee sore, afraid to drive    Phone call information:   []  Phone call made today to patient at am/pm at the number provided:      []  Patient answered, conversation as follows:    []  Patient did not answer, message left as follows:  [x]  Phone call not needed - pt contacted us to cancel and provided reason for cancellation.      Electronically signed by:  Therman Ahumada, PT, PT

## 2023-09-28 ENCOUNTER — HOSPITAL ENCOUNTER (OUTPATIENT)
Dept: PHYSICAL THERAPY | Age: 73
Setting detail: THERAPIES SERIES
Discharge: HOME | End: 2023-09-28
Payer: MEDICARE

## 2023-09-28 PROCEDURE — 97110 THERAPEUTIC EXERCISES: CPT | Performed by: SPECIALIST

## 2023-09-28 PROCEDURE — 97112 NEUROMUSCULAR REEDUCATION: CPT | Performed by: SPECIALIST

## 2023-09-28 PROCEDURE — 97016 VASOPNEUMATIC DEVICE THERAPY: CPT | Performed by: SPECIALIST

## 2023-09-28 PROCEDURE — 97140 MANUAL THERAPY 1/> REGIONS: CPT | Performed by: SPECIALIST

## 2023-09-28 NOTE — FLOWSHEET NOTE
proximal hip strength and control, within 5lb HHD in LE to allow for proper functional mobility as indicated by patients Functional Deficits. [x] Progressing: [] Met: [] Not Met: [] Adjusted     4. Patient will return to [facility] for  functional activities without increased symptoms or restriction. [x] Progressing: [] Met: [] Not Met: [] Adjusted     5. Walk/ stairs with min to no limitations (patient specific functional goal)    [x] Progressing: [] Met: [] Not Met: [] Adjusted          Overall Progression Towards Functional goals/ Treatment Progress Update:  [x] Patient is progressing as expected towards functional goals listed. [] Progression is slowed due to complexities/Impairments listed. [] Progression has been slowed due to co-morbidities.   [] Plan just implemented, too soon to assess goals progression <30days   [] Goals require adjustment due to lack of progress  [] Patient is not progressing as expected and requires additional follow up with physician  [] Other    Prognosis for POC: [x] Good [] Fair  [] Poor      Patient requires continued skilled intervention: [x] Yes  [] No    Treatment/Activity Tolerance:  [x] Patient able to complete treatment  [] Patient limited by fatigue  [] Patient limited by pain    [] Patient limited by other medical complications  [] Other:     Return to Play: (if applicable)   []  Stage 1: Intro to Strength   []  Stage 2: Return to Run and Strength   []  Stage 3: Return to Jump and Strength   []  Stage 4: Dynamic Strength and Agility   []  Stage 5: Sport Specific Training     []  Ready to Return to Play, Meets All Above Stages   [x]  Not Ready for Return to Sports   Comments:                          PLAN:   [x] Continue per plan of care [] Alter current plan (see comments above)  [] Plan of care initiated [] Hold pending MD visit [] Discharge    Electronically signed by:  Eliz Rico PT    Note: If patient does not return for scheduled/ recommended follow up visits, this

## 2023-10-03 ENCOUNTER — HOSPITAL ENCOUNTER (OUTPATIENT)
Dept: PHYSICAL THERAPY | Age: 73
Setting detail: THERAPIES SERIES
Discharge: HOME | End: 2023-10-03
Payer: MEDICARE

## 2023-10-03 PROCEDURE — 97110 THERAPEUTIC EXERCISES: CPT | Performed by: SPECIALIST

## 2023-10-03 PROCEDURE — 97016 VASOPNEUMATIC DEVICE THERAPY: CPT | Performed by: SPECIALIST

## 2023-10-03 PROCEDURE — 97140 MANUAL THERAPY 1/> REGIONS: CPT | Performed by: SPECIALIST

## 2023-10-03 PROCEDURE — 97112 NEUROMUSCULAR REEDUCATION: CPT | Performed by: SPECIALIST

## 2023-10-03 NOTE — FLOWSHEET NOTE
demonstrate an increase in Strength to good proximal hip strength and control, within 5lb HHD in LE to allow for proper functional mobility as indicated by patients Functional Deficits. [x] Progressing: [] Met: [] Not Met: [] Adjusted     4. Patient will return to [facility] for  functional activities without increased symptoms or restriction. [x] Progressing: [] Met: [] Not Met: [] Adjusted     5. Walk/ stairs with min to no limitations (patient specific functional goal)    [x] Progressing: [] Met: [] Not Met: [] Adjusted          Overall Progression Towards Functional goals/ Treatment Progress Update:  [x] Patient is progressing as expected towards functional goals listed. [] Progression is slowed due to complexities/Impairments listed. [] Progression has been slowed due to co-morbidities.   [] Plan just implemented, too soon to assess goals progression <30days   [] Goals require adjustment due to lack of progress  [] Patient is not progressing as expected and requires additional follow up with physician  [] Other    Prognosis for POC: [x] Good [] Fair  [] Poor      Patient requires continued skilled intervention: [x] Yes  [] No    Treatment/Activity Tolerance:  [x] Patient able to complete treatment  [] Patient limited by fatigue  [] Patient limited by pain    [] Patient limited by other medical complications  [] Other:     Return to Play: (if applicable)   []  Stage 1: Intro to Strength   []  Stage 2: Return to Run and Strength   []  Stage 3: Return to Jump and Strength   []  Stage 4: Dynamic Strength and Agility   []  Stage 5: Sport Specific Training     []  Ready to Return to Play, Meets All Above Stages   [x]  Not Ready for Return to Sports   Comments:                          PLAN:   [x] Continue per plan of care [] Alter current plan (see comments above)  [] Plan of care initiated [] Hold pending MD visit [] Discharge    Electronically signed by:  Sol Bennett PT    Note: If patient does not return for

## 2023-10-05 ENCOUNTER — HOSPITAL ENCOUNTER (OUTPATIENT)
Dept: PHYSICAL THERAPY | Age: 73
Setting detail: THERAPIES SERIES
Discharge: HOME | End: 2023-10-05
Payer: MEDICARE

## 2023-10-05 PROCEDURE — 97016 VASOPNEUMATIC DEVICE THERAPY: CPT | Performed by: SPECIALIST

## 2023-10-05 PROCEDURE — 97110 THERAPEUTIC EXERCISES: CPT | Performed by: SPECIALIST

## 2023-10-05 PROCEDURE — 97112 NEUROMUSCULAR REEDUCATION: CPT | Performed by: SPECIALIST

## 2023-10-05 PROCEDURE — 97140 MANUAL THERAPY 1/> REGIONS: CPT | Performed by: SPECIALIST

## 2023-10-05 NOTE — FLOWSHEET NOTE
(PA's, Inf., Post.)  [x] (11968) Provided manual therapy to mobilize LE, proximal hip and/or LS spine soft tissue/joints for the purpose of modulating pain, promoting relaxation, increasing ROM, reducing/eliminating soft tissue swelling/inflammation/restriction, improving soft tissue extensibility and allowing for proper ROM for normal function with self-care, mobility, lifting and ambulation. Modalities:     [x] GAME READY (VASO)- for significant edema, swelling, pain control. Charges:  Timed Code Treatment Minutes: 38   Total Treatment Minutes:  48   BWC:  TE TIME:  NMR TIME:  MANUAL TIME:  UNTIMED MINUTES:  Medicare Total:         [] EVAL (LOW) 66971 (typically 20 minutes face-to-face)  [] EVAL (MOD) 62702 (typically 30 minutes face-to-face)  [] EVAL (HIGH) 86104 (typically 45 minutes face-to-face)  [] RE-EVAL     [x] AK(22620) x     [] IONTO  [x] NMR (28222) x     [x] VASO  [x] Manual (67535) x     [] Other:  [] TA x      [] Mech Traction (28396)  [] ES(attended) (55481)      [] ES (un) (49926):    ASSESSMENT:  Good tolerance with Eliana and manuals    GOALS:      Patient stated goal: walk stairs    Therapist goals for Patient:   Short Term Goals: To be achieved in: 2 weeks  1. Independent in HEP and progression per patient tolerance, in order to prevent re-injury. [x] Progressing: [] Met: [] Not Met: [] Adjusted     2. Patient will have a decrease in pain to facilitate improvement in movement, function, and ADLs as indicated by Functional Deficits. [x] Progressing: [] Met: [] Not Met: [] Adjusted     Long Term Goals: To be achieved in: 4 weeks  1. FOTO score will match or exceed predicted score to assist with reaching prior level of function. [x] Progressing: [] Met: [] Not Met: [] Adjusted     2. Patient will demonstrate increased AROM to 120-0  to allow for proper joint functioning as indicated by patients Functional Deficits. [x] Progressing: [] Met: [] Not Met: [] Adjusted     3.  Patient will

## 2023-10-10 ENCOUNTER — OFFICE VISIT (OUTPATIENT)
Dept: ORTHOPEDIC SURGERY | Age: 73
End: 2023-10-10

## 2023-10-10 ENCOUNTER — APPOINTMENT (OUTPATIENT)
Dept: PHYSICAL THERAPY | Age: 73
End: 2023-10-10
Payer: MEDICARE

## 2023-10-10 ENCOUNTER — HOSPITAL ENCOUNTER (OUTPATIENT)
Dept: PHYSICAL THERAPY | Age: 73
Setting detail: THERAPIES SERIES
Discharge: HOME | End: 2023-10-10
Payer: MEDICARE

## 2023-10-10 VITALS — HEIGHT: 64 IN | BODY MASS INDEX: 32.1 KG/M2 | WEIGHT: 188 LBS

## 2023-10-10 DIAGNOSIS — S83.231A COMPLEX TEAR OF MEDIAL MENISCUS OF RIGHT KNEE AS CURRENT INJURY, INITIAL ENCOUNTER: Primary | ICD-10-CM

## 2023-10-10 PROCEDURE — 99024 POSTOP FOLLOW-UP VISIT: CPT | Performed by: STUDENT IN AN ORGANIZED HEALTH CARE EDUCATION/TRAINING PROGRAM

## 2023-10-10 NOTE — PROGRESS NOTES
Chief Complaint  Knee Pain (Rt knee)      History of Present Illness: The patient is here for repeat evaluation of her right knee. She is 6 weeks out from a right knee arthroscopy with partial medial meniscectomy. She is working with physical therapy. She is still walking with a single crutch when she is out of the house as she still has a little bit of weakness to the knee. She does not use it when she is at home. She is happy with her progress so far but still has concerns with stairs. She does have multiple stairs that she has to traverse at work and recently the chairlift to get up the stairs has been broken. Prior HPI 9/12/2023:  Russell Haq is a pleasant 67 y.o. female here today for her first postop evaluation regarding her right knee. She underwent a right knee arthroscopy with partial medial meniscectomy on 8/28/2023 2 weeks ago. She is doing well. Expected amount of pain. Medical History:  Patient's medications, allergies, past medical, surgical, social and family histories were reviewed and updated as appropriate. Pertinent items are noted in HPI  Review of systems reviewed from Patient History Form dated on 10/10/23 and available in the patient's chart under the Media tab. Vital Signs: There were no vitals filed for this visit. Constitutional: In no apparent distress. Normal affect. Alert and oriented X3 and is cooperative. Right knee exam    Well-healed surgical scars to the anterior knee. Slight scar formation noted to both portals. Some tenderness palpation over the scars. Knee range of motion 0 to 125 degrees. Some tightness anteriorly with deep flexion. Mild tenderness to the medial joint line. Stable to varus and valgus stress. Decreased quadriceps tone compared to the opposite knee. Radiology:       No new x-rays today.            Assessment : 68-year-old female 6 weeks status post right knee arthroscopy partial medial meniscectomy, date

## 2023-10-10 NOTE — FLOWSHEET NOTE
[address] [facility], [place] [address] [facility], [address] [place] [address] Drive  Phone: [phone]   Fax:     [phone]    Physical Therapy  Cancellation/No-show Note  Patient Name:  Bernard Fox  :  1950   Date:  10/10/2023    Cancelled visits to date: 2  No-shows to date: 0    For today's appointment patient:  [x]  Cancelled  []  Rescheduled appointment  []  No-show     Reason given by patient:  []  Patient ill  [x]  Conflicting appointment  []  No transportation    []  Conflict with work  []  No reason given  []  Other:     Comments:      Phone call information:   []  Phone call made today to patient at am/pm at the number provided:      []  Patient answered, conversation as follows:    []  Patient did not answer, message left as follows:  [x]  Phone call not needed - pt contacted us to cancel and provided reason for cancellation.      Electronically signed by:  Katja Nunn PT, PT

## 2023-10-11 ENCOUNTER — HOSPITAL ENCOUNTER (OUTPATIENT)
Dept: PHYSICAL THERAPY | Age: 73
Setting detail: THERAPIES SERIES
Discharge: HOME | End: 2023-10-11
Payer: MEDICARE

## 2023-10-11 ENCOUNTER — OFFICE VISIT (OUTPATIENT)
Dept: FAMILY MEDICINE CLINIC | Age: 73
End: 2023-10-11

## 2023-10-11 VITALS
HEART RATE: 76 BPM | DIASTOLIC BLOOD PRESSURE: 66 MMHG | BODY MASS INDEX: 32.79 KG/M2 | WEIGHT: 191 LBS | OXYGEN SATURATION: 92 % | SYSTOLIC BLOOD PRESSURE: 110 MMHG

## 2023-10-11 DIAGNOSIS — M50.221 HERNIATION OF INTERVERTEBRAL DISC AT C4-C5 LEVEL: ICD-10-CM

## 2023-10-11 DIAGNOSIS — M50.322 DEGENERATION OF C5-C6 INTERVERTEBRAL DISC: ICD-10-CM

## 2023-10-11 DIAGNOSIS — F32.3 DEPRESSIVE PSYCHOSIS (HCC): ICD-10-CM

## 2023-10-11 DIAGNOSIS — M50.321 DEGENERATION OF INTERVERTEBRAL DISC AT C4-C5 LEVEL: ICD-10-CM

## 2023-10-11 DIAGNOSIS — M50.223 HERNIATION OF INTERVERTEBRAL DISC AT C6-C7 LEVEL: Primary | ICD-10-CM

## 2023-10-11 PROCEDURE — 97016 VASOPNEUMATIC DEVICE THERAPY: CPT | Performed by: SPECIALIST

## 2023-10-11 PROCEDURE — 97140 MANUAL THERAPY 1/> REGIONS: CPT | Performed by: SPECIALIST

## 2023-10-11 PROCEDURE — 97112 NEUROMUSCULAR REEDUCATION: CPT | Performed by: SPECIALIST

## 2023-10-11 PROCEDURE — 97110 THERAPEUTIC EXERCISES: CPT | Performed by: SPECIALIST

## 2023-10-11 NOTE — PROGRESS NOTES
Exercise per Week: 0 days     Minutes of Exercise per Session: 0 min   Stress: Not on file   Social Connections: Not on file   Intimate Partner Violence: Not on file   Housing Stability: Unknown (2/22/2023)    Housing Stability Vital Sign     Unable to Pay for Housing in the Last Year: Not on file     Number of [address] in the Last Year: Not on file     Unstable Housing in the Last Year: No     Prior to Visit Medications    Medication Sig Taking? Authorizing Provider   traMADol (ULTRAM) 50 MG tablet Take 1 tablet by mouth 4 times daily for 30 days.  Yes BRENDA Paul CNP   citalopram (CELEXA) 10 MG tablet Take 1 tablet by mouth daily Yes Frieda Rouse MD   ondansetron (ZOFRAN) 4 MG tablet Take 1 tablet by mouth 3 times daily as needed for Nausea or Vomiting Yes Negar Lares DO   gabapentin (NEURONTIN) 100 MG capsule TAKE TWO CAPSULES BY MOUTH THREE TIMES A DAY AS DIRECTED Yes Frieda Rouse MD   atorvastatin (LIPITOR) 20 MG tablet Take one tab daily Yes Lynnette Ferrer MD   ezetimibe (ZETIA) 10 MG tablet TAKE ONE TABLET BY MOUTH DAILY Yes Lynnette Ferrer MD   albuterol sulfate HFA (VENTOLIN HFA) 108 (90 Base) MCG/ACT inhaler Inhale 2 puffs into the lungs every 6 hours as needed for Wheezing or Shortness of Breath (sob) Yes Jazz Flores MD   Coenzyme Q10 (COQ10 PO) Take by mouth Yes ProviderLouie MD   metoprolol succinate (TOPROL XL) 25 MG extended release tablet Take one tab daily Yes Lynnette Ferrer MD   lisinopril (PRINIVIL;ZESTRIL) 40 MG tablet TAKE ONE TABLET BY MOUTH EVERY EVENING Yes Lynnette Ferrer MD   fenofibrate (TRICOR) 54 MG tablet TAKE ONE TABLET BY MOUTH DAILY Yes Lynnette Ferrer MD   naloxone 4 MG/0.1ML LIQD nasal spray 1 spray by Nasal route as needed for Opioid Reversal Yes BRENDA Paul CNP   spironolactone (ALDACTONE) 25 MG tablet TAKE ONE-HALF TABLET BY MOUTH DAILY Yes Lynnette Ferrer MD   valACYclovir (VALTREX) 1 g tablet Take 1 tablet

## 2023-10-11 NOTE — FLOWSHEET NOTE
increase in Strength to good proximal hip strength and control, within 5lb HHD in LE to allow for proper functional mobility as indicated by patients Functional Deficits. [x] Progressing: [] Met: [] Not Met: [] Adjusted     4. Patient will return to [facility] for  functional activities without increased symptoms or restriction. [x] Progressing: [] Met: [] Not Met: [] Adjusted     5. Walk/ stairs with min to no limitations (patient specific functional goal)    [x] Progressing: [] Met: [] Not Met: [] Adjusted          Overall Progression Towards Functional goals/ Treatment Progress Update:  [x] Patient is progressing as expected towards functional goals listed. [] Progression is slowed due to complexities/Impairments listed. [] Progression has been slowed due to co-morbidities.   [] Plan just implemented, too soon to assess goals progression <30days   [] Goals require adjustment due to lack of progress  [] Patient is not progressing as expected and requires additional follow up with physician  [] Other    Prognosis for POC: [x] Good [] Fair  [] Poor      Patient requires continued skilled intervention: [x] Yes  [] No    Treatment/Activity Tolerance:  [x] Patient able to complete treatment  [] Patient limited by fatigue  [] Patient limited by pain    [] Patient limited by other medical complications  [] Other:     Return to Play: (if applicable)   []  Stage 1: Intro to Strength   []  Stage 2: Return to Run and Strength   []  Stage 3: Return to Jump and Strength   []  Stage 4: Dynamic Strength and Agility   []  Stage 5: Sport Specific Training     []  Ready to Return to Play, Meets All Above Stages   [x]  Not Ready for Return to Sports   Comments:                          PLAN:   [x] Continue per plan of care [] Alter current plan (see comments above)  [] Plan of care initiated [] Hold pending MD visit [] Discharge    Electronically signed by:  Sorin Vela PT    Note: If patient does not return for scheduled/

## 2023-10-12 ENCOUNTER — APPOINTMENT (OUTPATIENT)
Dept: PHYSICAL THERAPY | Age: 73
End: 2023-10-12
Payer: MEDICARE

## 2023-10-12 ENCOUNTER — HOSPITAL ENCOUNTER (OUTPATIENT)
Dept: PHYSICAL THERAPY | Age: 73
Setting detail: THERAPIES SERIES
Discharge: HOME | End: 2023-10-12
Payer: MEDICARE

## 2023-10-12 PROCEDURE — 97016 VASOPNEUMATIC DEVICE THERAPY: CPT | Performed by: SPECIALIST

## 2023-10-12 PROCEDURE — 97112 NEUROMUSCULAR REEDUCATION: CPT | Performed by: SPECIALIST

## 2023-10-12 PROCEDURE — 97140 MANUAL THERAPY 1/> REGIONS: CPT | Performed by: SPECIALIST

## 2023-10-12 PROCEDURE — 97110 THERAPEUTIC EXERCISES: CPT | Performed by: SPECIALIST

## 2023-10-12 NOTE — FLOWSHEET NOTE
(30582)   CUES NEEDED    / slant board  1 min  x   HR/TR on slant board  15x  x              SLS  1 min                              Therapeutic Activity (95909)       Step up and overs 2\" 15x            vaso  10 min                   Sendmebox access code: [email or web address]           Therapeutic Exercise and NMR EXR  [x] (20629) Provided verbal/tactile cueing for activities related to strengthening, flexibility, endurance, ROM for improvements in LE, proximal hip, and core control with self care, mobility, lifting, ambulation. [x] (88727) Provided verbal/tactile cueing for activities related to improving balance, coordination, kinesthetic sense, posture, motor skill, proprioception to assist with LE, proximal hip, and core control in self-care, mobility, lifting, ambulation and eccentric single leg control.      NMR and Therapeutic Activities:    [x] (67833 or 02645) Provided verbal/tactile cueing for activities related to improving balance, coordination, kinesthetic sense, posture, motor skill, proprioception and motor activation to allow for proper function of core, proximal hip and LE with self-care and ADLs and functional mobility.   [] (98644) Gait Re-education- Provided training and instruction to the patient for proper LE, core and proximal hip recruitment and positioning and eccentric body weight control with ambulation re-education including up and down stairs     Home Exercise Program:    [x] (07328) Reviewed/Progressed HEP activities related to strengthening, flexibility, endurance, ROM of core, proximal hip and LE for functional self-care, mobility, lifting and ambulation/stair navigation   [] (34532) Reviewed/Progressed HEP activities related to improving balance, coordination, kinesthetic sense, posture, motor skill, proprioception of core, proximal hip and LE for self-care, mobility, lifting, and ambulation/stair navigation      Manual Treatments:  PROM / STM / Oscillations-Mobs:  G-I, II, III, IV (PA's,

## 2023-10-17 ENCOUNTER — HOSPITAL ENCOUNTER (OUTPATIENT)
Dept: PHYSICAL THERAPY | Age: 73
Setting detail: THERAPIES SERIES
Discharge: HOME | End: 2023-10-17
Payer: MEDICARE

## 2023-10-17 ENCOUNTER — APPOINTMENT (OUTPATIENT)
Dept: PHYSICAL THERAPY | Age: 73
End: 2023-10-17
Payer: MEDICARE

## 2023-10-17 NOTE — FLOWSHEET NOTE
[facility], [place] [address]  Phone: [phone]   Fax:     [phone]    Physical Therapy  Cancellation/No-show Note  Patient Name:  Alycia Hernandez  :  1950   Date:  10/17/2023    Cancelled visits to date: 3  No-shows to date: 0    For today's appointment patient:  [x]  Cancelled  []  Rescheduled appointment  []  No-show     Reason given by patient:  []  Patient ill  []  Conflicting appointment  []  No transportation    []  Conflict with work  []  No reason given  [x]  Other:     Comments:  knee sore from RTW    Phone call information:   []  Phone call made today to patient at am/pm at the number provided:      []  Patient answered, conversation as follows:    []  Patient did not answer, message left as follows:  [x]  Phone call not needed - pt contacted us to cancel and provided reason for cancellation.      Electronically signed by:  Rojas Bui, PT, PT

## 2023-10-19 ENCOUNTER — APPOINTMENT (OUTPATIENT)
Dept: PHYSICAL THERAPY | Age: 73
End: 2023-10-19
Payer: MEDICARE

## 2023-10-19 ENCOUNTER — HOSPITAL ENCOUNTER (OUTPATIENT)
Dept: PHYSICAL THERAPY | Age: 73
Setting detail: THERAPIES SERIES
Discharge: HOME | End: 2023-10-19
Payer: MEDICARE

## 2023-10-19 PROCEDURE — 97140 MANUAL THERAPY 1/> REGIONS: CPT | Performed by: SPECIALIST

## 2023-10-19 PROCEDURE — 97016 VASOPNEUMATIC DEVICE THERAPY: CPT | Performed by: SPECIALIST

## 2023-10-19 PROCEDURE — 97110 THERAPEUTIC EXERCISES: CPT | Performed by: SPECIALIST

## 2023-10-19 PROCEDURE — 97112 NEUROMUSCULAR REEDUCATION: CPT | Performed by: SPECIALIST

## 2023-10-24 ENCOUNTER — APPOINTMENT (OUTPATIENT)
Dept: PHYSICAL THERAPY | Age: 73
End: 2023-10-24
Payer: MEDICARE

## 2023-10-24 ENCOUNTER — HOSPITAL ENCOUNTER (OUTPATIENT)
Dept: PHYSICAL THERAPY | Age: 73
Setting detail: THERAPIES SERIES
Discharge: HOME | End: 2023-10-24
Payer: MEDICARE

## 2023-10-24 PROCEDURE — 97112 NEUROMUSCULAR REEDUCATION: CPT | Performed by: SPECIALIST

## 2023-10-24 PROCEDURE — 97110 THERAPEUTIC EXERCISES: CPT | Performed by: SPECIALIST

## 2023-10-24 PROCEDURE — 97016 VASOPNEUMATIC DEVICE THERAPY: CPT | Performed by: SPECIALIST

## 2023-10-24 PROCEDURE — 97140 MANUAL THERAPY 1/> REGIONS: CPT | Performed by: SPECIALIST

## 2023-10-24 NOTE — FLOWSHEET NOTE
NMR re-education (97430)   CUES NEEDED    / slant board  1 min  x   HR/TR on slant board  15x  x              SLS  1 min                              Therapeutic Activity (55042)       Step up and overs 2\" 15x            vaso  10 min                   [employer] access code: [email or web address]           Therapeutic Exercise and NMR EXR  [x] (93752) Provided verbal/tactile cueing for activities related to strengthening, flexibility, endurance, ROM for improvements in LE, proximal hip, and core control with self care, mobility, lifting, ambulation. [x] (09502) Provided verbal/tactile cueing for activities related to improving balance, coordination, kinesthetic sense, posture, motor skill, proprioception to assist with LE, proximal hip, and core control in self-care, mobility, lifting, ambulation and eccentric single leg control.      NMR and Therapeutic Activities:    [x] (80728 or 69486) Provided verbal/tactile cueing for activities related to improving balance, coordination, kinesthetic sense, posture, motor skill, proprioception and motor activation to allow for proper function of core, proximal hip and LE with self-care and ADLs and functional mobility.   [] (55933) Gait Re-education- Provided training and instruction to the patient for proper LE, core and proximal hip recruitment and positioning and eccentric body weight control with ambulation re-education including up and down stairs     Home Exercise Program:    [x] (69105) Reviewed/Progressed HEP activities related to strengthening, flexibility, endurance, ROM of core, proximal hip and LE for functional self-care, mobility, lifting and ambulation/stair navigation   [] (57374) Reviewed/Progressed HEP activities related to improving balance, coordination, kinesthetic sense, posture, motor skill, proprioception of core, proximal hip and LE for self-care, mobility, lifting, and ambulation/stair navigation      Manual Treatments:  PROM / STM /

## 2023-10-26 ENCOUNTER — HOSPITAL ENCOUNTER (OUTPATIENT)
Dept: PHYSICAL THERAPY | Age: 73
Setting detail: THERAPIES SERIES
Discharge: HOME | End: 2023-10-26
Payer: MEDICARE

## 2023-10-26 ENCOUNTER — APPOINTMENT (OUTPATIENT)
Dept: PHYSICAL THERAPY | Age: 73
End: 2023-10-26
Payer: MEDICARE

## 2023-10-26 PROCEDURE — 97112 NEUROMUSCULAR REEDUCATION: CPT | Performed by: SPECIALIST

## 2023-10-26 PROCEDURE — 97016 VASOPNEUMATIC DEVICE THERAPY: CPT | Performed by: SPECIALIST

## 2023-10-26 PROCEDURE — 97140 MANUAL THERAPY 1/> REGIONS: CPT | Performed by: SPECIALIST

## 2023-10-26 PROCEDURE — 97110 THERAPEUTIC EXERCISES: CPT | Performed by: SPECIALIST

## 2023-10-26 NOTE — FLOWSHEET NOTE
[address] [facility], [place] [address]  Phone: [phone]   Fax:     [phone]      Physical Therapy Treatment Note/ Progress Report:     Date:  10/26/2023    Patient Name:  Angela Mota    :  1950  MRN: [id number]  Restrictions/Precautions:    Medical/Treatment Diagnosis Information:   K54.538A (ICD-10-CM) - Complex tear of medial meniscus of right knee as current injury, initial encounter                  right knee arthroscopy with partial medial meniscectomy on 2023      Insurance/Certification information:   [address] [place] [address]  Physician Information:   Josh Hackett DO  Has the plan of care been signed (Y/N):        []  Yes  [x]  No     Date of Patient follow up with Physician: NS    Is this a Progress Report:     []  Yes  [x]  No      If Yes:  Date Range for reporting period:  Beginnin23 ------------ Ending: 10/19/23    Progress report will be due (10 Rx or 30 days whichever is less):      Recertification will be due (POC Duration  / 90 days whichever is less): 23      Visit # Insurance Allowable Auth Required   In Person 9 BMN []  Yes     []  No    Tele Health 0  []  Yes     []  No    Total 9       FOTO Score:   LEFS 78%    Date assessed:  23      Latex Allergy:  [x]NO      []YES  Preferred Language for Healthcare:   [x]English       []other:    Pain level:  310     SUBJECTIVE:  difficulty with stairs      OBJECTIVE:   Observation:   Test measurements:    10/12 AROM knee flex 125-0    RESTRICTIONS/PRECAUTIONS: none    Exercises/Interventions:   Therapeutic Ex (35505) Sets/sec Reps Notes/CUES HEP   Heel slides  10x  x   QS  10x  x   SLR/3# 10x2  x                        bike  5 min     SUSAN B abd/ ext 30# 15x     LP 80# 20x     Knee ext machine 10# 15x                   Manual Intervention (59022)       Pat mob/ ROM/ scar MA  8 min

## 2023-10-31 ENCOUNTER — HOSPITAL ENCOUNTER (OUTPATIENT)
Dept: PHYSICAL THERAPY | Age: 73
Setting detail: THERAPIES SERIES
Discharge: HOME | End: 2023-10-31
Payer: MEDICARE

## 2023-10-31 PROCEDURE — 97110 THERAPEUTIC EXERCISES: CPT | Performed by: SPECIALIST

## 2023-10-31 PROCEDURE — 97112 NEUROMUSCULAR REEDUCATION: CPT | Performed by: SPECIALIST

## 2023-10-31 PROCEDURE — 97140 MANUAL THERAPY 1/> REGIONS: CPT | Performed by: SPECIALIST

## 2023-10-31 PROCEDURE — 97016 VASOPNEUMATIC DEVICE THERAPY: CPT | Performed by: SPECIALIST

## 2023-11-01 ENCOUNTER — OFFICE VISIT (OUTPATIENT)
Dept: FAMILY MEDICINE CLINIC | Age: 73
End: 2023-11-01

## 2023-11-01 VITALS
SYSTOLIC BLOOD PRESSURE: 108 MMHG | WEIGHT: 191.2 LBS | OXYGEN SATURATION: 90 % | DIASTOLIC BLOOD PRESSURE: 78 MMHG | BODY MASS INDEX: 32.82 KG/M2 | HEART RATE: 89 BPM

## 2023-11-01 DIAGNOSIS — M51.37 DEGENERATION OF INTERVERTEBRAL DISC AT L5-S1 LEVEL: ICD-10-CM

## 2023-11-01 DIAGNOSIS — M51.37 DEGENERATIVE DISC DISEASE AT L5-S1 LEVEL: Primary | Chronic | ICD-10-CM

## 2023-11-01 DIAGNOSIS — M51.36 DEGENERATION OF L4-L5 INTERVERTEBRAL DISC: Chronic | ICD-10-CM

## 2023-11-01 DIAGNOSIS — M51.36 DEGENERATION OF LUMBAR INTERVERTEBRAL DISC: ICD-10-CM

## 2023-11-01 NOTE — PROGRESS NOTES
Chief Complaint   Patient presents with    Other     [facility] Claim         Internal Administration   First Dose COVID-19, J&J, (age 18y+), IM, 0.5 mL  2021   Second Dose           Last COVID Lab SARS-CoV-2 (no units)   Date Value   2021 Not Detected     SARS-COV-2, RdRp gene (no units)   Date Value   2022 Negative             Wt Readings from Last 3 Encounters:   23 86.7 kg (191 lb 3.2 oz)   10/11/23 86.6 kg (191 lb)   10/10/23 85.3 kg (188 lb)     BP Readings from Last 3 Encounters:   23 108/78   10/11/23 110/66   23 120/69      Lab Results   Component Value Date    LABA1C 6.4 2023    LABA1C 6.2 2023    LABA1C 5.9 10/27/2021       HPI:  Sigrid Damon is a 68 y.o. (: 1950) here today for    Patient is here today for her  [facility] claim. It was related to her lower lumbar spine. She does have some tenderness over the sacral spine but no tenderness over the lumbar spine. A c-9 will be sent also for dr. Chidi Koo her neurologist she saw in the past.     [x] Patient has completed an advance directive  [] Patient has NOT completed an advanced directive  [x] Patient has a documented healthcare surrogate  [] Patient does NOT have a documented healthcare surrogate  [] Discussed the importance of establishing and updating an advanced directive. Patient has questions at this time and those were answered. [x] Discussed the importance of establishing and updating an advanced directive. Patient does NOT have questions at this time. Discussed with: [x] Patient            [] Family             [] Other caregiver    Patient's medications, allergies, past medical, surgical, social and family histories were reviewed and updated asappropriate on 2023 at 1:34 PM.    ROS:  Review of Systems    All other systems reviewed and are negative except as noted above on 2023 at 1:34 PM. Additional review of systems may be scanned into the media section ofthis medical record.

## 2023-11-19 DIAGNOSIS — B02.29 POSTHERPETIC NEURALGIA: ICD-10-CM

## 2023-11-20 RX ORDER — LIDOCAINE 50 MG/G
PATCH TOPICAL
Qty: 30 PATCH | Refills: 5 | Status: SHIPPED | OUTPATIENT
Start: 2023-11-20

## 2023-11-20 NOTE — TELEPHONE ENCOUNTER
Refill Request     CONFIRM preferrred pharmacy with the patient. If Mail Order Rx - Pend for 90 day refill. Last Seen: Last Seen Department: 11/1/2023  Last Seen by PCP: 11/1/2023    Last Written: 10/26/23    If no future appointment scheduled, route STAFF MESSAGE with patient name to the [facility] for scheduling. Next Appointment:   Future Appointments   Date Time Provider [address]  [facility]   11/28/2023  2:00 PM Wilbur Guzman MD [facility]   12/6/2023  2:00 PM DO ASIM Florian [facility]   12/13/2023  1:20 PM Yuly Felton MD [facility]   4/17/2024  3:40 PM Pricila Mueller MD [facility] [place] [facility]   5/1/2024  3:20 PM MD Dell Dumont  [place] - [facility]       Message sent to [address] [place] [address] to schedule appt with patient?   NO      Requested Prescriptions     Pending Prescriptions Disp Refills    lidocaine (LIDODERM) 5 % [Pharmacy Med Name: LIDOCAINE 5% PATCH] 30 patch 5     Sig: APPLY 1 PATCH TO AFFECTED AREA FOR 12 HOURS IN A 24 HOUR PERIOD

## 2023-11-28 ENCOUNTER — OFFICE VISIT (OUTPATIENT)
Dept: CARDIOLOGY CLINIC | Age: 73
End: 2023-11-28
Payer: MEDICARE

## 2023-11-28 VITALS
WEIGHT: 189 LBS | SYSTOLIC BLOOD PRESSURE: 120 MMHG | HEIGHT: 64 IN | OXYGEN SATURATION: 93 % | BODY MASS INDEX: 32.27 KG/M2 | HEART RATE: 72 BPM | DIASTOLIC BLOOD PRESSURE: 78 MMHG

## 2023-11-28 DIAGNOSIS — I10 ESSENTIAL HYPERTENSION: ICD-10-CM

## 2023-11-28 DIAGNOSIS — I50.22 SYSTOLIC CHF, CHRONIC (HCC): Primary | ICD-10-CM

## 2023-11-28 DIAGNOSIS — R53.83 OTHER FATIGUE: ICD-10-CM

## 2023-11-28 DIAGNOSIS — E78.2 MIXED HYPERLIPIDEMIA: ICD-10-CM

## 2023-11-28 DIAGNOSIS — I42.9 CARDIOMYOPATHY, IDIOPATHIC (HCC): ICD-10-CM

## 2023-11-28 PROCEDURE — 1090F PRES/ABSN URINE INCON ASSESS: CPT | Performed by: INTERNAL MEDICINE

## 2023-11-28 PROCEDURE — 3074F SYST BP LT 130 MM HG: CPT | Performed by: INTERNAL MEDICINE

## 2023-11-28 PROCEDURE — G8417 CALC BMI ABV UP PARAM F/U: HCPCS | Performed by: INTERNAL MEDICINE

## 2023-11-28 PROCEDURE — 99215 OFFICE O/P EST HI 40 MIN: CPT | Performed by: INTERNAL MEDICINE

## 2023-11-28 PROCEDURE — G8400 PT W/DXA NO RESULTS DOC: HCPCS | Performed by: INTERNAL MEDICINE

## 2023-11-28 PROCEDURE — G8484 FLU IMMUNIZE NO ADMIN: HCPCS | Performed by: INTERNAL MEDICINE

## 2023-11-28 PROCEDURE — 1123F ACP DISCUSS/DSCN MKR DOCD: CPT | Performed by: INTERNAL MEDICINE

## 2023-11-28 PROCEDURE — 3017F COLORECTAL CA SCREEN DOC REV: CPT | Performed by: INTERNAL MEDICINE

## 2023-11-28 PROCEDURE — G8427 DOCREV CUR MEDS BY ELIG CLIN: HCPCS | Performed by: INTERNAL MEDICINE

## 2023-11-28 PROCEDURE — 1036F TOBACCO NON-USER: CPT | Performed by: INTERNAL MEDICINE

## 2023-11-28 PROCEDURE — 3078F DIAST BP <80 MM HG: CPT | Performed by: INTERNAL MEDICINE

## 2023-11-28 RX ORDER — TRAMADOL HYDROCHLORIDE 50 MG/1
TABLET ORAL
COMMUNITY
Start: 2023-11-19

## 2023-11-28 NOTE — PATIENT INSTRUCTIONS
Plan:  We will reach out to the [facility] to see if they can schedule you leqvio injection for cholesterol management.  They will call you to set this up  Follow up with PCP regarding B12 injections  Labs: fasting lipids, B12, cbc, cmp, bnp, tsh ft4, iron and tibc  You will need repeat echocardiogram prior to back surgery  Discussed possibly ordering a cardiac CTA as well  Follow up in 6 months

## 2023-11-28 NOTE — PROGRESS NOTES
consistent with left bundle branch   block. Grade II diastolic dysfunction with elevated LV pressure. Right ventricular systolic function is indeterminate. Systolic pulmonary artery pressure (SPAP) is normal and estimated at 25 mmHg   (right atrial pressure 3 mmHg). Echo 11/9/2021:  Summary   Global left ventricular systolic function is mildly decreased with ejection   fraction estimated at 45%. Abnormal (paradoxical) septal motion is present likely due to left bundle   branch block. Normal left ventricular size with mild concentric left ventricular   hypertrophy. Grade I diastolic dysfunction. Normal RV size and systolic function. Mildly dilated left atrium. Mild tricuspid regurgitation. Systolic pulmonary artery pressure (SPAP) is normal and estimated at 22 mmHg   (right atrial pressure 3 mmHg)    Echo 11/29/2022  Summary   Technically difficult examination. -- The left ventricular systolic function is mildly reduced with an ejection   fraction of 40-45 %. There is hypokinesis of the inferior, inferoseptal and   anteroseptal walls. Normal left ventricle size. Mild concentric left   ventricular hypertrophy. Normal left ventricular diastolic filling pressure. -- MIld mitral and tricuspid regurgitation. -- Systolic pulmonary artery pressure (SPAP) is normal and estimated at 25   mmHg (right atrial pressure 3 mmHg). Labs were reviewed including labs from other hospital systems through [facility]. Cardiac testing was reviewed including echos, nuclear scans, cardiac catheterization, including from other hospital systems through [employer] [employer] [facility]. Heart Failure Therapies: The 4 Pillars of Heart Failure Therapies    ACE inhibitors, angiotensin receptor blockers, or ARNI (Entresto):    2. Beta blockers: toprol xl 25    3. Aldosterone blockers: aldactone 12.5    4. SGLT2 inhibitors:        Assessment:    1. Systolic CHF, chronic ([address])    2. Mixed hyperlipidemia    3.

## 2023-12-06 ENCOUNTER — OFFICE VISIT (OUTPATIENT)
Dept: ORTHOPEDIC SURGERY | Age: 73
End: 2023-12-06
Payer: MEDICARE

## 2023-12-06 DIAGNOSIS — S83.231A COMPLEX TEAR OF MEDIAL MENISCUS OF RIGHT KNEE AS CURRENT INJURY, INITIAL ENCOUNTER: Primary | ICD-10-CM

## 2023-12-06 PROCEDURE — 99213 OFFICE O/P EST LOW 20 MIN: CPT | Performed by: STUDENT IN AN ORGANIZED HEALTH CARE EDUCATION/TRAINING PROGRAM

## 2023-12-06 PROCEDURE — G8400 PT W/DXA NO RESULTS DOC: HCPCS | Performed by: STUDENT IN AN ORGANIZED HEALTH CARE EDUCATION/TRAINING PROGRAM

## 2023-12-06 PROCEDURE — 3017F COLORECTAL CA SCREEN DOC REV: CPT | Performed by: STUDENT IN AN ORGANIZED HEALTH CARE EDUCATION/TRAINING PROGRAM

## 2023-12-06 PROCEDURE — 1036F TOBACCO NON-USER: CPT | Performed by: STUDENT IN AN ORGANIZED HEALTH CARE EDUCATION/TRAINING PROGRAM

## 2023-12-06 PROCEDURE — 1090F PRES/ABSN URINE INCON ASSESS: CPT | Performed by: STUDENT IN AN ORGANIZED HEALTH CARE EDUCATION/TRAINING PROGRAM

## 2023-12-06 PROCEDURE — G8484 FLU IMMUNIZE NO ADMIN: HCPCS | Performed by: STUDENT IN AN ORGANIZED HEALTH CARE EDUCATION/TRAINING PROGRAM

## 2023-12-06 PROCEDURE — G8427 DOCREV CUR MEDS BY ELIG CLIN: HCPCS | Performed by: STUDENT IN AN ORGANIZED HEALTH CARE EDUCATION/TRAINING PROGRAM

## 2023-12-06 PROCEDURE — G8417 CALC BMI ABV UP PARAM F/U: HCPCS | Performed by: STUDENT IN AN ORGANIZED HEALTH CARE EDUCATION/TRAINING PROGRAM

## 2023-12-06 PROCEDURE — 1123F ACP DISCUSS/DSCN MKR DOCD: CPT | Performed by: STUDENT IN AN ORGANIZED HEALTH CARE EDUCATION/TRAINING PROGRAM

## 2023-12-06 NOTE — PROGRESS NOTES
Chief Complaint  Knee Pain (Ck rt knee)      History of Present Illness: The patient is here for repeat evaluation of her right knee. The patient is 3 months out from her surgery. She is having limited catching pain in the anterior aspect of the knee and some swelling anterior medially. Overall she is doing okay. Prior HPI 10/10/2023:  The patient is here for repeat evaluation of her right knee. She is 6 weeks out from a right knee arthroscopy with partial medial meniscectomy. She is working with physical therapy. She is still walking with a single crutch when she is out of the house as she still has a little bit of weakness to the knee. She does not use it when she is at home. She is happy with her progress so far but still has concerns with stairs. She does have multiple stairs that she has to traverse at work and recently the chairlift to get up the stairs has been broken. Prior HPI 9/12/2023:  Justice Colorado is a pleasant 68 y.o. female here today for her first postop evaluation regarding her right knee. She underwent a right knee arthroscopy with partial medial meniscectomy on 8/28/2023 2 weeks ago. She is doing well. Expected amount of pain. Medical History:  Patient's medications, allergies, past medical, surgical, social and family histories were reviewed and updated as appropriate. Pertinent items are noted in HPI  Review of systems reviewed from Patient History Form dated on 12/6/23 and available in the patient's chart under the Media tab. Vital Signs: There were no vitals filed for this visit. Constitutional: In no apparent distress. Normal affect. Alert and oriented X3 and is cooperative. Right knee exam    Well-healed surgical scars to the anterior knee. Slight scar formation noted to both portals. Some tenderness palpation over the scars. Knee range of motion 0 to 140 degrees with pain to the anterior knee on deep flexion.   Some tightness anteriorly

## 2023-12-27 DIAGNOSIS — B37.0 THRUSH: ICD-10-CM

## 2023-12-27 DIAGNOSIS — J02.9 SORE THROAT: ICD-10-CM

## 2023-12-27 DIAGNOSIS — F41.8 SITUATIONAL ANXIETY: ICD-10-CM

## 2023-12-28 RX ORDER — FLUCONAZOLE 150 MG/1
TABLET ORAL
Qty: 1 TABLET | Refills: 0 | OUTPATIENT
Start: 2023-12-28

## 2023-12-28 RX ORDER — CITALOPRAM HYDROBROMIDE 10 MG/1
10 TABLET ORAL DAILY
Qty: 90 TABLET | Refills: 0 | Status: SHIPPED | OUTPATIENT
Start: 2023-12-28

## 2023-12-28 NOTE — TELEPHONE ENCOUNTER
Refill Request     CONFIRM preferrred pharmacy with the patient. If Mail Order Rx - Pend for 90 day refill. Last Seen: Last Seen Department: 11/1/2023  Last Seen by PCP: 11/1/2023    Last Written: 09/20/2023    If no future appointment scheduled, route STAFF MESSAGE with patient name to the [facility] for scheduling. Next Appointment:   Future Appointments   Date Time Provider [facility] [address]   2/15/2024 11:00 AM Vito Vail, APRN - CNP [facility]   3/8/2024  3:15 PM Lucindatamar Garcia [facility]   4/17/2024  3:40 PM MD Dell Oliva  [place] [facility]   5/1/2024  3:20 PM MD Dell Oliva  [place] [facility]   6/4/2024  1:30 PM MD Heather Thomas [facility]       Message sent to [address] [place] [address] to schedule appt with patient?   NO      Requested Prescriptions     Pending Prescriptions Disp Refills    citalopram (CELEXA) 10 MG tablet [Pharmacy Med Name: CITALOPRAM HBR 10 MG TABLET] 90 tablet 0     Sig: TAKE 1 TABLET BY MOUTH DAILY

## 2024-01-25 ENCOUNTER — TELEPHONE (OUTPATIENT)
Dept: CARDIOLOGY CLINIC | Age: 74
End: 2024-01-25

## 2024-01-25 DIAGNOSIS — I10 ESSENTIAL HYPERTENSION: ICD-10-CM

## 2024-01-25 DIAGNOSIS — I50.22 SYSTOLIC CHF, CHRONIC (HCC): ICD-10-CM

## 2024-01-25 RX ORDER — METOPROLOL SUCCINATE 25 MG/1
TABLET, EXTENDED RELEASE ORAL
Qty: 90 TABLET | Refills: 1 | Status: SHIPPED | OUTPATIENT
Start: 2024-01-25

## 2024-01-25 RX ORDER — SPIRONOLACTONE 25 MG/1
TABLET ORAL
Qty: 45 TABLET | Refills: 1 | Status: SHIPPED | OUTPATIENT
Start: 2024-01-25

## 2024-01-25 NOTE — TELEPHONE ENCOUNTER
Pt would like a refill of spironolactone and metoprolol sent to [facility].     Last Ov- 11.28.23 MATTEO  Next Ov- 06.04.24

## 2024-02-12 DIAGNOSIS — F41.8 SITUATIONAL ANXIETY: ICD-10-CM

## 2024-02-12 RX ORDER — CITALOPRAM HYDROBROMIDE 10 MG/1
10 TABLET ORAL DAILY
Qty: 90 TABLET | Refills: 0 | Status: SHIPPED | OUTPATIENT
Start: 2024-02-12

## 2024-02-15 ENCOUNTER — HOSPITAL ENCOUNTER (OUTPATIENT)
Dept: GENERAL RADIOLOGY | Age: 74
Discharge: HOME | End: 2024-02-15
Payer: MEDICARE

## 2024-02-15 ENCOUNTER — HOSPITAL ENCOUNTER (OUTPATIENT)
Age: 74
Discharge: HOME | End: 2024-02-15

## 2024-02-15 DIAGNOSIS — M51.37 DEGENERATION OF LUMBAR OR LUMBOSACRAL INTERVERTEBRAL DISC: ICD-10-CM

## 2024-02-15 PROCEDURE — 73501 X-RAY EXAM HIP UNI 1 VIEW: CPT

## 2024-02-15 PROCEDURE — 72110 X-RAY EXAM L-2 SPINE 4/>VWS: CPT

## 2024-02-15 PROCEDURE — 73502 X-RAY EXAM HIP UNI 2-3 VIEWS: CPT

## 2024-03-08 ENCOUNTER — OFFICE VISIT (OUTPATIENT)
Dept: ORTHOPEDIC SURGERY | Age: 74
End: 2024-03-08
Payer: MEDICARE

## 2024-03-08 DIAGNOSIS — S83.231A COMPLEX TEAR OF MEDIAL MENISCUS OF RIGHT KNEE AS CURRENT INJURY, INITIAL ENCOUNTER: Primary | ICD-10-CM

## 2024-03-08 PROCEDURE — 1036F TOBACCO NON-USER: CPT | Performed by: STUDENT IN AN ORGANIZED HEALTH CARE EDUCATION/TRAINING PROGRAM

## 2024-03-08 PROCEDURE — G8417 CALC BMI ABV UP PARAM F/U: HCPCS | Performed by: STUDENT IN AN ORGANIZED HEALTH CARE EDUCATION/TRAINING PROGRAM

## 2024-03-08 PROCEDURE — 3017F COLORECTAL CA SCREEN DOC REV: CPT | Performed by: STUDENT IN AN ORGANIZED HEALTH CARE EDUCATION/TRAINING PROGRAM

## 2024-03-08 PROCEDURE — 1090F PRES/ABSN URINE INCON ASSESS: CPT | Performed by: STUDENT IN AN ORGANIZED HEALTH CARE EDUCATION/TRAINING PROGRAM

## 2024-03-08 PROCEDURE — G8400 PT W/DXA NO RESULTS DOC: HCPCS | Performed by: STUDENT IN AN ORGANIZED HEALTH CARE EDUCATION/TRAINING PROGRAM

## 2024-03-08 PROCEDURE — G8484 FLU IMMUNIZE NO ADMIN: HCPCS | Performed by: STUDENT IN AN ORGANIZED HEALTH CARE EDUCATION/TRAINING PROGRAM

## 2024-03-08 PROCEDURE — G8427 DOCREV CUR MEDS BY ELIG CLIN: HCPCS | Performed by: STUDENT IN AN ORGANIZED HEALTH CARE EDUCATION/TRAINING PROGRAM

## 2024-03-08 PROCEDURE — 99213 OFFICE O/P EST LOW 20 MIN: CPT | Performed by: STUDENT IN AN ORGANIZED HEALTH CARE EDUCATION/TRAINING PROGRAM

## 2024-03-08 PROCEDURE — 1123F ACP DISCUSS/DSCN MKR DOCD: CPT | Performed by: STUDENT IN AN ORGANIZED HEALTH CARE EDUCATION/TRAINING PROGRAM

## 2024-03-08 NOTE — PROGRESS NOTES
Chief Complaint  Knee Pain (Ck rt knee)      History of Present Illness:  The patient is here for repeat evaluation of her right knee.  The patient is doing very well.  She is nearly 6 months out from her surgery.  She denies any major issues.  She has occasional soreness to the medial joint but it is much better than prior to surgery.  She is happy with her results.    Prior HPI 12/6/2023:  The patient is here for repeat evaluation of her right knee.  The patient is 3 months out from her surgery.  She is having limited catching pain in the anterior aspect of the knee and some swelling anterior medially.  Overall she is doing okay.    Prior HPI 10/10/2023:  The patient is here for repeat evaluation of her right knee.  She is 6 weeks out from a right knee arthroscopy with partial medial meniscectomy.  She is working with physical therapy.  She is still walking with a single crutch when she is out of the house as she still has a little bit of weakness to the knee.  She does not use it when she is at home.  She is happy with her progress so far but still has concerns with stairs.  She does have multiple stairs that she has to traverse at work and recently the chairlift to get up the stairs has been broken.    Prior HPI 9/12/2023:  Mary Yadav is a pleasant 73 y.o. female here today for her first postop evaluation regarding her right knee.  She underwent a right knee arthroscopy with partial medial meniscectomy on 8/28/2023 2 weeks ago.  She is doing well.  Expected amount of pain.         Medical History:  Patient's medications, allergies, past medical, surgical, social and family histories were reviewed and updated as appropriate.    Pertinent items are noted in HPI  Review of systems reviewed from Patient History Form dated on 3/8/24 and available in the patient's chart under the Media tab.       Vital Signs:  There were no vitals filed for this visit.      Constitutional: In no apparent distress. Normal

## 2024-03-20 ENCOUNTER — OFFICE VISIT (OUTPATIENT)
Dept: FAMILY MEDICINE CLINIC | Age: 74
End: 2024-03-20
Payer: MEDICARE

## 2024-03-20 VITALS
HEIGHT: 64 IN | SYSTOLIC BLOOD PRESSURE: 101 MMHG | DIASTOLIC BLOOD PRESSURE: 63 MMHG | WEIGHT: 188.2 LBS | HEART RATE: 77 BPM | OXYGEN SATURATION: 90 % | BODY MASS INDEX: 32.13 KG/M2

## 2024-03-20 DIAGNOSIS — I77.1 SUBCLAVIAN ARTERY STENOSIS, RIGHT (HCC): ICD-10-CM

## 2024-03-20 DIAGNOSIS — F32.3 DEPRESSIVE PSYCHOSIS (HCC): ICD-10-CM

## 2024-03-20 DIAGNOSIS — I42.9 CARDIOMYOPATHY, IDIOPATHIC (HCC): ICD-10-CM

## 2024-03-20 DIAGNOSIS — Z00.00 MEDICARE ANNUAL WELLNESS VISIT, SUBSEQUENT: Primary | ICD-10-CM

## 2024-03-20 DIAGNOSIS — I50.22 SYSTOLIC CHF, CHRONIC (HCC): ICD-10-CM

## 2024-03-20 DIAGNOSIS — Z00.00 ENCOUNTER FOR ANNUAL WELLNESS VISIT (AWV) IN MEDICARE PATIENT: ICD-10-CM

## 2024-03-20 DIAGNOSIS — F41.8 SITUATIONAL ANXIETY: ICD-10-CM

## 2024-03-20 PROCEDURE — G8427 DOCREV CUR MEDS BY ELIG CLIN: HCPCS | Performed by: FAMILY MEDICINE

## 2024-03-20 PROCEDURE — 3078F DIAST BP <80 MM HG: CPT | Performed by: FAMILY MEDICINE

## 2024-03-20 PROCEDURE — 1090F PRES/ABSN URINE INCON ASSESS: CPT | Performed by: FAMILY MEDICINE

## 2024-03-20 PROCEDURE — 99213 OFFICE O/P EST LOW 20 MIN: CPT | Performed by: FAMILY MEDICINE

## 2024-03-20 PROCEDURE — G0439 PPPS, SUBSEQ VISIT: HCPCS | Performed by: FAMILY MEDICINE

## 2024-03-20 PROCEDURE — 3017F COLORECTAL CA SCREEN DOC REV: CPT | Performed by: FAMILY MEDICINE

## 2024-03-20 PROCEDURE — G8484 FLU IMMUNIZE NO ADMIN: HCPCS | Performed by: FAMILY MEDICINE

## 2024-03-20 PROCEDURE — G8417 CALC BMI ABV UP PARAM F/U: HCPCS | Performed by: FAMILY MEDICINE

## 2024-03-20 PROCEDURE — G8400 PT W/DXA NO RESULTS DOC: HCPCS | Performed by: FAMILY MEDICINE

## 2024-03-20 PROCEDURE — 1036F TOBACCO NON-USER: CPT | Performed by: FAMILY MEDICINE

## 2024-03-20 PROCEDURE — 3074F SYST BP LT 130 MM HG: CPT | Performed by: FAMILY MEDICINE

## 2024-03-20 PROCEDURE — 1123F ACP DISCUSS/DSCN MKR DOCD: CPT | Performed by: FAMILY MEDICINE

## 2024-03-20 RX ORDER — CITALOPRAM 20 MG/1
20 TABLET ORAL DAILY
Qty: 90 TABLET | Refills: 0 | Status: SHIPPED | OUTPATIENT
Start: 2024-03-20

## 2024-03-20 ASSESSMENT — PATIENT HEALTH QUESTIONNAIRE - PHQ9
SUM OF ALL RESPONSES TO PHQ QUESTIONS 1-9: 15
SUM OF ALL RESPONSES TO PHQ QUESTIONS 1-9: 15
6. FEELING BAD ABOUT YOURSELF - OR THAT YOU ARE A FAILURE OR HAVE LET YOURSELF OR YOUR FAMILY DOWN: NEARLY EVERY DAY
3. TROUBLE FALLING OR STAYING ASLEEP: MORE THAN HALF THE DAYS
2. FEELING DOWN, DEPRESSED OR HOPELESS: SEVERAL DAYS
7. TROUBLE CONCENTRATING ON THINGS, SUCH AS READING THE NEWSPAPER OR WATCHING TELEVISION: NEARLY EVERY DAY
8. MOVING OR SPEAKING SO SLOWLY THAT OTHER PEOPLE COULD HAVE NOTICED. OR THE OPPOSITE, BEING SO FIGETY OR RESTLESS THAT YOU HAVE BEEN MOVING AROUND A LOT MORE THAN USUAL: SEVERAL DAYS
5. POOR APPETITE OR OVEREATING: NOT AT ALL
SUM OF ALL RESPONSES TO PHQ QUESTIONS 1-9: 15
10. IF YOU CHECKED OFF ANY PROBLEMS, HOW DIFFICULT HAVE THESE PROBLEMS MADE IT FOR YOU TO DO YOUR WORK, TAKE CARE OF THINGS AT HOME, OR GET ALONG WITH OTHER PEOPLE: VERY DIFFICULT
9. THOUGHTS THAT YOU WOULD BE BETTER OFF DEAD, OR OF HURTING YOURSELF: NOT AT ALL
1. LITTLE INTEREST OR PLEASURE IN DOING THINGS: MORE THAN HALF THE DAYS
SUM OF ALL RESPONSES TO PHQ QUESTIONS 1-9: 15
SUM OF ALL RESPONSES TO PHQ9 QUESTIONS 1 & 2: 3
4. FEELING TIRED OR HAVING LITTLE ENERGY: NEARLY EVERY DAY

## 2024-03-20 ASSESSMENT — LIFESTYLE VARIABLES
HOW MANY STANDARD DRINKS CONTAINING ALCOHOL DO YOU HAVE ON A TYPICAL DAY: PATIENT DOES NOT DRINK
HOW OFTEN DO YOU HAVE A DRINK CONTAINING ALCOHOL: NEVER

## 2024-03-20 NOTE — PROGRESS NOTES
Medicare Annual Wellness Visit    Mary Yadav is here for Medicare AWV    Assessment & Plan   Medicare annual wellness visit, subsequent  Depressive psychosis (HCC)  Systolic CHF, chronic (HCC)  Cardiomyopathy, idiopathic (HCC)  Subclavian artery stenosis, right (HCC)  Encounter for annual wellness visit (AWV) in Medicare patient  Situational anxiety  -     citalopram (CELEXA) 20 MG tablet; Take 1 tablet by mouth daily, Disp-90 tablet, R-0Normal    Recommendations for Preventive Services Due: see orders and patient instructions/AVS.  Recommended screening schedule for the next 5-10 years is provided to the patient in written form: see Patient Instructions/AVS.     Return if symptoms worsen or fail to improve.     Subjective     In addition to the AWV patient is depressed related to the stress she is under from caring for her mother. Patient is not of the mindset to harm herself or others. We discussed that increasing citalopram may help her cope with the other stresses. We have given her a living will. Other issues are at baseline.     Patient's complete Health Risk Assessment and screening values have been reviewed and are found in Flowsheets. The following problems were reviewed today and where indicated follow up appointments were made and/or referrals ordered.    Positive Risk Factor Screenings with Interventions:    Fall Risk:  Do you feel unsteady or are you worried about falling? : (!) yes  2 or more falls in past year?: no (1 fall)  Fall with injury in past year?: no     Interventions:    Reviewed medications, home hazards, visual acuity, and co-morbidities that can increase risk for falls  Recommended Assisted Device     Depression:  PHQ-2 Score: 3  PHQ-9 Total Score: 15    Interpretation:  5-9 mild   10-14 moderate   15-19 moderately severe   20-27 severe     Interventions:  Discussed patients depression medications, increased patients celexa to 20mg daily.        Controlled Medication 
Medicare Annual Wellness Visit    Mary Yadav is here for Medicare AWV    Assessment & Plan   Medicare annual wellness visit, subsequent  Depressive psychosis (HCC)  Systolic CHF, chronic (HCC)  Cardiomyopathy, idiopathic (HCC)  Subclavian artery stenosis, right (HCC)  Encounter for annual wellness visit (AWV) in Medicare patient  Situational anxiety  -     citalopram (CELEXA) 20 MG tablet; Take 1 tablet by mouth daily, Disp-90 tablet, R-0Normal    Recommendations for Preventive Services Due: see orders and patient instructions/AVS.  Recommended screening schedule for the next 5-10 years is provided to the patient in written form: see Patient Instructions/AVS.     Return in 1 year (on 3/20/2025).     Subjective   The following acute and/or chronic problems were also addressed today: Patient has worsening depression related to stress from mother.  Patient has no intent to harm herself or others.    Patient's complete Health Risk Assessment and screening values have been reviewed and are found in Flowsheets. The following problems were reviewed today and where indicated follow up appointments were made and/or referrals ordered.    Positive Risk Factor Screenings with Interventions:    Fall Risk:  Do you feel unsteady or are you worried about falling? : (!) yes  2 or more falls in past year?: no (1 fall)  Fall with injury in past year?: no     Interventions:    Reviewed medications, home hazards, visual acuity, and co-morbidities that can increase risk for falls     Depression:  PHQ-2 Score: 3  PHQ-9 Total Score: 15    Interpretation:  5-9 mild   10-14 moderate   15-19 moderately severe   20-27 severe     Interventions:  Medications adjusted: See above       Controlled Medication Review:      Today's Pain Level: No data recorded   Opioid Risk: (Low risk score <55) Opioid risk score: 13    Patient is low risk for opioid use disorder or overdose.    Last PDMP Devyn as Reviewed:  Review User Review Instant Review 
Medicare Annual Wellness Visit    Mary Yadav is here for Medicare AWV    Assessment & Plan   Medicare annual wellness visit, subsequent  Depressive psychosis (HCC)  Systolic CHF, chronic (HCC)  Cardiomyopathy, idiopathic (HCC)  Subclavian artery stenosis, right (HCC)  Encounter for annual wellness visit (AWV) in Medicare patient  Situational anxiety  -     citalopram (CELEXA) 20 MG tablet; Take 1 tablet by mouth daily, Disp-90 tablet, R-0Normal  In addition to the AWV patient is depressed related to the stress she is under from caring for her mother.  Patient is not of the mindset to harm herself or others.  We discussed that increasing citalopram may help her cope with the other stresses.  We have given her a living will.  Other issues are at baseline.    Recommendations for Preventive Services Due: see orders and patient instructions/AVS.  Recommended screening schedule for the next 5-10 years is provided to the patient in written form: see Patient Instructions/AVS.     No follow-ups on file.     Subjective       Patient's complete Health Risk Assessment and screening values have been reviewed and are found in Flowsheets. The following problems were reviewed today and where indicated follow up appointments were made and/or referrals ordered.    Positive Risk Factor Screenings with Interventions:    Fall Risk:  Do you feel unsteady or are you worried about falling? : (!) yes  2 or more falls in past year?: no (1 fall)  Fall with injury in past year?: no     Interventions:    Recommended Assisted Device     Depression:  PHQ-2 Score: 3  PHQ-9 Total Score: 15    Interpretation:  5-9 mild   10-14 moderate   15-19 moderately severe   20-27 severe        Controlled Medication Review:  {IMPORTANT! Click here to document Controlled Substance Monitoring and then refresh note (Optional):[id number]}    Today's Pain Level: No data recorded   Opioid Risk: (Low risk score <55) Opioid risk score: 13    Patient is low risk for 
Visit completed  
rhythm, normal S1 and S2, no murmurs, rubs, clicks, or gallops, distal pulses intact, no carotid bruits  Abdomen: soft, non-tender, non-distended, normal bowel sounds, no masses or organomegaly  Extremities: no cyanosis, clubbing or edema  Musculoskeletal: normal range of motion, no joint swelling, deformity or tenderness  Neurologic: reflexes normal and symmetric, no cranial nerve deficit, gait, coordination and speech normal       Allergies   Allergen Reactions    Shellfish-Derived Products Nausea And Vomiting    Celebrex [Celecoxib] Swelling    Codeine     Ibuprofen Swelling    Methadone     Vioxx Swelling     Prior to Visit Medications    Medication Sig Taking? Authorizing Provider   citalopram (CELEXA) 10 MG tablet TAKE 1 TABLET BY MOUTH DAILY Yes Berry Krause MD   spironolactone (ALDACTONE) 25 MG tablet TAKE ONE-HALF TABLET BY MOUTH DAILY Yes Charlotte Timmons MD   metoprolol succinate (TOPROL XL) 25 MG extended release tablet Take one tab daily Yes Charlotte Timmons MD   traMADol (ULTRAM) 50 MG tablet  Yes Louie Mcnamara MD   lidocaine (LIDODERM) 5 % APPLY 1 PATCH TO AFFECTED AREA FOR 12 HOURS IN A 24 HOUR PERIOD Yes Berry Krause MD   gabapentin (NEURONTIN) 100 MG capsule TAKE TWO CAPSULES BY MOUTH THREE TIMES A DAY AS DIRECTED Yes Berry Krause MD   atorvastatin (LIPITOR) 20 MG tablet Take one tab daily Yes Charlotte Timmons MD   ezetimibe (ZETIA) 10 MG tablet TAKE ONE TABLET BY MOUTH DAILY Yes Charlotte Timmons MD   albuterol sulfate HFA (VENTOLIN HFA) 108 (90 Base) MCG/ACT inhaler Inhale 2 puffs into the lungs every 6 hours as needed for Wheezing or Shortness of Breath (sob) Yes Jesus Peoples MD   Coenzyme Q10 (COQ10 PO) Take by mouth Yes Louie Mcnamara MD   lisinopril (PRINIVIL;ZESTRIL) 40 MG tablet TAKE ONE TABLET BY MOUTH EVERY EVENING Yes Charlotte Timmons MD   fenofibrate (TRICOR) 54 MG tablet TAKE ONE TABLET BY MOUTH DAILY Yes Charlotte Timmons MD

## 2024-03-20 NOTE — PATIENT INSTRUCTIONS
-3
ready to make changes right now, try to pick a date in the future. Then make an appointment with your doctor to talk about when and how you'll get started with a plan.  Follow-up care is a key part of your treatment and safety. Be sure to make and go to all appointments, and call your doctor if you are having problems. It's also a good idea to know your test results and keep a list of the medicines you take.  How can you care for yourself at home?  Set realistic goals. Many people expect to lose much more weight than is likely. A weight loss of 5% to 10% of your body weight may be enough to improve your health.  Get family and friends involved to provide support. Talk to them about why you are trying to lose weight, and ask them to help. They can help by participating in exercise and having meals with you, even if they may be eating something different.  Find what works best for you. If you do not have time or do not like to cook, a program that offers meal replacement bars or shakes may be better for you. Or if you like to prepare meals, finding a plan that includes daily menus and recipes may be best.  Ask your doctor about other health professionals who can help you achieve your weight loss goals.  A dietitian can help you make healthy changes in your diet.  An exercise specialist or  can help you develop a safe and effective exercise program.  A counselor or psychiatrist can help you cope with issues such as depression, anxiety, or family problems that can make it hard to focus on weight loss.  Consider joining a support group for people who are trying to lose weight. Your doctor can suggest groups in your area.  Where can you learn more?  Go to https://www.health[employer]ak[employer].net/patientEd and enter U357 to learn more about \"Starting a Weight Loss Plan: Care Instructions.\"  Current as of: September 20, 2023               Content Version: 14.0  © 4348-8907 [employer].   Care instructions

## 2024-03-25 ENCOUNTER — NURSE ONLY (OUTPATIENT)
Dept: FAMILY MEDICINE CLINIC | Age: 74
End: 2024-03-25

## 2024-03-25 DIAGNOSIS — I42.9 CARDIOMYOPATHY, IDIOPATHIC (HCC): ICD-10-CM

## 2024-03-25 DIAGNOSIS — I10 ESSENTIAL HYPERTENSION: ICD-10-CM

## 2024-03-25 DIAGNOSIS — E78.2 MIXED HYPERLIPIDEMIA: ICD-10-CM

## 2024-03-25 DIAGNOSIS — R53.83 OTHER FATIGUE: ICD-10-CM

## 2024-03-25 DIAGNOSIS — I50.22 SYSTOLIC CHF, CHRONIC (HCC): ICD-10-CM

## 2024-03-26 LAB
ALBUMIN SERPL-MCNC: 4.2 G/DL (ref 3.4–5)
ALBUMIN/GLOB SERPL: 1.7 {RATIO} (ref 1.1–2.2)
ALP SERPL-CCNC: 89 U/L (ref 40–129)
ALT SERPL-CCNC: 15 U/L (ref 10–40)
ANION GAP SERPL CALCULATED.3IONS-SCNC: 12 MMOL/L (ref 3–16)
AST SERPL-CCNC: 24 U/L (ref 15–37)
BASOPHILS # BLD: 0.1 K/UL (ref 0–0.2)
BASOPHILS NFR BLD: 1.1 %
BILIRUB SERPL-MCNC: 0.5 MG/DL (ref 0–1)
BUN SERPL-MCNC: 17 MG/DL (ref 7–20)
CALCIUM SERPL-MCNC: 9.9 MG/DL (ref 8.3–10.6)
CHLORIDE SERPL-SCNC: 101 MMOL/L (ref 99–110)
CHOLEST SERPL-MCNC: 238 MG/DL (ref 0–199)
CO2 SERPL-SCNC: 26 MMOL/L (ref 21–32)
CREAT SERPL-MCNC: 1 MG/DL (ref 0.6–1.2)
DEPRECATED RDW RBC AUTO: 13.9 % (ref 12.4–15.4)
EOSINOPHIL # BLD: 0.2 K/UL (ref 0–0.6)
EOSINOPHIL NFR BLD: 3.4 %
GFR SERPLBLD CREATININE-BSD FMLA CKD-EPI: 59 ML/MIN/{1.73_M2}
GLUCOSE SERPL-MCNC: 111 MG/DL (ref 70–99)
HCT VFR BLD AUTO: 38.1 % (ref 36–48)
HDLC SERPL-MCNC: 48 MG/DL (ref 40–60)
HGB BLD-MCNC: 12.6 G/DL (ref 12–16)
IRON SATN MFR SERPL: 26 % (ref 15–50)
IRON SERPL-MCNC: 99 UG/DL (ref 37–145)
LDLC SERPL CALC-MCNC: 157 MG/DL
LYMPHOCYTES # BLD: 1.8 K/UL (ref 1–5.1)
LYMPHOCYTES NFR BLD: 30.1 %
MCH RBC QN AUTO: 28.5 PG (ref 26–34)
MCHC RBC AUTO-ENTMCNC: 33 G/DL (ref 31–36)
MCV RBC AUTO: 86.3 FL (ref 80–100)
MONOCYTES # BLD: 0.3 K/UL (ref 0–1.3)
MONOCYTES NFR BLD: 5.3 %
NEUTROPHILS # BLD: 3.6 K/UL (ref 1.7–7.7)
NEUTROPHILS NFR BLD: 60.1 %
NT-PROBNP SERPL-MCNC: 63 PG/ML (ref 0–124)
PLATELET # BLD AUTO: 264 K/UL (ref 135–450)
PMV BLD AUTO: 10.1 FL (ref 5–10.5)
POTASSIUM SERPL-SCNC: 4.5 MMOL/L (ref 3.5–5.1)
PROT SERPL-MCNC: 6.7 G/DL (ref 6.4–8.2)
RBC # BLD AUTO: 4.41 M/UL (ref 4–5.2)
REASON FOR REJECTION: NORMAL
REJECTED TEST: NORMAL
SODIUM SERPL-SCNC: 139 MMOL/L (ref 136–145)
TIBC SERPL-MCNC: 382 UG/DL (ref 260–445)
TRIGL SERPL-MCNC: 164 MG/DL (ref 0–150)
TSH SERPL DL<=0.005 MIU/L-ACNC: 1.22 UIU/ML (ref 0.27–4.2)
VIT B12 SERPL-MCNC: >2000 PG/ML (ref 211–911)
VLDLC SERPL CALC-MCNC: 33 MG/DL
WBC # BLD AUTO: 6 K/UL (ref 4–11)

## 2024-03-28 ENCOUNTER — TELEPHONE (OUTPATIENT)
Dept: CARDIOLOGY CLINIC | Age: 74
End: 2024-03-28

## 2024-03-28 NOTE — TELEPHONE ENCOUNTER
----- Message from Charlotte Timmons MD sent at 3/27/2024  5:00 PM EDT -----  Please call patient and let her know that her labs look stable.  No changes.  MATTEO

## 2024-04-20 DIAGNOSIS — F41.8 SITUATIONAL ANXIETY: ICD-10-CM

## 2024-04-22 RX ORDER — CITALOPRAM 20 MG/1
20 TABLET ORAL DAILY
Qty: 90 TABLET | Refills: 0 | Status: SHIPPED | OUTPATIENT
Start: 2024-04-22

## 2024-05-01 ENCOUNTER — OFFICE VISIT (OUTPATIENT)
Dept: FAMILY MEDICINE CLINIC | Age: 74
End: 2024-05-01

## 2024-05-01 VITALS
SYSTOLIC BLOOD PRESSURE: 125 MMHG | HEART RATE: 81 BPM | OXYGEN SATURATION: 91 % | BODY MASS INDEX: 32.96 KG/M2 | WEIGHT: 192 LBS | DIASTOLIC BLOOD PRESSURE: 75 MMHG

## 2024-05-01 DIAGNOSIS — M51.36 DEGENERATION OF L4-L5 INTERVERTEBRAL DISC: Primary | Chronic | ICD-10-CM

## 2024-05-01 DIAGNOSIS — M48.061 SPINAL STENOSIS OF LUMBAR REGION, UNSPECIFIED WHETHER NEUROGENIC CLAUDICATION PRESENT: ICD-10-CM

## 2024-05-01 DIAGNOSIS — M51.37 DEGENERATIVE DISC DISEASE AT L5-S1 LEVEL: Chronic | ICD-10-CM

## 2024-05-01 RX ORDER — FENOFIBRATE 54 MG/1
TABLET ORAL
Qty: 90 TABLET | Refills: 3 | Status: SHIPPED | OUTPATIENT
Start: 2024-05-01

## 2024-05-01 SDOH — ECONOMIC STABILITY: FOOD INSECURITY: WITHIN THE PAST 12 MONTHS, THE FOOD YOU BOUGHT JUST DIDN'T LAST AND YOU DIDN'T HAVE MONEY TO GET MORE.: NEVER TRUE

## 2024-05-01 SDOH — ECONOMIC STABILITY: FOOD INSECURITY: WITHIN THE PAST 12 MONTHS, YOU WORRIED THAT YOUR FOOD WOULD RUN OUT BEFORE YOU GOT MONEY TO BUY MORE.: NEVER TRUE

## 2024-05-01 SDOH — ECONOMIC STABILITY: INCOME INSECURITY: HOW HARD IS IT FOR YOU TO PAY FOR THE VERY BASICS LIKE FOOD, HOUSING, MEDICAL CARE, AND HEATING?: NOT HARD AT ALL

## 2024-05-01 NOTE — PROGRESS NOTES
Chief Complaint   Patient presents with    [facility]         Internal Administration   First Dose COVID-19, J&J, (age 18y+), IM, 0.5 mL  2021   Second Dose           Last COVID Lab SARS-CoV-2 (no units)   Date Value   2021 Not Detected     SARS-COV-2, RdRp gene (no units)   Date Value   2022 Negative             Wt Readings from Last 3 Encounters:   24 87.1 kg (192 lb)   24 85.4 kg (188 lb 3.2 oz)   23 85.7 kg (189 lb)     BP Readings from Last 3 Encounters:   24 125/75   24 101/63   23 120/78      Lab Results   Component Value Date    LABA1C 6.4 2023    LABA1C 6.2 2023    LABA1C 5.9 10/27/2021       HPI:  Mary Yadav is a 73 y.o. (: 1950) here today for  Follow up on  workmans comp claim related to her lower lumbar spine.     Patient states that she did go to see Dr. Corbin back in march with neurosurgery, he informed her that she has stenosis and he is recommending surgery. She has an upcoming [facility] hearing scheduled to show need for this procedure.     She states that the pain she is having is at L1 and through the sacrum and sometimes the pain will shoot down into her left leg. She denies any numbness and tingling in the leg mainly just pain.     [x] Patient has completed an advance directive  [] Patient has NOT completed an advanced directive  [x] Patient has a documented healthcare surrogate  [] Patient does NOT have a documented healthcare surrogate  [] Discussed the importance of establishing and updating an advanced directive.  Patient has questions at this time and those were answered.  [x] Discussed the importance of establishing and updating an advanced directive.  Patient does NOT have questions at this time.    Discussed with: [x] Patient            [] Family             [] Other caregiver    Patient's medications, allergies, past medical, surgical, social and family histories were reviewed and updated asappropriate on

## 2024-05-07 ENCOUNTER — OFFICE VISIT (OUTPATIENT)
Dept: FAMILY MEDICINE CLINIC | Age: 74
End: 2024-05-07

## 2024-05-07 VITALS
BODY MASS INDEX: 32.96 KG/M2 | RESPIRATION RATE: 16 BRPM | HEART RATE: 85 BPM | SYSTOLIC BLOOD PRESSURE: 138 MMHG | DIASTOLIC BLOOD PRESSURE: 80 MMHG | WEIGHT: 192 LBS | OXYGEN SATURATION: 98 %

## 2024-05-07 DIAGNOSIS — M50.223 HERNIATION OF INTERVERTEBRAL DISC AT C6-C7 LEVEL: ICD-10-CM

## 2024-05-07 DIAGNOSIS — M50.322 DEGENERATION OF C5-C6 INTERVERTEBRAL DISC: ICD-10-CM

## 2024-05-07 DIAGNOSIS — M50.221 HERNIATION OF INTERVERTEBRAL DISC AT C4-C5 LEVEL: ICD-10-CM

## 2024-05-07 NOTE — PROGRESS NOTES
Chief Complaint   Patient presents with    Follow-up     Pt is here for a  [facility].         Internal Administration   First Dose COVID-19, J&J, (age 18y+), IM, 0.5 mL  2021   Second Dose           Last COVID Lab SARS-CoV-2 (no units)   Date Value   2021 Not Detected     SARS-COV-2, RdRp gene (no units)   Date Value   2022 Negative             Wt Readings from Last 3 Encounters:   24 87.1 kg (192 lb)   24 87.1 kg (192 lb)   24 85.4 kg (188 lb 3.2 oz)     BP Readings from Last 3 Encounters:   24 138/80   24 125/75   24 101/63      Lab Results   Component Value Date    LABA1C 6.4 2023    LABA1C 6.2 2023    LABA1C 5.9 10/27/2021       HPI:  Mary Yadav is a 73 y.o. (: 1950) here today for    Patient is here today for her [facility] claim related to her neck. She states that she had a really bad shock in her neck a couple days ago and she denies doing anything in particular. She states that then 2 days later she had weakness in her left arm. She is scheduled to see dr. Martinez next week for this pain and will determine the cause. Patient states that she feels like her neck mobility is about the same.     [x] Patient has completed an advance directive  [] Patient has NOT completed an advanced directive  [x] Patient has a documented healthcare surrogate  [] Patient does NOT have a documented healthcare surrogate  [] Discussed the importance of establishing and updating an advanced directive.  Patient has questions at this time and those were answered.  [x] Discussed the importance of establishing and updating an advanced directive.  Patient does NOT have questions at this time.    Discussed with: [x] Patient            [] Family             [] Other caregiver    Patient's medications, allergies, past medical, surgical, social and family histories were reviewed and updated asappropriate on 2024 at 3:19 PM.    ROS:  Review of Systems    All

## 2024-05-14 ENCOUNTER — TELEPHONE (OUTPATIENT)
Dept: FAMILY MEDICINE CLINIC | Age: 74
End: 2024-05-14

## 2024-05-14 DIAGNOSIS — B02.9 HERPES ZOSTER WITHOUT COMPLICATION: Primary | ICD-10-CM

## 2024-05-14 RX ORDER — ACYCLOVIR 800 MG/1
800 TABLET ORAL
Qty: 35 TABLET | Refills: 0 | Status: SHIPPED | OUTPATIENT
Start: 2024-05-14 | End: 2024-05-21

## 2024-05-14 NOTE — TELEPHONE ENCOUNTER
Pt states that she woke up this morning with the shingles. She was wanting to see if she could get something called in to [facility] pharmacy.

## 2024-05-17 ENCOUNTER — OFFICE VISIT (OUTPATIENT)
Dept: ORTHOPEDIC SURGERY | Age: 74
End: 2024-05-17
Payer: MEDICARE

## 2024-05-17 DIAGNOSIS — M19.012 PRIMARY OSTEOARTHRITIS OF LEFT SHOULDER: Primary | ICD-10-CM

## 2024-05-17 DIAGNOSIS — M25.512 LEFT SHOULDER PAIN, UNSPECIFIED CHRONICITY: ICD-10-CM

## 2024-05-17 PROCEDURE — 1090F PRES/ABSN URINE INCON ASSESS: CPT | Performed by: STUDENT IN AN ORGANIZED HEALTH CARE EDUCATION/TRAINING PROGRAM

## 2024-05-17 PROCEDURE — G8400 PT W/DXA NO RESULTS DOC: HCPCS | Performed by: STUDENT IN AN ORGANIZED HEALTH CARE EDUCATION/TRAINING PROGRAM

## 2024-05-17 PROCEDURE — G8427 DOCREV CUR MEDS BY ELIG CLIN: HCPCS | Performed by: STUDENT IN AN ORGANIZED HEALTH CARE EDUCATION/TRAINING PROGRAM

## 2024-05-17 PROCEDURE — G8417 CALC BMI ABV UP PARAM F/U: HCPCS | Performed by: STUDENT IN AN ORGANIZED HEALTH CARE EDUCATION/TRAINING PROGRAM

## 2024-05-17 PROCEDURE — 1123F ACP DISCUSS/DSCN MKR DOCD: CPT | Performed by: STUDENT IN AN ORGANIZED HEALTH CARE EDUCATION/TRAINING PROGRAM

## 2024-05-17 PROCEDURE — 3017F COLORECTAL CA SCREEN DOC REV: CPT | Performed by: STUDENT IN AN ORGANIZED HEALTH CARE EDUCATION/TRAINING PROGRAM

## 2024-05-17 PROCEDURE — 20610 DRAIN/INJ JOINT/BURSA W/O US: CPT | Performed by: STUDENT IN AN ORGANIZED HEALTH CARE EDUCATION/TRAINING PROGRAM

## 2024-05-17 PROCEDURE — 1036F TOBACCO NON-USER: CPT | Performed by: STUDENT IN AN ORGANIZED HEALTH CARE EDUCATION/TRAINING PROGRAM

## 2024-05-17 PROCEDURE — 99213 OFFICE O/P EST LOW 20 MIN: CPT | Performed by: STUDENT IN AN ORGANIZED HEALTH CARE EDUCATION/TRAINING PROGRAM

## 2024-05-17 RX ORDER — LIDOCAINE HYDROCHLORIDE 10 MG/ML
4 INJECTION, SOLUTION INFILTRATION; PERINEURAL ONCE
Status: COMPLETED | OUTPATIENT
Start: 2024-05-17 | End: 2024-05-17

## 2024-05-17 RX ORDER — METHYLPREDNISOLONE ACETATE 40 MG/ML
80 INJECTION, SUSPENSION INTRA-ARTICULAR; INTRALESIONAL; INTRAMUSCULAR; SOFT TISSUE ONCE
Status: COMPLETED | OUTPATIENT
Start: 2024-05-17 | End: 2024-05-17

## 2024-05-17 RX ADMIN — LIDOCAINE HYDROCHLORIDE 4 ML: 10 INJECTION, SOLUTION INFILTRATION; PERINEURAL at 15:31

## 2024-05-17 RX ADMIN — METHYLPREDNISOLONE ACETATE 80 MG: 40 INJECTION, SUSPENSION INTRA-ARTICULAR; INTRALESIONAL; INTRAMUSCULAR; SOFT TISSUE at 15:32

## 2024-05-17 NOTE — PROGRESS NOTES
Chief Complaint  Shoulder Pain      History of Present Illness:  Mary Yadav is a pleasant 73 y.o. female here today for established patient evaluation of a new problem regarding her left shoulder.  The patient reports that she had a jolting pain to her lower neck back on April 17.  She does have a history of an injury there in the past which required surgery from neurosurgery.  Then 2 days later she noted a good amount of pain in the left shoulder that radiated to the middle of the arm.  She is having difficulty with range of motion.         Medical History:  Patient's medications, allergies, past medical, surgical, social and family histories were reviewed and updated as appropriate.    Pertinent items are noted in HPI  Review of systems reviewed from Patient History Form available in the patient's chart under the Media tab.       Vital Signs:  There were no vitals filed for this visit.      Constitutional: In no apparent distress. Normal affect. Alert and oriented X3 and is cooperative.       Left shoulder exam    Inspection:  Held in a normal posture. Normal contour at the acromioclavicular joint. No swelling, ecchymosis, or erythema about the shoulder. No atrophy appreciated. No scapular winging.     Palpation:  No subacromial crepitus.  Tender to the deltoid insertion.  Some tenderness to the glenohumeral joint.    Range of Motion: Full passive range of motion, limitations in active rotation internal rotation and external rotation    Strength:  Normal supraspinatus, infraspinatus, and subscapularis muscle strength.    Stability: No anterior instability. No posterior instability.    Special Tests: Impingement findings are negative. Labral findings are negative. Speed sign and Yergason signs are both negative. Crossover sign is negative. Belly press sign is negative. Lift off sign is negative.    Other findings: The skin is warm dry and well perfused. 2+ radial pulse. Sensation is intact to light touch

## 2024-06-03 NOTE — PROGRESS NOTES
[facility]  Advanced CHF/Pulmonary Hypertension   Cardiac Evaluation      Mary Yadav  YOB: 1950    Date of Visit:  6/4/24    Chief Complaint   Patient presents with    Follow-up    Congestive Heart Failure       History of Present Illness:  Mary Yadav is a 73 y.o. female who presents from referral from Dr. Phelps for consultation and management of CHF, non ischemic CMP. She has a with PMH of HTN, HLD, obesity, abnormal stress test, and fibromyalgia, who presented to [facility] with chest pain in March 2017. History obtained from patient and review of EMR. She underwent a stress test on 3/20/17 that showed moderate sized anteroseptal partial reversibility defect consistent with  ischemia and infarction in the territory of the mid and distal LAD  She has right subclavian artery stenosis and has seen Dr. Naranjo for this. She underwent LHC on 3/23/17 with no intervention needed.    She had a recent VL Duplex on 5/2/18 (report below).    Her echo from 11/9/2021 showed her LVEF 45%.   OV, 6/21/2022, patient reports she has been feeling more fatigued lately. She only does what she needs to do and then spends the rest of her day resting/laying down. Patient states she gets a lot of sleep but still has fatigue during the day. She feels more shortness of breath on really hot days, but for the most part feels like her breathing as been stable. She has chronic back pain with 4 prior back surgeries by Dr. Sahni. She states she needs another back surgery. She wear a lidoderm patch while she works and she uses tramadol to help relieve the pain. She follows with pain management as well. She works for [employer]   OV, 5/23/2023, She reports she is no longer living with her mother. She has increased fatigue. She has occasional chest discomfort. She reports the discomfort goes away when she lays down.  LOV, 11/28/2023, She reports her chest discomfort has improved. She reports increased

## 2024-06-04 ENCOUNTER — OFFICE VISIT (OUTPATIENT)
Dept: CARDIOLOGY CLINIC | Age: 74
End: 2024-06-04

## 2024-06-04 VITALS
WEIGHT: 187.5 LBS | HEART RATE: 78 BPM | OXYGEN SATURATION: 97 % | HEIGHT: 64 IN | BODY MASS INDEX: 32.01 KG/M2 | SYSTOLIC BLOOD PRESSURE: 120 MMHG | DIASTOLIC BLOOD PRESSURE: 68 MMHG

## 2024-06-04 DIAGNOSIS — E55.9 VITAMIN D DEFICIENCY: ICD-10-CM

## 2024-06-04 DIAGNOSIS — Z79.899 MEDICATION MANAGEMENT: ICD-10-CM

## 2024-06-04 DIAGNOSIS — I42.9 CARDIOMYOPATHY, IDIOPATHIC (HCC): ICD-10-CM

## 2024-06-04 DIAGNOSIS — E78.2 MIXED HYPERLIPIDEMIA: ICD-10-CM

## 2024-06-04 DIAGNOSIS — I10 ESSENTIAL HYPERTENSION: ICD-10-CM

## 2024-06-04 DIAGNOSIS — I50.22 SYSTOLIC CHF, CHRONIC (HCC): Primary | ICD-10-CM

## 2024-06-04 RX ORDER — SPIRONOLACTONE 25 MG/1
TABLET ORAL
Qty: 45 TABLET | Refills: 3 | Status: SHIPPED | OUTPATIENT
Start: 2024-06-04

## 2024-06-04 RX ORDER — EZETIMIBE 10 MG/1
TABLET ORAL
Qty: 90 TABLET | Refills: 3 | Status: SHIPPED | OUTPATIENT
Start: 2024-06-04

## 2024-06-04 RX ORDER — LISINOPRIL 40 MG/1
TABLET ORAL
Qty: 90 TABLET | Refills: 3 | Status: SHIPPED | OUTPATIENT
Start: 2024-06-04

## 2024-06-04 RX ORDER — ATORVASTATIN CALCIUM 20 MG/1
TABLET, FILM COATED ORAL
Qty: 90 TABLET | Refills: 3 | Status: CANCELLED | OUTPATIENT
Start: 2024-06-04

## 2024-06-04 RX ORDER — METOPROLOL SUCCINATE 25 MG/1
TABLET, EXTENDED RELEASE ORAL
Qty: 90 TABLET | Refills: 3 | Status: SHIPPED | OUTPATIENT
Start: 2024-06-04

## 2024-06-04 NOTE — PATIENT INSTRUCTIONS
Plan:  Leqvio injection ordered for cholesterol management. Leqvio previously ordered and approved, however, patient did not schedule this due to family illness.[facility] will contact you to schedule this   Labs now: Magnesium, vitamin d, bnp, bmp, cbc  Stop atorvastatin to see if this help relieve muscle cramping  Follow up in 6 months

## 2024-06-06 DIAGNOSIS — M50.322 DEGENERATION OF C5-C6 INTERVERTEBRAL DISC: ICD-10-CM

## 2024-06-06 DIAGNOSIS — M50.221 HERNIATION OF INTERVERTEBRAL DISC AT C4-C5 LEVEL: ICD-10-CM

## 2024-06-06 DIAGNOSIS — M50.321 DEGENERATION OF INTERVERTEBRAL DISC AT C4-C5 LEVEL: ICD-10-CM

## 2024-06-06 DIAGNOSIS — M50.223 HERNIATION OF INTERVERTEBRAL DISC AT C6-C7 LEVEL: ICD-10-CM

## 2024-06-07 RX ORDER — GABAPENTIN 100 MG/1
CAPSULE ORAL
Qty: 180 CAPSULE | Refills: 5 | Status: SHIPPED | OUTPATIENT
Start: 2024-06-07 | End: 2024-12-07

## 2024-06-07 NOTE — TELEPHONE ENCOUNTER
Future appt scheduled 10/14/2024                   Last appt 05/07/2024      Last Written 07/31/2023      gabapentin (NEURONTIN) 100 MG capsule   #180  5 RF

## 2024-06-10 RX ORDER — DIPHENHYDRAMINE HYDROCHLORIDE 50 MG/ML
50 INJECTION INTRAMUSCULAR; INTRAVENOUS
OUTPATIENT
Start: 2024-06-20

## 2024-06-10 RX ORDER — FAMOTIDINE 10 MG/ML
20 INJECTION, SOLUTION INTRAVENOUS
OUTPATIENT
Start: 2024-06-20

## 2024-06-10 RX ORDER — SODIUM CHLORIDE 9 MG/ML
INJECTION, SOLUTION INTRAVENOUS CONTINUOUS
OUTPATIENT
Start: 2024-06-20

## 2024-06-10 RX ORDER — EPINEPHRINE 1 MG/ML
0.3 INJECTION, SOLUTION INTRAMUSCULAR; SUBCUTANEOUS PRN
OUTPATIENT
Start: 2024-06-20

## 2024-06-25 ENCOUNTER — HOSPITAL ENCOUNTER (OUTPATIENT)
Dept: NURSING | Age: 74
Setting detail: INFUSION SERIES
Discharge: HOME | End: 2024-06-25

## 2024-07-09 ENCOUNTER — COMMUNITY OUTREACH (OUTPATIENT)
Dept: FAMILY MEDICINE CLINIC | Age: 74
End: 2024-07-09

## 2024-07-11 NOTE — DISCHARGE INSTRUCTIONS
inclisiran  Pronunciation:  IN kli SIR an  Brand:  Leqvio  What is the most important information I should know about inclisiran?  Use only as directed. Tell your doctor if you use other medicines or have other medical conditions or allergies.  What is inclisiran?  Inclisiran works by helping the liver reduce levels of \"bad\" cholesterol (low-density lipoprotein, or LDL) circulating in your blood.  Inclisiran is used together with a low-fat diet and other cholesterol-lowering medications in adults with heterozygous familial hypercholesterolemia (inherited types of high cholesterol). These conditions can cause high blood levels of LDL cholesterol, and can also cause plaque to build up inside your arteries.  Inclisiran is also used in adults with atherosclerotic cardiovascular disease (ASCVD) needing additional lowering of LDL cholesterol.  Inclisiran may also be used for purposes not listed in this medication guide.  What should I discuss with my healthcare provider before receiving inclisiran?  Tell your doctor if you have:  end-stage kidney disease or severe liver disease.  May harm an unborn baby. Do not use if you are pregnant.   It may not be safe to breastfeed while using this medicine. Ask your doctor about any risk.  Ask a doctor if it is safe to breastfeed while using this medicine.  Not approved for use by anyone younger than 18 years old.  How is this drug given?  Inclisiran is injected under the skin. A healthcare provider will give you this injection.  You should not stop using inclisiran without your doctor's advice, or your LDL cholesterol levels may increase.  Inclisiran is only part of a complete treatment program that also includes diet, statin medication, and regular blood testing. Follow your doctor's instructions very closely.  What happens if I miss a dose?  Call your doctor for instructions if you miss an appointment for your inclisiran injection.  What happens if I overdose?  In a medical  setting an overdose would be treated quickly.  What should I avoid while receiving inclisiran?  Follow your doctor's instructions about any restrictions on food, beverages, or activity.  What are the possible side effects of inclisiran?  Get emergency medical help if you have signs of an allergic reaction: hives, severe itching; difficult breathing; swelling of your face, lips, tongue, or throat.  Call your doctor at once if you have:  pain and burning when you urinate.  Common side effects may include:  redness, pain, or bruising where an injection was given;  pain in your legs and arms;  diarrhea;  breathing problems, bronchitis;  joint pain; or  pain and burning when you urinate.  This is not a complete list of side effects and others may occur. Call your doctor for medical advice about side effects. You may report side effects to FDA at 7-824-FDA-3448.  What other drugs will affect inclisiran?  Other drugs may affect inclisiran, including prescription and over-the-counter medicines, vitamins, and herbal products. Tell your doctor about all other medicines you use.  Where can I get more information?  Your pharmacist can provide more information about inclisiran.  Remember, keep this and all other medicines out of the reach of children, never share your medicines with others, and use this medication only for the indication prescribed.     RETURN ON OCT 23,2024 AT 1300 FOR YOU NEXT DOSE OF LEQVIO  FOLLOW UP WITH DR. MERINO AS NEEDED OR SCHEDULED  CALL US IF YOU NEED TO CANCEL OR RESCHEDULE YOUR APPOINTMENT [phone]

## 2024-07-17 ENCOUNTER — HOSPITAL ENCOUNTER (OUTPATIENT)
Dept: NURSING | Age: 74
Setting detail: INFUSION SERIES
Discharge: HOME | End: 2024-07-17
Payer: MEDICARE

## 2024-07-17 VITALS
WEIGHT: 187.39 LBS | HEART RATE: 80 BPM | TEMPERATURE: 98 F | HEIGHT: 65 IN | SYSTOLIC BLOOD PRESSURE: 111 MMHG | DIASTOLIC BLOOD PRESSURE: 65 MMHG | RESPIRATION RATE: 16 BRPM | BODY MASS INDEX: 31.22 KG/M2

## 2024-07-17 DIAGNOSIS — E78.2 MIXED HYPERLIPIDEMIA: Primary | ICD-10-CM

## 2024-07-17 DIAGNOSIS — Z78.9 STATIN INTOLERANCE: ICD-10-CM

## 2024-07-17 PROCEDURE — 99203 OFFICE O/P NEW LOW 30 MIN: CPT

## 2024-07-17 PROCEDURE — 96372 THER/PROPH/DIAG INJ SC/IM: CPT

## 2024-07-17 PROCEDURE — 6360000002 HC RX W HCPCS: Performed by: INTERNAL MEDICINE

## 2024-07-17 RX ORDER — EPINEPHRINE 1 MG/ML
0.3 INJECTION, SOLUTION INTRAMUSCULAR; SUBCUTANEOUS PRN
OUTPATIENT
Start: 2024-10-15

## 2024-07-17 RX ORDER — DIPHENHYDRAMINE HYDROCHLORIDE 50 MG/ML
50 INJECTION INTRAMUSCULAR; INTRAVENOUS
OUTPATIENT
Start: 2024-10-15

## 2024-07-17 RX ORDER — INCLISIRAN 284 MG/1.5ML
284 INJECTION, SOLUTION SUBCUTANEOUS ONCE
COMMUNITY

## 2024-07-17 RX ORDER — SODIUM CHLORIDE 9 MG/ML
INJECTION, SOLUTION INTRAVENOUS CONTINUOUS
OUTPATIENT
Start: 2024-10-15

## 2024-07-17 RX ADMIN — INCLISIRAN 284 MG: 284 INJECTION, SOLUTION SUBCUTANEOUS at 13:45

## 2024-07-17 ASSESSMENT — PAIN DESCRIPTION - DESCRIPTORS: DESCRIPTORS: ACHING;DULL

## 2024-07-17 ASSESSMENT — PAIN DESCRIPTION - ORIENTATION: ORIENTATION: LOWER

## 2024-07-17 ASSESSMENT — PAIN SCALES - GENERAL: PAINLEVEL_OUTOF10: 7

## 2024-07-17 ASSESSMENT — PAIN DESCRIPTION - PAIN TYPE: TYPE: CHRONIC PAIN

## 2024-07-17 ASSESSMENT — PAIN DESCRIPTION - FREQUENCY: FREQUENCY: CONTINUOUS

## 2024-07-17 ASSESSMENT — PAIN DESCRIPTION - LOCATION: LOCATION: BACK

## 2024-07-17 NOTE — PROGRESS NOTES
Pt continues to report that she feels fine  Resp are easy and even  Lungs CTA  No signs of adverse reactions noted  Discharge instructions reviewed with pt  and understanding was verbalized  Copy was given then pt was discharged ambulatory in stable condition

## 2024-07-17 NOTE — PROGRESS NOTES
Pt here ambulatory for first dose of SC Leqvio  Pt reports she discussed this injection with Dr. Timmons and it afraid of it but she is willing to try it as she hasn't been able to take anything else due to side effects  Leqvio pamphlet given to pt for her to review  I discussed benefits as well as possible adverse reactions with pt and pt verbalized understanding  Pt is agreeable to be monitored for 30 mins after the injection is given  Pt without any further questions or concerns  Leqvio  284 mg was given SC in left arm  Injection site unremarkable  Bandaid applied  Pt tera well  Will monitor

## 2024-07-26 ENCOUNTER — OFFICE VISIT (OUTPATIENT)
Dept: ORTHOPEDIC SURGERY | Age: 74
End: 2024-07-26
Payer: MEDICARE

## 2024-07-26 VITALS — BODY MASS INDEX: 31.16 KG/M2 | WEIGHT: 187 LBS | HEIGHT: 65 IN

## 2024-07-26 DIAGNOSIS — M19.012 PRIMARY OSTEOARTHRITIS OF LEFT SHOULDER: Primary | ICD-10-CM

## 2024-07-26 PROCEDURE — 20610 DRAIN/INJ JOINT/BURSA W/O US: CPT | Performed by: STUDENT IN AN ORGANIZED HEALTH CARE EDUCATION/TRAINING PROGRAM

## 2024-07-26 RX ORDER — LIDOCAINE HYDROCHLORIDE 10 MG/ML
4 INJECTION, SOLUTION INFILTRATION; PERINEURAL ONCE
Status: COMPLETED | OUTPATIENT
Start: 2024-07-26 | End: 2024-07-26

## 2024-07-26 RX ORDER — TRIAMCINOLONE ACETONIDE 40 MG/ML
80 INJECTION, SUSPENSION INTRA-ARTICULAR; INTRAMUSCULAR ONCE
Status: COMPLETED | OUTPATIENT
Start: 2024-07-26 | End: 2024-07-26

## 2024-07-26 RX ADMIN — TRIAMCINOLONE ACETONIDE 80 MG: 40 INJECTION, SUSPENSION INTRA-ARTICULAR; INTRAMUSCULAR at 15:44

## 2024-07-26 RX ADMIN — LIDOCAINE HYDROCHLORIDE 4 ML: 10 INJECTION, SOLUTION INFILTRATION; PERINEURAL at 15:44

## 2024-07-26 NOTE — PROGRESS NOTES
rotation and external rotation    Strength:  Normal supraspinatus, infraspinatus, and subscapularis muscle strength.    Stability: No anterior instability. No posterior instability.    Special Tests: Impingement findings are negative. Labral findings are negative. Speed sign and Yergason signs are both negative. Crossover sign is negative. Belly press sign is negative. Lift off sign is negative.    Other findings: The skin is warm dry and well perfused. 2+ radial pulse. Sensation is intact to light touch over the deltoid.      Radiology:       3 views of the left shoulder taken in the office today demonstrate moderate to severe glenohumeral osteoarthritis with a large inferior osteophyte and loss of joint space.  Appropriate acromiohumeral interval.  Moderate AC joint arthritis.             Assessment : 73-year-old female with left shoulder osteoarthritis flare    Impression:  Encounter Diagnosis   Name Primary?    Primary osteoarthritis of left shoulder Yes       Office Procedures:  Orders Placed This Encounter   Procedures    MO ARTHROCENTESIS ASPIR&/INJ MAJOR JT/BURSA W/O US         Plan:     I will perform another injection today.  I will change medications from Depo-Medrol to Kenalog to see if we can get a better response.  She is going to think about shoulder placement.  The earliest we would be able to do 1 would be 3 months from today.  The bigger issue is that the patient does not have much support for transportation.  If we do do a shoulder placement she would need to be admitted for discussion of potential home health assistance.  Follow-up as needed.    . Mary Yadav is in agreement with this plan. All questions were answered to patient's satisfaction and was encouraged to call with any further questions.      The indications and risks of steroid injection as well as treatment alternatives were discussed with the patient who consented to the procedure. Under sterile conditions and after informed

## 2024-07-31 DIAGNOSIS — I10 ESSENTIAL HYPERTENSION: ICD-10-CM

## 2024-07-31 DIAGNOSIS — E78.2 MIXED HYPERLIPIDEMIA: Primary | ICD-10-CM

## 2024-07-31 DIAGNOSIS — I50.22 SYSTOLIC CHF, CHRONIC (HCC): ICD-10-CM

## 2024-07-31 RX ORDER — SPIRONOLACTONE 25 MG/1
TABLET ORAL
Qty: 45 TABLET | Refills: 3 | Status: SHIPPED | OUTPATIENT
Start: 2024-07-31

## 2024-07-31 RX ORDER — FENOFIBRATE 54 MG/1
TABLET ORAL
Qty: 90 TABLET | Refills: 3 | Status: SHIPPED | OUTPATIENT
Start: 2024-07-31

## 2024-07-31 RX ORDER — METOPROLOL SUCCINATE 25 MG/1
TABLET, EXTENDED RELEASE ORAL
Qty: 90 TABLET | Refills: 3 | Status: SHIPPED | OUTPATIENT
Start: 2024-07-31

## 2024-07-31 RX ORDER — LISINOPRIL 40 MG/1
TABLET ORAL
Qty: 90 TABLET | Refills: 3 | Status: SHIPPED | OUTPATIENT
Start: 2024-07-31

## 2024-07-31 NOTE — TELEPHONE ENCOUNTER
Medication Refill    Medication needing refilled: spironolactone (ALDACTONE) 25 MG tablet [[id number]]    Dosage of the medication: 25mg    How are you taking this medication (QD, BID, TID, QID, PRN):   Sig: TAKE ONE-HALF TABLET BY MOUTH DAILY          30 or 90 day supply called in: 30    When will you run out of your medication: out    Which Pharmacy are we sending the medication to?:   [facility] [id number] [facility] [address] - P [phone] - F [phone]  [address] [facility] [address]  Phone: [phone]  Fax: [phone]           Medication Refill    Medication needing refilled: metoprolol succinate (TOPROL XL) 25 MG extended release tablet [[id number]]    Dosage of the medication: 25mg    How are you taking this medication (QD, BID, TID, QID, PRN):   Sig: Take one tab daily          30 or 90 day supply called in: 30    When will you run out of your medication: out    Which Pharmacy are we sending the medication to?:   [facility] [id number] [place] [address] [address] - P [phone] - F [phone]  [address] [facility] [address]  Phone: [phone]  Fax: [phone]         Medication Refill    Medication needing refilled: lisinopril (PRINIVIL;ZESTRIL) 40 MG tablet [[id number]]    Dosage of the medication: 40mg    How are you taking this medication (QD, BID, TID, QID, PRN):   Sig: TAKE ONE TABLET BY MOUTH EVERY EVENING          30 or 90 day supply called in: 30    When will you run out of your medication:    Which Pharmacy are we sending the medication to?:   [facility] [id number] [facility] [address] - P [phone] - F [phone]  [address] [facility] [place] [address]  Phone: [phone]  Fax: [phone]           Medication Refill    Medication needing refilled: fenofibrate (TRICOR) 54 MG tablet [[id number]]    Dosage of the medication: 54mg    How are you taking this medication (QD, BID, TID, QID, PRN):   Sig:

## 2024-08-01 DIAGNOSIS — B02.9 HERPES ZOSTER WITHOUT COMPLICATION: ICD-10-CM

## 2024-08-01 DIAGNOSIS — F41.8 SITUATIONAL ANXIETY: ICD-10-CM

## 2024-08-01 RX ORDER — CITALOPRAM 20 MG/1
20 TABLET ORAL DAILY
Qty: 90 TABLET | Refills: 0 | Status: SHIPPED | OUTPATIENT
Start: 2024-08-01

## 2024-08-01 RX ORDER — VALACYCLOVIR HYDROCHLORIDE 1 G/1
1000 TABLET, FILM COATED ORAL 3 TIMES DAILY
Qty: 21 TABLET | Refills: 1 | Status: SHIPPED | OUTPATIENT
Start: 2024-08-01

## 2024-08-01 NOTE — TELEPHONE ENCOUNTER
Refill Request     CONFIRM preferrred pharmacy with the patient.    If Mail Order Rx - Pend for 90 day refill.      Last Seen: Last Seen Department: 5/7/2024  Last Seen by PCP: 5/7/2024    Last Written: Celexa 4/22/24  Valtrex 11/17/22    If no future appointment scheduled, route STAFF MESSAGE with patient name to the  Pool for scheduling.      Next Appointment:   Future Appointments   Date Time Provider Department Center   8/13/2024  1:20 PM Brandin Kim MD [facility] AND [facility] [place]   10/14/2024  3:00 PM Berry Krause MD [place] [facility] DEP   10/23/2024  1:00 PM [facility]   11/4/2024  3:40 PM [facility]   12/17/2024  2:00 PM Charlotte Timmons MD [place] [facility]       Message sent to  to schedule appt with patient?  N/A      Requested Prescriptions     Pending Prescriptions Disp Refills    citalopram (CELEXA) 20 MG tablet 90 tablet 0     Sig: Take 1 tablet by mouth daily    valACYclovir (VALTREX) 1 g tablet 21 tablet 1     Sig: Take 1 tablet by mouth 3 times daily

## 2024-08-14 ENCOUNTER — OFFICE VISIT (OUTPATIENT)
Dept: FAMILY MEDICINE CLINIC | Age: 74
End: 2024-08-14

## 2024-08-14 VITALS
BODY MASS INDEX: 31.77 KG/M2 | OXYGEN SATURATION: 92 % | WEIGHT: 188 LBS | HEART RATE: 73 BPM | DIASTOLIC BLOOD PRESSURE: 78 MMHG | SYSTOLIC BLOOD PRESSURE: 130 MMHG

## 2024-08-14 DIAGNOSIS — B37.0 ORAL THRUSH: Primary | ICD-10-CM

## 2024-08-14 DIAGNOSIS — L71.9 ROSACEA: ICD-10-CM

## 2024-08-14 NOTE — PROGRESS NOTES
Psychiatric:         Behavior: Behavior normal.         Thought Content: Thought content normal.         Judgment: Judgment normal.            ASSESSMENT PLAN     Date of last Colonoscopy: 4/23/2012   No cologuard on file  No FIT/FOBT on file   No flexible sigmoidoscopy on file  Date of last Mammogram: 11/21/2019  No cervical cancer screening on file     Diagnosis Orders   1. Oral thrush  nystatin (MYCOSTATIN) 311155 UNIT/ML suspension      2. Rosacea  NEETU - Eloise Link, CNP, Dermatology, [facility]      Nystatin for thrush.  She thinks that she is being exposed to fleas from the grass when grasses cut and thrown up against her apartment.  Wondered what she could use for prevention spray.  Recommendation made.  Use steroid cream for the rash.  She wishes referral to dermatology.  Follow-up as regularly scheduled.    Patient should call the office immediately with new or ongoing signs or symptoms or worsening, or proceed to the emergency room.  No changes in past medical history, past surgical history, social history, or family history were noted during the patient encounter unless specifically listed above.  All updates of past medical history, past surgical history, social history, or family history were reviewed personally by me during the office visit.  All problems listed in the assessment are stable unless noted otherwise.  Medication profile reviewed personally by me during the visit.  Medication side effects and possible impairments from medications were discussed as applicable.    Unless stated otherwise, we will continue current medications as listed in the medication review report contained in the patient's medical record.    This document was prepared by a combination of typing and transcription through a voice recognition software.          Scribe attestation: I, Kamila Jackson RN, am scribing for and in the presence of BLAKE MARVIN MD. Electronically signed by Kamila Jackson RN on

## 2024-10-14 ENCOUNTER — OFFICE VISIT (OUTPATIENT)
Dept: FAMILY MEDICINE CLINIC | Age: 74
End: 2024-10-14

## 2024-10-14 VITALS
WEIGHT: 190.8 LBS | BODY MASS INDEX: 32.24 KG/M2 | DIASTOLIC BLOOD PRESSURE: 84 MMHG | HEART RATE: 82 BPM | OXYGEN SATURATION: 92 % | SYSTOLIC BLOOD PRESSURE: 137 MMHG

## 2024-10-14 DIAGNOSIS — B02.29 POSTHERPETIC NEURALGIA: ICD-10-CM

## 2024-10-14 DIAGNOSIS — M50.221 HERNIATION OF INTERVERTEBRAL DISC AT C4-C5 LEVEL: ICD-10-CM

## 2024-10-14 DIAGNOSIS — M50.223 HERNIATION OF INTERVERTEBRAL DISC AT C6-C7 LEVEL: ICD-10-CM

## 2024-10-14 DIAGNOSIS — F41.8 SITUATIONAL ANXIETY: ICD-10-CM

## 2024-10-14 DIAGNOSIS — M50.321 DEGENERATION OF INTERVERTEBRAL DISC AT C4-C5 LEVEL: ICD-10-CM

## 2024-10-14 DIAGNOSIS — B02.9 HERPES ZOSTER WITHOUT COMPLICATION: ICD-10-CM

## 2024-10-14 DIAGNOSIS — M50.322 DEGENERATION OF C5-C6 INTERVERTEBRAL DISC: Primary | ICD-10-CM

## 2024-10-14 RX ORDER — VALACYCLOVIR HYDROCHLORIDE 1 G/1
1000 TABLET, FILM COATED ORAL 3 TIMES DAILY
Qty: 21 TABLET | Refills: 1 | Status: SHIPPED | OUTPATIENT
Start: 2024-10-14

## 2024-10-14 RX ORDER — CITALOPRAM HYDROBROMIDE 20 MG/1
20 TABLET ORAL DAILY
Qty: 90 TABLET | Refills: 0 | Status: SHIPPED | OUTPATIENT
Start: 2024-10-14

## 2024-10-14 RX ORDER — GABAPENTIN 100 MG/1
CAPSULE ORAL
Qty: 270 CAPSULE | Refills: 2 | Status: SHIPPED | OUTPATIENT
Start: 2024-10-14 | End: 2025-04-15

## 2024-10-14 RX ORDER — LIDOCAINE 50 MG/G
PATCH TOPICAL
Qty: 30 PATCH | Refills: 5 | Status: SHIPPED | OUTPATIENT
Start: 2024-10-14

## 2024-10-14 NOTE — PROGRESS NOTES
abdominal tenderness.   Musculoskeletal:         General: Tenderness (bilateral trapezius spasm) present.      Comments: Flexion of neck 40 degrees  Extension of neck 10 degrees  Right rotation of neck 25 degrees  Left rotation of neck 30 degrees  Right flexion of neck 20 degrees  Left flexion of neck 30 degrees.    Skin:     General: Skin is warm and dry.      Coloration: Skin is not pale.      Findings: No erythema or rash.      Comments: Turgor normal   Psychiatric:         Behavior: Behavior normal.         Thought Content: Thought content normal.         Judgment: Judgment normal.              ASSESSMENT PLAN   Date of last Colonoscopy: 4/23/2012   No cologuard on file  No FIT/FOBT on file   No flexible sigmoidoscopy on file  Date of last Mammogram: 11/21/2019  No cervical cancer screening on file     Diagnosis Orders   1. Degeneration of C5-C6 intervertebral disc  gabapentin (NEURONTIN) 100 MG capsule      2. Situational anxiety  citalopram (CELEXA) 20 MG tablet      3. Degeneration of intervertebral disc at C4-C5 level  gabapentin (NEURONTIN) 100 MG capsule      4. Herniation of intervertebral disc at C4-C5 level  gabapentin (NEURONTIN) 100 MG capsule      5. Herniation of intervertebral disc at C6-C7 level  gabapentin (NEURONTIN) 100 MG capsule      6. Postherpetic neuralgia  lidocaine (LIDODERM) 5 %      7. Herpes zoster without complication  valACYclovir (VALTREX) 1 g tablet      Patient still having stabbing shocking pains in her neck.  Pain management provides pain medication.  She does not feel like following up with neurosurgery would be necessary at this time.  Still uses the gabapentin.  Pain management will take over the Worker's Comp. portion of her care.    Patient should call the office immediately with new or ongoing signs or symptoms or worsening, or proceed to the emergency room.  No changes in past medical history, past surgical history, social history, or family history were noted during the

## 2024-10-14 NOTE — PATIENT INSTRUCTIONS
Not feeling your best?  Where to go for the right care at the right time.    Dear Mary Yadav   I wanted to provide you with some information that might help you seek care for your condition when your primary care provider or specialist is unavailable. If you have a need outside of normal business hours, you should first contact your primary care office or specialist caring for your condition. They may have on-call providers that could assist with your care. During office hours, you may request a virtual or same day appointment.   But what if your primary care provider is not in the office that day and you can't wait until the  next day for care? In that situation, your next option is to visit an urgent care facility.          [facility] now has urgent care sites open to support our community.   [facility] is a great alternative when you need immediate medical care that is not a serious threat to your health or your doctor's office is closed or unable to get you in for an appointment. The urgent care centers offer fast access to [facility] doctors for minor illnesses and injuries for patients of all ages. There are other medical services available including lab testing, X-rays, EKGs, and IV fluids.  Locations are open daily from 8 a.m. - 8 p.m.     [place] [address] [address], [place], [place] [address]  [phone]    [address], [place], [place] [address]  [phone]    Local Urgent Care     [facility] 8:30 am - 7:70 pm   [address] [place] [address]  [phone]    [facility]     8 am - 8 pm   [address], [place] [address]  [phone]    [facility] / [place] 7 am - 7:30 pm  [address] [place] [address]  [phone]     [facility] / [place] 7 am - 7:30 pm  [address] [place] [address]  [phone]    [place]

## 2024-10-22 NOTE — DISCHARGE INSTRUCTIONS
inclisiran  Pronunciation:  IN kli SIR an  Brand:  Leqvio  What is the most important information I should know about inclisiran?  Use only as directed. Tell your doctor if you use other medicines or have other medical conditions or allergies.  What is inclisiran?  Inclisiran works by helping the liver reduce levels of \"bad\" cholesterol (low-density lipoprotein, or LDL) circulating in your blood.  Inclisiran is used together with a low-fat diet and other cholesterol-lowering medications in adults with heterozygous familial hypercholesterolemia (inherited types of high cholesterol). These conditions can cause high blood levels of LDL cholesterol, and can also cause plaque to build up inside your arteries.  Inclisiran is also used in adults with atherosclerotic cardiovascular disease (ASCVD) needing additional lowering of LDL cholesterol.  Inclisiran may also be used for purposes not listed in this medication guide.  What should I discuss with my healthcare provider before receiving inclisiran?  Tell your doctor if you have:  end-stage kidney disease or severe liver disease.  May harm an unborn baby. Do not use if you are pregnant.   It may not be safe to breastfeed while using this medicine. Ask your doctor about any risk.  Ask a doctor if it is safe to breastfeed while using this medicine.  Not approved for use by anyone younger than 18 years old.  How is this drug given?  Inclisiran is injected under the skin. A healthcare provider will give you this injection.  You should not stop using inclisiran without your doctor's advice, or your LDL cholesterol levels may increase.  Inclisiran is only part of a complete treatment program that also includes diet, statin medication, and regular blood testing. Follow your doctor's instructions very closely.  What happens if I miss a dose?  Call your doctor for instructions if you miss an appointment for your inclisiran injection.  What happens if I overdose?  In a medical  setting an overdose would be treated quickly.  What should I avoid while receiving inclisiran?  Follow your doctor's instructions about any restrictions on food, beverages, or activity.  What are the possible side effects of inclisiran?  Get emergency medical help if you have signs of an allergic reaction: hives, severe itching; difficult breathing; swelling of your face, lips, tongue, or throat.  Call your doctor at once if you have:  pain and burning when you urinate.  Common side effects may include:  redness, pain, or bruising where an injection was given;  pain in your legs and arms;  diarrhea;  breathing problems, bronchitis;  joint pain; or  pain and burning when you urinate.  This is not a complete list of side effects and others may occur. Call your doctor for medical advice about side effects. You may report side effects to FDA at 1-597-FDA-8469.  What other drugs will affect inclisiran?  Other drugs may affect inclisiran, including prescription and over-the-counter medicines, vitamins, and herbal products. Tell your doctor about all other medicines you use.  Where can I get more information?  Your pharmacist can provide more information about inclisiran.  Remember, keep this and all other medicines out of the reach of children, never share your medicines with others, and use this medication only for the indication prescribed.     RETURN ON APRIL 30,2025 AT 1130 FOR YOU NEXT DOSE OF LEQVIO  FOLLOW UP WITH DR. MERINO AS NEEDED OR SCHEDULED  CALL US IF YOU NEED TO CANCEL OR RESCHEDULE YOUR APPOINTMENT [phone]

## 2024-10-23 ENCOUNTER — HOSPITAL ENCOUNTER (OUTPATIENT)
Dept: NURSING | Age: 74
Setting detail: INFUSION SERIES
Discharge: HOME | End: 2024-10-23
Payer: MEDICARE

## 2024-10-23 VITALS
HEART RATE: 73 BPM | DIASTOLIC BLOOD PRESSURE: 61 MMHG | TEMPERATURE: 98.2 F | BODY MASS INDEX: 31.22 KG/M2 | WEIGHT: 187.39 LBS | RESPIRATION RATE: 16 BRPM | HEIGHT: 65 IN | SYSTOLIC BLOOD PRESSURE: 109 MMHG

## 2024-10-23 DIAGNOSIS — E78.2 MIXED HYPERLIPIDEMIA: Primary | ICD-10-CM

## 2024-10-23 DIAGNOSIS — Z78.9 STATIN INTOLERANCE: ICD-10-CM

## 2024-10-23 PROCEDURE — 96372 THER/PROPH/DIAG INJ SC/IM: CPT

## 2024-10-23 PROCEDURE — 6360000002 HC RX W HCPCS: Performed by: INTERNAL MEDICINE

## 2024-10-23 PROCEDURE — 99211 OFF/OP EST MAY X REQ PHY/QHP: CPT

## 2024-10-23 RX ORDER — EPINEPHRINE 1 MG/ML
0.3 INJECTION, SOLUTION INTRAMUSCULAR; SUBCUTANEOUS PRN
OUTPATIENT
Start: 2025-04-13

## 2024-10-23 RX ORDER — SODIUM CHLORIDE 9 MG/ML
INJECTION, SOLUTION INTRAVENOUS CONTINUOUS
OUTPATIENT
Start: 2025-04-13

## 2024-10-23 RX ORDER — DIPHENHYDRAMINE HYDROCHLORIDE 50 MG/ML
50 INJECTION INTRAMUSCULAR; INTRAVENOUS
OUTPATIENT
Start: 2025-04-13

## 2024-10-23 RX ADMIN — INCLISIRAN 284 MG: 284 INJECTION, SOLUTION SUBCUTANEOUS at 11:52

## 2024-10-23 ASSESSMENT — PAIN DESCRIPTION - ORIENTATION: ORIENTATION: LOWER

## 2024-10-23 ASSESSMENT — PAIN DESCRIPTION - PAIN TYPE: TYPE: CHRONIC PAIN

## 2024-10-23 ASSESSMENT — PAIN DESCRIPTION - FREQUENCY: FREQUENCY: CONTINUOUS

## 2024-10-23 ASSESSMENT — PAIN DESCRIPTION - LOCATION: LOCATION: BACK

## 2024-10-23 ASSESSMENT — PAIN DESCRIPTION - DESCRIPTORS: DESCRIPTORS: ACHING;DULL

## 2024-10-23 ASSESSMENT — PAIN SCALES - GENERAL: PAINLEVEL_OUTOF10: 7

## 2024-10-23 NOTE — PROGRESS NOTES
Pt here ambulatory for a Leqvio injection  Pt reports no issues after the first injection and she denies any   questions or concerns about it today  Leqvio  284 mg was given SC in left arm  Injection site unremarkable  Bandaid applied  Pt tera well Discharge instructions reviewed with pt  and understanding was verbalized Copy was given then pt was discharged ambulatory in stable condition

## 2024-11-04 ENCOUNTER — OFFICE VISIT (OUTPATIENT)
Dept: FAMILY MEDICINE CLINIC | Age: 74
End: 2024-11-04

## 2024-11-04 VITALS
SYSTOLIC BLOOD PRESSURE: 126 MMHG | OXYGEN SATURATION: 92 % | BODY MASS INDEX: 31.81 KG/M2 | HEART RATE: 82 BPM | DIASTOLIC BLOOD PRESSURE: 84 MMHG | WEIGHT: 188.6 LBS

## 2024-11-04 DIAGNOSIS — M51.379 DEGENERATION OF INTERVERTEBRAL DISC AT L5-S1 LEVEL: ICD-10-CM

## 2024-11-04 DIAGNOSIS — M51.379 DEGENERATIVE DISC DISEASE AT L5-S1 LEVEL: Chronic | ICD-10-CM

## 2024-11-04 DIAGNOSIS — F41.8 SITUATIONAL ANXIETY: ICD-10-CM

## 2024-11-04 RX ORDER — BUSPIRONE HYDROCHLORIDE 10 MG/1
10 TABLET ORAL 3 TIMES DAILY PRN
Qty: 90 TABLET | Refills: 0 | Status: SHIPPED | OUTPATIENT
Start: 2024-11-04 | End: 2024-12-04

## 2024-11-04 NOTE — PROGRESS NOTES
Chief Complaint   Patient presents with    Other     [facility] Claim         Internal Administration   First Dose COVID-19, J&J, (age 18y+), IM, 0.5 mL  2021   Second Dose           Last COVID Lab SARS-CoV-2 (no units)   Date Value   2021 Not Detected     SARS-COV-2, RdRp gene (no units)   Date Value   2022 Negative             Wt Readings from Last 3 Encounters:   24 85.5 kg (188 lb 9.6 oz)   10/23/24 85 kg (187 lb 6.3 oz)   10/14/24 86.5 kg (190 lb 12.8 oz)     BP Readings from Last 3 Encounters:   24 126/84   10/23/24 109/61   10/14/24 137/84      Lab Results   Component Value Date    LABA1C 6.4 2023    LABA1C 6.2 2023    LABA1C 5.9 10/27/2021       HPI:  Mary Yadav is a 74 y.o. (: 1950) here today for    She admits she is having a lot of issues with nerves lately. Discussed trying wellbutrin, she tried this in the past and states she didn't tolerate it. She states she doesn't want to be on anything that will make her passive. Discussed buspar which she can take only as needed to help with anxiety.       [x] Patient has completed an advance directive  [] Patient has NOT completed an advanced directive  [x] Patient has a documented healthcare surrogate  [] Patient does NOT have a documented healthcare surrogate  [] Discussed the importance of establishing and updating an advanced directive.  Patient has questions at this time and those were answered.  [x] Discussed the importance of establishing and updating an advanced directive.  Patient does NOT have questions at this time.    Discussed with: [x] Patient            [] Family             [] Other caregiver    Patient's medications, allergies, past medical, surgical, social and family histories were reviewed and updated asappropriate on 2024 at 4:18 PM.    ROS:  Review of Systems    All other systems reviewed and are negative except as noted above on 2024 at 4:18 PM. Additional review of systems may 
erythema or rash.      Comments: Turgor normal   Neurological:      Mental Status: She is oriented to person, place, and time.      Deep Tendon Reflexes:      Reflex Scores:       Patellar reflexes are 2+ on the right side and 1+ on the left side.  Psychiatric:         Mood and Affect: Mood normal.         Behavior: Behavior normal.         Thought Content: Thought content normal.         Judgment: Judgment normal.              ASSESSMENT PLAN   Date of last Colonoscopy: 4/23/2012   No cologuard on file  No FIT/FOBT on file   No flexible sigmoidoscopy on file  Date of last Mammogram: 11/21/2019  No cervical cancer screening on file     Diagnosis Orders           1. Degenerative disc disease at L5-S1 level        2. Degeneration of intervertebral disc at L5-S1 level        3.  Situational anxiety patient mentions much stressors with her mother and an older daughter that she believes contributes to the over always she feels.  She has had Wellbutrin in the past which bothered her.  Her mind does slow down with the citalopram at bedtime add BuSpar 10 mg 3 times daily as needed to try.  The Worker's Comp. note from today is in a separate note.      Patient should call the office immediately with new or ongoing signs or symptoms or worsening, or proceed to the emergency room.  No changes in past medical history, past surgical history, social history, or family history were noted during the patient encounter unless specifically listed above.  All updates of past medical history, past surgical history, social history, or family history were reviewed personally by me during the office visit.  All problems listed in the assessment are stable unless noted otherwise.  Medication profile reviewed personally by me during the visit.  Medication side effects and possible impairments from medications were discussed as applicable.    Unless stated otherwise, we will continue current medications as listed in the medication review

## 2024-11-06 ENCOUNTER — TELEPHONE (OUTPATIENT)
Dept: CARDIOLOGY CLINIC | Age: 74
End: 2024-11-06

## 2024-12-01 DIAGNOSIS — F41.8 SITUATIONAL ANXIETY: ICD-10-CM

## 2024-12-02 RX ORDER — BUSPIRONE HYDROCHLORIDE 10 MG/1
TABLET ORAL
Qty: 90 TABLET | Refills: 0 | Status: SHIPPED | OUTPATIENT
Start: 2024-12-02

## 2024-12-16 NOTE — PROGRESS NOTES
[facility]  Advanced CHF/Pulmonary Hypertension   Cardiac Evaluation      Mary Yadav  YOB: 1950    Date of Visit:  12/17/24    Chief Complaint   Patient presents with    Follow-up    Congestive Heart Failure    Other     weak    Fatigue       History of Present Illness:  Mary Yadav is a 74 y.o. female who presents from referral from Dr. Phelps for consultation and management of CHF, non ischemic CMP. She has a with PMH of HTN, HLD, obesity, abnormal stress test, and fibromyalgia, who presented to [facility] with chest pain in March 2017. History obtained from patient and review of EMR. She underwent a stress test on 3/20/17 that showed moderate sized anteroseptal partial reversibility defect consistent with  ischemia and infarction in the territory of the mid and distal LAD  She has right subclavian artery stenosis and has seen Dr. Naranjo for this. She underwent LHC on 3/23/17 with no intervention needed.    She had a recent VL Duplex on 5/2/18 (report below).    Her echo from 11/9/2021 showed her LVEF 45%.   OV, 6/21/2022, patient reports she has been feeling more fatigued lately. She only does what she needs to do and then spends the rest of her day resting/laying down. Patient states she gets a lot of sleep but still has fatigue during the day. She feels more shortness of breath on really hot days, but for the most part feels like her breathing as been stable. She has chronic back pain with 4 prior back surgeries by Dr. Sahni. She states she needs another back surgery. She wear a lidoderm patch while she works and she uses tramadol to help relieve the pain. She follows with pain management as well. She works for [employer]   OV, 5/23/2023, She reports she is no longer living with her mother. She has increased fatigue. She has occasional chest discomfort. She reports the discomfort goes away when she lays down.  OV, 11/28/2023, She reports her chest discomfort has

## 2024-12-17 ENCOUNTER — OFFICE VISIT (OUTPATIENT)
Dept: CARDIOLOGY CLINIC | Age: 74
End: 2024-12-17

## 2024-12-17 VITALS
WEIGHT: 187 LBS | BODY MASS INDEX: 31.92 KG/M2 | DIASTOLIC BLOOD PRESSURE: 74 MMHG | HEART RATE: 77 BPM | SYSTOLIC BLOOD PRESSURE: 116 MMHG | HEIGHT: 64 IN | OXYGEN SATURATION: 94 %

## 2024-12-17 DIAGNOSIS — E78.2 MIXED HYPERLIPIDEMIA: ICD-10-CM

## 2024-12-17 DIAGNOSIS — Z78.9 STATIN INTOLERANCE: ICD-10-CM

## 2024-12-17 DIAGNOSIS — I10 ESSENTIAL HYPERTENSION: ICD-10-CM

## 2024-12-17 DIAGNOSIS — G47.30 SLEEP APNEA, UNSPECIFIED TYPE: ICD-10-CM

## 2024-12-17 DIAGNOSIS — I42.9 CARDIOMYOPATHY, UNSPECIFIED TYPE (HCC): ICD-10-CM

## 2024-12-17 DIAGNOSIS — I50.9 CONGESTIVE HEART FAILURE, UNSPECIFIED HF CHRONICITY, UNSPECIFIED HEART FAILURE TYPE (HCC): ICD-10-CM

## 2024-12-17 DIAGNOSIS — I42.9 CARDIOMYOPATHY, IDIOPATHIC (HCC): ICD-10-CM

## 2024-12-17 DIAGNOSIS — I50.22 SYSTOLIC CHF, CHRONIC (HCC): Primary | ICD-10-CM

## 2024-12-17 NOTE — PATIENT INSTRUCTIONS
Your provider has ordered testing for further evaluation.  An order/prescription has been included in your paper work.   To schedule outpatient testing at [facility] contact Central Scheduling by calling [address] [place] [address] ([phone]).  To schedule outpatient testing at [facility] contact [phone].           Plan:  Fasting labs now  Echocardiogram to assess heart function and valves  Referral to pulmonologist for sleep apnea evaluation  Follow up in 4 months

## 2024-12-25 ENCOUNTER — HOSPITAL ENCOUNTER (EMERGENCY)
Age: 74
Discharge: HOME | End: 2024-12-25
Attending: EMERGENCY MEDICINE
Payer: MEDICARE

## 2024-12-25 ENCOUNTER — APPOINTMENT (OUTPATIENT)
Dept: GENERAL RADIOLOGY | Age: 74
End: 2024-12-25
Payer: MEDICARE

## 2024-12-25 ENCOUNTER — APPOINTMENT (OUTPATIENT)
Dept: CT IMAGING | Age: 74
End: 2024-12-25
Payer: MEDICARE

## 2024-12-25 VITALS
HEART RATE: 87 BPM | OXYGEN SATURATION: 96 % | HEIGHT: 64 IN | BODY MASS INDEX: 31.58 KG/M2 | RESPIRATION RATE: 16 BRPM | WEIGHT: 185 LBS | TEMPERATURE: 98.5 F | DIASTOLIC BLOOD PRESSURE: 80 MMHG | SYSTOLIC BLOOD PRESSURE: 169 MMHG

## 2024-12-25 DIAGNOSIS — S00.83XA TRAUMATIC HEMATOMA OF FOREHEAD, INITIAL ENCOUNTER: Primary | ICD-10-CM

## 2024-12-25 DIAGNOSIS — S20.211A BRUISED RIB, RIGHT, INITIAL ENCOUNTER: ICD-10-CM

## 2024-12-25 DIAGNOSIS — S63.501A SPRAIN OF RIGHT WRIST, INITIAL ENCOUNTER: ICD-10-CM

## 2024-12-25 PROCEDURE — 70450 CT HEAD/BRAIN W/O DYE: CPT

## 2024-12-25 PROCEDURE — 72125 CT NECK SPINE W/O DYE: CPT

## 2024-12-25 PROCEDURE — 73130 X-RAY EXAM OF HAND: CPT

## 2024-12-25 PROCEDURE — 6370000000 HC RX 637 (ALT 250 FOR IP): Performed by: EMERGENCY MEDICINE

## 2024-12-25 PROCEDURE — 73030 X-RAY EXAM OF SHOULDER: CPT

## 2024-12-25 PROCEDURE — 71101 X-RAY EXAM UNILAT RIBS/CHEST: CPT

## 2024-12-25 PROCEDURE — 99284 EMERGENCY DEPT VISIT MOD MDM: CPT

## 2024-12-25 RX ORDER — OXYCODONE AND ACETAMINOPHEN 5; 325 MG/1; MG/1
1 TABLET ORAL EVERY 8 HOURS PRN
Qty: 9 TABLET | Refills: 0 | Status: SHIPPED | OUTPATIENT
Start: 2024-12-25 | End: 2024-12-28

## 2024-12-25 RX ORDER — OXYCODONE AND ACETAMINOPHEN 5; 325 MG/1; MG/1
1 TABLET ORAL ONCE
Status: COMPLETED | OUTPATIENT
Start: 2024-12-25 | End: 2024-12-25

## 2024-12-25 RX ADMIN — OXYCODONE HYDROCHLORIDE AND ACETAMINOPHEN 1 TABLET: 5; 325 TABLET ORAL at 01:13

## 2024-12-25 ASSESSMENT — PAIN - FUNCTIONAL ASSESSMENT
PAIN_FUNCTIONAL_ASSESSMENT: NONE - DENIES PAIN
PAIN_FUNCTIONAL_ASSESSMENT: NONE - DENIES PAIN

## 2024-12-25 ASSESSMENT — PAIN SCALES - GENERAL: PAINLEVEL_OUTOF10: 6

## 2024-12-25 NOTE — ED PROVIDER NOTES
Emergency Department Provider Note  Location: [facility] EMERGENCY DEPARTMENT  12/25/2024     Patient Identification  Mary Yadav is a 74 y.o. female    Chief Complaint  Fall (Pt \"tripped over curve\", c/o right arm pain, shoulder to hand)      Mode of Arrival  private car    HPI  (History provided by patient)  This is a 74 y.o. female with a PMH significant for arthritis, chronic back pain presented today for fall.  Patient says she tripped over a curb and fell on her right side.  She hit her head and sustained a hematoma on her forehead.  She is also complaining of right arm pain.  The pain is worse in the wrist and radiates up her arm. She also reports right shoulder pain and right sided rib pain, just under her right breast.  She denies shortness of breath and abdominal pain.    No other complaints, modifying factors or associated symptoms.    ROS  No hip pain  Her chronic back pain is unchanged in nature.    I have reviewed the following nursing documentation:  Allergies:   Allergies   Allergen Reactions    Shellfish-Derived Products Nausea And Vomiting    Celebrex [Celecoxib] Swelling    Codeine     Ibuprofen Swelling    Methadone     Vioxx Swelling       Past medical history:  has a past medical history of Arthritis, Asthma, Back pain, Fibromyalgia, Fibromyalgia, Heart failure with reduced ejection fraction (HCC), Herpes zoster, Hypercholesteremia, Hyperlipidemia, Hypertriglyceridemia, Neck pain, Osteopenia, and Stress-induced cardiomyopathy (03/2017).    Past surgical history:  has a past surgical history that includes Neck surgery (1/98, 10/05, '07 or '08); back surgery; Hysterectomy; bladder repair; Colonoscopy (4/23/12); Cardiac catheterization; and Knee arthroscopy (Right, 8/28/2023).    Home medications:   Prior to Admission medications    Medication Sig Start Date End Date Taking? Authorizing Provider   oxyCODONE-acetaminophen (PERCOCET) 5-325 MG per tablet Take 1 tablet by mouth every 8 hours as  acute osseous abnormality.  Partially visualized anterior cervical spinal fixation hardware.     1. No acute cardiopulmonary abnormality. 2. No evidence of acute displaced right rib fracture.     XR SHOULDER RIGHT (MIN 2 VIEWS)    Result Date: 12/25/2024  EXAMINATION: THREE XRAY VIEWS OF THE RIGHT SHOULDER 12/25/2024 12:37 am COMPARISON: 09/08/2017 HISTORY: ORDERING SYSTEM PROVIDED HISTORY: fall, right shoulder pain TECHNOLOGIST PROVIDED HISTORY: Reason for exam:->fall, right shoulder pain Reason for Exam: right shoulder pain FINDINGS: No acute fracture.  No joint subluxation or dislocation.  Mild degenerative changes of the acromioclavicular joint.     No acute osseous abnormality of the right shoulder.     CT HEAD WO CONTRAST    Result Date: 12/25/2024  EXAMINATION: CT OF THE HEAD WITHOUT CONTRAST  12/25/2024 12:38 am TECHNIQUE: CT of the head was performed without the administration of intravenous contrast. Automated exposure control, iterative reconstruction, and/or weight based adjustment of the mA/kV was utilized to reduce the radiation dose to as low as reasonably achievable. COMPARISON: None. HISTORY: ORDERING SYSTEM PROVIDED HISTORY: fall, head injury TECHNOLOGIST PROVIDED HISTORY: Has a \"code stroke\" or \"stroke alert\" been called?->No Reason for exam:->fall, head injury Reason for Exam: fall FINDINGS: BRAIN/VENTRICLES: There is no acute intracranial hemorrhage, mass effect or midline shift.  No abnormal extra-axial fluid collection.  The gray-white differentiation is maintained without evidence of an acute infarct.  There is no evidence of hydrocephalus. Mild chronic white matter microvascular ischemic changes are present ORBITS: The visualized portion of the orbits demonstrate no acute abnormality. SINUSES: The visualized paranasal sinuses and mastoid air cells demonstrate no acute abnormality. SOFT TISSUES/SKULL:  Right frontal scalp swelling.  No underlying fracture.     No acute intracranial

## 2024-12-27 ENCOUNTER — CARE COORDINATION (OUTPATIENT)
Dept: CARE COORDINATION | Age: 74
End: 2024-12-27

## 2024-12-27 NOTE — CARE COORDINATION
Ambulatory Care Coordination Note     12/27/2024 11:42 AM     Patient outreach attempt by this AC today to offer care management services. [facility] was unable to reach the patient by telephone today;   left voice message requesting a return phone call to this ACM.  ER visit on 12/25/27  Traumatic hematoma of forehead, Bruised rib, right, Sprain of right wrist     ACM: Shanda Nicole RN    PCP/Specialist follow up:   Future Appointments         Provider Specialty Dept Phone    12/31/2024 1:30 PM Kris Worthington DO Orthopedic Surgery [phone]    3/19/2025 1:10 PM Smitha Singer APRN - CNP Pain Management [phone]    3/25/2025 1:30 PM Erasmo Mercado MD Pulmonology [phone]    4/15/2025 12:30 PM (Arrive by 12:15 PM) [facility] CARDI ECHO 1 Cardiology [phone]    4/15/2025 1:30 PM Charlotte Timmons MD Cardiology [phone]    4/15/2025 3:20 PM Melony Owen MD Family Medicine [phone]    4/30/2025 11:30 AM [facility] OP NURSING ROOM 1 IP Unit [phone]            Follow Up:   Plan for next AC outreach in approximately 1-2 days  to complete:  2nd ER follow up call needed -assess for CC needs  .

## 2024-12-30 ENCOUNTER — CARE COORDINATION (OUTPATIENT)
Dept: CARE COORDINATION | Age: 74
End: 2024-12-30

## 2024-12-30 ENCOUNTER — TELEPHONE (OUTPATIENT)
Dept: FAMILY MEDICINE CLINIC | Age: 74
End: 2024-12-30

## 2024-12-30 SDOH — ECONOMIC STABILITY: INCOME INSECURITY: HOW HARD IS IT FOR YOU TO PAY FOR THE VERY BASICS LIKE FOOD, HOUSING, MEDICAL CARE, AND HEATING?: NOT HARD AT ALL

## 2024-12-30 SDOH — ECONOMIC STABILITY: TRANSPORTATION INSECURITY
IN THE PAST 12 MONTHS, HAS THE LACK OF TRANSPORTATION KEPT YOU FROM MEDICAL APPOINTMENTS OR FROM GETTING MEDICATIONS?: NO

## 2024-12-30 SDOH — SOCIAL STABILITY: SOCIAL NETWORK: ARE YOU MARRIED, WIDOWED, DIVORCED, SEPARATED, NEVER MARRIED, OR LIVING WITH A PARTNER?: DIVORCED

## 2024-12-30 SDOH — ECONOMIC STABILITY: FOOD INSECURITY: WITHIN THE PAST 12 MONTHS, YOU WORRIED THAT YOUR FOOD WOULD RUN OUT BEFORE YOU GOT MONEY TO BUY MORE.: NEVER TRUE

## 2024-12-30 SDOH — ECONOMIC STABILITY: FOOD INSECURITY: WITHIN THE PAST 12 MONTHS, THE FOOD YOU BOUGHT JUST DIDN'T LAST AND YOU DIDN'T HAVE MONEY TO GET MORE.: NEVER TRUE

## 2024-12-30 SDOH — ECONOMIC STABILITY: TRANSPORTATION INSECURITY
IN THE PAST 12 MONTHS, HAS LACK OF TRANSPORTATION KEPT YOU FROM MEETINGS, WORK, OR FROM GETTING THINGS NEEDED FOR DAILY LIVING?: NO

## 2024-12-30 SDOH — HEALTH STABILITY: PHYSICAL HEALTH: ON AVERAGE, HOW MANY MINUTES DO YOU ENGAGE IN EXERCISE AT THIS LEVEL?: 0 MIN

## 2024-12-30 SDOH — HEALTH STABILITY: PHYSICAL HEALTH: ON AVERAGE, HOW MANY DAYS PER WEEK DO YOU ENGAGE IN MODERATE TO STRENUOUS EXERCISE (LIKE A BRISK WALK)?: 0 DAYS

## 2024-12-30 NOTE — CARE COORDINATION
Acceptance  Method: Handout  Response: Needs Reinforcement  Comment: HF zone tools mailed    Educate follow-up care, taught by Shanda Nicole RN at 12/30/2024  1:45 PM.  Learner: Patient  Readiness: Acceptance  Method: Handout  Response: Needs Reinforcement  Comment: HF zone tools mailed    Educate effective coping behavior, taught by Shanda Nicole RN at 12/30/2024  1:45 PM.  Learner: Patient  Readiness: Acceptance  Method: Handout  Response: Needs Reinforcement  Comment: HF zone tools mailed    WHEN TO CALL THE DOCTOR CHF, taught by Shanda Nicole RN at 12/30/2024  1:45 PM.  Learner: Patient  Readiness: Acceptance  Method: Handout  Response: Needs Reinforcement  Comment: HF zone tools mailed    Monitoring Weight, taught by Shanda Nicole RN at 12/30/2024  1:45 PM.  Learner: Patient  Readiness: Acceptance  Method: Handout  Response: Needs Reinforcement  Comment: HF zone tools mailed    Educate transfer safety, taught by Shanda Nicole RN at 12/30/2024  1:43 PM.  Learner: Patient  Readiness: Acceptance  Method: Explanation, Handout  Response: Needs Reinforcement  Comment: fall safety in apartment discussed. bathroom safety reviewed. Role of PT/OT services reviewed    Educate medication side effects, taught by Shanda Nicole RN at 12/30/2024  1:43 PM.  Learner: Patient  Readiness: Acceptance  Method: Explanation, Handout  Response: Needs Reinforcement  Comment: fall safety in apartment discussed. bathroom safety reviewed. Role of PT/OT services reviewed    Educate safety precautions, taught by Shanda Nicole RN at 12/30/2024  1:43 PM.  Learner: Patient  Readiness: Acceptance  Method: Explanation, Handout  Response: Needs Reinforcement  Comment: fall safety in apartment discussed. bathroom safety reviewed. Role of PT/OT services reviewed    Educate home safety, taught by Shanda Nicole RN at 12/30/2024  1:43 PM.  Learner: Patient  Readiness: Acceptance  Method: Explanation, Handout  Response: Needs

## 2024-12-30 NOTE — TELEPHONE ENCOUNTER
Scheduled an appt for ER follow up on 1/10/25. Called main number is not working. Call daughter left vm

## 2024-12-30 NOTE — TELEPHONE ENCOUNTER
----- Message from AYSE MIGUEL RN sent at 12/30/2024  1:11 PM EST -----  Regarding: needs ED follow up appt.  This is a [facility] patient formerly  She had a fall at home and needs ER follow up appt. She may need some PT/OT evaluations. Right shoulder is still hurting. She has an ortho appt scheduled she made on her own. Having some trouble dressing,washing hair, bath etc since falls. Hit her ER , bruised right ribs and sprained wrist.  She needs a later in afternoon appt for the ER follow up. She cannot do VV. Please assist .     Thanks  Shanda Nicole RN BSN  Ambulatory Care Manager   Respecting Choices® Advanced Steps ACP Facilitator  [facility]  [email or web address]  [phone]

## 2024-12-31 ENCOUNTER — PREP FOR PROCEDURE (OUTPATIENT)
Dept: ORTHOPEDIC SURGERY | Age: 74
End: 2024-12-31

## 2024-12-31 ENCOUNTER — OFFICE VISIT (OUTPATIENT)
Dept: ORTHOPEDIC SURGERY | Age: 74
End: 2024-12-31
Payer: MEDICARE

## 2024-12-31 VITALS — WEIGHT: 185 LBS | BODY MASS INDEX: 31.58 KG/M2 | HEIGHT: 64 IN

## 2024-12-31 DIAGNOSIS — M25.511 RIGHT SHOULDER PAIN, UNSPECIFIED CHRONICITY: ICD-10-CM

## 2024-12-31 DIAGNOSIS — M75.101 TEAR OF RIGHT ROTATOR CUFF, UNSPECIFIED TEAR EXTENT, UNSPECIFIED WHETHER TRAUMATIC: Primary | ICD-10-CM

## 2024-12-31 DIAGNOSIS — S62.316A DISPLACED FRACTURE OF BASE OF FIFTH METACARPAL BONE, RIGHT HAND, INITIAL ENCOUNTER FOR CLOSED FRACTURE: ICD-10-CM

## 2024-12-31 PROCEDURE — 1090F PRES/ABSN URINE INCON ASSESS: CPT | Performed by: STUDENT IN AN ORGANIZED HEALTH CARE EDUCATION/TRAINING PROGRAM

## 2024-12-31 PROCEDURE — L3809 WHFO W/O JOINTS PRE OTS: HCPCS | Performed by: STUDENT IN AN ORGANIZED HEALTH CARE EDUCATION/TRAINING PROGRAM

## 2024-12-31 PROCEDURE — 1123F ACP DISCUSS/DSCN MKR DOCD: CPT | Performed by: STUDENT IN AN ORGANIZED HEALTH CARE EDUCATION/TRAINING PROGRAM

## 2024-12-31 PROCEDURE — G8400 PT W/DXA NO RESULTS DOC: HCPCS | Performed by: STUDENT IN AN ORGANIZED HEALTH CARE EDUCATION/TRAINING PROGRAM

## 2024-12-31 PROCEDURE — 20610 DRAIN/INJ JOINT/BURSA W/O US: CPT | Performed by: STUDENT IN AN ORGANIZED HEALTH CARE EDUCATION/TRAINING PROGRAM

## 2024-12-31 PROCEDURE — G8417 CALC BMI ABV UP PARAM F/U: HCPCS | Performed by: STUDENT IN AN ORGANIZED HEALTH CARE EDUCATION/TRAINING PROGRAM

## 2024-12-31 PROCEDURE — G8427 DOCREV CUR MEDS BY ELIG CLIN: HCPCS | Performed by: STUDENT IN AN ORGANIZED HEALTH CARE EDUCATION/TRAINING PROGRAM

## 2024-12-31 PROCEDURE — 99215 OFFICE O/P EST HI 40 MIN: CPT | Performed by: STUDENT IN AN ORGANIZED HEALTH CARE EDUCATION/TRAINING PROGRAM

## 2024-12-31 PROCEDURE — 3017F COLORECTAL CA SCREEN DOC REV: CPT | Performed by: STUDENT IN AN ORGANIZED HEALTH CARE EDUCATION/TRAINING PROGRAM

## 2024-12-31 PROCEDURE — G8484 FLU IMMUNIZE NO ADMIN: HCPCS | Performed by: STUDENT IN AN ORGANIZED HEALTH CARE EDUCATION/TRAINING PROGRAM

## 2024-12-31 PROCEDURE — 1036F TOBACCO NON-USER: CPT | Performed by: STUDENT IN AN ORGANIZED HEALTH CARE EDUCATION/TRAINING PROGRAM

## 2024-12-31 RX ORDER — METHYLPREDNISOLONE ACETATE 40 MG/ML
80 INJECTION, SUSPENSION INTRA-ARTICULAR; INTRALESIONAL; INTRAMUSCULAR; SOFT TISSUE ONCE
Status: COMPLETED | OUTPATIENT
Start: 2024-12-31 | End: 2024-12-31

## 2024-12-31 RX ADMIN — METHYLPREDNISOLONE ACETATE 80 MG: 40 INJECTION, SUSPENSION INTRA-ARTICULAR; INTRALESIONAL; INTRAMUSCULAR; SOFT TISSUE at 14:14

## 2024-12-31 RX ADMIN — Medication 4 ML: at 14:13

## 2024-12-31 NOTE — PROGRESS NOTES
Date:  2024    Name:  Mary Yadav  Address:  [address] [address]    :  1950      Age:   74 y.o.    SSN:  [id number]      Medical Record Number:  [id number]    Reason for Visit:    Chief Complaint    Shoulder Pain (NP RT. SHOULDER)      DOS:2024     HPI: Mary Yadav is a 74 y.o. right-hand-dominant female here today for evaluation of right shoulder and hand pain after a mechanical fall on 2024 evening.  Patient states that she was taking the trash when she stumbled over the curb and fell directly onto her right side.  She has had right shoulder and hand pain since that time.  She was seen in the ER on  where x-rays were taken and was given a thumb spica brace.  Denies numbness/tingling.    Of note, she has a history of a full-thickness supraspinatus tear with 12 mm of retraction diagnosed in  on MRI.  She has had shoulder dysfunction from that prior to her fall which has now worsened.         ROS: All systems reviewed on patient intake form.  Pertinent items are noted in HPI.        Past Medical History:   Diagnosis Date    Arthritis     Asthma     Back pain     Fibromyalgia     Fibromyalgia     Heart failure with reduced ejection fraction (HCC)     Herpes zoster     Hypercholesteremia     Hyperlipidemia     Hypertriglyceridemia     Neck pain     Osteopenia     Stress-induced cardiomyopathy 2017        Past Surgical History:   Procedure Laterality Date    BACK SURGERY      BLADDER REPAIR      CARDIAC CATHETERIZATION      COLONOSCOPY  12    diverticulosis    HYSTERECTOMY (CERVIX STATUS UNKNOWN)      KNEE ARTHROSCOPY Right 2023    RIGHT KNEE VIDEO ARTHROSCOPY, PARTIAL MEDIAL MENISCECTOMY performed by Kris Worthington DO at [facility]    NECK SURGERY  , 10/05, '07 or     C6-C7spur '05       Family History   Problem Relation Age of Onset    High Blood Pressure Mother     Heart Disease Father        Social History     Socioeconomic

## 2025-01-02 ENCOUNTER — PREP FOR PROCEDURE (OUTPATIENT)
Dept: ORTHOPEDIC SURGERY | Age: 75
End: 2025-01-02

## 2025-01-03 ENCOUNTER — HOSPITAL ENCOUNTER (OUTPATIENT)
Age: 75
Discharge: HOME | End: 2025-01-03
Payer: MEDICARE

## 2025-01-03 ENCOUNTER — TELEPHONE (OUTPATIENT)
Dept: CARDIOLOGY CLINIC | Age: 75
End: 2025-01-03

## 2025-01-03 ENCOUNTER — HOSPITAL ENCOUNTER (OUTPATIENT)
Dept: GENERAL RADIOLOGY | Age: 75
Discharge: HOME | End: 2025-01-03
Payer: MEDICARE

## 2025-01-03 ENCOUNTER — OFFICE VISIT (OUTPATIENT)
Dept: ORTHOPEDIC SURGERY | Age: 75
End: 2025-01-03

## 2025-01-03 ENCOUNTER — OFFICE VISIT (OUTPATIENT)
Dept: FAMILY MEDICINE CLINIC | Age: 75
End: 2025-01-03

## 2025-01-03 VITALS
HEART RATE: 76 BPM | BODY MASS INDEX: 31.58 KG/M2 | WEIGHT: 185 LBS | OXYGEN SATURATION: 98 % | DIASTOLIC BLOOD PRESSURE: 77 MMHG | HEIGHT: 64 IN | SYSTOLIC BLOOD PRESSURE: 131 MMHG

## 2025-01-03 VITALS — WEIGHT: 185 LBS | HEIGHT: 64 IN | BODY MASS INDEX: 31.58 KG/M2
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
## 2025-01-03 DIAGNOSIS — Z01.818 PRE-OP EXAM: Primary | ICD-10-CM

## 2025-01-03 DIAGNOSIS — T14.8XXA HEMATOMA: ICD-10-CM

## 2025-01-03 DIAGNOSIS — Z01.818 PRE-OP EXAM: ICD-10-CM

## 2025-01-03 DIAGNOSIS — Z72.0 TOBACCO ABUSE: ICD-10-CM

## 2025-01-03 DIAGNOSIS — I50.22 SYSTOLIC CHF, CHRONIC (HCC): ICD-10-CM

## 2025-01-03 DIAGNOSIS — J45.20 MILD INTERMITTENT ASTHMA WITHOUT COMPLICATION: ICD-10-CM

## 2025-01-03 DIAGNOSIS — S62.316A DISPLACED FRACTURE OF BASE OF FIFTH METACARPAL BONE, RIGHT HAND, INITIAL ENCOUNTER FOR CLOSED FRACTURE: Primary | ICD-10-CM

## 2025-01-03 DIAGNOSIS — R73.03 PRE-DIABETES: ICD-10-CM

## 2025-01-03 DIAGNOSIS — I10 ESSENTIAL HYPERTENSION: ICD-10-CM

## 2025-01-03 PROBLEM — F32.3 DEPRESSIVE PSYCHOSIS (HCC): Status: RESOLVED | Noted: 2017-02-21 | Resolved: 2025-01-03

## 2025-01-03 LAB
ALBUMIN SERPL-MCNC: 4.3 G/DL (ref 3.4–5)
ALBUMIN/GLOB SERPL: 1.3 {RATIO} (ref 1.1–2.2)
ALP SERPL-CCNC: 96 U/L (ref 40–129)
ALT SERPL-CCNC: 11 U/L (ref 10–40)
ANION GAP SERPL CALCULATED.3IONS-SCNC: 9 MMOL/L (ref 3–16)
AST SERPL-CCNC: 21 U/L (ref 15–37)
BASOPHILS # BLD: 0.1 K/UL (ref 0–0.2)
BASOPHILS NFR BLD: 1.1 %
BILIRUB SERPL-MCNC: 0.5 MG/DL (ref 0–1)
BUN SERPL-MCNC: 15 MG/DL (ref 7–20)
CALCIUM SERPL-MCNC: 9.6 MG/DL (ref 8.3–10.6)
CHLORIDE SERPL-SCNC: 100 MMOL/L (ref 99–110)
CO2 SERPL-SCNC: 30 MMOL/L (ref 21–32)
CREAT SERPL-MCNC: 0.9 MG/DL (ref 0.6–1.2)
DEPRECATED RDW RBC AUTO: 12.9 % (ref 12.4–15.4)
EOSINOPHIL # BLD: 0.1 K/UL (ref 0–0.6)
EOSINOPHIL NFR BLD: 0.8 %
GFR SERPLBLD CREATININE-BSD FMLA CKD-EPI: 67 ML/MIN/{1.73_M2}
GLUCOSE SERPL-MCNC: 97 MG/DL (ref 70–99)
HCT VFR BLD AUTO: 41 % (ref 36–48)
HGB BLD-MCNC: 13.4 G/DL (ref 12–16)
LYMPHOCYTES # BLD: 2.5 K/UL (ref 1–5.1)
LYMPHOCYTES NFR BLD: 22.9 %
MCH RBC QN AUTO: 28.5 PG (ref 26–34)
MCHC RBC AUTO-ENTMCNC: 32.7 G/DL (ref 31–36)
MCV RBC AUTO: 87.1 FL (ref 80–100)
MONOCYTES # BLD: 0.8 K/UL (ref 0–1.3)
MONOCYTES NFR BLD: 7.3 %
NEUTROPHILS # BLD: 7.4 K/UL (ref 1.7–7.7)
NEUTROPHILS NFR BLD: 67.9 %
PLATELET # BLD AUTO: 343 K/UL (ref 135–450)
PMV BLD AUTO: 9 FL (ref 5–10.5)
POTASSIUM SERPL-SCNC: 4.4 MMOL/L (ref 3.5–5.1)
PROT SERPL-MCNC: 7.6 G/DL (ref 6.4–8.2)
RBC # BLD AUTO: 4.71 M/UL (ref 4–5.2)
SODIUM SERPL-SCNC: 139 MMOL/L (ref 136–145)
WBC # BLD AUTO: 10.9 K/UL (ref 4–11)

## 2025-01-03 PROCEDURE — 70150 X-RAY EXAM OF FACIAL BONES: CPT

## 2025-01-03 PROCEDURE — 80053 COMPREHEN METABOLIC PANEL: CPT

## 2025-01-03 PROCEDURE — 36415 COLL VENOUS BLD VENIPUNCTURE: CPT

## 2025-01-03 PROCEDURE — 85025 COMPLETE CBC W/AUTO DIFF WBC: CPT

## 2025-01-03 ASSESSMENT — PATIENT HEALTH QUESTIONNAIRE - PHQ9
10. IF YOU CHECKED OFF ANY PROBLEMS, HOW DIFFICULT HAVE THESE PROBLEMS MADE IT FOR YOU TO DO YOUR WORK, TAKE CARE OF THINGS AT HOME, OR GET ALONG WITH OTHER PEOPLE: NOT DIFFICULT AT ALL
5. POOR APPETITE OR OVEREATING: NOT AT ALL
SUM OF ALL RESPONSES TO PHQ9 QUESTIONS 1 & 2: 0
1. LITTLE INTEREST OR PLEASURE IN DOING THINGS: NOT AT ALL
SUM OF ALL RESPONSES TO PHQ QUESTIONS 1-9: 0
6. FEELING BAD ABOUT YOURSELF - OR THAT YOU ARE A FAILURE OR HAVE LET YOURSELF OR YOUR FAMILY DOWN: NOT AT ALL
7. TROUBLE CONCENTRATING ON THINGS, SUCH AS READING THE NEWSPAPER OR WATCHING TELEVISION: NOT AT ALL
SUM OF ALL RESPONSES TO PHQ QUESTIONS 1-9: 0
4. FEELING TIRED OR HAVING LITTLE ENERGY: NOT AT ALL
8. MOVING OR SPEAKING SO SLOWLY THAT OTHER PEOPLE COULD HAVE NOTICED. OR THE OPPOSITE, BEING SO FIGETY OR RESTLESS THAT YOU HAVE BEEN MOVING AROUND A LOT MORE THAN USUAL: NOT AT ALL
9. THOUGHTS THAT YOU WOULD BE BETTER OFF DEAD, OR OF HURTING YOURSELF: NOT AT ALL
3. TROUBLE FALLING OR STAYING ASLEEP: NOT AT ALL
2. FEELING DOWN, DEPRESSED OR HOPELESS: NOT AT ALL
SUM OF ALL RESPONSES TO PHQ QUESTIONS 1-9: 0
SUM OF ALL RESPONSES TO PHQ QUESTIONS 1-9: 0

## 2025-01-03 NOTE — PROGRESS NOTES
EVENING    metoprolol succinate (TOPROL XL) 25 MG extended release tablet Take one tab daily    spironolactone (ALDACTONE) 25 MG tablet TAKE ONE-HALF TABLET BY MOUTH DAILY    traMADol (ULTRAM) 50 mg, Oral, 4 TIMES DAILY PRN    valACYclovir (VALTREX) 1,000 mg, Oral, 3 TIMES DAILY       Past Medical History     Past Medical History:   Diagnosis Date    Arthritis     Asthma     Back pain     Fibromyalgia     Heart failure with reduced ejection fraction (HCC)     Herpes zoster     Hypercholesteremia     Hyperlipidemia     Hypertriglyceridemia     Neck pain     Osteopenia     Stress-induced cardiomyopathy 03/2017       Past Surgical History     Past Surgical History:   Procedure Laterality Date    BACK SURGERY      lumbar    BLADDER REPAIR      CARDIAC CATHETERIZATION      COLONOSCOPY  04/23/2012    diverticulosis    HYSTERECTOMY (CERVIX STATUS UNKNOWN)      KNEE ARTHROSCOPY Right 08/28/2023    RIGHT KNEE VIDEO ARTHROSCOPY, PARTIAL MEDIAL MENISCECTOMY performed by Kris Worthington DO at [facility] OR    NECK SURGERY  1/98, 10/05, '07 or '08    C6-C7spur '05       Social History     Social History     Occupational History    Not on file   Tobacco Use    Smoking status: Former     Current packs/day: 0.00     Average packs/day: 0.5 packs/day for 3.0 years (1.5 ttl pk-yrs)     Types: Cigarettes     Start date: 1/1/2012     Quit date: 1/1/2015     Years since quitting: 10.0    Smokeless tobacco: Never    Tobacco comments:     pt smoked off and on   Vaping Use    Vaping status: Never Used   Substance and Sexual Activity    Alcohol use: No     Alcohol/week: 0.0 standard drinks of alcohol    Drug use: No    Sexual activity: Yes     Partners: Male         Physical Exam     Vital Signs:  Ht 1.626 m (5' 4\")   Wt 83.9 kg (185 lb)   LMP  (LMP Unknown)   BMI 31.76 kg/m²   BMI: Body mass index is 31.76 kg/m².    General appearance: healthy, alert, no distress, appropriate for stated age  HEENT: normocephalic, atraumatic  Respiratory

## 2025-01-03 NOTE — PATIENT INSTRUCTIONS
Not feeling your best?  Where to go for the right care at the right time.    Dear Mary Yadav   I wanted to provide you with some information that might help you seek care for your condition when your primary care provider or specialist is unavailable. If you have a need outside of normal business hours, you should first contact your primary care office or specialist caring for your condition. They may have on-call providers that could assist with your care. During office hours, you may request a virtual or same day appointment.   But what if your primary care provider is not in the office that day and you can't wait until the  next day for care? In that situation, your next option is to visit an urgent care facility.          [facility] now has urgent care sites open to support our community.   [facility] is a great alternative when you need immediate medical care that is not a serious threat to your health or your doctor's office is closed or unable to get you in for an appointment. The urgent care centers offer fast access to [facility] doctors for minor illnesses and injuries for patients of all ages. There are other medical services available including lab testing, X-rays, EKGs, and IV fluids.  Locations are open daily from 8 a.m. - 8 p.m.     [place] [address], [place], [place] [address]  [phone]    [address], [place], [place] [address]  [phone]    Local Urgent Care     [facility] 8:30 am - 7:70 pm   [address] [place] [address]  [phone]    [facility]     8 am - 8 pm   [address], [place] [address]  [phone]    [facility] 7 am - 7:30 pm  [address] [place] [address]  [phone]     [facility] [place] 7 am - 7:30 pm  [address] [place] [address]  [phone]    [place]

## 2025-01-03 NOTE — PROGRESS NOTES
exam    2. Systolic CHF, chronic (HCC)    3. Hematoma         Plan:     1. Preoperative workup as follows: History and physical; Review of medications  2. Further recommendations from consultants:Yes - cardiac clearance needed     3. Change in medication regimen before surgery: Take Metoprolol on morning of surgery with sip of water, and hold all other medications until after surgery. Stop NSAIDS (Motrin, Aleve, Ibuprofen), vitamin E, aspirin, fish oil 7-10 days prior to surgery unless instructed otherwise by surgeon.   4. Prophylaxis for cardiac events with perioperative beta-blockers: Not indicated  ACC/AHA indications for pre-operative beta-blocker use:    Vascular surgery with history of postitive stress test  Intermediate or high risk surgery with history of CAD   Intermediate or high risk surgery with multiple clinical predictors of CAD- 2 of the following: history of compensated or prior heart failure, history of cerebrovascular disease, DM, or renal insufficiency    Routine administration of higher-dose, long-acting metoprolol in beta-blocker-naïve patients on the day of surgery, and in the absence of dose titration is associated with an overall increase in mortality.  Beta-blockers should be started days to weeks prior to surgery and titrated to pulse < 70.  5. Deep vein thrombosis prophylaxis: regimen to be chosen by surgical team  6. No contraindications to planned surgery pending appropriate labs and cardiac clearance       If you have questions, please do not hesitate to call me ([phone]).     Sincerely,      Marjan Ansari, DNP, APRN, FNP-BC

## 2025-01-03 NOTE — TELEPHONE ENCOUNTER
CARDIAC CLEARANCE REQUEST    What type of procedure are you having:  Right finger closed reduction pinning fifth metacarpal bas fracture  Are you taking any blood thinners:  Asprin 81mg  Type on anesthesia:  mac  When is your procedure scheduled for:  1/6/25  What physician is performing your procedure:  Dr. Levine   Phone Number:  [phone]  Fax number to send the letter:  Can place in [facility] from [facility] surgery is requesting clearance.    Last ov 12/17/2024 charbel

## 2025-01-07 ENCOUNTER — TELEPHONE (OUTPATIENT)
Dept: ORTHOPEDIC SURGERY | Age: 75
End: 2025-01-07

## 2025-01-07 NOTE — PROGRESS NOTES
Mary E Eyre    Age 74 y.o.    female    1950    N [id number]    1/9/2025  Arrival Time_____________  OR Time____________71 Min     Procedure(s):  RIGHT HAND CLOSED REDUCTION AND PINNING FIFTH METACARPAL BASE FRACTURE NOTE: BLOCK                      Monitor Anesthesia Care    Surgeon(s):  Nereida Levine, MD       Phone [phone] (home)     Inhospitals  Date  Info Source  Home  Cell         Work  _____________________________________________________________________  _____________________________________________________________________  _____________________________________________________________________  _____________________________________________________________________  _____________________________________________________________________    PCP _____________________________ Phone_________________     H&P  ________________  Bringing      Chart              Epic      DOS      Called________  EKG ________________   Bringing      Chart              Epic      DOS      Called________  LABS________________   Bringing     Chart              Epic      DOS      Called________  Cardiac Clearance ______ Bringing      Chart              Epic      DOS      Called________  Pulmonary Clearance____ Bringing      Chart              Epic      DOS      Called________    Cardiologist________________________ Phone___________________________  Pulmonologist_______________________Phone___________________________    ? Advance Directives   ? Anabaptist concerns / Waiver on Chart            PAT Communications________________  ? Pre-op Instructions Given /Understood          _________________________________  ? Directions to Surgery Center                          _________________________________  ? Transportation Home_______________      __________________________________  ? Crutches/Walker__________________        __________________________________    Orders: Hard copy/ EPIC                 Transcribed/ EPIC

## 2025-01-07 NOTE — TELEPHONE ENCOUNTER
Pt surgery that was scheduled for 1/6/2025 was canceled and moved to 1/9/2025. Still needs clearance given.

## 2025-01-08 ENCOUNTER — ANESTHESIA EVENT (OUTPATIENT)
Dept: OPERATING ROOM | Age: 75
End: 2025-01-08
Payer: MEDICARE

## 2025-01-08 ENCOUNTER — TELEPHONE (OUTPATIENT)
Dept: ORTHOPEDIC SURGERY | Age: 75
End: 2025-01-08

## 2025-01-08 NOTE — TELEPHONE ENCOUNTER
LVM for Graciela regarding MATTEO message. LVM for pt. If she calls back please relay MATTEO previous message

## 2025-01-08 NOTE — TELEPHONE ENCOUNTER
Graciela from preadmission called for clearance informed that we need approval from [facility]. Surgery is 1/9/25

## 2025-01-08 NOTE — TELEPHONE ENCOUNTER
PT IS REQUESTING A PHONE CALL IN REGARDS TO SURGERY DETAILS. PLEASE CALL PT BACK ASAP. PT IS SCHEDULED FOR SURGERY TOMORROW. PT CAN BE REACHED [phone]

## 2025-01-09 ENCOUNTER — ANESTHESIA (OUTPATIENT)
Dept: OPERATING ROOM | Age: 75
End: 2025-01-09
Payer: MEDICARE

## 2025-01-09 ENCOUNTER — CARE COORDINATION (OUTPATIENT)
Dept: CARE COORDINATION | Age: 75
End: 2025-01-09

## 2025-01-09 ENCOUNTER — APPOINTMENT (OUTPATIENT)
Dept: GENERAL RADIOLOGY | Age: 75
End: 2025-01-09
Attending: STUDENT IN AN ORGANIZED HEALTH CARE EDUCATION/TRAINING PROGRAM
Payer: MEDICARE

## 2025-01-09 ENCOUNTER — HOSPITAL ENCOUNTER (OUTPATIENT)
Age: 75
Setting detail: OUTPATIENT SURGERY
Discharge: HOME | End: 2025-01-09
Attending: STUDENT IN AN ORGANIZED HEALTH CARE EDUCATION/TRAINING PROGRAM | Admitting: STUDENT IN AN ORGANIZED HEALTH CARE EDUCATION/TRAINING PROGRAM
Payer: MEDICARE

## 2025-01-09 VITALS
HEART RATE: 64 BPM | WEIGHT: 185 LBS | DIASTOLIC BLOOD PRESSURE: 59 MMHG | OXYGEN SATURATION: 94 % | RESPIRATION RATE: 16 BRPM | TEMPERATURE: 98.3 F | SYSTOLIC BLOOD PRESSURE: 117 MMHG | BODY MASS INDEX: 31.58 KG/M2 | HEIGHT: 64 IN

## 2025-01-09 DIAGNOSIS — S62.316A CLOSED DISPLACED FRACTURE OF BASE OF FIFTH METACARPAL BONE OF RIGHT HAND, INITIAL ENCOUNTER: Primary | ICD-10-CM

## 2025-01-09 PROCEDURE — 2580000003 HC RX 258: Performed by: STUDENT IN AN ORGANIZED HEALTH CARE EDUCATION/TRAINING PROGRAM

## 2025-01-09 PROCEDURE — 2500000003 HC RX 250 WO HCPCS: Performed by: STUDENT IN AN ORGANIZED HEALTH CARE EDUCATION/TRAINING PROGRAM

## 2025-01-09 PROCEDURE — 3700000001 HC ADD 15 MINUTES (ANESTHESIA): Performed by: STUDENT IN AN ORGANIZED HEALTH CARE EDUCATION/TRAINING PROGRAM

## 2025-01-09 PROCEDURE — C1713 ANCHOR/SCREW BN/BN,TIS/BN: HCPCS | Performed by: STUDENT IN AN ORGANIZED HEALTH CARE EDUCATION/TRAINING PROGRAM

## 2025-01-09 PROCEDURE — 3600000015 HC SURGERY LEVEL 5 ADDTL 15MIN: Performed by: STUDENT IN AN ORGANIZED HEALTH CARE EDUCATION/TRAINING PROGRAM

## 2025-01-09 PROCEDURE — 64415 NJX AA&/STRD BRCH PLXS IMG: CPT | Performed by: STUDENT IN AN ORGANIZED HEALTH CARE EDUCATION/TRAINING PROGRAM

## 2025-01-09 PROCEDURE — 26615 TREAT METACARPAL FRACTURE: CPT | Performed by: STUDENT IN AN ORGANIZED HEALTH CARE EDUCATION/TRAINING PROGRAM

## 2025-01-09 PROCEDURE — 6370000000 HC RX 637 (ALT 250 FOR IP): Performed by: ANESTHESIOLOGY

## 2025-01-09 PROCEDURE — 73140 X-RAY EXAM OF FINGER(S): CPT

## 2025-01-09 PROCEDURE — 6360000002 HC RX W HCPCS: Performed by: NURSE ANESTHETIST, CERTIFIED REGISTERED

## 2025-01-09 PROCEDURE — 6360000002 HC RX W HCPCS: Performed by: STUDENT IN AN ORGANIZED HEALTH CARE EDUCATION/TRAINING PROGRAM

## 2025-01-09 PROCEDURE — 3700000000 HC ANESTHESIA ATTENDED CARE: Performed by: STUDENT IN AN ORGANIZED HEALTH CARE EDUCATION/TRAINING PROGRAM

## 2025-01-09 PROCEDURE — 7100000011 HC PHASE II RECOVERY - ADDTL 15 MIN: Performed by: STUDENT IN AN ORGANIZED HEALTH CARE EDUCATION/TRAINING PROGRAM

## 2025-01-09 PROCEDURE — 7100000010 HC PHASE II RECOVERY - FIRST 15 MIN: Performed by: STUDENT IN AN ORGANIZED HEALTH CARE EDUCATION/TRAINING PROGRAM

## 2025-01-09 PROCEDURE — 3600000005 HC SURGERY LEVEL 5 BASE: Performed by: STUDENT IN AN ORGANIZED HEALTH CARE EDUCATION/TRAINING PROGRAM

## 2025-01-09 PROCEDURE — 2709999900 HC NON-CHARGEABLE SUPPLY: Performed by: STUDENT IN AN ORGANIZED HEALTH CARE EDUCATION/TRAINING PROGRAM

## 2025-01-09 PROCEDURE — 2580000003 HC RX 258: Performed by: NURSE ANESTHETIST, CERTIFIED REGISTERED

## 2025-01-09 PROCEDURE — 2500000003 HC RX 250 WO HCPCS: Performed by: NURSE ANESTHETIST, CERTIFIED REGISTERED

## 2025-01-09 DEVICE — K WIRE FIX L152MM DIA1.1MM S STL DBL TRCR TIP STYL 1: Type: IMPLANTABLE DEVICE | Site: WRIST | Status: FUNCTIONAL

## 2025-01-09 RX ORDER — NALOXONE HYDROCHLORIDE 0.4 MG/ML
INJECTION, SOLUTION INTRAMUSCULAR; INTRAVENOUS; SUBCUTANEOUS PRN
Status: CANCELLED | OUTPATIENT
Start: 2025-01-09

## 2025-01-09 RX ORDER — MIDAZOLAM HYDROCHLORIDE 1 MG/ML
INJECTION, SOLUTION INTRAMUSCULAR; INTRAVENOUS
Status: COMPLETED | OUTPATIENT
Start: 2025-01-09 | End: 2025-01-09

## 2025-01-09 RX ORDER — MAGNESIUM HYDROXIDE 1200 MG/15ML
LIQUID ORAL CONTINUOUS PRN
Status: COMPLETED | OUTPATIENT
Start: 2025-01-09 | End: 2025-01-09

## 2025-01-09 RX ORDER — OXYCODONE AND ACETAMINOPHEN 5; 325 MG/1; MG/1
1 TABLET ORAL EVERY 6 HOURS PRN
Qty: 10 TABLET | Refills: 0 | Status: SHIPPED | OUTPATIENT
Start: 2025-01-09 | End: 2025-01-16

## 2025-01-09 RX ORDER — MIDAZOLAM HYDROCHLORIDE 1 MG/ML
2 INJECTION, SOLUTION INTRAMUSCULAR; INTRAVENOUS
Status: DISCONTINUED | OUTPATIENT
Start: 2025-01-09 | End: 2025-01-09 | Stop reason: HOSPADM

## 2025-01-09 RX ORDER — EPHEDRINE SULFATE 50 MG/ML
INJECTION INTRAVENOUS
Status: DISCONTINUED | OUTPATIENT
Start: 2025-01-09 | End: 2025-01-09 | Stop reason: SDUPTHER

## 2025-01-09 RX ORDER — SODIUM CHLORIDE 9 MG/ML
INJECTION, SOLUTION INTRAVENOUS PRN
Status: CANCELLED | OUTPATIENT
Start: 2025-01-09

## 2025-01-09 RX ORDER — DROPERIDOL 2.5 MG/ML
0.62 INJECTION, SOLUTION INTRAMUSCULAR; INTRAVENOUS
Status: CANCELLED | OUTPATIENT
Start: 2025-01-09 | End: 2025-01-10

## 2025-01-09 RX ORDER — ONDANSETRON 2 MG/ML
4 INJECTION INTRAMUSCULAR; INTRAVENOUS
Status: CANCELLED | OUTPATIENT
Start: 2025-01-09 | End: 2025-01-10

## 2025-01-09 RX ORDER — OXYCODONE HYDROCHLORIDE 5 MG/1
5 TABLET ORAL PRN
Status: CANCELLED | OUTPATIENT
Start: 2025-01-09 | End: 2025-01-09

## 2025-01-09 RX ORDER — LABETALOL HYDROCHLORIDE 5 MG/ML
10 INJECTION, SOLUTION INTRAVENOUS
Status: CANCELLED | OUTPATIENT
Start: 2025-01-09

## 2025-01-09 RX ORDER — BUPIVACAINE HYDROCHLORIDE 5 MG/ML
INJECTION, SOLUTION EPIDURAL; INTRACAUDAL
Status: COMPLETED | OUTPATIENT
Start: 2025-01-09 | End: 2025-01-09

## 2025-01-09 RX ORDER — SODIUM CHLORIDE 0.9 % (FLUSH) 0.9 %
5-40 SYRINGE (ML) INJECTION EVERY 12 HOURS SCHEDULED
Status: DISCONTINUED | OUTPATIENT
Start: 2025-01-09 | End: 2025-01-09 | Stop reason: HOSPADM

## 2025-01-09 RX ORDER — SODIUM CHLORIDE 0.9 % (FLUSH) 0.9 %
5-40 SYRINGE (ML) INJECTION PRN
Status: CANCELLED | OUTPATIENT
Start: 2025-01-09

## 2025-01-09 RX ORDER — SODIUM CHLORIDE 9 MG/ML
INJECTION, SOLUTION INTRAVENOUS PRN
Status: DISCONTINUED | OUTPATIENT
Start: 2025-01-09 | End: 2025-01-09 | Stop reason: HOSPADM

## 2025-01-09 RX ORDER — DIPHENHYDRAMINE HYDROCHLORIDE 50 MG/ML
12.5 INJECTION INTRAMUSCULAR; INTRAVENOUS
Status: CANCELLED | OUTPATIENT
Start: 2025-01-09 | End: 2025-01-10

## 2025-01-09 RX ORDER — PROPOFOL 10 MG/ML
INJECTION, EMULSION INTRAVENOUS
Status: DISCONTINUED | OUTPATIENT
Start: 2025-01-09 | End: 2025-01-09 | Stop reason: SDUPTHER

## 2025-01-09 RX ORDER — LIDOCAINE HYDROCHLORIDE 10 MG/ML
1 INJECTION, SOLUTION EPIDURAL; INFILTRATION; INTRACAUDAL; PERINEURAL
Status: DISCONTINUED | OUTPATIENT
Start: 2025-01-09 | End: 2025-01-09 | Stop reason: HOSPADM

## 2025-01-09 RX ORDER — SODIUM CHLORIDE 0.9 % (FLUSH) 0.9 %
5-40 SYRINGE (ML) INJECTION PRN
Status: DISCONTINUED | OUTPATIENT
Start: 2025-01-09 | End: 2025-01-09 | Stop reason: HOSPADM

## 2025-01-09 RX ORDER — OXYCODONE HYDROCHLORIDE 5 MG/1
10 TABLET ORAL PRN
Status: CANCELLED | OUTPATIENT
Start: 2025-01-09 | End: 2025-01-09

## 2025-01-09 RX ORDER — FENTANYL CITRATE 50 UG/ML
INJECTION, SOLUTION INTRAMUSCULAR; INTRAVENOUS
Status: DISCONTINUED | OUTPATIENT
Start: 2025-01-09 | End: 2025-01-09 | Stop reason: SDUPTHER

## 2025-01-09 RX ORDER — SODIUM CHLORIDE 0.9 % (FLUSH) 0.9 %
5-40 SYRINGE (ML) INJECTION EVERY 12 HOURS SCHEDULED
Status: CANCELLED | OUTPATIENT
Start: 2025-01-09

## 2025-01-09 RX ORDER — SODIUM CHLORIDE, SODIUM LACTATE, POTASSIUM CHLORIDE, CALCIUM CHLORIDE 600; 310; 30; 20 MG/100ML; MG/100ML; MG/100ML; MG/100ML
INJECTION, SOLUTION INTRAVENOUS CONTINUOUS
Status: DISCONTINUED | OUTPATIENT
Start: 2025-01-09 | End: 2025-01-09 | Stop reason: HOSPADM

## 2025-01-09 RX ADMIN — EPHEDRINE SULFATE 10 MG: 50 INJECTION INTRAVENOUS at 14:21

## 2025-01-09 RX ADMIN — PROPOFOL 20 MG: 10 INJECTION, EMULSION INTRAVENOUS at 13:58

## 2025-01-09 RX ADMIN — SODIUM CHLORIDE: 9 INJECTION, SOLUTION INTRAVENOUS at 12:31

## 2025-01-09 RX ADMIN — PROPOFOL 50 MCG/KG/MIN: 10 INJECTION, EMULSION INTRAVENOUS at 13:55

## 2025-01-09 RX ADMIN — DEXMEDETOMIDINE HYDROCHLORIDE 4 MCG: 100 INJECTION, SOLUTION INTRAVENOUS at 13:59

## 2025-01-09 RX ADMIN — BUPIVACAINE HYDROCHLORIDE 20 ML: 5 INJECTION, SOLUTION EPIDURAL; INTRACAUDAL; PERINEURAL at 13:15

## 2025-01-09 RX ADMIN — FENTANYL CITRATE 25 MCG: 50 INJECTION, SOLUTION INTRAMUSCULAR; INTRAVENOUS at 14:01

## 2025-01-09 RX ADMIN — PROPOFOL 20 MG: 10 INJECTION, EMULSION INTRAVENOUS at 13:54

## 2025-01-09 RX ADMIN — Medication 2 AMPULE: at 12:46

## 2025-01-09 RX ADMIN — DEXMEDETOMIDINE HYDROCHLORIDE 4 MCG: 100 INJECTION, SOLUTION INTRAVENOUS at 14:08

## 2025-01-09 RX ADMIN — MIDAZOLAM 2 MG: 1 INJECTION INTRAMUSCULAR; INTRAVENOUS at 13:15

## 2025-01-09 RX ADMIN — CEFAZOLIN 2000 MG: 2 INJECTION, POWDER, FOR SOLUTION INTRAVENOUS at 13:59

## 2025-01-09 ASSESSMENT — PAIN - FUNCTIONAL ASSESSMENT
PAIN_FUNCTIONAL_ASSESSMENT: 0-10

## 2025-01-09 ASSESSMENT — PAIN DESCRIPTION - DESCRIPTORS: DESCRIPTORS: ACHING

## 2025-01-09 NOTE — ANESTHESIA PRE PROCEDURE
Department of Anesthesiology  Preprocedure Note       Name:  Mary Yadav   Age:  74 y.o.  :  1950                                          MRN:  [id number]         Date:  2025      Surgeon: Surgeon(s):  Nereida Levine MD    Procedure: Procedure(s):  RIGHT HAND CLOSED REDUCTION AND PINNING FIFTH METACARPAL BASE FRACTURE NOTE: BLOCK    Medications prior to admission:   Prior to Admission medications    Medication Sig Start Date End Date Taking? Authorizing Provider   traMADol (ULTRAM) 50 MG tablet Take 1 tablet by mouth 4 times daily as needed for Pain for up to 60 days. Max Daily Amount: 200 mg 24  Smitha Singer APRN - CNP   busPIRone (BUSPAR) 10 MG tablet TAKE 1 TABLET BY MOUTH 3 TIMES A DAY AS NEEDED FOR ANXIETY  Patient taking differently: Take 1 tablet by mouth 2 times daily as needed 24   Berry Krause MD   citalopram (CELEXA) 20 MG tablet Take 1 tablet by mouth daily 10/14/24   Berry Krause MD   gabapentin (NEURONTIN) 100 MG capsule TAKE TWO CAPSULES BY MOUTH THREE TIMES A DAY AS DIRECTED  Patient taking differently: Take 2 capsules by mouth in the morning and at bedtime. 10/14/24 4/15/25  Berry Krause MD   lidocaine (LIDODERM) 5 % APPLY 1 PATCH TO AFFECTED AREA FOR 12 HOURS IN A 24 HOUR PERIOD  Patient taking differently: Place 1 patch onto the skin daily as needed APPLY 1 PATCH TO AFFECTED AREA FOR 12 HOURS IN A 24 HOUR PERIOD 10/14/24   Berry Krause MD   valACYclovir (VALTREX) 1 g tablet Take 1 tablet by mouth 3 times daily  Patient taking differently: Take 1 tablet by mouth 3 times daily as needed 10/14/24   Berry Krause MD   metoprolol succinate (TOPROL XL) 25 MG extended release tablet Take one tab daily 24   Charlotte Timmons MD   spironolactone (ALDACTONE) 25 MG tablet TAKE ONE-HALF TABLET BY MOUTH DAILY 24   Charlotte Timmons MD   lisinopril (PRINIVIL;ZESTRIL) 40 MG tablet TAKE ONE TABLET

## 2025-01-09 NOTE — PROGRESS NOTES
Time out performed with Dr. Contreras for radial nerve block. Patient placed on telemetry, continuous pulse oximetry, and 2L NC for the procedure. BP cycled every five minutes. Cardiac rhythm is NSR. Patient tolerated well, VSS stable throughout. Will continue to monitor VS every 15 minutes post procedure. Side rails in place, call light within reach, once alert patient instructed to press call button if assistance is need, verbalized understanding.

## 2025-01-09 NOTE — ANESTHESIA PROCEDURE NOTES
Peripheral Block    Patient location during procedure: pre-op  Reason for block: post-op pain management and at surgeon's request  Start time: 1/9/2025 1:15 PM  End time: 1/9/2025 1:25 PM  Staffing  Performed: anesthesiologist   Anesthesiologist: Nicanor Contreras MD  Performed by: Nicanor Contreras MD  Authorized by: Nicanor Contreras MD    Preanesthetic Checklist  Completed: patient identified, IV checked, site marked, risks and benefits discussed, surgical/procedural consents, equipment checked, pre-op evaluation, timeout performed, anesthesia consent given, oxygen available, monitors applied/VS acknowledged, fire risk safety assessment completed and verbalized and blood product R/B/A discussed and consented  Peripheral Block   Patient position: supine  Prep: ChloraPrep  Provider prep: sterile gloves  Patient monitoring: cardiac monitor, continuous pulse ox, frequent blood pressure checks, IV access, oxygen and responsive to questions  Block type: Brachial plexus  Supraclavicular  Laterality: right  Injection technique: single-shot  Guidance: ultrasound guided  Local infiltration: lidocaine  Infiltration strength: 1 %  Local infiltration: lidocaine  Dose: 3 mL    Needle   Needle type: insulated echogenic nerve stimulator needle   Needle gauge: 21 G  Needle localization: ultrasound guidance  Needle length: 5 cm  Assessment   Injection assessment: negative aspiration for heme, no paresthesia on injection, local visualized surrounding nerve on ultrasound and no intravascular symptoms  Slow fractionated injection: yes  Hemodynamics: stable  Outcomes: uncomplicated and patient tolerated procedure well    Additional Notes  Patient moved throughout procedure due to Lumbar pain despite placement of pillows and IV versed.   Medications Administered  midazolam (VERSED) injection 2 mg/2mL - IntraVENous   2 mg - 1/9/2025 1:15:00 PM  BUPivacaine (MARCAINE) PF injection 0.5% - Perineural   20 mL - 1/9/2025 1:15:00 PM

## 2025-01-09 NOTE — PROGRESS NOTES
Patient arrived to phase 2 from OR, patient responds to voice, report received from endo team, will continue to monitor.

## 2025-01-09 NOTE — H&P
Preoperative H&P Update    The patient's History and Physical in the medical record was reviewed by me today.  I reviewed the HPI, medications, allergies, reason for surgery, diagnosis and treatment plan and there has been no interval change.    The patient was examined by me today. Physical exam findings for this update include:    LMP  (LMP Unknown)   Airway is intact  Chest: breathing comfortably  Heart: regular rate  Findings on exam of the body region where surgery is to be performed include: see last office and/or consult note      Electronically signed by Nereida Levine MD on 1/9/2025 at 1:27 PM

## 2025-01-09 NOTE — DISCHARGE INSTRUCTIONS
ORTHOPAEDICS AND SPORTS MEDICINE           HAND SURGERY - LAUREN POWELL MD                              POST-OPERATIVE DISCHARGE INSTRUCTIONS    PRESCRIPTIONS:  You have been given a prescription to be taken as directed for post-operative pain control.  Take over-the-counter Colace, 100mg by mouth twice a day while taking narcotic pain medications to help prevent constipation.  You may also take over the counter ibuprofen/aleve and tylenol for pain. Take this as directed on the packaging. Do not exceed 3000 mg tylenol/acetaminophen in 24 hours.     Ibuprofen 600-800 mg (3-4) tablets by mouth every 6 hours as needed for pain.      OR   Aleve 2 tablets by mouth every 12 hours (twice daily) as needed for pain.      AND/OR   Tylenol 1000 mg (2 tablets) every 8 hours as needed for pain.    4. Please use your pain medication carefully, as refills are limited and you may not be provided with one.   5. Prescriptions are only filled in person during a clinic visit.   6. As stated above, please  use over the counter pain medicine - it will also be helpful with decreasing your swelling.       ANESTHESIA:  1.After your surgery, post-surgical discomfort or pain is likely. This discomfort can last several days to a few weeks. At certain times of the day your discomfort may be more intense.     2. Did you receive a nerve block? A nerve block can provide pain relief for one hour to two days after your surgery. As long as the nerve block is working, you will experience little or no sensation in the area the surgeon operated on. As the nerve block wears off, you will begin to experience pain or discomfort. It is very important that you begin taking your prescribed pain medication before the nerve block fully wears off. Treating your pain at the first sign of the block wearing off will ensure your pain is better controlled and more tolerable when full-sensation returns. Do not wait until the pain is intolerable, as the

## 2025-01-09 NOTE — ANESTHESIA POSTPROCEDURE EVALUATION
Department of Anesthesiology  Postprocedure Note    Patient: Mary Yadav  MRN: [id number]  YOB: 1950  Date of evaluation: 1/9/2025    Procedure Summary       Date: 01/09/25 Room / Location: [address]    Anesthesia Start: 1352 Anesthesia Stop: 1500    Procedure: RIGHT HAND CLOSED REDUCTION AND PINNING FIFTH METACARPAL BASE FRACTURE (Right: Wrist) Diagnosis:       Displaced fracture of base of fifth metacarpal bone of right hand      (Displaced fracture of base of fifth metacarpal bone of right hand [S62.316A])    Surgeons: Nereida Levine MD Responsible Provider: Nicanor Contreras MD    Anesthesia Type: MAC, Regional ASA Status: 3            Anesthesia Type: MAC, Regional    Christie Phase I: Christie Score: 10    Christie Phase II: Christie Score: 10    Anesthesia Post Evaluation    Comments: Anes Post-op Note    Name:    Mary Yadav  MRN:      [id number]    Patient Vitals in the past 12 hrs:  01/09/25 1527, BP:(!) 117/59, Pulse:64, Resp:16, SpO2:94 %  01/09/25 1513, BP:(!) 119/56, Pulse:57, Resp:16, SpO2:94 %  01/09/25 1501, BP:(!) 98/58, Pulse:79, Resp:16, SpO2:94 %  01/09/25 1210, BP:130/85, Temp:98.3 °F (36.8 °C), Pulse:73, Resp:18, SpO2:96 %, Height:1.626 m (5' 4\"), Weight:83.9 kg (185 lb)     LABS:    CBC  Lab Results       Component                Value               Date/Time                  WBC                      10.9                01/03/2025 05:53 PM        HGB                      13.4                01/03/2025 05:53 PM        HCT                      41.0                01/03/2025 05:53 PM        PLT                      343                 01/03/2025 05:53 PM   RENAL  Lab Results       Component                Value               Date/Time                  NA                       139                 01/03/2025 05:53 PM        K                        4.4                 01/03/2025 05:53 PM        CL                       100                 01/03/2025

## 2025-01-09 NOTE — CARE COORDINATION
Ambulatory Care Coordination Note     1/9/2025 4:29 PM     Patient outreach attempt by this ACM today to perform care management follow up . ACM was unable to reach the patient by telephone today;   Number is not working and connecting on call      ACM: Shanda Nicole RN     PCP/Specialist follow up:   Future Appointments         Provider Specialty Dept Phone    1/20/2025 10:15 AM Nereida Levine MD Orthopedic Surgery [phone]    3/19/2025 1:10 PM Smitha Singer APRN - CNP Pain Management [phone]    3/25/2025 1:30 PM Erasmo Mercado MD Pulmonology [phone]    4/15/2025 12:30 PM (Arrive by 12:15 PM) [facility] 1 Cardiology [phone]    4/15/2025 1:30 PM Charlotte Timmons MD Cardiology [phone]    4/15/2025 3:20 PM Melony Owen MD Family Medicine [phone]    4/30/2025 11:30 AM [facility] OP NURSING ROOM 1 IP Unit [phone]            Follow Up:   Plan for next ACM outreach in approximately 1-2 days  to complete:  Additional follow up attempt needed  .

## 2025-01-09 NOTE — PROGRESS NOTES
Educated patient and daughter to not take pain medicine containing tylenol while taking pain prescription because it also contains tylenol.

## 2025-01-10 ENCOUNTER — TELEPHONE (OUTPATIENT)
Dept: ORTHOPEDIC SURGERY | Age: 75
End: 2025-01-10

## 2025-01-10 NOTE — OP NOTE
OPERATIVE NOTE     Patient Name: Mary Yadav   YOB: 1950  MRN:  [id number]    Date of Procedure:  1/9/2025  Surgeon: Nereida Levine MD    PRE-OPERATIVE DIAGNOSIS:  Right fifth metacarpal base fracture    POST-OPERATIVE DIAGNOSIS:  Right fifth metacarpal base fracture    PROCEDURE:  Mini-open reduction and percutaneous fixation of right fifth metacarpal base fracture     Anesthesia: Monitor Anesthesia Care and regional    OPERATIVE INDICATIONS: The patient is a very pleasant 74 y.o. female who sustained a fracture to their right fifth metacarpal base. X-rays were reviewed with the patient and it was determined that these fractures required closed reduction and percutaneous fixation due to displaced and unstable nature of the fracture. Due to delays in her care and recent inclement weather, her surgery was delayed until she was just over 2 weeks post injury. Risks of surgery were discussed with the patient including infection, retained hardware, malunion, non-union, bleeding and pain.    OPERATIVE PROCEDURE: The patient was seen in the preoperative holding area. The operative procedure confirmed and the operative site was marked with an indelible marker. A regional block was performed in the preoperative holding area. The patient was brought to the operating room and placed supine on the table. A hand table was placed on the patient's side.  A tourniquet was placed on the patient's forearm.  A timeout was performed which correctly identified the team members, the procedure to be performed as well as the operative site. Next, the arm was prepped and draped in a sterile fashion.     Closed reduction was attempted but due to the delay in her care, the fracture could not be mobilized in a closed fashion. A small incision on the dorsal aspect of the fifth metacarpal base and dissection proceeded down to the fracture. Care was taken to protect the dorsal ulnar sensory nerve. The fracture was mobilized

## 2025-01-10 NOTE — TELEPHONE ENCOUNTER
General Question     Subject: 2 WEEKS APPT   Patient and /or Facility Request: Mary Yadav   Contact Number: [phone]     PATIENT DAUGHTER WOULF LIKE TO KNOW IF ITS OK TO KEEP THE APPT FOR 1- FOR THE PATIENT 2 WEEK APPT..    REQ A CALL BACK..    PLEASE ADVISE

## 2025-01-15 ENCOUNTER — CARE COORDINATION (OUTPATIENT)
Dept: CASE MANAGEMENT | Age: 75
End: 2025-01-15

## 2025-01-15 NOTE — CARE COORDINATION
Ambulatory Care Coordination Note     1/15/2025 11:58 AM     Patient outreach attempt by this ACM today to perform care management follow up . ACM was unable to reach the patient by telephone today;   left voice message requesting a return phone call to this ACM.     ACM: Luana Santoro LPN     PCP/Specialist follow up:   Future Appointments         Provider Specialty Dept Phone    1/20/2025 11:15 AM Nereida Levine MD Orthopedic Surgery [phone]    3/19/2025 1:10 PM Smitha Singer APRN - CNP Pain Management [phone]    3/25/2025 1:30 PM Erasmo Mercado MD Pulmonology [phone]    4/15/2025 12:30 PM (Arrive by 12:15 PM) [facility] Cardiology [phone]    4/15/2025 1:30 PM Charlotte Timmons MD Cardiology [phone]    4/15/2025 3:20 PM Melony Owen MD Family Medicine [phone]    4/30/2025 11:30 AM [facility] OP NURSING ROOM 1 IP Unit [phone]            Follow Up:   Plan for next ACM outreach in approximately 1 week to complete:  ACM follow up .

## 2025-01-20 ENCOUNTER — OFFICE VISIT (OUTPATIENT)
Dept: ORTHOPEDIC SURGERY | Age: 75
End: 2025-01-20
Payer: MEDICARE

## 2025-01-20 VITALS — BODY MASS INDEX: 31.58 KG/M2 | WEIGHT: 185 LBS | HEIGHT: 64 IN

## 2025-01-20 DIAGNOSIS — S62.316A DISPLACED FRACTURE OF BASE OF FIFTH METACARPAL BONE, RIGHT HAND, INITIAL ENCOUNTER FOR CLOSED FRACTURE: Primary | ICD-10-CM

## 2025-01-20 PROCEDURE — 29075 APPL CST ELBW FNGR SHORT ARM: CPT | Performed by: STUDENT IN AN ORGANIZED HEALTH CARE EDUCATION/TRAINING PROGRAM

## 2025-01-20 PROCEDURE — 99024 POSTOP FOLLOW-UP VISIT: CPT | Performed by: STUDENT IN AN ORGANIZED HEALTH CARE EDUCATION/TRAINING PROGRAM

## 2025-01-20 NOTE — PROGRESS NOTES
Hand, Upper Extremity and Reconstructive Surgery                Nereida Levine MD                                           Summary of Upper Extremity Problems and Interventions     Diagnosis: Right fifth metacarpal base fracture  Surgery-mini open reduction and percutaneous fixation of right fifth metacarpal base fracture, date of surgery 1/9/2025    History of Present Illness     Interval update 1/20/2025-patient presents for follow-up of the above surgery.  He has been in a splint since her surgery.  Pain is well-controlled.  She presents today with her daughter.    Mary Yadav is a 74 y.o. right hand dominant female who presents with right hand injury.  Patient fell while taking out the garbage on 12/24/2024.  She fell onto an outstretched hand.  She was seen by Dr. Worthington earlier this week.  She was found to have a right fifth metacarpal fracture.  She is placed in a TKO brace.  She does currently work doing [employer] for an [employer].  Denies any prior injury or surgeries to the hand aside from this recent incident.  She occasionally smokes.  Denies history of diabetes.    Allergies     Allergies   Allergen Reactions    Shellfish-Derived Products Nausea And Vomiting    Celebrex [Celecoxib] Swelling    Codeine     Ibuprofen Swelling    Methadone     Vioxx Swelling       Home Medications     Current Outpatient Medications   Medication Instructions    albuterol sulfate HFA (VENTOLIN HFA) 108 (90 Base) MCG/ACT inhaler 2 puffs, Inhalation, EVERY 6 HOURS PRN    aspirin EC 81 mg, Oral, DAILY    busPIRone (BUSPAR) 10 MG tablet TAKE 1 TABLET BY MOUTH 3 TIMES A DAY AS NEEDED FOR ANXIETY    Calcium 6927-6603 MG-UNIT CHEW Oral, DAILY    Cholecalciferol (VITAMIN D3) 2000 UNITS CAPS 2 tablets, Oral, DAILY    citalopram (CELEXA) 20 mg, Oral, DAILY    Coenzyme Q10 (COQ10 PO) Oral, DAILY    fenofibrate (TRICOR) 54 MG tablet TAKE ONE TABLET BY MOUTH DAILY    gabapentin

## 2025-01-24 ENCOUNTER — CARE COORDINATION (OUTPATIENT)
Dept: CARE COORDINATION | Age: 75
End: 2025-01-24

## 2025-01-24 NOTE — CARE COORDINATION
Ambulatory Care Coordination Note     1/24/2025 11:31 AM     patient outreach attempt by this ACM today to perform care management follow up . ACM was unable to reach the patient by telephone today;   left voice message requesting a return phone call to this ACM.     Patient closed (unable to reach patient) from the High Risk Care Management program on 1/24/2025.  Patient  UTR x 3 calls  .  Care management goals have been completed. No further Ambulatory Care Manager follow up scheduled.

## 2025-01-27 DIAGNOSIS — F41.8 SITUATIONAL ANXIETY: ICD-10-CM

## 2025-01-27 RX ORDER — CITALOPRAM HYDROBROMIDE 20 MG/1
20 TABLET ORAL DAILY
Qty: 90 TABLET | Refills: 0 | Status: SHIPPED | OUTPATIENT
Start: 2025-01-27

## 2025-01-27 NOTE — TELEPHONE ENCOUNTER
Refill Request     CONFIRM preferrred pharmacy with the patient.    If Mail Order Rx - Pend for 90 day refill.      Last Seen: Last Seen Department: 1/3/2025  Last Seen by PCP: Visit date not found    Last Written: 10/14/24    If no future appointment scheduled, route STAFF MESSAGE with patient name to the  Pool for scheduling.      Next Appointment:   Future Appointments   Date Time Provider Department Center   2/19/2025  2:45 PM Nereida Levine MD [facility]   3/19/2025  1:10 PM Smitha Singer APRN - CNP [facility] AND [facility]   3/25/2025  1:30 PM Erasmo Mercado MD CLERM PULM [facility]   4/15/2025 12:30 PM [facility] CARDI ECHO 1 [facility] AND CAR [facility] CARD   4/15/2025  1:30 PM Charlotte Timmons MD Bartolome Car [facility]   4/15/2025  3:20 PM Melony Owen MD [facility]   4/30/2025 11:30 AM [facility] [place]       Message sent to  to schedule appt with patient?  N/A      Requested Prescriptions     Pending Prescriptions Disp Refills    citalopram (CELEXA) 20 MG tablet 90 tablet 0     Sig: Take 1 tablet by mouth daily

## 2025-01-30 DIAGNOSIS — F41.8 SITUATIONAL ANXIETY: ICD-10-CM

## 2025-01-30 RX ORDER — CITALOPRAM HYDROBROMIDE 20 MG/1
20 TABLET ORAL DAILY
Qty: 90 TABLET | Refills: 0 | OUTPATIENT
Start: 2025-01-30

## 2025-02-19 ENCOUNTER — OFFICE VISIT (OUTPATIENT)
Dept: ORTHOPEDIC SURGERY | Age: 75
End: 2025-02-19

## 2025-02-19 VITALS — HEIGHT: 64 IN | WEIGHT: 185 LBS | BODY MASS INDEX: 31.58 KG/M2

## 2025-02-19 DIAGNOSIS — S62.316A DISPLACED FRACTURE OF BASE OF FIFTH METACARPAL BONE, RIGHT HAND, INITIAL ENCOUNTER FOR CLOSED FRACTURE: Primary | ICD-10-CM

## 2025-02-20 NOTE — PROGRESS NOTES
Hand, Upper Extremity and Reconstructive Surgery                Nereida Levine MD                                           Summary of Upper Extremity Problems and Interventions     Diagnosis: Right fifth metacarpal base fracture  Surgery-mini open reduction and percutaneous fixation of right fifth metacarpal base fracture, date of surgery 1/9/2025    History of Present Illness     Interval update 2/20/2025-patient presents for follow-up of the above surgery.  He has been in a splint since her surgery.  Pain is well-controlled.  No concerns at this time.  She presents today with her daughter.    Mary Yadav is a 74 y.o. right hand dominant female who presents with right hand injury.  Patient fell while taking out the garbage on 12/24/2024.  She fell onto an outstretched hand.  She was seen by Dr. Worthington earlier this week.  She was found to have a right fifth metacarpal fracture.  She is placed in a TKO brace.  She does currently work doing [employer] for an [employer]  Denies any prior injury or surgeries to the hand aside from this recent incident.  She occasionally smokes.  Denies history of diabetes.    Allergies     Allergies   Allergen Reactions    Shellfish-Derived Products Nausea And Vomiting    Celebrex [Celecoxib] Swelling    Codeine     Ibuprofen Swelling    Methadone     Vioxx Swelling       Home Medications     Current Outpatient Medications   Medication Instructions    albuterol sulfate HFA (VENTOLIN HFA) 108 (90 Base) MCG/ACT inhaler 2 puffs, Inhalation, EVERY 6 HOURS PRN    aspirin EC 81 mg, Oral, DAILY    busPIRone (BUSPAR) 10 MG tablet TAKE 1 TABLET BY MOUTH 3 TIMES A DAY AS NEEDED FOR ANXIETY    Calcium 0460-9711 MG-UNIT CHEW Oral, DAILY    Cholecalciferol (VITAMIN D3) 2000 UNITS CAPS 2 tablets, Oral, DAILY    citalopram (CELEXA) 20 mg, Oral, DAILY    Coenzyme Q10 (COQ10 PO) Oral, DAILY    fenofibrate (TRICOR) 54 MG tablet TAKE ONE TABLET BY

## 2025-03-03 ENCOUNTER — HOSPITAL ENCOUNTER (OUTPATIENT)
Dept: OCCUPATIONAL THERAPY | Age: 75
Setting detail: THERAPIES SERIES
Discharge: HOME | End: 2025-03-03
Payer: MEDICARE

## 2025-03-03 PROCEDURE — 97165 OT EVAL LOW COMPLEX 30 MIN: CPT | Performed by: OCCUPATIONAL THERAPIST

## 2025-03-03 PROCEDURE — 97112 NEUROMUSCULAR REEDUCATION: CPT | Performed by: OCCUPATIONAL THERAPIST

## 2025-03-03 NOTE — PLAN OF CARE
Adjusted    TREATMENT PLAN     Frequency/Duration: 2x/week for 8 weeks for the following treatment interventions:    Interventions:  [x] Therapeutic exercise including: strength training, ROM, including postural re-education.   [x] NMR activation and proprioception, including postural re-education.    [x] Manual therapy as indicated to include: PROM, Gr I-IV mobilizations, STM, and Dry Needling/IASTM  [x] Modalities as needed that may include: Electrical Stimulation, Biofeedback, Thermal Agents, Ultrasound, Paraffin, and Whirlpool  [x] Patient education on joint protection, postural re-education, activity modification, progression of HEP.        [] Aquatic Therapy    Plan: POC initiated as per evaluation    Electronically Signed by: Marj Archuleta OT    Date: 03/03/2025    Note: Portions of this note have been templated and/or copied from initial evaluation, reassessments and prior notes for documentation efficiency.

## 2025-03-10 ENCOUNTER — HOSPITAL ENCOUNTER (OUTPATIENT)
Dept: OCCUPATIONAL THERAPY | Age: 75
Setting detail: THERAPIES SERIES
Discharge: HOME | End: 2025-03-10
Payer: MEDICARE

## 2025-03-10 PROCEDURE — 97110 THERAPEUTIC EXERCISES: CPT | Performed by: OCCUPATIONAL THERAPIST

## 2025-03-10 PROCEDURE — 97018 PARAFFIN BATH THERAPY: CPT | Performed by: OCCUPATIONAL THERAPIST

## 2025-03-10 PROCEDURE — 97112 NEUROMUSCULAR REEDUCATION: CPT | Performed by: OCCUPATIONAL THERAPIST

## 2025-03-10 PROCEDURE — 97140 MANUAL THERAPY 1/> REGIONS: CPT | Performed by: OCCUPATIONAL THERAPIST

## 2025-03-10 NOTE — FLOWSHEET NOTE
[facility] - Outpatient Rehabilitation and Therapy: [address], [place] [address] office: [phone] fax: [phone]     Occupational Therapy Initial Evaluation Certification      Dear Nereida Levine MD,    We had the pleasure of evaluating the following patient for occupational therapy services at [facility] Outpatient Occupational Therapy.  A summary of our findings can be found in the initial assessment below.  This includes our plan of care.  If you have any questions or concerns regarding these findings, please do not hesitate to contact me at the office phone number listed above.  Thank you for the referral.     Physician Signature:_______________________________Date:__________________  By signing above (or electronic signature), therapist’s plan is approved by physician       Occupational Therapy: TREATMENT/PROGRESS NOTE   Patient: Mary Yadav (74 y.o. female)   Examination Date: 03/10/2025   :  1950 MRN: [id number]   Visit #: 2  Insurance Allowable Auth Needed     BMN []Yes    [x]No    Insurance: Payor: MEDICARE / Plan: MEDICARE PART A AND B / Product Type: *No Product type* /   Insurance ID: [id number] - (Medicare)  Secondary Insurance (if applicable): CIGNA   Treatment Diagnosis: R hand pain M79.641     Medical Diagnosis:  Displaced fracture of base of fifth metacarpal bone, right hand, initial encounter for closed fracture [S62.316A]   Referring Physician: Nereida Levine MD  PCP: Melony Owen MD     Plan of care signed (Y/N):     Date of Patient follow up with Physician:      Plan of Care Report: EVAL today  POC update due: (10 visits /OR AUTH LIMITS, whichever is less)  2025                                             Medical History:  Date of Onset:  Date of Surgery:  Comorbidities:  Hypertension  Osteoarthritis  Anxiety  Depression  Other Cardio/Pulmonary Conditions: no pacemaker  Relevant Medical History: R shoulder pain

## 2025-03-17 ENCOUNTER — TELEPHONE (OUTPATIENT)
Dept: CARDIOLOGY CLINIC | Age: 75
End: 2025-03-17

## 2025-03-17 ENCOUNTER — TELEPHONE (OUTPATIENT)
Dept: ORTHOPEDIC SURGERY | Age: 75
End: 2025-03-17

## 2025-03-17 NOTE — TELEPHONE ENCOUNTER
General Question     Subject: WORK FORMS   Patient and /or Facility Request: Mary Yadav   Contact Number: [phone]       PATIENT CALLING TO LET PROVIDER THAT SHE WILL BE DROPPING OFF WORK FORMS ON WED 3/19/25 AT  AND WILL NEED BACK  ASAP     PLEASE ADVISE

## 2025-03-17 NOTE — TELEPHONE ENCOUNTER
Pt is going to cancel her appt with Dr Chau (sleep) due to having been off with her broken wrist and missing a lot of work. She has to get back to work, or she will get fired. She will R/S at some point.

## 2025-03-19 ENCOUNTER — HOSPITAL ENCOUNTER (OUTPATIENT)
Dept: OCCUPATIONAL THERAPY | Age: 75
Setting detail: THERAPIES SERIES
Discharge: HOME | End: 2025-03-19
Payer: MEDICARE

## 2025-03-19 PROCEDURE — 97112 NEUROMUSCULAR REEDUCATION: CPT | Performed by: OCCUPATIONAL THERAPIST

## 2025-03-19 NOTE — FLOWSHEET NOTE
[facility] - Outpatient Rehabilitation and Therapy: [address], [place] [address] office: [phone] fax: [phone]     Occupational Therapy Initial Evaluation Certification      Dear Nereida Levine MD,    We had the pleasure of evaluating the following patient for occupational therapy services at [facility] Outpatient Occupational Therapy.  A summary of our findings can be found in the initial assessment below.  This includes our plan of care.  If you have any questions or concerns regarding these findings, please do not hesitate to contact me at the office phone number listed above.  Thank you for the referral.     Physician Signature:_______________________________Date:__________________  By signing above (or electronic signature), therapist’s plan is approved by physician       Occupational Therapy: TREATMENT/PROGRESS NOTE   Patient: Mary Yadav (74 y.o. female)   Examination Date: 2025   :  1950 MRN: [id number]   Visit #: 3  Insurance Allowable Auth Needed     BMN []Yes    [x]No    Insurance: Payor: MEDICARE / Plan: MEDICARE PART A AND B / Product Type: *No Product type* /   Insurance ID: [id number] - (Medicare)  Secondary Insurance (if applicable): CIGNA   Treatment Diagnosis: R hand pain M79.641     Medical Diagnosis:  Displaced fracture of base of fifth metacarpal bone, right hand, initial encounter for closed fracture [S62.316A]   Referring Physician: Nereida Levine MD  PCP: Melony Owen MD     Plan of care signed (Y/N):     Date of Patient follow up with Physician:      Plan of Care Report: EVAL today  POC update due: (10 visits /OR AUTH LIMITS, whichever is less)  2025                                             Medical History:  Date of Onset:  Date of Surgery:  Comorbidities:  Hypertension  Osteoarthritis  Anxiety  Depression  Other Cardio/Pulmonary Conditions: no pacemaker  Relevant Medical History: R shoulder pain

## 2025-03-20 NOTE — TELEPHONE ENCOUNTER
R/C TO PATIENT.    LMOR FOR PATIENT THAT COPY OF FORM AND RTW LETTER ARE READY FOR PICKUP AT  AT [facility]. RTW DATE UPDATED TO 3/21/25

## 2025-03-27 ENCOUNTER — TELEPHONE (OUTPATIENT)
Dept: PULMONOLOGY | Age: 75
End: 2025-03-27

## 2025-03-27 NOTE — TELEPHONE ENCOUNTER
Cancelled new patient appointment    Patient called and said that she called and cancel appointment for 3-25 and then she received a call stating she has missed the appointment. I asked if she would like to reschedule and she said no not at this time. I told her that her referral is good for one year and if shew changes her mind to call us back at her convenience.

## 2025-04-18 ENCOUNTER — OFFICE VISIT (OUTPATIENT)
Dept: ORTHOPEDIC SURGERY | Age: 75
End: 2025-04-18
Payer: MEDICARE

## 2025-04-18 VITALS — BODY MASS INDEX: 31.58 KG/M2 | WEIGHT: 185 LBS | HEIGHT: 64 IN

## 2025-04-18 DIAGNOSIS — M12.811 ROTATOR CUFF ARTHROPATHY OF RIGHT SHOULDER: Primary | ICD-10-CM

## 2025-04-18 PROCEDURE — G8400 PT W/DXA NO RESULTS DOC: HCPCS | Performed by: STUDENT IN AN ORGANIZED HEALTH CARE EDUCATION/TRAINING PROGRAM

## 2025-04-18 PROCEDURE — 1090F PRES/ABSN URINE INCON ASSESS: CPT | Performed by: STUDENT IN AN ORGANIZED HEALTH CARE EDUCATION/TRAINING PROGRAM

## 2025-04-18 PROCEDURE — 1159F MED LIST DOCD IN RCRD: CPT | Performed by: STUDENT IN AN ORGANIZED HEALTH CARE EDUCATION/TRAINING PROGRAM

## 2025-04-18 PROCEDURE — 1123F ACP DISCUSS/DSCN MKR DOCD: CPT | Performed by: STUDENT IN AN ORGANIZED HEALTH CARE EDUCATION/TRAINING PROGRAM

## 2025-04-18 PROCEDURE — 3017F COLORECTAL CA SCREEN DOC REV: CPT | Performed by: STUDENT IN AN ORGANIZED HEALTH CARE EDUCATION/TRAINING PROGRAM

## 2025-04-18 PROCEDURE — 1125F AMNT PAIN NOTED PAIN PRSNT: CPT | Performed by: STUDENT IN AN ORGANIZED HEALTH CARE EDUCATION/TRAINING PROGRAM

## 2025-04-18 PROCEDURE — 1036F TOBACCO NON-USER: CPT | Performed by: STUDENT IN AN ORGANIZED HEALTH CARE EDUCATION/TRAINING PROGRAM

## 2025-04-18 PROCEDURE — G8427 DOCREV CUR MEDS BY ELIG CLIN: HCPCS | Performed by: STUDENT IN AN ORGANIZED HEALTH CARE EDUCATION/TRAINING PROGRAM

## 2025-04-18 PROCEDURE — G8417 CALC BMI ABV UP PARAM F/U: HCPCS | Performed by: STUDENT IN AN ORGANIZED HEALTH CARE EDUCATION/TRAINING PROGRAM

## 2025-04-18 PROCEDURE — 99214 OFFICE O/P EST MOD 30 MIN: CPT | Performed by: STUDENT IN AN ORGANIZED HEALTH CARE EDUCATION/TRAINING PROGRAM

## 2025-04-18 NOTE — PROGRESS NOTES
Chief Complaint  Shoulder Pain (CK RT. SHOULDER)      History of Present Illness  The patient is a 74-year-old female here today for repeat evaluation regarding her right shoulder. She reports experiencing a pulling sensation in her right shoulder when reaching upwards. She has been unable to work due to stiffness and pain, which led to her losing her job. On 03/19/2025, she attempted to return to work but was unable to continue due to her symptoms. She is currently on disability insurance and is uncertain about her ability to return to work. She has been out of work for approximately 4 months and is open to the possibility of therapy but is not considering surgical intervention at this time. Financial difficulties in managing her medical bills due to unemployment are mentioned, with concerns about the cost of therapy and insurance coverage.    She reports experiencing depression and reluctance to go outside. Efforts are made to go outside daily, either by taking a short drive or walking around the area.    SOCIAL HISTORY  Exercise: Takes a walk around the area or a short drive every day.    Prior HPI 12/31/24:  Mary Yadav is a 74 y.o. right-hand-dominant female here today for evaluation of right shoulder and hand pain after a mechanical fall on 12/24/2024 evening.  Patient states that she was taking the trash when she stumbled over the curb and fell directly onto her right side.  She has had right shoulder and hand pain since that time.  She was seen in the ER on [place] where x-rays were taken and was given a thumb spica brace.  Denies numbness/tingling.     Of note, she has a history of a full-thickness supraspinatus tear with 12 mm of retraction diagnosed in 2017 on MRI.  She has had shoulder dysfunction from that prior to her fall which has now worsened.         Medical History:  Patient's medications, allergies, past medical, surgical, social and family histories were reviewed and updated as

## 2025-05-04 DIAGNOSIS — F41.8 SITUATIONAL ANXIETY: ICD-10-CM

## 2025-05-05 RX ORDER — CITALOPRAM HYDROBROMIDE 20 MG/1
20 TABLET ORAL DAILY
Qty: 90 TABLET | Refills: 0 | Status: SHIPPED | OUTPATIENT
Start: 2025-05-05

## 2025-05-05 NOTE — TELEPHONE ENCOUNTER
Refill Request     CONFIRM preferrred pharmacy with the patient.    If Mail Order Rx - Pend for 90 day refill.      Last Seen: Last Seen Department: 1/3/2025  Last Seen by PCP: Visit date not found    Last Written: 1/27/25    If no future appointment scheduled, route STAFF MESSAGE with patient name to the  Pool for scheduling.      Next Appointment:   Future Appointments   Date Time Provider Department Center   5/7/2025  2:20 PM Vonnie Abarca, PT [facility]   5/8/2025 12:00 PM [facility] OP NURSING ROOM 1 [facility] OP RN [facility]   6/25/2025  1:30 PM Smitha Singer, BRENDA - CNP AFL TSP AND [facility]   11/5/2025 11:30 AM [facility] OP NURSING ROOM 1 [facility] OP RN [facility]       Message sent to  to schedule appt with patient?  NO      Requested Prescriptions     Pending Prescriptions Disp Refills    citalopram (CELEXA) 20 MG tablet [Pharmacy Med Name: CITALOPRAM HBR 20 MG TABLET] 90 tablet 0     Sig: Take 1 tablet by mouth daily

## 2025-05-07 ENCOUNTER — HOSPITAL ENCOUNTER (OUTPATIENT)
Dept: PHYSICAL THERAPY | Age: 75
Setting detail: THERAPIES SERIES
Discharge: HOME | End: 2025-05-07
Payer: MEDICARE

## 2025-05-07 DIAGNOSIS — M25.511 CHRONIC RIGHT SHOULDER PAIN: Primary | ICD-10-CM

## 2025-05-07 DIAGNOSIS — G89.29 CHRONIC RIGHT SHOULDER PAIN: Primary | ICD-10-CM

## 2025-05-07 DIAGNOSIS — M62.81 MUSCLE WEAKNESS OF RIGHT UPPER EXTREMITY: ICD-10-CM

## 2025-05-07 PROCEDURE — 97110 THERAPEUTIC EXERCISES: CPT

## 2025-05-07 PROCEDURE — 97161 PT EVAL LOW COMPLEX 20 MIN: CPT

## 2025-05-07 PROCEDURE — 97140 MANUAL THERAPY 1/> REGIONS: CPT

## 2025-05-07 NOTE — PLAN OF CARE
[facility] - Outpatient Rehabilitation and Therapy: [address] [place] [place] [address] office: [phone] fax: [phone]     Physical Therapy Initial Evaluation Certification      Dear Kris Worthington DO ,    We had the pleasure of evaluating the following patient for physical therapy services at [facility] Outpatient Physical Therapy.  A summary of our findings can be found in the initial assessment below.  This includes our plan of care.  If you have any questions or concerns regarding these findings, please do not hesitate to contact me at the office phone number listed above.  Thank you for the referral.     Physician Signature:_______________________________Date:__________________  By signing above (or electronic signature), therapist’s plan is approved by physician       Physical Therapy: TREATMENT/PROGRESS NOTE   Patient: Mary Yadav (74 y.o. female)   Examination Date: 2025   :  1950 MRN: [id number]   Visit #: 1   Insurance Allowable Auth Needed   Med Nec []Yes    []No    Insurance: Payor: MEDICARE / Plan: MEDICARE PART A AND B / Product Type: *No Product type* /   Insurance ID: [id number] - (Medicare)  Secondary Insurance (if applicable): CIGNA   Treatment Diagnosis:     ICD-10-CM    1. Chronic right shoulder pain  M25.511     G89.29       2. Muscle weakness of right upper extremity  M62.81          Medical Diagnosis:  Rotator cuff arthropathy of right shoulder [M12.811]   Referring Physician: Kris Worthington DO  PCP: Melony Owen MD     Plan of care signed (Y/N):     Date of Patient follow up with Physician:      Plan of Care Report: EVAL today  POC update due: (10 visits /OR AUTH LIMITS, whichever is less)  2025                                             Medical History:  Comorbidities:  Other: see below  Relevant Medical History:   Past Medical History:   Diagnosis Date    Arthritis     Asthma     Back pain     Fibromyalgia     Heart failure with

## 2025-05-08 ENCOUNTER — HOSPITAL ENCOUNTER (OUTPATIENT)
Dept: NURSING | Age: 75
Setting detail: INFUSION SERIES
Discharge: HOME | End: 2025-05-08
Payer: MEDICARE

## 2025-05-08 VITALS
SYSTOLIC BLOOD PRESSURE: 123 MMHG | RESPIRATION RATE: 16 BRPM | BODY MASS INDEX: 31.58 KG/M2 | DIASTOLIC BLOOD PRESSURE: 72 MMHG | HEIGHT: 64 IN | TEMPERATURE: 97.4 F | HEART RATE: 80 BPM | WEIGHT: 184.97 LBS

## 2025-05-08 DIAGNOSIS — E78.2 MIXED HYPERLIPIDEMIA: Primary | ICD-10-CM

## 2025-05-08 DIAGNOSIS — Z78.9 STATIN INTOLERANCE: ICD-10-CM

## 2025-05-08 PROCEDURE — 99211 OFF/OP EST MAY X REQ PHY/QHP: CPT

## 2025-05-08 PROCEDURE — 6360000002 HC RX W HCPCS: Performed by: INTERNAL MEDICINE

## 2025-05-08 PROCEDURE — 96372 THER/PROPH/DIAG INJ SC/IM: CPT

## 2025-05-08 RX ORDER — EPINEPHRINE 1 MG/ML
0.3 INJECTION, SOLUTION INTRAMUSCULAR; SUBCUTANEOUS PRN
OUTPATIENT
Start: 2025-10-16

## 2025-05-08 RX ORDER — HYDROCORTISONE SODIUM SUCCINATE 100 MG/2ML
100 INJECTION INTRAMUSCULAR; INTRAVENOUS
OUTPATIENT
Start: 2025-10-16

## 2025-05-08 RX ORDER — DIPHENHYDRAMINE HYDROCHLORIDE 50 MG/ML
50 INJECTION, SOLUTION INTRAMUSCULAR; INTRAVENOUS
OUTPATIENT
Start: 2025-10-16

## 2025-05-08 RX ORDER — SODIUM CHLORIDE 9 MG/ML
INJECTION, SOLUTION INTRAVENOUS CONTINUOUS
OUTPATIENT
Start: 2025-10-16

## 2025-05-08 RX ADMIN — INCLISIRAN 284 MG: 284 INJECTION, SOLUTION SUBCUTANEOUS at 12:20

## 2025-05-08 ASSESSMENT — PAIN DESCRIPTION - DESCRIPTORS: DESCRIPTORS: ACHING;DULL

## 2025-05-08 ASSESSMENT — PAIN DESCRIPTION - LOCATION: LOCATION: BACK

## 2025-05-08 ASSESSMENT — PAIN DESCRIPTION - PAIN TYPE: TYPE: CHRONIC PAIN

## 2025-05-08 ASSESSMENT — PAIN DESCRIPTION - ORIENTATION: ORIENTATION: LOWER

## 2025-05-08 ASSESSMENT — PAIN SCALES - GENERAL: PAINLEVEL_OUTOF10: 7

## 2025-05-08 ASSESSMENT — PAIN DESCRIPTION - FREQUENCY: FREQUENCY: CONTINUOUS

## 2025-05-08 NOTE — PROGRESS NOTES
Pt here ambulatory for a Leqvio injection  Pt reports no issues after the other injections and she denies any   questions or concerns about it today  Leqvio  284 mg was given SC in left arm  Injection site unremarkable  Bandaid applied  Pt tera well Discharge instructions reviewed with pt  and understanding was verbalized Copy was given then pt was discharged ambulatory in stable condition

## 2025-05-13 ENCOUNTER — HOSPITAL ENCOUNTER (OUTPATIENT)
Dept: PHYSICAL THERAPY | Age: 75
Setting detail: THERAPIES SERIES
End: 2025-05-13
Payer: MEDICARE

## 2025-05-15 ENCOUNTER — HOSPITAL ENCOUNTER (OUTPATIENT)
Dept: PHYSICAL THERAPY | Age: 75
Setting detail: THERAPIES SERIES
Discharge: HOME | End: 2025-05-15
Payer: MEDICARE

## 2025-05-15 PROCEDURE — 97140 MANUAL THERAPY 1/> REGIONS: CPT

## 2025-05-15 PROCEDURE — 97110 THERAPEUTIC EXERCISES: CPT

## 2025-05-15 NOTE — FLOWSHEET NOTE
[facility] - Outpatient Rehabilitation and Therapy: [address], [place] [address] office: [phone] fax: [phone]         Physical Therapy: TREATMENT/PROGRESS NOTE   Patient: Mary Yadav (74 y.o. female)   Examination Date: 05/15/2025   :  1950 MRN: [id number]   Visit #: 2   Insurance Allowable Auth Needed   Med Nec []Yes    []No    Insurance: Payor: MEDICARE / Plan: MEDICARE PART A AND B / Product Type: *No Product type* /   Insurance ID: [id number] - (Medicare)  Secondary Insurance (if applicable): CIGNA   Treatment Diagnosis:     ICD-10-CM    1. Chronic right shoulder pain  M25.511     G89.29       2. Muscle weakness of right upper extremity  M62.81          Medical Diagnosis:  Rotator cuff arthropathy of right shoulder [M12.811]   Referring Physician: Kris Worthington DO  PCP: Melony Owen MD     Plan of care signed (Y/N):     Date of Patient follow up with Physician:      Plan of Care Report: NO  POC update due: (10 visits /OR AUTH LIMITS, whichever is less)  2025                                             Medical History:  Comorbidities:  Other: see below  Relevant Medical History:   Past Medical History:   Diagnosis Date    Arthritis     Asthma     Back pain     Fibromyalgia     Heart failure with reduced ejection fraction (HCC)     Herpes zoster     Hypercholesteremia     Hyperlipidemia     Hypertriglyceridemia     Neck pain     Osteopenia     Stress-induced cardiomyopathy 2017                                              Precautions/ Contra-indications:           Latex allergy:  NO  Pacemaker:    NO  Contraindications for Manipulation: NA  Date of Surgery: n/a  Other:    Red Flags:  None    Suicide Screening:   The patient did not verbalize a primary behavioral concern, suicidal ideation, suicidal intent, or demonstrate suicidal behaviors.    Preferred Language for Healthcare:   [x] English       [] other:    SUBJECTIVE EXAMINATION     Patient stated

## 2025-05-20 ENCOUNTER — HOSPITAL ENCOUNTER (OUTPATIENT)
Dept: PHYSICAL THERAPY | Age: 75
Setting detail: THERAPIES SERIES
Discharge: HOME | End: 2025-05-20
Payer: MEDICARE

## 2025-05-20 PROCEDURE — 97110 THERAPEUTIC EXERCISES: CPT

## 2025-05-20 PROCEDURE — 97140 MANUAL THERAPY 1/> REGIONS: CPT

## 2025-05-20 NOTE — FLOWSHEET NOTE
(87046)     Iontophoresis (56320)    VASO (88663)     Ultrasound (36645)    Group Therapy (52427)     Estim Attended (91392)    Canalith Repositioning (90705)     Physical Performance Test (68901)    Custom orthotic ()     Other:    Other:    Total Timed Code Tx Minutes 57' 3       Total Treatment Minutes 57'        Charge Justification:  (02348) THERAPEUTIC EXERCISE - Provided verbal/tactile cueing for HEP and/or activities related to strengthening, flexibility, endurance, ROM performed to prevent loss of range of motion, maintain or improve muscular strength or increase flexibility, following either an injury or surgery.   (29818) MANUAL THERAPY -  Manual therapy techniques, 1 or more regions, each 15 minutes (Mobilization/manipulation, manual lymphatic drainage, manual traction) for the purpose of modulating pain, promoting relaxation,  increasing ROM, reducing/eliminating soft tissue swelling/inflammation/restriction, improving soft tissue extensibility and allowing for proper ROM for normal function with self care, mobility, lifting and ambulation    GOALS     Patient stated goal: To be able to return to work  [] Progressing: [] Met: [] Not Met: [] Adjusted    Therapist goals for Patient:   Short Term Goals: To be achieved in: 2 weeks  Independent in HEP and progression per patient tolerance, in order to prevent re-injury.   [] Progressing: [] Met: [] Not Met: [] Adjusted  Patient will have a decrease in pain to <2-3/10 to facilitate improvement in movement, function, and ADLs as indicated by Functional Deficits.  [] Progressing: [] Met: [] Not Met: [] Adjusted      Long Term Goals: To be achieved in: 12 weeks  Disability index score of 20% or less for the Quick DASH to assist with reaching prior level of function with activities such as donning and doffing clothing without pain.  [] Progressing: [] Met: [] Not Met: [] Adjusted  Patient will demonstrate increased AROM of R Shoulder flexion/scap to 140 °

## 2025-05-21 DIAGNOSIS — E78.2 MIXED HYPERLIPIDEMIA: ICD-10-CM

## 2025-05-21 RX ORDER — FENOFIBRATE 54 MG/1
TABLET ORAL
Qty: 90 TABLET | Refills: 0 | Status: SHIPPED | OUTPATIENT
Start: 2025-05-21

## 2025-05-21 NOTE — TELEPHONE ENCOUNTER
Refill Request     CONFIRM preferrred pharmacy with the patient.    If Mail Order Rx - Pend for 90 day refill.      Last Seen: Last Seen Department: 1/3/2025  Last Seen by PCP: Visit date not found    Last Written: 7/31/24    If no future appointment scheduled, route STAFF MESSAGE with patient name to the  Pool for scheduling.      Next Appointment:   Future Appointments   Date Time Provider Department Center   5/22/2025  3:40 PM Vonnie Abarca PT [facility] PT [facility]   5/28/2025  3:40 PM Vonnie Abarca, PT [facility] PT [place] [facility]   6/3/2025  2:20 PM Vonnie Abarca, PT [facility] PT [place] [facility]   6/5/2025  3:40 PM Vonnie Abarca PT [facility] PT [facility]   6/25/2025  1:30 PM Smitha Singer APRN - CNP [facility] AND [facility]   11/11/2025 12:00 PM [facility]       Message sent to  to schedule appt with patient?  N/A      Requested Prescriptions     Pending Prescriptions Disp Refills    fenofibrate (TRICOR) 54 MG tablet 90 tablet 3     Sig: TAKE ONE TABLET BY MOUTH DAILY      
Universal Safety Interventions

## 2025-05-22 ENCOUNTER — HOSPITAL ENCOUNTER (OUTPATIENT)
Dept: PHYSICAL THERAPY | Age: 75
Setting detail: THERAPIES SERIES
End: 2025-05-22
Payer: MEDICARE

## 2025-05-28 ENCOUNTER — HOSPITAL ENCOUNTER (OUTPATIENT)
Dept: PHYSICAL THERAPY | Age: 75
Setting detail: THERAPIES SERIES
Discharge: HOME | End: 2025-05-28
Payer: MEDICARE

## 2025-05-28 PROCEDURE — 97140 MANUAL THERAPY 1/> REGIONS: CPT

## 2025-05-28 PROCEDURE — 97110 THERAPEUTIC EXERCISES: CPT

## 2025-05-28 NOTE — FLOWSHEET NOTE
Ther. Act (48070)    [facility]. Traction (42997)     Gait (78307)    Dry Needle 1-2 muscle (63412)     Aquatic Therex (05176)    Dry Needle 3+ muscle (69923)     Iontophoresis (64953)    VASO (95533)     Ultrasound (58612)    Group Therapy (86579)     Estim Attended (01759)    Canalith Repositioning (43547)     Physical Performance Test (97734)    Custom orthotic ()     Other:    Other:    Total Timed Code Tx Minutes 60' 4       Total Treatment Minutes 60'        Charge Justification:  (06466) THERAPEUTIC EXERCISE - Provided verbal/tactile cueing for HEP and/or activities related to strengthening, flexibility, endurance, ROM performed to prevent loss of range of motion, maintain or improve muscular strength or increase flexibility, following either an injury or surgery.   (70408) MANUAL THERAPY -  Manual therapy techniques, 1 or more regions, each 15 minutes (Mobilization/manipulation, manual lymphatic drainage, manual traction) for the purpose of modulating pain, promoting relaxation,  increasing ROM, reducing/eliminating soft tissue swelling/inflammation/restriction, improving soft tissue extensibility and allowing for proper ROM for normal function with self care, mobility, lifting and ambulation    GOALS     Patient stated goal: To be able to return to work  [] Progressing: [] Met: [] Not Met: [] Adjusted    Therapist goals for Patient:   Short Term Goals: To be achieved in: 2 weeks  Independent in HEP and progression per patient tolerance, in order to prevent re-injury.   [] Progressing: [] Met: [] Not Met: [] Adjusted  Patient will have a decrease in pain to <2-3/10 to facilitate improvement in movement, function, and ADLs as indicated by Functional Deficits.  [] Progressing: [] Met: [] Not Met: [] Adjusted      Long Term Goals: To be achieved in: 12 weeks  Disability index score of 20% or less for the Quick DASH to assist with reaching prior level of function with activities such as donning and doffing

## 2025-06-03 ENCOUNTER — HOSPITAL ENCOUNTER (OUTPATIENT)
Dept: PHYSICAL THERAPY | Age: 75
Setting detail: THERAPIES SERIES
Discharge: HOME | End: 2025-06-03
Payer: MEDICARE

## 2025-06-03 PROCEDURE — 97110 THERAPEUTIC EXERCISES: CPT

## 2025-06-03 PROCEDURE — 97140 MANUAL THERAPY 1/> REGIONS: CPT

## 2025-06-03 NOTE — FLOWSHEET NOTE
[facility] - Outpatient Rehabilitation and Therapy: [address], [place] [address] office: [phone] fax: [phone]         Physical Therapy: TREATMENT/PROGRESS NOTE   Patient: Mary Yadav (74 y.o. female)   Examination Date: 2025   :  1950 MRN: [id number]   Visit #: 5   Insurance Allowable Auth Needed   Med Nec []Yes    []No    Insurance: Payor: MEDICARE / Plan: MEDICARE PART A AND B / Product Type: *No Product type* /   Insurance ID: [id number] - (Medicare)  Secondary Insurance (if applicable): [employer]   Treatment Diagnosis:     ICD-10-CM    1. Chronic right shoulder pain  M25.511     G89.29       2. Muscle weakness of right upper extremity  M62.81          Medical Diagnosis:  Rotator cuff arthropathy of right shoulder [M12.811]   Referring Physician: Kris Worthington DO  PCP: Melony Owen MD     Plan of care signed (Y/N):     Date of Patient follow up with Physician:      Plan of Care Report: NO  POC update due: (10 visits /OR AUTH LIMITS, whichever is less)  2025                                             Medical History:  Comorbidities:  Other: see below  Relevant Medical History:   Past Medical History:   Diagnosis Date    Arthritis     Asthma     Back pain     Fibromyalgia     Heart failure with reduced ejection fraction (HCC)     Herpes zoster     Hypercholesteremia     Hyperlipidemia     Hypertriglyceridemia     Neck pain     Osteopenia     Stress-induced cardiomyopathy 2017                                              Precautions/ Contra-indications:           Latex allergy:  NO  Pacemaker:    NO  Contraindications for Manipulation: NA  Date of Surgery: n/a  Other:    Red Flags:  None    Suicide Screening:   The patient did not verbalize a primary behavioral concern, suicidal ideation, suicidal intent, or demonstrate suicidal behaviors.    Preferred Language for Healthcare:   [x] English       [] other:    SUBJECTIVE EXAMINATION     Patient stated

## 2025-06-05 ENCOUNTER — HOSPITAL ENCOUNTER (OUTPATIENT)
Dept: PHYSICAL THERAPY | Age: 75
Setting detail: THERAPIES SERIES
Discharge: HOME | End: 2025-06-05
Payer: MEDICARE

## 2025-06-05 PROCEDURE — 97140 MANUAL THERAPY 1/> REGIONS: CPT

## 2025-06-05 PROCEDURE — 97110 THERAPEUTIC EXERCISES: CPT

## 2025-06-05 NOTE — PLAN OF CARE
emphasis/focus on exercise progression, improving proper muscle recruitment and activation/motor control patterns, modulating pain, increasing ROM, and improving postural awareness. Next visit plan to add new exercises         Electronically Signed by Vonnie Abarca PT  Date: 06/05/2025     Note: Portions of this note have been templated and/or copied from initial evaluation, reassessments and prior notes for documentation efficiency.    Note: If patient does not return for scheduled/recommended follow up visits, this note will serve as a discharge from care along with the most recent update on progress.    Ortho Evaluation

## 2025-06-12 ENCOUNTER — HOSPITAL ENCOUNTER (OUTPATIENT)
Dept: PHYSICAL THERAPY | Age: 75
Setting detail: THERAPIES SERIES
End: 2025-06-12
Payer: MEDICARE

## 2025-06-13 ENCOUNTER — TELEMEDICINE (OUTPATIENT)
Dept: FAMILY MEDICINE CLINIC | Age: 75
End: 2025-06-13

## 2025-06-13 DIAGNOSIS — Z00.00 MEDICARE ANNUAL WELLNESS VISIT, SUBSEQUENT: Primary | ICD-10-CM

## 2025-06-13 SDOH — ECONOMIC STABILITY: FOOD INSECURITY: WITHIN THE PAST 12 MONTHS, THE FOOD YOU BOUGHT JUST DIDN'T LAST AND YOU DIDN'T HAVE MONEY TO GET MORE.: NEVER TRUE

## 2025-06-13 SDOH — ECONOMIC STABILITY: FOOD INSECURITY: WITHIN THE PAST 12 MONTHS, YOU WORRIED THAT YOUR FOOD WOULD RUN OUT BEFORE YOU GOT MONEY TO BUY MORE.: NEVER TRUE

## 2025-06-13 ASSESSMENT — PATIENT HEALTH QUESTIONNAIRE - PHQ9
SUM OF ALL RESPONSES TO PHQ QUESTIONS 1-9: 13
9. THOUGHTS THAT YOU WOULD BE BETTER OFF DEAD, OR OF HURTING YOURSELF: NOT AT ALL
10. IF YOU CHECKED OFF ANY PROBLEMS, HOW DIFFICULT HAVE THESE PROBLEMS MADE IT FOR YOU TO DO YOUR WORK, TAKE CARE OF THINGS AT HOME, OR GET ALONG WITH OTHER PEOPLE: NOT DIFFICULT AT ALL
3. TROUBLE FALLING OR STAYING ASLEEP: SEVERAL DAYS
5. POOR APPETITE OR OVEREATING: NEARLY EVERY DAY
8. MOVING OR SPEAKING SO SLOWLY THAT OTHER PEOPLE COULD HAVE NOTICED. OR THE OPPOSITE, BEING SO FIGETY OR RESTLESS THAT YOU HAVE BEEN MOVING AROUND A LOT MORE THAN USUAL: NOT AT ALL
SUM OF ALL RESPONSES TO PHQ QUESTIONS 1-9: 13
SUM OF ALL RESPONSES TO PHQ QUESTIONS 1-9: 13
4. FEELING TIRED OR HAVING LITTLE ENERGY: NEARLY EVERY DAY
SUM OF ALL RESPONSES TO PHQ QUESTIONS 1-9: 13
1. LITTLE INTEREST OR PLEASURE IN DOING THINGS: NEARLY EVERY DAY
6. FEELING BAD ABOUT YOURSELF - OR THAT YOU ARE A FAILURE OR HAVE LET YOURSELF OR YOUR FAMILY DOWN: NOT AT ALL
7. TROUBLE CONCENTRATING ON THINGS, SUCH AS READING THE NEWSPAPER OR WATCHING TELEVISION: NOT AT ALL
2. FEELING DOWN, DEPRESSED OR HOPELESS: NEARLY EVERY DAY

## 2025-06-13 NOTE — PROGRESS NOTES
Shortness of Breath (sob) Yes Jesus Peoples MD   Coenzyme Q10 (COQ10 PO) Take by mouth daily Yes Louie Mcnamara MD   aspirin EC 81 MG EC tablet Take 1 tablet by mouth daily Yes Berry Krause MD   Cholecalciferol (VITAMIN D3) 2000 UNITS CAPS Take 2 tablets by mouth daily  Yes Louie Mcnamara MD   Calcium 7599-8131 MG-UNIT CHEW Take by mouth daily Yes Louie Mcnamara MD       CareTeam (Including outside providers/suppliers regularly involved in providing care):   Patient Care Team:  Melony Owen MD as PCP - General (Family Medicine)  Melony Owen MD as PCP - Empaneled Provider  Pardeep Camejo as  (Spiritual Services)     Recommendations for Preventive Services Due: see orders and patient instructions/AVS.  Recommended screening schedule for the next 5-10 years is provided to the patient in written form: see Patient Instructions/AVS.     Reviewed and updated this visit:  Tobacco  Allergies  Meds  Problems  Med Hx  Surg Hx  Fam Hx  Sexual   Hx               Mary Eyre, was evaluated through a synchronous (real-time) audio-video encounter. The patient (or guardian if applicable) is aware that this is a billable service, which includes applicable co-pays. This Virtual Visit was conducted with patient's (and/or legal guardian's) consent. Patient identification was verified, and a caregiver was present when appropriate.   The patient was [facility] [place] [facility] [place] [facility] [address]  Provider was located at Facility (Appt Dept): [address] [place] [id number]  Confirm you are appropriately licensed, registered, or certified to deliver care in the state where the patient is located as indicated above. If you are not or unsure, please re-schedule the visit: Yes, I confirm.        This encounter was performed under my, Melony Owen MD’s, direct supervision, 06/13/25.

## 2025-06-13 NOTE — PATIENT INSTRUCTIONS
attack. These may include:    Chest pain or pressure, or a strange feeling in the chest.     Sweating.     Shortness of breath.     Pain, pressure, or a strange feeling in the back, neck, jaw, or upper belly or in one or both shoulders or arms.     Lightheadedness or sudden weakness.     A fast or irregular heartbeat.   After you call 911, the  may tell you to chew 1 adult-strength or 2 to 4 low-dose aspirin. Wait for an ambulance. Do not try to drive yourself.  Watch closely for changes in your health, and be sure to contact your doctor if you have any problems.  Where can you learn more?  Go to https://www.[employer].net/patientEd and enter F075 to learn more about \"A Healthy Heart: Care Instructions.\"  Current as of: July 31, 2024  Content Version: 14.5  © 1113-8194 [employer].   Care instructions adapted under license by [employer] If you have questions about a medical condition or this instruction, always ask your healthcare professional. [employer], disclaims any warranty or liability for your use of this information.    Personalized Preventive Plan for Mary Yadav - 6/13/2025  Medicare offers a range of preventive health benefits. Some of the tests and screenings are paid in full while other may be subject to a deductible, co-insurance, and/or copay.  Some of these benefits include a comprehensive review of your medical history including lifestyle, illnesses that may run in your family, and various assessments and screenings as appropriate.  After reviewing your medical record and screening and assessments performed today your provider may have ordered immunizations, labs, imaging, and/or referrals for you.  A list of these orders (if applicable) as well as your Preventive Care list are included within your After Visit Summary for your review.

## 2025-07-18 ENCOUNTER — TELEPHONE (OUTPATIENT)
Dept: INTERNAL MEDICINE CLINIC | Age: 75
End: 2025-07-18

## 2025-07-18 ENCOUNTER — OFFICE VISIT (OUTPATIENT)
Dept: FAMILY MEDICINE CLINIC | Age: 75
End: 2025-07-18

## 2025-07-18 VITALS
HEART RATE: 82 BPM | DIASTOLIC BLOOD PRESSURE: 88 MMHG | WEIGHT: 173 LBS | BODY MASS INDEX: 29.68 KG/M2 | RESPIRATION RATE: 18 BRPM | OXYGEN SATURATION: 93 % | SYSTOLIC BLOOD PRESSURE: 133 MMHG

## 2025-07-18 DIAGNOSIS — B02.9 HERPES ZOSTER WITHOUT COMPLICATION: ICD-10-CM

## 2025-07-18 RX ORDER — VALACYCLOVIR HYDROCHLORIDE 1 G/1
1000 TABLET, FILM COATED ORAL 3 TIMES DAILY
Qty: 21 TABLET | Refills: 1 | Status: SHIPPED | OUTPATIENT
Start: 2025-07-18 | End: 2025-07-25

## 2025-07-18 NOTE — PROGRESS NOTES
C4-C5 level, Herniation of intervertebral disc at C6-C7 level     Inclisiran Sodium (LEQVIO) 284 MG/1.5ML  --  --     Associated Diagnoses:  --     lidocaine (LIDODERM) 5 %  10/14/24  --     APPLY 1 PATCH TO AFFECTED AREA FOR 12 HOURS IN A 24 HOUR PERIOD     Patient taking differently: Place 1 patch onto the skin daily as needed APPLY 1 PATCH TO AFFECTED AREA FOR 12 HOURS IN A 24 HOUR PERIOD     Associated Diagnoses:  Postherpetic neuralgia     lisinopril (PRINIVIL;ZESTRIL) 40 MG tablet  07/31/24  --     TAKE ONE TABLET BY MOUTH EVERY EVENING     Associated Diagnoses:  Systolic CHF, chronic (HCC), Essential hypertension     metoprolol succinate (TOPROL XL) 25 MG extended release tablet  07/31/24  --     Take one tab daily     Associated Diagnoses:  Systolic CHF, chronic (HCC), Essential hypertension     naloxone 4 MG/0.1ML LIQD nasal spray  03/19/25  --     1 spray by Nasal route as needed for Opioid Reversal     Associated Diagnoses:  Degeneration of intervertebral disc at L5-S1 level, Degeneration of L4-L5 intervertebral disc     spironolactone (ALDACTONE) 25 MG tablet  07/31/24  --     TAKE ONE-HALF TABLET BY MOUTH DAILY     Associated Diagnoses:  Systolic CHF, chronic (HCC)     traMADol (ULTRAM) 50 MG tablet  06/25/25 08/24/25     Take 1 tablet by mouth 4 times daily as needed for Pain for up to 60 days. Max Daily Amount: 200 mg     Associated Diagnoses:  Degeneration of intervertebral disc at L5-S1 level     valACYclovir (VALTREX) 1 g tablet  10/14/24  --     Take 1 tablet by mouth 3 times daily     Patient taking differently: Take 1 tablet by mouth 3 times daily as needed     Associated Diagnoses:  Herpes zoster without complication             Patient Active Problem List   Diagnosis Code    Back pain M54.9    Neck pain M54.2    Fibromyalgia M79.7    Arthritis M19.90    Herpes zoster B02.9    Osteopenia M85.80    Obesity E66.9    Dysthymia F34.1    Scoliosis of thoracic spine M41.9    Compression fx, thoracic

## 2025-07-18 NOTE — TELEPHONE ENCOUNTER
Spoke to patient, she stated that is it currently only on her right hip.    It started about a week ago with tingling, pain and itching and yesterday a rash appeared.     Patient is not able to complete a VV or see a vitalist.    She was scheduled for an in person acute visit today.

## 2025-07-18 NOTE — TELEPHONE ENCOUNTER
Patient called and stated she has broken out in shingles.    She said that anytime this had happened in the past Dr. Krause would send her in acyclovir to treat it.    I informed patient she would probably need to be seen in office and offered her an acute today, patient Denied appointment.     Patient stated she wanted to see if the medication could be called in before scheduling.

## 2025-07-31 DIAGNOSIS — F41.8 SITUATIONAL ANXIETY: ICD-10-CM

## 2025-07-31 RX ORDER — CITALOPRAM HYDROBROMIDE 20 MG/1
20 TABLET ORAL DAILY
Qty: 90 TABLET | Refills: 0 | Status: SHIPPED | OUTPATIENT
Start: 2025-07-31

## 2025-07-31 NOTE — TELEPHONE ENCOUNTER
Refill Request     CONFIRM preferrred pharmacy with the patient.    If Mail Order Rx - Pend for 90 day refill.      Last Seen: Last Seen Department: 7/18/2025  Last Seen by PCP: 7/18/2025    Last Written: 5/5/25    If no future appointment scheduled, route STAFF MESSAGE with patient name to the  Pool for scheduling.      Next Appointment:   Future Appointments   Date Time Provider Department Center   8/15/2025 10:50 AM [facility]   8/20/2025  2:00 PM Smitha Singer APRN - CNP [facility] AND [facility] [place]   11/11/2025 12:00 PM [facility]   6/19/2026 12:30 PM SCHEDULE, [facility] ROCK ANDRE , LPN [facility] DEP       Message sent to  to schedule appt with patient?  NO      Requested Prescriptions     Pending Prescriptions Disp Refills    citalopram (CELEXA) 20 MG tablet [Pharmacy Med Name: CITALOPRAM HBR 20 MG TABLET] 90 tablet 0     Sig: TAKE 1 TABLET BY MOUTH DAILY

## 2025-08-04 ENCOUNTER — TELEPHONE (OUTPATIENT)
Dept: CARDIOLOGY CLINIC | Age: 75
End: 2025-08-04

## 2025-08-04 DIAGNOSIS — I50.22 SYSTOLIC CHF, CHRONIC (HCC): ICD-10-CM

## 2025-08-05 RX ORDER — SPIRONOLACTONE 25 MG/1
TABLET ORAL
Qty: 45 TABLET | Refills: 3 | Status: SHIPPED | OUTPATIENT
Start: 2025-08-05

## 2025-08-08 DIAGNOSIS — I10 ESSENTIAL HYPERTENSION: ICD-10-CM

## 2025-08-08 DIAGNOSIS — I50.22 SYSTOLIC CHF, CHRONIC (HCC): ICD-10-CM

## 2025-08-08 RX ORDER — LISINOPRIL 40 MG/1
TABLET ORAL
Qty: 90 TABLET | Refills: 3 | Status: SHIPPED | OUTPATIENT
Start: 2025-08-08

## 2025-08-08 RX ORDER — METOPROLOL SUCCINATE 25 MG/1
TABLET, EXTENDED RELEASE ORAL
Qty: 90 TABLET | Refills: 3 | Status: SHIPPED | OUTPATIENT
Start: 2025-08-08

## 2025-08-15 ENCOUNTER — OFFICE VISIT (OUTPATIENT)
Dept: FAMILY MEDICINE CLINIC | Age: 75
End: 2025-08-15

## 2025-08-15 VITALS
OXYGEN SATURATION: 92 % | HEART RATE: 72 BPM | SYSTOLIC BLOOD PRESSURE: 117 MMHG | DIASTOLIC BLOOD PRESSURE: 75 MMHG | RESPIRATION RATE: 18 BRPM

## 2025-08-15 DIAGNOSIS — F41.8 SITUATIONAL ANXIETY: ICD-10-CM

## 2025-08-15 DIAGNOSIS — I10 ESSENTIAL HYPERTENSION: ICD-10-CM

## 2025-08-15 DIAGNOSIS — R73.03 PRE-DIABETES: ICD-10-CM

## 2025-08-15 DIAGNOSIS — Z12.31 ENCOUNTER FOR SCREENING MAMMOGRAM FOR BREAST CANCER: ICD-10-CM

## 2025-08-15 DIAGNOSIS — R41.3 MEMORY CHANGE: Primary | ICD-10-CM

## 2025-08-15 DIAGNOSIS — Z72.0 TOBACCO ABUSE: ICD-10-CM

## 2025-08-15 DIAGNOSIS — M85.80 OSTEOPENIA, UNSPECIFIED LOCATION: ICD-10-CM

## 2025-08-15 DIAGNOSIS — E78.2 MIXED HYPERLIPIDEMIA: ICD-10-CM

## 2025-08-24 DIAGNOSIS — E78.2 MIXED HYPERLIPIDEMIA: ICD-10-CM

## 2025-08-25 RX ORDER — FENOFIBRATE 54 MG/1
54 TABLET ORAL DAILY
Qty: 90 TABLET | Refills: 0 | Status: SHIPPED | OUTPATIENT
Start: 2025-08-25

## 2025-08-29 ENCOUNTER — TELEPHONE (OUTPATIENT)
Dept: CARDIOLOGY CLINIC | Age: 75
End: 2025-08-29

## (undated) DEVICE — GOWN,SIRUS,NON REINFRCD,LARGE,SET IN SL: Brand: MEDLINE

## (undated) DEVICE — SUTURE ETHLN SZ 4-0 L18IN NONABSORBABLE BLK L19MM PS-2 3/8 1667H

## (undated) DEVICE — SUTURE N ABSRB L 18 IN SZ 4-0 NDL L 19 MM NYL MONOFILAMENT

## (undated) DEVICE — Device

## (undated) DEVICE — GLOVE,SURG,SENSICARE,ALOE,LF,PF,7: Brand: MEDLINE

## (undated) DEVICE — CORD ES L12FT BPLR FRCP

## (undated) DEVICE — SKIN PREP TRAY W/CHG: Brand: MEDLINE INDUSTRIES, INC.

## (undated) DEVICE — PACK PROCEDURE SURG SURGERYARTHROSCOPY KNEE

## (undated) DEVICE — 4.5 MM INCISOR PLUS STRAIGHT                                    BLADES, POWER/EP-1, VIOLET, PACKAGED                                    6 PER BOX, STERILE

## (undated) DEVICE — MANIFOLD SURG NEPTUNE WST MGMT

## (undated) DEVICE — STERILE LATEX POWDER-FREE SURGICAL GLOVESWITH NITRILE COATING: Brand: PROTEXIS

## (undated) DEVICE — UNDERPAD HOSP W30XL36IN WHT SUP ABSRB POLYMER AIR PERM DISP

## (undated) DEVICE — GLOVE SURG SZ 65 L12IN FNGR THK94MIL STD WHT LTX FREE

## (undated) DEVICE — DRESSING,GAUZE,XEROFORM,CURAD,1"X8",ST: Brand: CURAD

## (undated) DEVICE — DRAPE EQUIP C ARM MINI 10000100] TIDI PRODUCTS INC]

## (undated) DEVICE — TUBING PMP IRRIG GOFLO

## (undated) DEVICE — GLOVE ORANGE PI 7   MSG9070

## (undated) DEVICE — SOLUTION IRRIG 500ML 0.9% SOD CHLO USP POUR PLAS BTL

## (undated) DEVICE — STERILE COMFO-TEX LATEX FREE ELASTIC BANDAGE, 3INX5YD: Brand: COMFO-TEX™

## (undated) DEVICE — ZIMMER® STERILE DISPOSABLE TOURNIQUET CUFF WITH PLC, DUAL PORT, SINGLE BLADDER, 18 IN. (46 CM)

## (undated) DEVICE — SOLUTION IV IRRIG 0.9% NACL 3000ML BAG 2B7477

## (undated) DEVICE — GLOVE SURG SZ 65 L12IN FNGR THK79MIL GRN LTX FREE

## (undated) DEVICE — PADDING CAST W4INXL4YD NONSTERILE COT RAYON MICROPLEATED

## (undated) DEVICE — SINGLE USE DEVICE INTENDED TO COVER EXPOSED ENDS OF ORTHOPEDIC PIN AND K-WIRES TO HELP PROTECT THE EXPOSED WIRE FROM SNAGGING ON CLOTHING.: Brand: OXBORO™ PIN COVER

## (undated) DEVICE — Z CONVERTED USE 2273232 BANDAGE COMPR W6INXL11YD E KNIT DBL SELF CLSR EZE-BAND

## (undated) DEVICE — PADDING CAST N ADH 12X6 IN CRIMPED FINISH 100% COTTON WBRLII